# Patient Record
Sex: MALE | Race: WHITE | NOT HISPANIC OR LATINO | Employment: FULL TIME | URBAN - METROPOLITAN AREA
[De-identification: names, ages, dates, MRNs, and addresses within clinical notes are randomized per-mention and may not be internally consistent; named-entity substitution may affect disease eponyms.]

---

## 2017-02-02 ENCOUNTER — COMMUNICATION - HEALTHEAST (OUTPATIENT)
Dept: SCHEDULING | Facility: CLINIC | Age: 57
End: 2017-02-02

## 2017-02-05 ENCOUNTER — COMMUNICATION - HEALTHEAST (OUTPATIENT)
Dept: FAMILY MEDICINE | Facility: CLINIC | Age: 57
End: 2017-02-05

## 2017-02-05 DIAGNOSIS — I10 HTN (HYPERTENSION): ICD-10-CM

## 2017-02-05 DIAGNOSIS — E78.5 HYPERLIPIDEMIA: ICD-10-CM

## 2017-04-27 ENCOUNTER — COMMUNICATION - HEALTHEAST (OUTPATIENT)
Dept: FAMILY MEDICINE | Facility: CLINIC | Age: 57
End: 2017-04-27

## 2017-04-27 DIAGNOSIS — E78.5 HYPERLIPIDEMIA: ICD-10-CM

## 2017-04-27 DIAGNOSIS — I10 HTN (HYPERTENSION): ICD-10-CM

## 2017-06-01 ENCOUNTER — COMMUNICATION - HEALTHEAST (OUTPATIENT)
Dept: FAMILY MEDICINE | Facility: CLINIC | Age: 57
End: 2017-06-01

## 2017-07-18 ENCOUNTER — COMMUNICATION - HEALTHEAST (OUTPATIENT)
Dept: FAMILY MEDICINE | Facility: CLINIC | Age: 57
End: 2017-07-18

## 2017-07-18 DIAGNOSIS — I10 HTN (HYPERTENSION): ICD-10-CM

## 2017-08-02 ENCOUNTER — OFFICE VISIT - HEALTHEAST (OUTPATIENT)
Dept: FAMILY MEDICINE | Facility: CLINIC | Age: 57
End: 2017-08-02

## 2017-08-02 DIAGNOSIS — E78.5 HYPERLIPIDEMIA: ICD-10-CM

## 2017-08-02 DIAGNOSIS — I10 ESSENTIAL HYPERTENSION: ICD-10-CM

## 2017-08-02 DIAGNOSIS — Z12.11 SCREEN FOR COLON CANCER: ICD-10-CM

## 2017-08-02 LAB
CHOLEST SERPL-MCNC: 144 MG/DL
FASTING STATUS PATIENT QL REPORTED: YES
HDLC SERPL-MCNC: 35 MG/DL
LDLC SERPL CALC-MCNC: 91 MG/DL
TRIGL SERPL-MCNC: 88 MG/DL

## 2017-08-02 ASSESSMENT — MIFFLIN-ST. JEOR: SCORE: 1970.06

## 2017-08-07 ENCOUNTER — COMMUNICATION - HEALTHEAST (OUTPATIENT)
Dept: FAMILY MEDICINE | Facility: CLINIC | Age: 57
End: 2017-08-07

## 2017-08-28 ENCOUNTER — COMMUNICATION - HEALTHEAST (OUTPATIENT)
Dept: FAMILY MEDICINE | Facility: CLINIC | Age: 57
End: 2017-08-28

## 2017-08-28 DIAGNOSIS — E78.5 HYPERLIPIDEMIA: ICD-10-CM

## 2017-10-16 ENCOUNTER — COMMUNICATION - HEALTHEAST (OUTPATIENT)
Dept: FAMILY MEDICINE | Facility: CLINIC | Age: 57
End: 2017-10-16

## 2017-10-16 DIAGNOSIS — I10 HTN (HYPERTENSION): ICD-10-CM

## 2017-11-20 ENCOUNTER — COMMUNICATION - HEALTHEAST (OUTPATIENT)
Dept: FAMILY MEDICINE | Facility: CLINIC | Age: 57
End: 2017-11-20

## 2017-11-30 ENCOUNTER — RECORDS - HEALTHEAST (OUTPATIENT)
Dept: ADMINISTRATIVE | Facility: OTHER | Age: 57
End: 2017-11-30

## 2017-12-01 ENCOUNTER — RECORDS - HEALTHEAST (OUTPATIENT)
Dept: ADMINISTRATIVE | Facility: OTHER | Age: 57
End: 2017-12-01

## 2018-01-14 ENCOUNTER — COMMUNICATION - HEALTHEAST (OUTPATIENT)
Dept: FAMILY MEDICINE | Facility: CLINIC | Age: 58
End: 2018-01-14

## 2018-01-14 DIAGNOSIS — I10 HTN (HYPERTENSION): ICD-10-CM

## 2018-01-25 ENCOUNTER — COMMUNICATION - HEALTHEAST (OUTPATIENT)
Dept: FAMILY MEDICINE | Facility: CLINIC | Age: 58
End: 2018-01-25

## 2018-03-07 ENCOUNTER — COMMUNICATION - HEALTHEAST (OUTPATIENT)
Dept: FAMILY MEDICINE | Facility: CLINIC | Age: 58
End: 2018-03-07

## 2018-04-18 ENCOUNTER — COMMUNICATION - HEALTHEAST (OUTPATIENT)
Dept: FAMILY MEDICINE | Facility: CLINIC | Age: 58
End: 2018-04-18

## 2018-06-01 ENCOUNTER — COMMUNICATION - HEALTHEAST (OUTPATIENT)
Dept: FAMILY MEDICINE | Facility: CLINIC | Age: 58
End: 2018-06-01

## 2018-07-09 ENCOUNTER — COMMUNICATION - HEALTHEAST (OUTPATIENT)
Dept: FAMILY MEDICINE | Facility: CLINIC | Age: 58
End: 2018-07-09

## 2018-07-09 DIAGNOSIS — I10 HTN (HYPERTENSION): ICD-10-CM

## 2018-08-07 ENCOUNTER — COMMUNICATION - HEALTHEAST (OUTPATIENT)
Dept: FAMILY MEDICINE | Facility: CLINIC | Age: 58
End: 2018-08-07

## 2018-08-30 ENCOUNTER — COMMUNICATION - HEALTHEAST (OUTPATIENT)
Dept: FAMILY MEDICINE | Facility: CLINIC | Age: 58
End: 2018-08-30

## 2018-08-30 DIAGNOSIS — E78.5 HYPERLIPIDEMIA: ICD-10-CM

## 2018-09-24 ENCOUNTER — COMMUNICATION - HEALTHEAST (OUTPATIENT)
Dept: FAMILY MEDICINE | Facility: CLINIC | Age: 58
End: 2018-09-24

## 2018-10-10 ENCOUNTER — COMMUNICATION - HEALTHEAST (OUTPATIENT)
Dept: FAMILY MEDICINE | Facility: CLINIC | Age: 58
End: 2018-10-10

## 2018-10-10 DIAGNOSIS — I10 HTN (HYPERTENSION): ICD-10-CM

## 2018-11-09 ENCOUNTER — COMMUNICATION - HEALTHEAST (OUTPATIENT)
Dept: FAMILY MEDICINE | Facility: CLINIC | Age: 58
End: 2018-11-09

## 2018-11-28 ENCOUNTER — COMMUNICATION - HEALTHEAST (OUTPATIENT)
Dept: FAMILY MEDICINE | Facility: CLINIC | Age: 58
End: 2018-11-28

## 2018-11-28 DIAGNOSIS — E78.5 HYPERLIPIDEMIA: ICD-10-CM

## 2018-12-14 ENCOUNTER — COMMUNICATION - HEALTHEAST (OUTPATIENT)
Dept: FAMILY MEDICINE | Facility: CLINIC | Age: 58
End: 2018-12-14

## 2019-01-24 ENCOUNTER — COMMUNICATION - HEALTHEAST (OUTPATIENT)
Dept: FAMILY MEDICINE | Facility: CLINIC | Age: 59
End: 2019-01-24

## 2019-02-12 ENCOUNTER — RECORDS - HEALTHEAST (OUTPATIENT)
Dept: ADMINISTRATIVE | Facility: OTHER | Age: 59
End: 2019-02-12

## 2019-02-22 ENCOUNTER — COMMUNICATION - HEALTHEAST (OUTPATIENT)
Dept: FAMILY MEDICINE | Facility: CLINIC | Age: 59
End: 2019-02-22

## 2019-02-27 ENCOUNTER — OFFICE VISIT - HEALTHEAST (OUTPATIENT)
Dept: FAMILY MEDICINE | Facility: CLINIC | Age: 59
End: 2019-02-27

## 2019-02-27 DIAGNOSIS — Z00.00 WELLNESS EXAMINATION: ICD-10-CM

## 2019-02-27 DIAGNOSIS — M17.0 OSTEOARTHRITIS OF BOTH KNEES, UNSPECIFIED OSTEOARTHRITIS TYPE: ICD-10-CM

## 2019-02-27 DIAGNOSIS — I10 ESSENTIAL HYPERTENSION, BENIGN: ICD-10-CM

## 2019-02-27 LAB
ANION GAP SERPL CALCULATED.3IONS-SCNC: 12 MMOL/L (ref 5–18)
BUN SERPL-MCNC: 20 MG/DL (ref 8–22)
CALCIUM SERPL-MCNC: 9.2 MG/DL (ref 8.5–10.5)
CHLORIDE BLD-SCNC: 101 MMOL/L (ref 98–107)
CHOLEST SERPL-MCNC: 170 MG/DL
CO2 SERPL-SCNC: 27 MMOL/L (ref 22–31)
CREAT SERPL-MCNC: 0.88 MG/DL (ref 0.7–1.3)
ERYTHROCYTE [DISTWIDTH] IN BLOOD BY AUTOMATED COUNT: 12.3 % (ref 11–14.5)
FASTING STATUS PATIENT QL REPORTED: YES
GFR SERPL CREATININE-BSD FRML MDRD: >60 ML/MIN/1.73M2
GLUCOSE BLD-MCNC: 98 MG/DL (ref 70–125)
HBA1C MFR BLD: 5.9 % (ref 3.5–6)
HCT VFR BLD AUTO: 48.4 % (ref 40–54)
HDLC SERPL-MCNC: 38 MG/DL
HGB BLD-MCNC: 16.2 G/DL (ref 14–18)
LDLC SERPL CALC-MCNC: 100 MG/DL
MCH RBC QN AUTO: 29.6 PG (ref 27–34)
MCHC RBC AUTO-ENTMCNC: 33.5 G/DL (ref 32–36)
MCV RBC AUTO: 88 FL (ref 80–100)
PLATELET # BLD AUTO: 227 THOU/UL (ref 140–440)
PMV BLD AUTO: 7.4 FL (ref 7–10)
POTASSIUM BLD-SCNC: 4.3 MMOL/L (ref 3.5–5)
PSA SERPL-MCNC: 11 NG/ML (ref 0–3.5)
RBC # BLD AUTO: 5.47 MILL/UL (ref 4.4–6.2)
SODIUM SERPL-SCNC: 140 MMOL/L (ref 136–145)
TRIGL SERPL-MCNC: 159 MG/DL
WBC: 8.8 THOU/UL (ref 4–11)

## 2019-02-28 ENCOUNTER — COMMUNICATION - HEALTHEAST (OUTPATIENT)
Dept: FAMILY MEDICINE | Facility: CLINIC | Age: 59
End: 2019-02-28

## 2019-03-06 ENCOUNTER — RECORDS - HEALTHEAST (OUTPATIENT)
Dept: ADMINISTRATIVE | Facility: OTHER | Age: 59
End: 2019-03-06

## 2019-03-07 ENCOUNTER — COMMUNICATION - HEALTHEAST (OUTPATIENT)
Dept: FAMILY MEDICINE | Facility: CLINIC | Age: 59
End: 2019-03-07

## 2019-03-07 DIAGNOSIS — E78.5 HYPERLIPIDEMIA: ICD-10-CM

## 2019-03-15 ENCOUNTER — RECORDS - HEALTHEAST (OUTPATIENT)
Dept: ADMINISTRATIVE | Facility: OTHER | Age: 59
End: 2019-03-15

## 2019-03-18 ENCOUNTER — OFFICE VISIT - HEALTHEAST (OUTPATIENT)
Dept: FAMILY MEDICINE | Facility: CLINIC | Age: 59
End: 2019-03-18

## 2019-03-18 DIAGNOSIS — J10.1 INFLUENZA B: ICD-10-CM

## 2019-03-18 DIAGNOSIS — R05.9 COUGH: ICD-10-CM

## 2019-03-18 LAB
FLUAV AG SPEC QL IA: ABNORMAL
FLUBV AG SPEC QL IA: ABNORMAL

## 2019-03-27 ENCOUNTER — COMMUNICATION - HEALTHEAST (OUTPATIENT)
Dept: FAMILY MEDICINE | Facility: CLINIC | Age: 59
End: 2019-03-27

## 2019-03-27 DIAGNOSIS — M17.0 OSTEOARTHRITIS OF BOTH KNEES, UNSPECIFIED OSTEOARTHRITIS TYPE: ICD-10-CM

## 2019-04-04 ENCOUNTER — RECORDS - HEALTHEAST (OUTPATIENT)
Dept: ADMINISTRATIVE | Facility: OTHER | Age: 59
End: 2019-04-04

## 2019-05-13 ENCOUNTER — COMMUNICATION - HEALTHEAST (OUTPATIENT)
Dept: FAMILY MEDICINE | Facility: CLINIC | Age: 59
End: 2019-05-13

## 2019-05-13 ENCOUNTER — OFFICE VISIT - HEALTHEAST (OUTPATIENT)
Dept: FAMILY MEDICINE | Facility: CLINIC | Age: 59
End: 2019-05-13

## 2019-05-13 DIAGNOSIS — I10 ESSENTIAL HYPERTENSION, BENIGN: ICD-10-CM

## 2019-05-13 DIAGNOSIS — I10 HTN (HYPERTENSION): ICD-10-CM

## 2019-05-13 DIAGNOSIS — R97.20 ELEVATED PROSTATE SPECIFIC ANTIGEN (PSA): ICD-10-CM

## 2019-05-13 DIAGNOSIS — G47.33 OSA (OBSTRUCTIVE SLEEP APNEA): ICD-10-CM

## 2019-05-13 LAB
ANION GAP SERPL CALCULATED.3IONS-SCNC: 11 MMOL/L (ref 5–18)
BUN SERPL-MCNC: 18 MG/DL (ref 8–22)
CALCIUM SERPL-MCNC: 10 MG/DL (ref 8.5–10.5)
CHLORIDE BLD-SCNC: 104 MMOL/L (ref 98–107)
CO2 SERPL-SCNC: 25 MMOL/L (ref 22–31)
CREAT SERPL-MCNC: 0.96 MG/DL (ref 0.7–1.3)
ERYTHROCYTE [DISTWIDTH] IN BLOOD BY AUTOMATED COUNT: 12.6 % (ref 11–14.5)
GFR SERPL CREATININE-BSD FRML MDRD: >60 ML/MIN/1.73M2
GLUCOSE BLD-MCNC: 97 MG/DL (ref 70–125)
HCT VFR BLD AUTO: 48.6 % (ref 40–54)
HGB BLD-MCNC: 16.4 G/DL (ref 14–18)
MCH RBC QN AUTO: 30.1 PG (ref 27–34)
MCHC RBC AUTO-ENTMCNC: 33.8 G/DL (ref 32–36)
MCV RBC AUTO: 89 FL (ref 80–100)
PLATELET # BLD AUTO: 270 THOU/UL (ref 140–440)
PMV BLD AUTO: 7.6 FL (ref 7–10)
POTASSIUM BLD-SCNC: 4.7 MMOL/L (ref 3.5–5)
RBC # BLD AUTO: 5.44 MILL/UL (ref 4.4–6.2)
SODIUM SERPL-SCNC: 140 MMOL/L (ref 136–145)
WBC: 6.5 THOU/UL (ref 4–11)

## 2019-05-24 ASSESSMENT — MIFFLIN-ST. JEOR: SCORE: 2069.31

## 2019-05-28 ENCOUNTER — ANESTHESIA - HEALTHEAST (OUTPATIENT)
Dept: SURGERY | Facility: HOSPITAL | Age: 59
End: 2019-05-28

## 2019-05-28 ENCOUNTER — SURGERY - HEALTHEAST (OUTPATIENT)
Dept: SURGERY | Facility: HOSPITAL | Age: 59
End: 2019-05-28

## 2019-05-28 ASSESSMENT — MIFFLIN-ST. JEOR: SCORE: 2059.11

## 2019-05-29 ENCOUNTER — AMBULATORY - HEALTHEAST (OUTPATIENT)
Dept: OTHER | Facility: CLINIC | Age: 59
End: 2019-05-29

## 2019-06-06 ENCOUNTER — RECORDS - HEALTHEAST (OUTPATIENT)
Dept: ADMINISTRATIVE | Facility: OTHER | Age: 59
End: 2019-06-06

## 2019-06-17 ENCOUNTER — RECORDS - HEALTHEAST (OUTPATIENT)
Dept: ADMINISTRATIVE | Facility: OTHER | Age: 59
End: 2019-06-17

## 2019-07-16 ENCOUNTER — COMMUNICATION - HEALTHEAST (OUTPATIENT)
Dept: FAMILY MEDICINE | Facility: CLINIC | Age: 59
End: 2019-07-16

## 2019-07-16 DIAGNOSIS — I10 HTN (HYPERTENSION): ICD-10-CM

## 2019-09-05 ENCOUNTER — RECORDS - HEALTHEAST (OUTPATIENT)
Dept: ADMINISTRATIVE | Facility: OTHER | Age: 59
End: 2019-09-05

## 2019-09-19 ENCOUNTER — COMMUNICATION - HEALTHEAST (OUTPATIENT)
Dept: ONCOLOGY | Facility: CLINIC | Age: 59
End: 2019-09-19

## 2019-12-16 ENCOUNTER — RECORDS - HEALTHEAST (OUTPATIENT)
Dept: ADMINISTRATIVE | Facility: OTHER | Age: 59
End: 2019-12-16

## 2020-02-18 ENCOUNTER — COMMUNICATION - HEALTHEAST (OUTPATIENT)
Dept: SCHEDULING | Facility: CLINIC | Age: 60
End: 2020-02-18

## 2020-02-18 DIAGNOSIS — I10 HTN (HYPERTENSION): ICD-10-CM

## 2020-02-24 ENCOUNTER — COMMUNICATION - HEALTHEAST (OUTPATIENT)
Dept: FAMILY MEDICINE | Facility: CLINIC | Age: 60
End: 2020-02-24

## 2020-02-24 ENCOUNTER — OFFICE VISIT - HEALTHEAST (OUTPATIENT)
Dept: FAMILY MEDICINE | Facility: CLINIC | Age: 60
End: 2020-02-24

## 2020-02-24 DIAGNOSIS — H61.21 IMPACTED CERUMEN OF RIGHT EAR: ICD-10-CM

## 2020-02-24 DIAGNOSIS — E78.5 HYPERLIPIDEMIA: ICD-10-CM

## 2020-02-24 DIAGNOSIS — I10 HTN (HYPERTENSION): ICD-10-CM

## 2020-02-24 DIAGNOSIS — I10 ESSENTIAL HYPERTENSION, BENIGN: ICD-10-CM

## 2020-02-24 DIAGNOSIS — Z00.00 WELLNESS EXAMINATION: ICD-10-CM

## 2020-02-24 LAB
ANION GAP SERPL CALCULATED.3IONS-SCNC: 9 MMOL/L (ref 5–18)
BUN SERPL-MCNC: 18 MG/DL (ref 8–22)
CALCIUM SERPL-MCNC: 9.2 MG/DL (ref 8.5–10.5)
CHLORIDE BLD-SCNC: 104 MMOL/L (ref 98–107)
CHOLEST SERPL-MCNC: 158 MG/DL
CO2 SERPL-SCNC: 28 MMOL/L (ref 22–31)
CREAT SERPL-MCNC: 0.83 MG/DL (ref 0.7–1.3)
ERYTHROCYTE [DISTWIDTH] IN BLOOD BY AUTOMATED COUNT: 12.8 % (ref 11–14.5)
FASTING STATUS PATIENT QL REPORTED: YES
GFR SERPL CREATININE-BSD FRML MDRD: >60 ML/MIN/1.73M2
GLUCOSE BLD-MCNC: 101 MG/DL (ref 70–125)
HBA1C MFR BLD: 5.9 % (ref 3.5–6)
HCT VFR BLD AUTO: 47.4 % (ref 40–54)
HDLC SERPL-MCNC: 35 MG/DL
HGB BLD-MCNC: 16.1 G/DL (ref 14–18)
LDLC SERPL CALC-MCNC: 100 MG/DL
MCH RBC QN AUTO: 29.2 PG (ref 27–34)
MCHC RBC AUTO-ENTMCNC: 34 G/DL (ref 32–36)
MCV RBC AUTO: 86 FL (ref 80–100)
PLATELET # BLD AUTO: 252 THOU/UL (ref 140–440)
PMV BLD AUTO: 7.2 FL (ref 7–10)
POTASSIUM BLD-SCNC: 4.3 MMOL/L (ref 3.5–5)
RBC # BLD AUTO: 5.52 MILL/UL (ref 4.4–6.2)
SODIUM SERPL-SCNC: 141 MMOL/L (ref 136–145)
TRIGL SERPL-MCNC: 115 MG/DL
WBC: 7.9 THOU/UL (ref 4–11)

## 2020-02-24 ASSESSMENT — MIFFLIN-ST. JEOR: SCORE: 2093.13

## 2020-03-02 ENCOUNTER — RECORDS - HEALTHEAST (OUTPATIENT)
Dept: ADMINISTRATIVE | Facility: OTHER | Age: 60
End: 2020-03-02

## 2020-03-13 ENCOUNTER — RECORDS - HEALTHEAST (OUTPATIENT)
Dept: ADMINISTRATIVE | Facility: OTHER | Age: 60
End: 2020-03-13

## 2020-04-23 ENCOUNTER — COMMUNICATION - HEALTHEAST (OUTPATIENT)
Dept: FAMILY MEDICINE | Facility: CLINIC | Age: 60
End: 2020-04-23

## 2020-04-23 DIAGNOSIS — I10 ESSENTIAL HYPERTENSION, BENIGN: ICD-10-CM

## 2020-06-10 ENCOUNTER — RECORDS - HEALTHEAST (OUTPATIENT)
Dept: ADMINISTRATIVE | Facility: OTHER | Age: 60
End: 2020-06-10

## 2020-06-11 ENCOUNTER — RECORDS - HEALTHEAST (OUTPATIENT)
Dept: ADMINISTRATIVE | Facility: OTHER | Age: 60
End: 2020-06-11

## 2020-06-15 ENCOUNTER — VIRTUAL VISIT (OUTPATIENT)
Dept: FAMILY MEDICINE | Facility: OTHER | Age: 60
End: 2020-06-15

## 2020-06-15 ENCOUNTER — AMBULATORY - HEALTHEAST (OUTPATIENT)
Dept: FAMILY MEDICINE | Facility: CLINIC | Age: 60
End: 2020-06-15

## 2020-06-15 DIAGNOSIS — Z20.822 SUSPECTED COVID-19 VIRUS INFECTION: ICD-10-CM

## 2020-06-16 NOTE — PROGRESS NOTES
"Date: 06/15/2020 12:50:49  Clinician: Geoff Green  Clinician NPI: [id number]  Patient: Gonzalez Morton  Patient : 1960  Patient Address: [address] [place] [address]  Patient Phone: [phone]  Visit Protocol: URI  Patient Summary:  Gonzalez is a 59 year old ( : 1960 ) male who initiated a Visit for COVID-19 (Coronavirus) evaluation and screening. When asked the question \"Please sign me up to receive news, health information and promotions. \", Gonzalez responded \"No\".    Gonzalez states his symptoms started gradually 3-4 days ago. After his symptoms started, they improved and then got worse again.   His symptoms consist of malaise, myalgia, a headache, and chills. Gonzalez also feels feverish.   Symptom details     Temperature: His current temperature is 100.2 degrees Fahrenheit. Gonzalez has had a temperature over 100 degrees Fahrenheit for 1-2 days.     Headache: He states the headache is mild (1-3 on a 10 point pain scale).      Gonzalez denies having wheezing, nausea, teeth pain, ageusia, diarrhea, sore throat, enlarged lymph nodes, anosmia, facial pain or pressure, cough, nasal congestion, vomiting, rhinitis, and ear pain. He also denies having recent facial or sinus surgery in the past 60 days, taking antibiotic medication for the symptoms, and having a sinus infection within the past year. He is not experiencing dyspnea.   Precipitating events  He has not recently been exposed to someone with influenza. Gonzalez has not been in close contact with any high risk individuals.   Pertinent COVID-19 (Coronavirus) information  In the past 14 days, Gonzalez has not worked in a congregate living setting.   He does not work or volunteer as healthcare worker or a  and does not work or volunteer in a healthcare facility.   Gonzalez also has not lived in a congregate living setting in the past 14 days. He does not live with a healthcare " worker.   Gonzalez has not had a close contact with a laboratory-confirmed COVID-19 patient within 14 days of symptom onset.   Pertinent medical history  Gonzalez needs a return to work/school note.   Weight: 270 lbs   Gonzalez does not smoke or use smokeless tobacco.   Weight: 270 lbs    MEDICATIONS: lisinopril-hydrochlorothiazide oral, simvastatin oral, IBU oral, ALLERGIES: Penicillins  Clinician Response:  Dear Gonzalez,   Your symptoms show that you may have coronavirus (COVID-19). This illness can cause fever, cough and trouble breathing. Many people get a mild case and get better on their own. Some people can get very sick.  What should I do?  We would like to test you for this virus. This will be a curbside test done outside the clinic.   1. Please call [phone] to schedule your visit. Explain that you were referred by [facility] to have a COVID-19 test. Be [facility].  The following will serve as your written order for this COVID Test, ordered by me, for the indication of suspected COVID [Z20.828]: The test will be ordered in [employer], our electronic health record, after you are scheduled. It will show as ordered and authorized by Rich Puente MD.  Order: COVID-19 (Coronavirus) PCR for SYMPTOMATIC testing from [facility].      2. When it's time for your COVID test:  Stay at least 6 feet away from others. (If someone will drive you to your test, stay in the backseat, as far away from the  as you can.)   Cover your mouth and nose with a mask, tissue or washcloth.  Go straight to the testing site. Don't make any stops on the way there or back.      3.Starting now: Stay home and away from others (self-isolate) until:   You've had no fever---and no medicine that reduces fever---for 3 full days (72 hours). And...   Your other symptoms have gotten better. For example, your cough or breathing has improved. And...   At least 10 days have passed since your symptoms started.      "  During this time, don't leave the house except for testing or medical care.   Stay in your own room, even for meals. Use your own bathroom if you can.   Stay away from others in your home. No hugging, kissing or shaking hands. No visitors.  Don't go to work, school or anywhere else.    Clean \"high touch\" surfaces often (doorknobs, counters, handles, etc.). Use a household cleaning spray or wipes. You'll find a full list of  on the EPA website: www.epa.gov/pesticide-registration/list-n-disinfectants-use-against-sars-cov-2.   Cover your mouth and nose with a mask, tissue or washcloth to avoid spreading germs.  Wash your hands and face often. Use soap and water.  Caregivers in these groups are at risk for severe illness due to COVID-19:  o People 65 years and older  o People who live in a nursing home or long-term care facility  o People with chronic disease (lung, heart, cancer, diabetes, kidney, liver, immunologic)  o People who have a weakened immune system, including those who:   Are in cancer treatment  Take medicine that weakens the immune system, such as corticosteroids  Had a bone marrow or organ transplant  Have an immune deficiency  Have poorly controlled HIV or AIDS  Are obese (body mass index of 40 or higher)  Smoke regularly   o Caregivers should wear gloves while washing dishes, handling laundry and cleaning bedrooms and bathrooms.  o Use caution when washing and drying laundry: Don't shake dirty laundry, and use the warmest water setting that you can.  o For more tips, go to www.cdc.gov/coronavirus/2019-ncov/downloads/10Things.pdf.      How can I take care of myself?   Get lots of rest. Drink extra fluids (unless a doctor has told you not to).   Take Tylenol (acetaminophen) for fever or pain. If you have liver or kidney problems, ask your family doctor if it's okay to take Tylenol.   Adults can take either:    650 mg (two 325 mg pills) every 4 to 6 hours, or...   1,000 mg (two 500 mg pills) " every 8 hours as needed.    Note: Don't take more than 3,000 mg in one day. Acetaminophen is found in many medicines (both prescribed and over-the-counter medicines). Read all labels to be sure you don't take too much.   For children, check the Tylenol bottle for the right dose. The dose is based on the child's age or weight.    If you have other health problems (like cancer, heart failure, an organ transplant or severe kidney disease): Call your specialty clinic if you don't feel better in the next 2 days.       Know when to call 911. Emergency warning signs include:    Trouble breathing or shortness of breath Pain or pressure in the chest that doesn't go away Feeling confused like you haven't felt before, or not being able to wake up Bluish-colored lips or face.  Where can I get more information?    [email or web address] -- About COVID-19: [email or web address]   CDC -- What to Do If You're Sick: www.cdc.gov/coronavirus/2019-ncov/about/steps-when-sick.html   CDC -- Ending Home Isolation: www.cdc.gov/coronavirus/2019-ncov/hcp/disposition-in-home-patients.html   CDC -- Caring for Someone: www.cdc.gov/coronavirus/2019-ncov/if-you-are-sick/care-for-someone.html   [facility] -- Interim Guidance for Hospital Discharge to Home: www.health.[facility].[place]/diseases/coronavirus/hcp/hospdischarge.pdf   [facility] clinical trials (COVID-19 research studies): clinicalaffairs.[facility]-clinical-trials    Below are the COVID-19 hotlines at the [place] Department of Health ([facility]). Interpreters are available.    For health questions: Call 656-338-3177 or 1-165.614.8279 (7 a.m. to 7 p.m.) For questions about schools and childcare: Call 274-181-1885 or 1-802.645.4689 (7 a.m. to 7 p.m.)    Diagnosis: Other malaise  Diagnosis ICD: R53.81

## 2020-06-17 ENCOUNTER — COMMUNICATION - HEALTHEAST (OUTPATIENT)
Dept: FAMILY MEDICINE | Facility: CLINIC | Age: 60
End: 2020-06-17

## 2020-06-18 ENCOUNTER — HOSPITAL ENCOUNTER (OUTPATIENT)
Dept: CT IMAGING | Facility: CLINIC | Age: 60
Discharge: HOME | End: 2020-06-18
Attending: UROLOGY

## 2020-06-18 ENCOUNTER — RECORDS - HEALTHEAST (OUTPATIENT)
Dept: ADMINISTRATIVE | Facility: OTHER | Age: 60
End: 2020-06-18

## 2020-06-18 DIAGNOSIS — R10.9 ABDOMINAL PAIN, UNSPECIFIED ABDOMINAL LOCATION: ICD-10-CM

## 2020-06-18 DIAGNOSIS — R50.9 FEVER, UNSPECIFIED FEVER CAUSE: ICD-10-CM

## 2020-06-19 ENCOUNTER — RECORDS - HEALTHEAST (OUTPATIENT)
Dept: ADMINISTRATIVE | Facility: OTHER | Age: 60
End: 2020-06-19

## 2020-06-23 ENCOUNTER — HOME CARE/HOSPICE - HEALTHEAST (OUTPATIENT)
Dept: HOME HEALTH SERVICES | Facility: HOME HEALTH | Age: 60
End: 2020-06-23

## 2020-06-23 ENCOUNTER — COMMUNICATION - HEALTHEAST (OUTPATIENT)
Dept: FAMILY MEDICINE | Facility: CLINIC | Age: 60
End: 2020-06-23

## 2020-06-25 ENCOUNTER — AMBULATORY - HEALTHEAST (OUTPATIENT)
Dept: INTERVENTIONAL RADIOLOGY/VASCULAR | Facility: HOSPITAL | Age: 60
End: 2020-06-25

## 2020-06-25 ENCOUNTER — RECORDS - HEALTHEAST (OUTPATIENT)
Dept: ADMINISTRATIVE | Facility: OTHER | Age: 60
End: 2020-06-25

## 2020-06-25 ENCOUNTER — COMMUNICATION - HEALTHEAST (OUTPATIENT)
Dept: FAMILY MEDICINE | Facility: CLINIC | Age: 60
End: 2020-06-25

## 2020-06-25 DIAGNOSIS — Z11.59 ENCOUNTER FOR SCREENING FOR OTHER VIRAL DISEASES: ICD-10-CM

## 2020-06-26 ENCOUNTER — COMMUNICATION - HEALTHEAST (OUTPATIENT)
Dept: FAMILY MEDICINE | Facility: CLINIC | Age: 60
End: 2020-06-26

## 2020-06-28 ENCOUNTER — AMBULATORY - HEALTHEAST (OUTPATIENT)
Dept: FAMILY MEDICINE | Facility: CLINIC | Age: 60
End: 2020-06-28

## 2020-06-28 DIAGNOSIS — Z11.59 ENCOUNTER FOR SCREENING FOR OTHER VIRAL DISEASES: ICD-10-CM

## 2020-06-29 ENCOUNTER — COMMUNICATION - HEALTHEAST (OUTPATIENT)
Dept: FAMILY MEDICINE | Facility: CLINIC | Age: 60
End: 2020-06-29

## 2020-06-29 ENCOUNTER — OFFICE VISIT - HEALTHEAST (OUTPATIENT)
Dept: FAMILY MEDICINE | Facility: CLINIC | Age: 60
End: 2020-06-29

## 2020-06-29 DIAGNOSIS — K65.1 PELVIC ABSCESS IN MALE (H): ICD-10-CM

## 2020-07-01 ENCOUNTER — HOSPITAL ENCOUNTER (OUTPATIENT)
Dept: CT IMAGING | Facility: HOSPITAL | Age: 60
Discharge: HOME | End: 2020-07-01
Attending: PHYSICIAN ASSISTANT

## 2020-07-01 ENCOUNTER — HOSPITAL ENCOUNTER (OUTPATIENT)
Dept: INTERVENTIONAL RADIOLOGY/VASCULAR | Facility: HOSPITAL | Age: 60
Discharge: HOME | End: 2020-07-01
Attending: PHYSICIAN ASSISTANT

## 2020-07-01 DIAGNOSIS — L02.91 ABSCESS: ICD-10-CM

## 2020-07-01 ASSESSMENT — MIFFLIN-ST. JEOR: SCORE: 2040.96

## 2020-07-03 ENCOUNTER — COMMUNICATION - HEALTHEAST (OUTPATIENT)
Dept: FAMILY MEDICINE | Facility: CLINIC | Age: 60
End: 2020-07-03

## 2020-07-08 ENCOUNTER — RECORDS - HEALTHEAST (OUTPATIENT)
Dept: ADMINISTRATIVE | Facility: OTHER | Age: 60
End: 2020-07-08

## 2020-07-09 ENCOUNTER — COMMUNICATION - HEALTHEAST (OUTPATIENT)
Dept: FAMILY MEDICINE | Facility: CLINIC | Age: 60
End: 2020-07-09

## 2020-07-13 ENCOUNTER — COMMUNICATION - HEALTHEAST (OUTPATIENT)
Dept: FAMILY MEDICINE | Facility: CLINIC | Age: 60
End: 2020-07-13

## 2020-07-25 ENCOUNTER — RECORDS - HEALTHEAST (OUTPATIENT)
Dept: ADMINISTRATIVE | Facility: OTHER | Age: 60
End: 2020-07-25

## 2020-07-27 ENCOUNTER — COMMUNICATION - HEALTHEAST (OUTPATIENT)
Dept: FAMILY MEDICINE | Facility: CLINIC | Age: 60
End: 2020-07-27

## 2020-07-28 ENCOUNTER — RECORDS - HEALTHEAST (OUTPATIENT)
Dept: ADMINISTRATIVE | Facility: OTHER | Age: 60
End: 2020-07-28

## 2020-07-28 ENCOUNTER — COMMUNICATION - HEALTHEAST (OUTPATIENT)
Dept: FAMILY MEDICINE | Facility: CLINIC | Age: 60
End: 2020-07-28

## 2020-07-31 ENCOUNTER — OFFICE VISIT - HEALTHEAST (OUTPATIENT)
Dept: FAMILY MEDICINE | Facility: CLINIC | Age: 60
End: 2020-07-31

## 2020-07-31 DIAGNOSIS — M17.0 OSTEOARTHRITIS OF BOTH KNEES, UNSPECIFIED OSTEOARTHRITIS TYPE: ICD-10-CM

## 2020-07-31 DIAGNOSIS — C61 PROSTATE CANCER (H): ICD-10-CM

## 2020-07-31 DIAGNOSIS — K65.1 PELVIC ABSCESS IN MALE (H): ICD-10-CM

## 2020-07-31 DIAGNOSIS — Z01.818 PREOP EXAMINATION: ICD-10-CM

## 2020-07-31 DIAGNOSIS — I10 ESSENTIAL HYPERTENSION, BENIGN: ICD-10-CM

## 2020-07-31 LAB
ANION GAP SERPL CALCULATED.3IONS-SCNC: 10 MMOL/L (ref 5–18)
ATRIAL RATE - MUSE: 67 BPM
BASOPHILS # BLD AUTO: 0.1 THOU/UL (ref 0–0.2)
BASOPHILS NFR BLD AUTO: 2 % (ref 0–2)
BUN SERPL-MCNC: 15 MG/DL (ref 8–22)
CALCIUM SERPL-MCNC: 9.7 MG/DL (ref 8.5–10.5)
CHLORIDE BLD-SCNC: 100 MMOL/L (ref 98–107)
CO2 SERPL-SCNC: 28 MMOL/L (ref 22–31)
CREAT SERPL-MCNC: 0.87 MG/DL (ref 0.7–1.3)
DIASTOLIC BLOOD PRESSURE - MUSE: NORMAL
EOSINOPHIL # BLD AUTO: 0.3 THOU/UL (ref 0–0.4)
EOSINOPHIL NFR BLD AUTO: 5 % (ref 0–6)
ERYTHROCYTE [DISTWIDTH] IN BLOOD BY AUTOMATED COUNT: 12.6 % (ref 11–14.5)
GFR SERPL CREATININE-BSD FRML MDRD: >60 ML/MIN/1.73M2
GLUCOSE BLD-MCNC: 102 MG/DL (ref 70–125)
HCT VFR BLD AUTO: 45.8 % (ref 40–54)
HGB BLD-MCNC: 15.6 G/DL (ref 14–18)
INTERPRETATION ECG - MUSE: NORMAL
LYMPHOCYTES # BLD AUTO: 1.3 THOU/UL (ref 0.8–4.4)
LYMPHOCYTES NFR BLD AUTO: 18 % (ref 20–40)
MCH RBC QN AUTO: 29.3 PG (ref 27–34)
MCHC RBC AUTO-ENTMCNC: 34.1 G/DL (ref 32–36)
MCV RBC AUTO: 86 FL (ref 80–100)
MONOCYTES # BLD AUTO: 0.5 THOU/UL (ref 0–0.9)
MONOCYTES NFR BLD AUTO: 6 % (ref 2–10)
NEUTROPHILS # BLD AUTO: 5.1 THOU/UL (ref 2–7.7)
NEUTROPHILS NFR BLD AUTO: 70 % (ref 50–70)
P AXIS - MUSE: -19 DEGREES
PLATELET # BLD AUTO: 274 THOU/UL (ref 140–440)
PMV BLD AUTO: 9.9 FL (ref 8.5–12.5)
POTASSIUM BLD-SCNC: 4 MMOL/L (ref 3.5–5)
PR INTERVAL - MUSE: 148 MS
QRS DURATION - MUSE: 112 MS
QT - MUSE: 406 MS
QTC - MUSE: 429 MS
R AXIS - MUSE: -12 DEGREES
RBC # BLD AUTO: 5.32 MILL/UL (ref 4.4–6.2)
SODIUM SERPL-SCNC: 138 MMOL/L (ref 136–145)
SYSTOLIC BLOOD PRESSURE - MUSE: NORMAL
T AXIS - MUSE: 17 DEGREES
VENTRICULAR RATE- MUSE: 67 BPM
WBC: 7.4 THOU/UL (ref 4–11)

## 2020-07-31 ASSESSMENT — MIFFLIN-ST. JEOR: SCORE: 2082.92

## 2020-08-11 ENCOUNTER — COMMUNICATION - HEALTHEAST (OUTPATIENT)
Dept: FAMILY MEDICINE | Facility: CLINIC | Age: 60
End: 2020-08-11

## 2020-08-13 ENCOUNTER — AMBULATORY - HEALTHEAST (OUTPATIENT)
Dept: FAMILY MEDICINE | Facility: CLINIC | Age: 60
End: 2020-08-13

## 2020-08-13 ENCOUNTER — COMMUNICATION - HEALTHEAST (OUTPATIENT)
Dept: FAMILY MEDICINE | Facility: CLINIC | Age: 60
End: 2020-08-13

## 2020-08-13 DIAGNOSIS — R94.31 ABNORMAL ELECTROCARDIOGRAM: ICD-10-CM

## 2020-08-13 DIAGNOSIS — E78.2 MIXED HYPERLIPIDEMIA: ICD-10-CM

## 2020-08-13 DIAGNOSIS — I10 ESSENTIAL HYPERTENSION, BENIGN: ICD-10-CM

## 2020-08-13 DIAGNOSIS — Z01.818 PREOP EXAMINATION: ICD-10-CM

## 2020-08-18 ENCOUNTER — COMMUNICATION - HEALTHEAST (OUTPATIENT)
Dept: CARDIOLOGY | Facility: CLINIC | Age: 60
End: 2020-08-18

## 2020-08-19 ENCOUNTER — OFFICE VISIT - HEALTHEAST (OUTPATIENT)
Dept: CARDIOLOGY | Facility: CLINIC | Age: 60
End: 2020-08-19

## 2020-08-19 DIAGNOSIS — Z01.810 PRE-OPERATIVE CARDIOVASCULAR EXAMINATION: ICD-10-CM

## 2020-08-19 DIAGNOSIS — R94.31 ABNORMAL ELECTROCARDIOGRAM: ICD-10-CM

## 2020-08-19 DIAGNOSIS — I10 ESSENTIAL HYPERTENSION, BENIGN: ICD-10-CM

## 2020-08-19 DIAGNOSIS — E78.2 MIXED HYPERLIPIDEMIA: ICD-10-CM

## 2020-08-19 RX ORDER — CELECOXIB 200 MG/1
200 CAPSULE ORAL 2 TIMES DAILY
Status: SHIPPED | COMMUNITY
Start: 2020-08-19 | End: 2021-08-20

## 2020-08-19 ASSESSMENT — MIFFLIN-ST. JEOR: SCORE: 2090.86

## 2020-08-24 ENCOUNTER — COMMUNICATION - HEALTHEAST (OUTPATIENT)
Dept: FAMILY MEDICINE | Facility: CLINIC | Age: 60
End: 2020-08-24

## 2020-08-25 ENCOUNTER — RECORDS - HEALTHEAST (OUTPATIENT)
Dept: ADMINISTRATIVE | Facility: OTHER | Age: 60
End: 2020-08-25

## 2020-09-10 ENCOUNTER — RECORDS - HEALTHEAST (OUTPATIENT)
Dept: ADMINISTRATIVE | Facility: OTHER | Age: 60
End: 2020-09-10

## 2020-10-08 ENCOUNTER — RECORDS - HEALTHEAST (OUTPATIENT)
Dept: ADMINISTRATIVE | Facility: OTHER | Age: 60
End: 2020-10-08

## 2020-11-20 ENCOUNTER — COMMUNICATION - HEALTHEAST (OUTPATIENT)
Dept: FAMILY MEDICINE | Facility: CLINIC | Age: 60
End: 2020-11-20

## 2020-11-20 DIAGNOSIS — I10 HTN (HYPERTENSION): ICD-10-CM

## 2021-02-10 ENCOUNTER — COMMUNICATION - HEALTHEAST (OUTPATIENT)
Dept: FAMILY MEDICINE | Facility: CLINIC | Age: 61
End: 2021-02-10

## 2021-02-10 DIAGNOSIS — I10 HTN (HYPERTENSION): ICD-10-CM

## 2021-02-10 DIAGNOSIS — I10 ESSENTIAL HYPERTENSION, BENIGN: ICD-10-CM

## 2021-02-10 RX ORDER — LISINOPRIL 20 MG/1
TABLET ORAL
Qty: 90 TABLET | Refills: 1 | Status: SHIPPED | OUTPATIENT
Start: 2021-02-10 | End: 2021-08-20

## 2021-02-10 RX ORDER — HYDROCHLOROTHIAZIDE 25 MG/1
TABLET ORAL
Qty: 90 TABLET | Refills: 1 | Status: SHIPPED | OUTPATIENT
Start: 2021-02-10 | End: 2021-08-20

## 2021-02-13 ENCOUNTER — COMMUNICATION - HEALTHEAST (OUTPATIENT)
Dept: FAMILY MEDICINE | Facility: CLINIC | Age: 61
End: 2021-02-13

## 2021-02-13 DIAGNOSIS — E78.5 HYPERLIPIDEMIA: ICD-10-CM

## 2021-02-14 RX ORDER — SIMVASTATIN 40 MG
TABLET ORAL
Qty: 90 TABLET | Refills: 1 | Status: SHIPPED | OUTPATIENT
Start: 2021-02-14 | End: 2021-10-15

## 2021-03-03 ENCOUNTER — RECORDS - HEALTHEAST (OUTPATIENT)
Dept: ADMINISTRATIVE | Facility: OTHER | Age: 61
End: 2021-03-03

## 2021-04-23 ENCOUNTER — AMBULATORY - HEALTHEAST (OUTPATIENT)
Dept: NURSING | Facility: CLINIC | Age: 61
End: 2021-04-23

## 2021-05-14 ENCOUNTER — AMBULATORY - HEALTHEAST (OUTPATIENT)
Dept: NURSING | Facility: CLINIC | Age: 61
End: 2021-05-14

## 2021-05-27 NOTE — TELEPHONE ENCOUNTER
Pt notified and agreeable. He will contact his orthopedic. H.DeGree, CMA      Dr Lebron, please deny this so we can close the request.

## 2021-05-27 NOTE — TELEPHONE ENCOUNTER
Controlled Substance Refill Request  Medication:   Requested Prescriptions     Pending Prescriptions Disp Refills     traMADol (ULTRAM) 50 mg tablet [Pharmacy Med Name: TRAMADOL HCL 50 MG TABLET] 90 tablet 0     Sig: TAKE 1-2 TABLETS BY MOUTH EVERY 8 HOURS AS NEEDED FOR PAIN.     Date Last Fill: 2/27/19  [id number]   Submit electronically to pharmacy  Controlled Substance Agreement on File:   Encounter-Level CSA Scan Date:    There are no encounter-level csa scan date.       Last office visit: Last office visit pertaining to requested medication was 2/27/19.

## 2021-05-27 NOTE — TELEPHONE ENCOUNTER
Please notify Steven that I would prefer that he have this refilled by orthopedics until the surgery is completed.  Alternatively orthopedics may wish to try injections to get him by until surgery.

## 2021-05-28 NOTE — PROGRESS NOTES
Preoperative Exam    Scheduled Procedure: prostate removal  Surgery Date:  5/28  Surgery Location: [facility], fax [phone]    Surgeon:  Dr. Sanchez    Very pleasant 58-year-old male with a past history of hypertension and sleep apnea presents today for preoperative evaluation for prostatectomy.  The patient had elevated PSA and positive biopsy findings.    Patient has hypertension and currently on hydrochlorothiazide.  He also uses a CPAP although he has no formal diagnosis of ERIN.  He has had no chest pain or shortness of breath.  He is able to do vigorous physical activity without chest pain or difficulty.    Family history is negative from lung hyperthermia, he has had past surgeries with no complications, he does not take any blood thinners he is a Mallampati class II.    Assessment/Plan:     1. Elevated prostate specific antigen (PSA)  Patient is been optimized for surgery      2. Benign Essential Hypertension  Patient's blood pressure, when he is taking his medication, is typically well controlled.  - HM2(CBC w/o Differential)  - Basic Metabolic Panel    3. ERIN (obstructive sleep apnea)  I advised him to bring his CPAP to the hospital    Surgical Procedure Risk: Low (reported cardiac risk generally < 1%)  Have you had prior anesthesia?: Yes  Have you or any family members had a previous anesthesia reaction:  No  Do you or any family members have a history of a clotting or bleeding disorder?: No  Cardiac Risk Assessment: no increased risk for major cardiac complications    Pt has been optimized for surgery      Functional Status: Independent  Patient plans to recover at home with family.     Subjective:      Gonzalez Morton is a 58 y.o. male who presents for a preoperative consultation.      All other systems reviewed and are negative, other than those listed in the HPI.    Pertinent History  Do you have difficulty breathing or chest pain after walking up a flight of stairs: No  History of  obstructive sleep apnea: No  Steroid use in the last 6 months: No  Frequent Aspirin/NSAID use: Yes: meloxicam  Prior Blood Transfusion: No  Prior Blood Transfusion Reaction: No  If for some reason prior to, during or after the procedure, if it is medically indicated, would you be willing to have a blood transfusion?:  There is no transfusion refusal.    Current Outpatient Medications   Medication Sig Dispense Refill     doxycycline (VIBRA-TABS) 100 MG tablet TAKE ONE TABLET BY MOUTH EVERY TWELVE HOURS 180 tablet 2     hydroCHLOROthiazide (HYDRODIURIL) 25 MG tablet Take 1 tablet (25 mg total) by mouth daily. 90 tablet 2     ibuprofen (ADVIL,MOTRIN) 200 MG tablet Take 200 mg by mouth every 6 (six) hours as needed for pain.       lisinopril (PRINIVIL,ZESTRIL) 10 MG tablet TAKE ONE TABLET BY MOUTH ONE TIME DAILY 90 tablet 2     simvastatin (ZOCOR) 40 MG tablet Take 1 tablet (40 mg total) by mouth at bedtime. 90 tablet 3     traMADol (ULTRAM) 50 mg tablet TAKE 1-2 TABLETS BY MOUTH EVERY 8 HOURS AS NEEDED FOR PAIN. 90 tablet 0     No current facility-administered medications for this visit.         Allergies   Allergen Reactions     Penicillins        Patient Active Problem List   Diagnosis     Benign Essential Hypertension     Hyperlipidemia     Rosacea     Back pain       No past medical history on file.    No past surgical history on file.    Social History     Socioeconomic History     Marital status: Single     Spouse name: Not on file     Number of children: Not on file     Years of education: Not on file     Highest education level: Not on file   Occupational History     Not on file   Social Needs     Financial resource strain: Not on file     Food insecurity:     Worry: Not on file     Inability: Not on file     Transportation needs:     Medical: Not on file     Non-medical: Not on file   Tobacco Use     Smoking status: Never Smoker     Smokeless tobacco: Never Used   Substance and Sexual Activity     Alcohol use:  Not on file     Drug use: Not on file     Sexual activity: Not on file   Lifestyle     Physical activity:     Days per week: Not on file     Minutes per session: Not on file     Stress: Not on file   Relationships     Social connections:     Talks on phone: Not on file     Gets together: Not on file     Attends Buddhism service: Not on file     Active member of club or organization: Not on file     Attends meetings of clubs or organizations: Not on file     Relationship status: Not on file     Intimate partner violence:     Fear of current or ex partner: Not on file     Emotionally abused: Not on file     Physically abused: Not on file     Forced sexual activity: Not on file   Other Topics Concern     Not on file   Social History Narrative     Not on file       ROS:  10 pt system review complete.  Notable for hypertension ERIN    Patient Care Team:  Candie Lebron MD as PCP - General (Family Medicine)          Objective:     Vitals:    05/13/19 0901   BP: 140/90   Pulse: 78   Resp: 16   SpO2: 97%   Weight: (!) 278 lb (126.1 kg)         Physical Exam:  Constitutional:    --Vitals as above  --No acute distress  Eyes-  --Sclera noninjected  --Lids and conjunctiva normal  ENT-  --TMs clear  --Sclera noninjected  --Pharynx not erythematous  Neck-  --Neck supple with no cervical lymphadenopathy  Lungs-  --Clear to Auscultation  Heart-  --Regular rate and rhythm  Abdomen--  --Soft, non-tender, non-distended  Skin-  --Pink and dry  Psych-  --Alert and oriented  --Normal affect      There are no Patient Instructions on file for this visit.        Labs:  Labs pending at this time.  Results will be reviewed when available.    Immunization History   Administered Date(s) Administered     Influenza,seasonal quad, PF 09/17/2014     Influenza,seasonal quad, PF, 36+MOS 09/17/2014     Influenza,seasonal, Inj IIV3 09/24/2011     Tdap 09/17/2014       Thank you for the opportunity to participate in the care for this patient.  If  you have any concerns please do not hesitate to contact me at the [facility] at [phone].    Electronically signed by Candie Lebron MD 05/13/19 9:02 AM

## 2021-05-29 NOTE — ANESTHESIA PREPROCEDURE EVALUATION
Anesthesia Evaluation      Patient summary reviewed   No history of anesthetic complications     Airway   Mallampati: I  Neck ROM: full   Pulmonary - normal exam    breath sounds clear to auscultation  (+) sleep apnea on CPAP, ,   (-) shortness of breath, not a smoker                         Cardiovascular - normal exam  Exercise tolerance: > or = 4 METS  (+) hypertension, , hypercholesterolemia,     (-) angina  Rhythm: regular  Rate: normal,         Neuro/Psych - negative ROS     Comments: Back pain    Endo/Other    (+) obesity,      Comments: rosacea    GI/Hepatic/Renal    (+) GERD well controlled,             Dental - normal exam                        Anesthesia Plan  Planned anesthetic: general endotracheal    ASA 3   Induction: intravenous   Anesthetic plan and risks discussed with: patient  Anesthesia plan special considerations: antiemetics,   Post-op plan: other (ERIN orders)

## 2021-05-29 NOTE — ANESTHESIA POSTPROCEDURE EVALUATION
Patient: Gonzalez Morton  ROBOTIC ASSISTED RADICAL RETROPUBIC PROSTATECTOMY, BILATERAL PELVIC LYMPH NODE DISSECTION  Anesthesia type: general    Patient location: PACU  Last vitals:   Vitals Value Taken Time   /94 5/28/2019  2:16 PM   Temp 36.8  C (98.2  F) 5/28/2019  2:07 PM   Pulse 71 5/28/2019  2:26 PM   Resp 14 5/28/2019  2:26 PM   SpO2 95 % 5/28/2019  2:26 PM   Vitals shown include unvalidated device data.  Post vital signs: stable  Level of consciousness: awake and responds to simple questions  Post-anesthesia pain: pain controlled  Post-anesthesia nausea and vomiting: no  Pulmonary: unassisted, return to baseline  Cardiovascular: stable and blood pressure at baseline  Hydration: adequate  Anesthetic events: no    QCDR Measures:  ASA# 11 - Vaani-op Cardiac Arrest: ASA11B - Patient did NOT experience unanticipated cardiac arrest  ASA# 12 - Avani-op Mortality Rate: ASA12B - Patient did NOT die  ASA# 13 - PACU Re-Intubation Rate: ASA13B - Patient did NOT require a new airway mgmt  ASA# 10 - Composite Anes Safety: ASA10A - No serious adverse event    Additional Notes:

## 2021-05-29 NOTE — ANESTHESIA CARE TRANSFER NOTE
Last vitals:   Vitals:    05/28/19 1407   BP: (!) 150/97   Pulse: 83   Resp: 20   Temp: 36.8  C (98.2  F)   SpO2: 94%     Patient's level of consciousness is drowsy  Spontaneous respirations: yes  Maintains airway independently: yes  Dentition unchanged: yes  Oropharynx: oropharynx clear of all foreign objects    QCDR Measures:  ASA# 20 - Surgical Safety Checklist: WHO surgical safety checklist completed prior to induction    PQRS# 430 - Adult PONV Prevention: 4558F - Pt received => 2 anti-emetic agents (different classes) preop & intraop  ASA# 8 - Peds PONV Prevention: NA - Not pediatric patient, not GA or 2 or more risk factors NOT present  PQRS# 424 - Avani-op Temp Management: 4559F - At least one body temp DOCUMENTED => 35.5C or 95.9F within required timeframe  PQRS# 426 - PACU Transfer Protocol: - Transfer of care checklist used  ASA# 14 - Acute Post-op Pain: ASA14B - Patient did NOT experience pain >= 7 out of 10

## 2021-05-30 NOTE — TELEPHONE ENCOUNTER
Medication Question or Clarification  Who is calling: Pharmacy: fax  What medication are you calling about? (include dose and sig) lisinopril 10 mg tab Take one tablet by mouth every day #90.  Who prescribed the medication?: Dr Lebron  What is your question/concern?: The lisinopril has a hold on it from Dr Rosenbaum.  Please advise.   Pharmacy:[facility]  Okay to leave a detailed message?: Yes  Site CMT - Please call the pharmacy to obtain any additional needed information.

## 2021-05-31 VITALS — HEIGHT: 70 IN | BODY MASS INDEX: 36.67 KG/M2 | WEIGHT: 256.12 LBS

## 2021-06-01 NOTE — TELEPHONE ENCOUNTER
I spoke with Gonzalez regarding my role at HE and his SCP. I explained the contents as well as my process for getting it to him. He agreed to receive via mail and confirmed his address. I advised I would likely get it done today and mail tomorrow and once received, would call for review. He confirmed understanding. I thanked him for his time. Dez

## 2021-06-02 VITALS — BODY MASS INDEX: 39.74 KG/M2 | WEIGHT: 273 LBS

## 2021-06-02 VITALS — BODY MASS INDEX: 39.88 KG/M2 | WEIGHT: 274 LBS

## 2021-06-02 VITALS — WEIGHT: 278 LBS | BODY MASS INDEX: 40.46 KG/M2

## 2021-06-02 VITALS — BODY MASS INDEX: 41.1 KG/M2 | HEIGHT: 69 IN | WEIGHT: 277.5 LBS

## 2021-06-02 NOTE — TELEPHONE ENCOUNTER
I called Steven to review his SCP. We reviewed the contents of the plan as well as the Treatment Summary in its' entirety. I commended him for having an adv dir and we spoke at length about genetics as he reported that his father and uncle both had prostate cancer. I offered to send him more materials in terms of why to consider counseling or testing. He accepted the information. We discussed the [facility] program and agreed to an email when the invite is est in the spring. I asked him if he needed anything else in terms of survivorship and he declined. He felt the information that I sent him was good and he expressed appreciation for that. I congratulated him on his survivorship and thanked him for his time. Dez

## 2021-06-04 VITALS
WEIGHT: 279.25 LBS | BODY MASS INDEX: 39.98 KG/M2 | DIASTOLIC BLOOD PRESSURE: 78 MMHG | SYSTOLIC BLOOD PRESSURE: 122 MMHG | HEART RATE: 70 BPM | OXYGEN SATURATION: 96 % | HEIGHT: 70 IN

## 2021-06-04 VITALS
SYSTOLIC BLOOD PRESSURE: 142 MMHG | RESPIRATION RATE: 16 BRPM | HEIGHT: 70 IN | DIASTOLIC BLOOD PRESSURE: 80 MMHG | BODY MASS INDEX: 40.23 KG/M2 | WEIGHT: 281 LBS | HEART RATE: 68 BPM

## 2021-06-04 VITALS
DIASTOLIC BLOOD PRESSURE: 82 MMHG | HEART RATE: 62 BPM | WEIGHT: 285 LBS | RESPIRATION RATE: 16 BRPM | SYSTOLIC BLOOD PRESSURE: 138 MMHG | OXYGEN SATURATION: 95 % | BODY MASS INDEX: 42.21 KG/M2 | HEIGHT: 69 IN

## 2021-06-04 VITALS
SYSTOLIC BLOOD PRESSURE: 138 MMHG | BODY MASS INDEX: 40.2 KG/M2 | TEMPERATURE: 98.9 F | DIASTOLIC BLOOD PRESSURE: 84 MMHG | RESPIRATION RATE: 16 BRPM | HEART RATE: 76 BPM | WEIGHT: 272.19 LBS | OXYGEN SATURATION: 97 %

## 2021-06-04 VITALS — BODY MASS INDEX: 38.65 KG/M2 | WEIGHT: 270 LBS | HEIGHT: 70 IN

## 2021-06-06 NOTE — TELEPHONE ENCOUNTER
Refill Approved    Rx renewed per Medication Renewal Policy. Medication was last renewed on 5/13/19.    Katheryn Haddad, [facility] Triage/Med Refill 2/18/2020     Requested Prescriptions   Pending Prescriptions Disp Refills     hydroCHLOROthiazide (HYDRODIURIL) 25 MG tablet 90 tablet 2     Sig: Take 1 tablet (25 mg total) by mouth daily.       Diuretics/Combination Diuretics Refill Protocol  Passed - 2/18/2020 10:36 AM        Passed - Visit with PCP or prescribing provider visit in past 12 months     Last office visit with prescriber/PCP: 2/27/2019 Candie Lebron MD OR same dept: Visit date not found OR same specialty: Visit date not found  Last physical: 5/13/2019 Last MTM visit: Visit date not found   Next visit within 3 mo: Visit date not found  Next physical within 3 mo: Visit date not found  Prescriber OR PCP: Candie Lebron MD  Last diagnosis associated with med order: 1. HTN (hypertension)  - hydroCHLOROthiazide (HYDRODIURIL) 25 MG tablet; Take 1 tablet (25 mg total) by mouth daily.  Dispense: 90 tablet; Refill: 2    If protocol passes may refill for 12 months if within 3 months of last provider visit (or a total of 15 months).             Passed - Serum Potassium in past 12 months      Lab Results   Component Value Date    Potassium 4.7 05/13/2019             Passed - Serum Sodium in past 12 months      Lab Results   Component Value Date    Sodium 140 05/13/2019             Passed - Blood pressure on file in past 12 months     BP Readings from Last 1 Encounters:   05/29/19 107/67             Passed - Serum Creatinine in past 12 months      Creatinine   Date Value Ref Range Status   05/13/2019 0.96 0.70 - 1.30 mg/dL Final

## 2021-06-06 NOTE — TELEPHONE ENCOUNTER
Refill Request  Did you contact pharmacy: No  Medication name:   Requested Prescriptions     Pending Prescriptions Disp Refills     hydroCHLOROthiazide (HYDRODIURIL) 25 MG tablet 90 tablet 2     Sig: Take 1 tablet (25 mg total) by mouth daily.     Who prescribed the medication: Candie Lebron MD  [facility]  Is patient out of medication: Yes  Patient notified refills processed in 3 business days:  yes  Okay to leave a detailed message: yes

## 2021-06-06 NOTE — PROGRESS NOTES
Assessment:      Healthy male exam.      Plan:    1. Benign Essential Hypertension-worsening  Patient's blood pressure is not well controlled.  Will increase lisinopril to 20 mg daily and he will monitor.  - lisinopriL (PRINIVIL,ZESTRIL) 20 MG tablet; Take 1 tablet (20 mg total) by mouth daily.  Dispense: 30 tablet; Refill: 11    2. Hyperlipidemia  Stable  - simvastatin (ZOCOR) 40 MG tablet; Take 1 tablet (40 mg total) by mouth at bedtime.  Dispense: 90 tablet; Refill: 3    3. HTN (hypertension)  As above  - hydroCHLOROthiazide (HYDRODIURIL) 25 MG tablet; Take 1 tablet (25 mg total) by mouth daily.  Dispense: 90 tablet; Refill: 1    4. Wellness examination  Encourage weight loss and patient would like to exercise more after he gets his knees repaired  - HM2(CBC w/o Differential)  - Basic Metabolic Panel  - Lipid Cascade  - Glycosylated Hemoglobin A1c    5. Impacted cerumen of right ear  Patient has cerumen impaction which is removed with irrigation.     All questions answered.     Subjective:      Gonzalez Morton is a 59 y.o. male who presents for an annual exam.  He has status post prostatectomy and last 2 PSAs have been undetectable.  He is continent of urine, has no side effects afterwards, and is back to work.  He continues to have severe DJD in both knees and is scheduled to see [facility] for repair.    He has a new concern about difficulty hearing out of his right ear.    Healthy Habits:   Regular Exercise: Yes  Sunscreen Use: No  Healthy Diet: Yes  Dental Visits Regularly: No  Seat Belt: Yes  Sexually active: Yes  Monthly Self Testicular Exams:  No  Hemoccults: No  Flex Sig: No  Colonoscopy: Yes  Lipid Profile: Yes  Glucose Screen: Yes  Prevention of Osteoporosis: Yes  Last Dexa: No  Guns at Home:  No      Immunization History   Administered Date(s) Administered     Influenza,seasonal quad, PF 09/17/2014     Influenza,seasonal quad, PF, =/> 6months 09/17/2014     Influenza,seasonal, Inj IIV3  09/24/2011     Tdap 09/17/2014     Immunization status: up to date and documented.    No exam data present     Current Outpatient Medications   Medication Sig Dispense Refill     acetaminophen (TYLENOL) 650 MG CR tablet Take 1,300 mg by mouth every 8 (eight) hours as needed for pain.              hydroCHLOROthiazide (HYDRODIURIL) 25 MG tablet Take 1 tablet (25 mg total) by mouth daily. 90 tablet 1     lisinopril (PRINIVIL,ZESTRIL) 10 MG tablet Take 1 tablet (10 mg total) by mouth daily. 90 tablet 2     meloxicam (MOBIC) 15 MG tablet Take 15 mg by mouth daily.       neomycin-bacitracin-polymyxin (NEOSPORIN) ointment Apply to penis at catheter insertion site three times a day while catheter is in place. 15 g 0     simvastatin (ZOCOR) 40 MG tablet Take 1 tablet (40 mg total) by mouth at bedtime. 90 tablet 3     sildenafil (REVATIO) 20 mg tablet TAKE UP TO 5 TABLETS 3 TIMES A WEEK OR AS NEEDED.  11     No current facility-administered medications for this visit.      Past Medical History:   Diagnosis Date     Hypertension      Rosacea      Sleep apnea     CPAP     Past Surgical History:   Procedure Laterality Date     APPENDECTOMY       KNEE ARTHROSCOPY Bilateral      WRIST SURGERY       Penicillins  No family history on file.  Social History     Socioeconomic History     Marital status: Single     Spouse name: Not on file     Number of children: Not on file     Years of education: Not on file     Highest education level: Not on file   Occupational History     Not on file   Social Needs     Financial resource strain: Not on file     Food insecurity:     Worry: Not on file     Inability: Not on file     Transportation needs:     Medical: Not on file     Non-medical: Not on file   Tobacco Use     Smoking status: Never Smoker     Smokeless tobacco: Never Used   Substance and Sexual Activity     Alcohol use: Not Currently     Drug use: Never     Sexual activity: Not on file   Lifestyle     Physical activity:     Days per  "week: Not on file     Minutes per session: Not on file     Stress: Not on file   Relationships     Social connections:     Talks on phone: Not on file     Gets together: Not on file     Attends Mandaen service: Not on file     Active member of club or organization: Not on file     Attends meetings of clubs or organizations: Not on file     Relationship status: Not on file     Intimate partner violence:     Fear of current or ex partner: Not on file     Emotionally abused: Not on file     Physically abused: Not on file     Forced sexual activity: Not on file   Other Topics Concern     Not on file   Social History Narrative     Not on file       Review of Systems  Review of Systems          Objective:     Vitals:    02/24/20 0927   BP: 150/90   Pulse: 62   Resp: 16   SpO2: 95%   Weight: (!) 285 lb (129.3 kg)   Height: 5' 9\" (1.753 m)     Body mass index is 42.09 kg/m .    Physical  Physical Exam     Constitutional:    --Vitals as above  --No acute distress  Eyes-  --Sclera noninjected  --Lids and conjunctiva normal  ENT-  --TMs clear except for ceruminosis in the right ear  --Sclera noninjected  --Pharynx not erythematous  Neck-  --Neck supple with no cervical lymphadenopathy  Lungs-  --Clear to Auscultation  Heart-  --Regular rate and rhythm  Abdomen--  --Soft, non-tender, non-distended  Skin-  --Pink and dry  Psych-  --Alert and oriented  --Normal affect        "

## 2021-06-07 NOTE — TELEPHONE ENCOUNTER
Refill request on Lisinopril 10mg tablet.    Requesting 90 day supply.    2/24/20 approved for #[id number].

## 2021-06-09 NOTE — TELEPHONE ENCOUNTER
INF visit scheduled 6/29/20 with Dr Lebron.   Ok for orders prior to appt? Dx: pelvic abscess s/p drain placement.

## 2021-06-09 NOTE — PRE-PROCEDURE
IP PRE-PROCEDURE - ASSESSMENT    58 yo male with a right pelvic abscess s/p drain placement.  CT shows resolution of the abscess.  Patient reports feeling well with no drain output the past 5 days (excluding flushes).  Continues on amoxicillin.    Plan for no-prep tube check with probable removal.  If tube exchange is necessary will discuss with patient in the room.    Cuba Gonzales MD

## 2021-06-09 NOTE — PROGRESS NOTES
Chief Complaint   Patient presents with     Hospital Visit Follow Up       HPI: Hospital follow-up.  He was hospitalized from 6/19/2020 to 6/23/2020.  He had a pelvic abscess with percutaneous tube placed which he continues to have.  He was discharged on Augmentin and med reconciliation was completed.  Patient continues to have perk tube in place with JUDITH drain in place.  He has no fever, and is otherwise feeling well.    ROS: No fever.  No difficulty urinating.  No abdominal pain.    SH: Reviewed-see Snapshot for review.     FH: Reviewed-see Snapshot for review.    Meds:  Gonzalez has a current medication list which includes the following prescription(s): acetaminophen, amoxicillin-clavulanate, hydrochlorothiazide, lisinopril, omeprazole, oxycodone, simvastatin, and sodium chloride.    O:  /84   Pulse 76   Temp 98.9  F (37.2  C)   Resp 16   Wt (!) 272 lb 3 oz (123.5 kg)   SpO2 97%   BMI 40.20 kg/m    Constitutional:    --Vitals as above  --No acute distress  Eyes-  --Sclera noninjected  --Lids and conjunctiva normal  Pelvis-  -- Nontender with JUDITH drain having mild cloudy fluid  Skin-  --Pink and dry    A/P:   1. Pelvic abscess in male (H)  Follow-up with IR.  Continue Augmentin.  Follow-up with urology.

## 2021-06-09 NOTE — PRE-PROCEDURE
Pre-Procedure Negative COVID Test     Gonzalez Morton  COVID-19 PCR Results    COVID-19 PCR Results 6/15/20 6/19/20 6/19/20 6/28/20     2142 [id number]    2019-nCOV Not Detected Test received-See reflex to IDDL test SARS CoV2 (COVID-19) Virus RT-PCR  Not Detected   SARS-CoV-2 PCR Result   NEGATIVE       Comments are available for some flowsheets but are not being displayed.             Patient Notification  Patient notified of the negative COVID test result    Patient Information  Patient informed of the no visitor policy  Patient instructed to continue to self-quarantine prior to procedure  Patient informed to contact the ordering provider if the following symptoms develop prior to procedure:   Fever  Cough  Shortness of Breath  Sore throat   Runny or stuffy nose  Muscle or body aches  Headaches  Fatigue  Vomiting or diarrhea   Rash    Miguel Angel Figueroa

## 2021-06-09 NOTE — TELEPHONE ENCOUNTER
Who is calling:  Екатерина  Reason for Call:  Calling to verify of provider will follow for home care orders?  Date of last appointment with primary care: 02/24/20  Okay to leave a detailed message: Yes

## 2021-06-09 NOTE — TELEPHONE ENCOUNTER
Received fax from [facility] on patient      Placed in Dr. Lebron's inbox      07/09/20

## 2021-06-09 NOTE — TELEPHONE ENCOUNTER
Received fax from [facility] on patient      Placed in Dr. Lebron's inbox      07/09/20

## 2021-06-09 NOTE — TELEPHONE ENCOUNTER
Fax came in from [facility] fax in Dr Obrien inbox.

## 2021-06-10 NOTE — TELEPHONE ENCOUNTER
Wellness Screening Tool  Symptom Screening:  Do you have one of the following NEW symptoms:    Fever (subjective or >100.0)?  No    A new cough?  No    Shortness of breath?  No     Chills? No     New loss of taste or smell? No     Generalized body aches? No     New persistent headache? No     New sore throat? No     Nausea, vomiting, or diarrhea?  No    Within the past 3 weeks, have you been exposed to someone with a known positive illness below:    COVID-19 (known or suspected)?  No    Chicken pox?  No    Mealses?  No    Pertussis?  No    Patient notified of visitor policy- They may have one person accompany them to their appointment, but they will need to wear a mask and will be screened upon arrival for symptoms: Yes  Pt informed to wear a mask: Yes  Pt notified if they develop any symptoms listed above, prior to their appointment, they are to call the clinic directly at [phone] for further instructions.  Yes  Patient's appointment status: Patient will be seen in clinic as scheduled on 8/19/20. CMM,Rn

## 2021-06-10 NOTE — TELEPHONE ENCOUNTER
Please call and notify pt that the anesthesiologist is requesting that he get cardiology clearance prior to surgery. Someone will be calling to schedule that.     NP from [facility] calling. They would prefer that pt be evaluated and cleared by cardiology for his EKG findings of inferior infarct, age indeterminate. He is asymptomatic but does have risk factors. Rapid access order was placed.

## 2021-06-10 NOTE — PROGRESS NOTES
Preoperative Exam    Scheduled Procedure: Left knee replacment  Surgery Date:  8/6/2020  Surgery Location: [facility], fax [phone]    Surgeon:  Dr. Redd    Assessment/Plan:     1. Preop examination    2. Osteoarthritis of both knees, unspecified osteoarthritis type    3. Benign Essential Hypertension     EKG without acute findings. Patient is asymptomatic at this time, so ok to proceed with surgery, but I would like him to consider an echo down the road. Pt expressed understanding.  - Basic Metabolic Panel  - Electrocardiogram Perform and Read    4. History Pelvic abscess in male (H)  - HM1(CBC and Differential)  - HM1 (CBC with Diff)    5. Prostate cancer (H)        Surgical Procedure Risk: Low (reported cardiac risk generally < 1%)  Have you had prior anesthesia?: Yes  Have you or any family members had a previous anesthesia reaction:  No  Do you or any family members have a history of a clotting or bleeding disorder?: No  Cardiac Risk Assessment: no increased risk for major cardiac complications    APPROVAL GIVEN to proceed with proposed procedure, without further diagnostic evaluation        Functional Status: Independent  Patient plans to recover at home with family.     Subjective:      Gonzalez Morton is a 59 y.o. male who presents for a preoperative consultation. Bilateral knee pain, L>R, significantly worse over the last several mos. He has end stage osteoarthritis bilaterally, but is much more symptomatic on the left. He was initially scheduled for TKA this spring, but it had to be rescheduled due to COVID. History of previous injuries, and he has played baseball     He was hospitalized in June for an abscess in the pelvis, presumably an infected lymphocele from his prostate surgery last year. He presented with a fever and they were initially concerned about COVID. He was admitted for drainage and IV abx and he has been feeling well since with a normal  energy level and exercise tolerance.    He denies recent fever, chills, cough, URI symptoms, dyspnea, abdominal pain, nausea, vomiting or diarrhea.      All other systems reviewed and are negative, other than those listed in the HPI.    Pertinent History  Do you have difficulty breathing or chest pain after walking up a flight of stairs: No  History of obstructive sleep apnea: Yes: uses cpap  Steroid use in the last 6 months: No  Frequent Aspirin/NSAID use: No  Prior Blood Transfusion: No  Prior Blood Transfusion Reaction: No  If for some reason prior to, during or after the procedure, if it is medically indicated, would you be willing to have a blood transfusion?:  There is no transfusion refusal.    Current Outpatient Medications   Medication Sig Dispense Refill     acetaminophen (TYLENOL) 650 MG CR tablet Take 1,300 mg by mouth every 8 (eight) hours as needed for pain.              hydroCHLOROthiazide (HYDRODIURIL) 25 MG tablet Take 1 tablet (25 mg total) by mouth daily. 90 tablet 1     lisinopriL (PRINIVIL,ZESTRIL) 20 MG tablet Take 1 tablet (20 mg total) by mouth daily. 90 tablet 1     omeprazole (PRILOSEC) 20 MG capsule Take 20 mg by mouth daily before breakfast.       simvastatin (ZOCOR) 40 MG tablet Take 1 tablet (40 mg total) by mouth at bedtime. 90 tablet 3     No current facility-administered medications for this visit.         Allergies   Allergen Reactions     Penicillins Hives     Tolerated CTX 06/2020       Patient Active Problem List   Diagnosis     Benign Essential Hypertension     Hyperlipidemia     Rosacea     Back pain     Prostate cancer (H)     Pelvic abscess in male (H)     Hypertension, unspecified type       Past Medical History:   Diagnosis Date     Hypertension      Rosacea      Sleep apnea     CPAP       Past Surgical History:   Procedure Laterality Date     APPENDECTOMY       KNEE ARTHROSCOPY Bilateral      WRIST SURGERY         Social History     Socioeconomic History     Marital  "status: Single     Spouse name: Not on file     Number of children: Not on file     Years of education: Not on file     Highest education level: Not on file   Occupational History     Not on file   Social Needs     Financial resource strain: Not on file     Food insecurity     Worry: Not on file     Inability: Not on file     Transportation needs     Medical: Not on file     Non-medical: Not on file   Tobacco Use     Smoking status: Never Smoker     Smokeless tobacco: Never Used   Substance and Sexual Activity     Alcohol use: Not Currently     Drug use: Never     Sexual activity: Not on file   Lifestyle     Physical activity     Days per week: Not on file     Minutes per session: Not on file     Stress: Not on file   Relationships     Social connections     Talks on phone: Not on file     Gets together: Not on file     Attends Gnosticism service: Not on file     Active member of club or organization: Not on file     Attends meetings of clubs or organizations: Not on file     Relationship status: Not on file     Intimate partner violence     Fear of current or ex partner: Not on file     Emotionally abused: Not on file     Physically abused: Not on file     Forced sexual activity: Not on file   Other Topics Concern     Not on file   Social History Narrative     Not on file       Patient Care Team:  Candie Lebron MD as PCP - General (Family Medicine)  Candie Lebron MD as Assigned PCP          Objective:     Vitals:    07/31/20 1022   BP: 122/78   Pulse: 70   SpO2: 96%   Weight: (!) 279 lb 4 oz (126.7 kg)   Height: 5' 10\" (1.778 m)         Physical Exam:  General: Alert, cooperative, no distress  Head: Normocephalic, without obvious abnormality, atraumatic  Eyes: PERRL, conjunctiva/corneas clear, EOM's intact  Ears: Normal TM's and external ear canals, both ears  Nose: Nares normal, septum midline,mucosa normal, no drainage  Throat: Lips, mucosa, and tongue normal; teeth and gums normal  Neck: Supple, " symmetrical, trachea midline, no adenopathy;  thyroid normal  Back: Symmetric, no curvature, ROM normal, no CVA tenderness  Lungs: Clear to auscultation bilaterally, respirations unlabored  Heart: Regular rate and rhythm, no murmur, rub, or gallop,  Abdomen: Soft, non-tender, no masses, no organomegaly  Genitourinary: exam deferred   Musculoskeletal: Normal range of motion. No joint swelling or deformity.   Extremities: Extremities normal, atraumatic, no cyanosis or edema  Skin: Skin color, texture, turgor normal, no rashes or lesions  Lymph nodes: Cervical, supraclavicular, and axillary nodes normal  Neurologic: Grossly normal   Psychiatric: Normal mood and affect.        Patient Instructions   Follow instructions from surgery center staff regarding oral intake.     Hold am medications the day of surgery.           Labs:  Results for orders placed or performed in visit on 07/31/20   Basic Metabolic Panel   Result Value Ref Range    Sodium 138 136 - 145 mmol/L    Potassium 4.0 3.5 - 5.0 mmol/L    Chloride 100 98 - 107 mmol/L    CO2 28 22 - 31 mmol/L    Anion Gap, Calculation 10 5 - 18 mmol/L    Glucose 102 70 - 125 mg/dL    Calcium 9.7 8.5 - 10.5 mg/dL    BUN 15 8 - 22 mg/dL    Creatinine 0.87 0.70 - 1.30 mg/dL    GFR MDRD Af Amer >60 >60 mL/min/1.73m2    GFR MDRD Non Af Amer >60 >60 mL/min/1.73m2   HM1 (CBC with Diff)   Result Value Ref Range    WBC 7.4 4.0 - 11.0 thou/uL    RBC 5.32 4.40 - 6.20 mill/uL    Hemoglobin 15.6 14.0 - 18.0 g/dL    Hematocrit 45.8 40.0 - 54.0 %    MCV 86 80 - 100 fL    MCH 29.3 27.0 - 34.0 pg    MCHC 34.1 32.0 - 36.0 g/dL    RDW 12.6 11.0 - 14.5 %    Platelets 274 140 - 440 thou/uL    MPV 9.9 8.5 - 12.5 fL    Neutrophils % 70 50 - 70 %    Lymphocytes % 18 (L) 20 - 40 %    Monocytes % 6 2 - 10 %    Eosinophils % 5 0 - 6 %    Basophils % 2 0 - 2 %    Neutrophils Absolute 5.1 2.0 - 7.7 thou/uL    Lymphocytes Absolute 1.3 0.8 - 4.4 thou/uL    Monocytes Absolute 0.5 0.0 - 0.9 thou/uL     Eosinophils Absolute 0.3 0.0 - 0.4 thou/uL    Basophils Absolute 0.1 0.0 - 0.2 thou/uL   Electrocardiogram Perform and Read   Result Value Ref Range    SYSTOLIC BLOOD PRESSURE      DIASTOLIC BLOOD PRESSURE      VENTRICULAR RATE 67 BPM    ATRIAL RATE 67 BPM    P-R INTERVAL 148 ms    QRS DURATION 112 ms    Q-T INTERVAL 406 ms    QTC CALCULATION (BEZET) 429 ms    P Axis -19 degrees    R AXIS -12 degrees    T AXIS 17 degrees    MUSE DIAGNOSIS       Sinus rhythm  Inferior infarct , age undetermined  Abnormal ECG  No previous ECGs available  Confirmed by TORI PERALTA MD LOC:[facility] ([id number]) on 7/31/2020 4:07:30 PM          Immunization History   Administered Date(s) Administered     Influenza,seasonal quad, PF 09/17/2014     Influenza,seasonal quad, PF, =/> 6months 09/17/2014     Influenza,seasonal, Inj IIV3 09/24/2011     Tdap 09/17/2014           Electronically signed by Rebecca Mas MD 07/31/20 10:24 AM

## 2021-06-10 NOTE — PATIENT INSTRUCTIONS - HE
Follow instructions from surgery center staff regarding oral intake.     Hold am medications the day of surgery.

## 2021-06-10 NOTE — TELEPHONE ENCOUNTER
Received fax from [facility] on patient      Placed in Dr. Lebron's inbox        08/24/20

## 2021-06-12 NOTE — PROGRESS NOTES
Subjective:   Gonzalez comes in today for check of his hypertension as well as his hyperlipidemia.  He is having no side effects to medicines.  He denies any high blood pressure symptoms such as dizziness or chest pain or chest palpitations.  He is trying to follow a diet low in cholesterol.  He also would like to lose some weight.  He thinks he has lost 15 pounds or so over the last few months.  He works as a  and is on concrete floors all day so his knees will bother him at times.  He takes ibuprofen for that.  He also has tramadol when the pain gets severe.  The orthopedist told him that he needs a total knee performed but he does not want to do that until his finances are improved.      Objective:  HEENT: Pupils equally round and reactive to light.  Fundi are benign.  Pharynx is clear.  Neck: No increased JVD noted.  No bruits heard.  Cardiac: There is a regular rhythm present.  No murmur heard.  Lungs: Lungs are clear.  Patient's in no respiratory distress.  Extremities: No edema noted in the feet.  The knees have crepitus bilaterally.  No effusions present today.  There is tenderness noted in both medial and lateral joint lines.      Assessment:  1.  Hypertension in good control  2.  Hyperlipidemia      Plan:  Comprehensive metabolic panel and lipid cascade are done today.  The patient will be called with any abnormalities.  There are no changes in his medications today.  He will stay on a weight loss type diet.  He will stay as aerobically active as his knees will have him be.  He will follow-up here in 6 months for recheck.  Patient will be scheduled for colonoscopy as he has not had that as of yet.

## 2021-06-13 NOTE — TELEPHONE ENCOUNTER
Refill Approved    Rx renewed per Medication Renewal Policy. Medication was last renewed on 2/24/20.    Rosa Maria Forte, [facility] Triage/Med Refill 11/22/2020     Requested Prescriptions   Pending Prescriptions Disp Refills     hydroCHLOROthiazide (HYDRODIURIL) 25 MG tablet [Pharmacy Med Name: HYDROCHLOROTHIAZIDE 25 MG TAB] 90 tablet 1     Sig: TAKE 1 TABLET BY MOUTH EVERY DAY       Diuretics/Combination Diuretics Refill Protocol  Passed - 11/20/2020  3:13 PM        Passed - Visit with PCP or prescribing provider visit in past 12 months     Last office visit with prescriber/PCP: 6/29/2020 Candie Lebron MD OR same dept: 6/29/2020 Candie Lebron MD OR same specialty: 6/29/2020 Candie Lebron MD  Last physical: 2/24/2020 Last MTM visit: Visit date not found   Next visit within 3 mo: Visit date not found  Next physical within 3 mo: Visit date not found  Prescriber OR PCP: Candie Lebron MD  Last diagnosis associated with med order: 1. HTN (hypertension)  - hydroCHLOROthiazide (HYDRODIURIL) 25 MG tablet [Pharmacy Med Name: HYDROCHLOROTHIAZIDE 25 MG TAB]; TAKE 1 TABLET BY MOUTH EVERY DAY  Dispense: 90 tablet; Refill: 1    If protocol passes may refill for 12 months if within 3 months of last provider visit (or a total of 15 months).             Passed - Serum Potassium in past 12 months      Lab Results   Component Value Date    Potassium 4.0 07/31/2020             Passed - Serum Sodium in past 12 months      Lab Results   Component Value Date    Sodium 138 07/31/2020             Passed - Blood pressure on file in past 12 months     BP Readings from Last 1 Encounters:   08/19/20 142/80             Passed - Serum Creatinine in past 12 months      Creatinine   Date Value Ref Range Status   07/31/2020 0.87 0.70 - 1.30 mg/dL Final

## 2021-06-15 NOTE — TELEPHONE ENCOUNTER
Refill Approved    Rx renewed per Medication Renewal Policy. Medication was last renewed on 4/23/20, 11/22/20.    Emir Kline, [facility] Triage/Med Refill 2/10/2021     Requested Prescriptions   Pending Prescriptions Disp Refills     hydroCHLOROthiazide (HYDRODIURIL) 25 MG tablet [Pharmacy Med Name: HYDROCHLOROTHIAZIDE 25 MG TAB] 90 tablet 0     Sig: TAKE 1 TABLET BY MOUTH EVERY DAY       Diuretics/Combination Diuretics Refill Protocol  Passed - 2/10/2021 12:10 PM        Passed - Visit with PCP or prescribing provider visit in past 12 months     Last office visit with prescriber/PCP: 6/29/2020 Candie Lebron MD OR same dept: 6/29/2020 Candie Lebron MD OR same specialty: 6/29/2020 Candie Lebron MD  Last physical: 2/24/2020 Last MTM visit: Visit date not found   Next visit within 3 mo: Visit date not found  Next physical within 3 mo: Visit date not found  Prescriber OR PCP: Candie Lebron MD  Last diagnosis associated with med order: 1. HTN (hypertension)  - hydroCHLOROthiazide (HYDRODIURIL) 25 MG tablet [Pharmacy Med Name: HYDROCHLOROTHIAZIDE 25 MG TAB]; TAKE 1 TABLET BY MOUTH EVERY DAY  Dispense: 90 tablet; Refill: 0    2. Benign Essential Hypertension  - lisinopriL (PRINIVIL,ZESTRIL) 20 MG tablet [Pharmacy Med Name: LISINOPRIL 20 MG TABLET]; TAKE 1 TABLET BY MOUTH EVERY DAY  Dispense: 90 tablet; Refill: 1    If protocol passes may refill for 12 months if within 3 months of last provider visit (or a total of 15 months).             Passed - Serum Potassium in past 12 months      Lab Results   Component Value Date    Potassium 4.0 07/31/2020             Passed - Serum Sodium in past 12 months      Lab Results   Component Value Date    Sodium 138 07/31/2020             Passed - Blood pressure on file in past 12 months     BP Readings from Last 1 Encounters:   08/19/20 142/80             Passed - Serum Creatinine in past 12 months      Creatinine   Date Value Ref Range Status    07/31/2020 0.87 0.70 - 1.30 mg/dL Final                lisinopriL (PRINIVIL,ZESTRIL) 20 MG tablet [Pharmacy Med Name: LISINOPRIL 20 MG TABLET] 90 tablet 1     Sig: TAKE 1 TABLET BY MOUTH EVERY DAY       Ace Inhibitors Refill Protocol Passed - 2/10/2021 12:10 PM        Passed - PCP or prescribing provider visit in past 12 months       Last office visit with prescriber/PCP: 6/29/2020 Candie Lebron MD OR same dept: 6/29/2020 Candie Lebron MD OR same specialty: 6/29/2020 Candie Lebron MD  Last physical: 2/24/2020 Last MTM visit: Visit date not found   Next visit within 3 mo: Visit date not found  Next physical within 3 mo: Visit date not found  Prescriber OR PCP: Candie Lebron MD  Last diagnosis associated with med order: 1. HTN (hypertension)  - hydroCHLOROthiazide (HYDRODIURIL) 25 MG tablet [Pharmacy Med Name: HYDROCHLOROTHIAZIDE 25 MG TAB]; TAKE 1 TABLET BY MOUTH EVERY DAY  Dispense: 90 tablet; Refill: 0    2. Benign Essential Hypertension  - lisinopriL (PRINIVIL,ZESTRIL) 20 MG tablet [Pharmacy Med Name: LISINOPRIL 20 MG TABLET]; TAKE 1 TABLET BY MOUTH EVERY DAY  Dispense: 90 tablet; Refill: 1    If protocol passes may refill for 12 months if within 3 months of last provider visit (or a total of 15 months).             Passed - Serum Potassium in past 12 months     Lab Results   Component Value Date    Potassium 4.0 07/31/2020             Passed - Blood pressure filed in past 12 months     BP Readings from Last 1 Encounters:   08/19/20 142/80             Passed - Serum Creatinine in past 12 months     Creatinine   Date Value Ref Range Status   07/31/2020 0.87 0.70 - 1.30 mg/dL Final

## 2021-06-16 PROBLEM — R94.31 ABNORMAL ELECTROCARDIOGRAM: Status: ACTIVE | Noted: 2020-08-19

## 2021-06-16 PROBLEM — Z01.810 PRE-OPERATIVE CARDIOVASCULAR EXAMINATION: Status: ACTIVE | Noted: 2020-08-19

## 2021-06-16 PROBLEM — K65.1 PELVIC ABSCESS IN MALE (H): Status: ACTIVE | Noted: 2020-06-19

## 2021-06-16 PROBLEM — C61 PROSTATE CANCER (H): Status: ACTIVE | Noted: 2019-05-29

## 2021-06-18 NOTE — LETTER
Letter by Candie Lebron MD at      Author: Candie Lebron MD Service: -- Author Type: --    Filed:  Encounter Date: 2/28/2019 Status: (Other)       Gonzalez Morton  [address] [address]            February 28, 2019        Dear Mr. Morton,        Below are the results from your recent visit:    Resulted Orders   HM2(CBC w/o Differential)   Result Value Ref Range    WBC 8.8 4.0 - 11.0 thou/uL    RBC 5.47 4.40 - 6.20 mill/uL    Hemoglobin 16.2 14.0 - 18.0 g/dL    Hematocrit 48.4 40.0 - 54.0 %    MCV 88 80 - 100 fL    MCH 29.6 27.0 - 34.0 pg    MCHC 33.5 32.0 - 36.0 g/dL    RDW 12.3 11.0 - 14.5 %    Platelets 227 140 - 440 thou/uL    MPV 7.4 7.0 - 10.0 fL   Basic Metabolic Panel   Result Value Ref Range    Sodium 140 136 - 145 mmol/L    Potassium 4.3 3.5 - 5.0 mmol/L    Chloride 101 98 - 107 mmol/L    CO2 27 22 - 31 mmol/L    Anion Gap, Calculation 12 5 - 18 mmol/L    Glucose 98 70 - 125 mg/dL    Calcium 9.2 8.5 - 10.5 mg/dL    BUN 20 8 - 22 mg/dL    Creatinine 0.88 0.70 - 1.30 mg/dL    GFR MDRD Af Amer >60 >60 mL/min/1.73m2    GFR MDRD Non Af Amer >60 >60 mL/min/1.73m2    Narrative    Fasting Glucose reference range is 70-99 mg/dL per  American Diabetes Association (ADA) guidelines.   Glycosylated Hemoglobin A1c   Result Value Ref Range    Hemoglobin A1c 5.9 3.5 - 6.0 %   PSA (Prostatic-Specific Antigen), Annual Screen   Result Value Ref Range    PSA 11.0 (H) 0.0 - 3.5 ng/mL    Narrative    Method is Abbott Prostate-Specific Antigen (PSA)  Standard-WHO 1st International (90:10)   Lipid Cascade   Result Value Ref Range    Cholesterol 170 <=199 mg/dL    Triglycerides 159 (H) <=149 mg/dL    HDL Cholesterol 38 (L) >=40 mg/dL    LDL Calculated 100 <=129 mg/dL    Patient Fasting > 8hrs? Yes          Steven, as we discussed on the phone, your PSA is elevated and I would like you to see the urologist soon.  I have submitted a referral for this to occur.      Sincerely,        CHACE Lebron,  MD, WILLY  [facility]  [phone]

## 2021-06-19 NOTE — LETTER
Letter by Karissa Puente PA-C at      Author: Karissa Puente PA-C Service: -- Author Type: --    Filed:  Encounter Date: 3/18/2019 Status: (Other)         March 18, 2019     Patient: Gonzalez Morton   YOB: 1960   Date of Visit: 3/18/2019       To Whom It May Concern:    It is my medical opinion that Gonzalez Morton may return to work when fever and ache free for 24 hours.    If you have any questions or concerns, please don't hesitate to call.    Sincerely,        Electronically signed by Karissa Puente PA-C

## 2021-06-20 ENCOUNTER — HEALTH MAINTENANCE LETTER (OUTPATIENT)
Age: 61
End: 2021-06-20

## 2021-06-20 NOTE — LETTER
Letter by Jessica Joshi RN at      Author: Jessica Joshi RN Service: -- Author Type: --    Filed:  Encounter Date: 6/17/2020 Status: (Other)       6/17/2020        Gonzalez Morton  [address] [address]    This letter provides a written record that you were tested for COVID-19 on 6/15/2020.     Your result was negative. This means that we didnt find the virus that causes COVID-19 in your sample. A test may show negative when you do actually have the virus. This can happen when the virus is in the early stages of infection, before you feel illness symptoms.    If you have symptoms   Stay home and away from others (self-isolate) until you meet ALL of the guidelines below:    Youve had no fever--and no medicine that reduces fever--for 3 full days (72 hours). And ?    Your other symptoms have gotten better. For example, your cough or breathing has improved. And?    At least 10 days have passed since your symptoms started.    During this time:    Stay home. Dont go to work, school or anywhere else.     Stay in your own room, including for meals. Use your own bathroom if you can.    Stay away from others in your home. No hugging, kissing or shaking hands. No visitors.    Clean high touch surfaces often (doorknobs, counters, handles, etc.). Use a household cleaning spray or wipes. You can find a full list on the EPA website at www.epa.gov/pesticide-registration/list-n-disinfectants-use-against-sars-cov-2.    Cover your mouth and nose with a mask, tissue or washcloth to avoid spreading germs.    Wash your hands and face often with soap and water.    Going back to work  Check with your employer for any guidelines to follow for going back to work.    Employers: This document serves as formal notice that your employee tested negative for COVID-19, as of the testing date shown above.

## 2021-06-20 NOTE — LETTER
Letter by Candie Lebron MD at      Author: Candie Lebron MD Service: -- Author Type: --    Filed:  Encounter Date: 2/24/2020 Status: (Other)         Gonzalez Morton  [address] [facility] [facility] [address]            February 24, 2020        Dear Mr. Morton,        Below are the results from your recent visit:    Resulted Orders   HM2(CBC w/o Differential)   Result Value Ref Range    WBC 7.9 4.0 - 11.0 thou/uL    RBC 5.52 4.40 - 6.20 mill/uL    Hemoglobin 16.1 14.0 - 18.0 g/dL    Hematocrit 47.4 40.0 - 54.0 %    MCV 86 80 - 100 fL    MCH 29.2 27.0 - 34.0 pg    MCHC 34.0 32.0 - 36.0 g/dL    RDW 12.8 11.0 - 14.5 %    Platelets 252 140 - 440 thou/uL    MPV 7.2 7.0 - 10.0 fL   Basic Metabolic Panel   Result Value Ref Range    Sodium 141 136 - 145 mmol/L    Potassium 4.3 3.5 - 5.0 mmol/L    Chloride 104 98 - 107 mmol/L    CO2 28 22 - 31 mmol/L    Anion Gap, Calculation 9 5 - 18 mmol/L    Glucose 101 70 - 125 mg/dL    Calcium 9.2 8.5 - 10.5 mg/dL    BUN 18 8 - 22 mg/dL    Creatinine 0.83 0.70 - 1.30 mg/dL    GFR MDRD Af Amer >60 >60 mL/min/1.73m2    GFR MDRD Non Af Amer >60 >60 mL/min/1.73m2    Narrative    Fasting Glucose reference range is 70-99 mg/dL per  American Diabetes Association (ADA) guidelines.   Lipid Cascade   Result Value Ref Range    Cholesterol 158 <=199 mg/dL    Triglycerides 115 <=149 mg/dL    HDL Cholesterol 35 (L) >=40 mg/dL    LDL Calculated 100 <=129 mg/dL    Patient Fasting > 8hrs? Yes    Glycosylated Hemoglobin A1c   Result Value Ref Range    Hemoglobin A1c 5.9 3.5 - 6.0 %         Steven, your laboratory results are normal.  That is good news.  Thank you for coming in to visit.  We should visit again in 1 year.      Sincerely,        CHACE Lebron MD, WILLY  [facility]  [phone]

## 2021-06-23 NOTE — TELEPHONE ENCOUNTER
Controlled Substance Refill Request  Medication Name:   Requested Prescriptions     Pending Prescriptions Disp Refills     traMADol (ULTRAM) 50 mg tablet 90 tablet 0     Sig: TAKE 1-2 TABLETS BY MOUTH EVERY 8 HOURS AS NEEDED FOR PAIN.     Date Last Fill: [id number]  Pharmacy:  [facility]      Submit electronically to pharmacy  Controlled Substance Agreement Date Scanned:   Encounter-Level CSA Scan Date:    There are no encounter-level csa scan date.       Last office visit with prescriber/PCP: 8/2/2017 Jose Carlos Morrison MD OR same dept: Visit date not found OR same specialty: 8/2/2017 Jose Carlos Morrison MD  Last physical: Visit date not found Last MTM visit: Visit date not found

## 2021-06-23 NOTE — TELEPHONE ENCOUNTER
Refilled for 1 month. Please call patient to have him establish care with a new physician. Thank you.     Patricia Calero MD

## 2021-06-23 NOTE — TELEPHONE ENCOUNTER
Called pt to relay covering provider's message.  Pt will call back tomorrow to schedule apt with New PCP.  Thanks.

## 2021-06-24 NOTE — TELEPHONE ENCOUNTER
Controlled Substance Refill Request  Medication:   Requested Prescriptions     Pending Prescriptions Disp Refills     traMADol (ULTRAM) 50 mg tablet [Pharmacy Med Name: TRAMADOL HCL 50 MG TABLET] 90 tablet 0     Sig: TAKE 1-2 TABLETS BY MOUTH EVERY 8 HOURS AS NEEDED FOR PAIN.     Date Last Fill: 1/24/19  Pharmacy:[id number]    Submit electronically to pharmacy  Controlled Substance Agreement on File:   Encounter-Level CSA Scan Date:    There are no encounter-level csa scan date.       Last office visit: Last office visit pertaining to requested medication was 8/2/17.

## 2021-06-24 NOTE — PROGRESS NOTES
Chief Complaint   Patient presents with     Medication Management     Refills       HPI: 58-year-old male with past medical history of hyperlipidemia hypertension obesity, presents today for new patient evaluation and medication management.  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    His main concern is bilateral knee pain.  He has knee pain which causes substantial difficulty for him and he is scheduled to have surgery in the next few months on both knees sequentially.  He takes Ultram for the pain is aware that is a controlled substance.  He does not want anything stronger than Ultram but notes that the only thing that controls the pain at this time.    ROS: No fever.  No cough or congestion.  No chest pain or shortness of breath.    SH:    reports that  has never smoked. he has never used smokeless tobacco.      FH: The Patient's family history is not on file.     Meds:  Gonzalez has a current medication list which includes the following prescription(s): hydrochlorothiazide, ibuprofen, lisinopril, simvastatin, tramadol, and doxycycline.    O:  /90   Pulse 67   Resp 16   Wt (!) 273 lb (123.8 kg)   SpO2 98%   BMI 39.74 kg/m     Constitutional:    --Vitals as above  --No acute distress  Eyes-  --Sclera noninjected  --Lids and conjunctiva normal  ENT-  --TMs clear  --Sclera noninjected  --Pharynx not erythematous  Neck-  --Neck supple with no cervical lymphadenopathy  Lungs-  --Clear to Auscultation  Heart-  --Regular rate and rhythm  Abdomen--  --Soft, non-tender, non-distended  Skin-  --Pink and dry  Psych-  --Alert and oriented  --Normal affect      A/P:   1. Benign Essential Hypertension  Hydrochlorothiazide to continue.  Continue lisinopril as well.  Patient is approximately 25 pounds heavier than normal according to his admission and we discussed the importance of  losing weight.  - HM2(CBC w/o Differential)  - Basic Metabolic Panel    2. Wellness examination  Encouraged weight loss, exercise, and reviewed immunizations and colonoscopy which are up-to-date.  - Glycosylated Hemoglobin A1c  - PSA (Prostatic-Specific Antigen), Annual Screen  - Lipid Cascade    3. Osteoarthritis of both knees, unspecified osteoarthritis type  Orthopedic notes from 2/12/2019 were reviewed showing that he has severe osteoarthritis and is scheduled to have follow-up and most likely surgery.  Cautiously refilled Ultram until he can get his surgery done.  We had a conversation concerning addiction, not driving while taking his medications and he agreed to do so.  - traMADol (ULTRAM) 50 mg tablet; TAKE 1-2 TABLETS BY MOUTH EVERY 8 HOURS AS NEEDED FOR PAIN.  Dispense: 90 tablet; Refill: 0      Follow up in 6 months

## 2021-06-24 NOTE — TELEPHONE ENCOUNTER
Last refill on 1/24/19 advised needs to establish care with new primary to get more refills. Last clinic visit was 2017.

## 2021-06-24 NOTE — TELEPHONE ENCOUNTER
Refill Approved    Rx renewed per Medication Renewal Policy. Medication was last renewed on 12/3/18.    Ov: 2/27/19    Ann Marie Miles, [facility] Triage/Med Refill 3/9/2019     Requested Prescriptions   Pending Prescriptions Disp Refills     simvastatin (ZOCOR) 40 MG tablet 90 tablet 0    Statins Refill Protocol (Hmg CoA Reductase Inhibitors) Passed - 3/7/2019  8:40 AM       Passed - PCP or prescribing provider visit in past 12 months     Last office visit with prescriber/PCP: 2/27/2019 Candie Lebron MD OR same dept: 2/27/2019 Candie Lebron MD OR same specialty: 2/27/2019 Candie Lebron MD  Last physical: Visit date not found Last MTM visit: Visit date not found   Next visit within 3 mo: Visit date not found  Next physical within 3 mo: Visit date not found  Prescriber OR PCP: Candie Lebron MD  Last diagnosis associated with med order: 1. Hyperlipidemia  - simvastatin (ZOCOR) 40 MG tablet  Dispense: 90 tablet; Refill: 0    If protocol passes may refill for 12 months if within 3 months of last provider visit (or a total of 15 months).

## 2021-06-25 NOTE — PATIENT INSTRUCTIONS - HE
1.Take Tylenol or Ibuprofen as needed for fever or ache relief.   2. Increase fluids and rest.  3. Influenza is typically considered contagious one day prior and 5-7 days after your symptoms begin. I recommend returning to work/school after you are fever free for 24 hours. Continue to practice good hand hygiene and cough coverage well after you are fever free.    4. Follow up if you develop fever that can not be controlled, difficulty breathing, or severe dehydration.    Influenza  Influenza ( the flu ) is an infection that affects your respiratory tract (the mouth, nose, and lungs, and the passages between them). Unlike a cold, the flu can make you very ill. And it can lead to pneumonia, a serious lung infection. For some people, especially older adults, young children, and people with certain chronic conditions, the flu can have serious complications and even be fatal.  How Does the Flu Spread?  The flu is caused by viruses. The viruses spread through the air in droplets when someone who has the flu coughs, sneezes, laughs, or talks. You can become infected when you inhale these viruses directly. You can also become infected when you touch a surface on which the droplets have landed and then transfer the germs to your eyes, nose, or mouth. Touching used tissues, or sharing utensils, drinking glasses, or a toothbrush with an infected person can expose you to flu viruses, too.  What Are the Symptoms of the Flu?  Flu symptoms tend to come on quickly and may last a few days to a few weeks. They include:    Fever usually higher than 101 F  (38.3 C) and chills    Sore throat and headache    Dry cough    Runny nose    Tiredness and weakness    Muscle aches  Factors That Can Make Flu Worse  For some people, the flu can be very serious. The risk of complications is greater for:    Children under age 5.    Adults 65 years of age and older.    People with a chronic illness, such as diabetes or heart, kidney, or lung  disease.    People who live in a nursing home or long-term care facility.   How Is the Flu Treated?  Influenza usually improves after 7 days or so. In some cases, your health care provider may prescribe an antiviral medication. This may help you get well sooner. For the medication to help, you need to take it as soon as possible (ideally within 48 hours) after your symptoms start. If you develop pneumonia or other serious illness, hospital care may be needed.  Easing Flu Symptoms    Drink lots of fluids such as water, juice, and warm soup. A good rule is to drink enough so that you urinate your normal amount.    Get plenty of rest.    Ask your health care provider what to take for fever and pain.    Call your provider if your fever rises over 101 F (38.3 C) or you become dizzy, lightheaded, or short of breath.    Take Tylenol 650mg every 4 hours or ibuprofen 600mg every 6 hours by mouth for pain/fever.  Do not exceed 4000mg of acetaminophen or 2400mg of ibuprofen from any source in a 24 hour period.  Taking Tylenol and ibuprofen together may be helpful in reducing pain.

## 2021-06-25 NOTE — PROGRESS NOTES
Chief Complaint   Patient presents with     Cough     Cold/cough, fever        HPI:  Gonzalez Morton is a 58 y.o. male who presents today complaining of cough, sore throat, and sweats x 1 day.  Patient has sick contacts at work.  No known fevers.  No history of immune suppression or lung disease.  He did not have a flu vaccine this year.  He takes NSAIDs chronically for pain but denies any other new medications.  He denies any shortness of breath, wheezing, or chest pain.  He had some mild nasal congestion.    History obtained from the patient.    Problem List:  2015-06: Back pain  Benign Essential Hypertension  Hyperlipidemia  Rosacea      No past medical history on file.    Social History     Tobacco Use     Smoking status: Never Smoker     Smokeless tobacco: Never Used   Substance Use Topics     Alcohol use: Not on file       Review of Systems   Constitutional: Positive for diaphoresis. Negative for fever.   HENT: Positive for congestion, rhinorrhea and sore throat. Negative for ear pain.    Respiratory: Positive for cough.    Gastrointestinal: Negative.        Vitals:    03/18/19 1727   BP: 138/88   Patient Site: Right Arm   Patient Position: Sitting   Cuff Size: Adult Large   Pulse: 70   Resp: 18   Temp: 98.5  F (36.9  C)   TempSrc: Oral   SpO2: 96%   Weight: (!) 274 lb (124.3 kg)       Physical Exam   Constitutional: He appears well-developed and well-nourished. No distress.   HENT:   Head: Normocephalic and atraumatic.   Right Ear: Tympanic membrane, external ear and ear canal normal.   Left Ear: Tympanic membrane, external ear and ear canal normal.   Mouth/Throat: Uvula is midline and oropharynx is clear and moist. No oropharyngeal exudate, posterior oropharyngeal edema or posterior oropharyngeal erythema.   Eyes: Conjunctivae are normal. Right eye exhibits no discharge. Left eye exhibits no discharge.   Neck: Normal range of motion. Neck supple.   Cardiovascular: Normal rate, regular rhythm and normal  heart sounds.   Pulmonary/Chest: Effort normal and breath sounds normal. No respiratory distress. He has no wheezes.   Lymphadenopathy:     He has no cervical adenopathy.   Skin: He is not diaphoretic.   Psychiatric: He has a normal mood and affect. His behavior is normal. Judgment and thought content normal.       Labs:  Recent Results (from the past 72 hour(s))   Influenza A/B Rapid Test- Nasal Swab   Result Value Ref Range    Influenza  A, Rapid Antigen No Influenza A antigen detected No Influenza A antigen detected    Influenza B, Rapid Antigen Influenza B antigen detected (!) No Influenza B antigen detected       Clinical Decision Making:  Influenza B was positive on test today.  Patient is not considered high risk.  We discussed the pros and cons of treatment, and the patient decided to not take Tamiflu.  At the end of the encounter, I discussed results, diagnosis, medications. Discussed red flags for immediate return to clinic/ER, as well as indications for follow up if no improvement. Patient understood and agreed to plan. Patient was stable for discharge.    1. Influenza B     2. Cough  Influenza A/B Rapid Test- Nasal Swab         Patient Instructions     1.Take Tylenol or Ibuprofen as needed for fever or ache relief.   2. Increase fluids and rest.  3. Influenza is typically considered contagious one day prior and 5-7 days after your symptoms begin. I recommend returning to work/school after you are fever free for 24 hours. Continue to practice good hand hygiene and cough coverage well after you are fever free.    4. Follow up if you develop fever that can not be controlled, difficulty breathing, or severe dehydration.    Influenza  Influenza ( the flu ) is an infection that affects your respiratory tract (the mouth, nose, and lungs, and the passages between them). Unlike a cold, the flu can make you very ill. And it can lead to pneumonia, a serious lung infection. For some people, especially older adults,  young children, and people with certain chronic conditions, the flu can have serious complications and even be fatal.  How Does the Flu Spread?  The flu is caused by viruses. The viruses spread through the air in droplets when someone who has the flu coughs, sneezes, laughs, or talks. You can become infected when you inhale these viruses directly. You can also become infected when you touch a surface on which the droplets have landed and then transfer the germs to your eyes, nose, or mouth. Touching used tissues, or sharing utensils, drinking glasses, or a toothbrush with an infected person can expose you to flu viruses, too.  What Are the Symptoms of the Flu?  Flu symptoms tend to come on quickly and may last a few days to a few weeks. They include:    Fever usually higher than 101 F  (38.3 C) and chills    Sore throat and headache    Dry cough    Runny nose    Tiredness and weakness    Muscle aches  Factors That Can Make Flu Worse  For some people, the flu can be very serious. The risk of complications is greater for:    Children under age 5.    Adults 65 years of age and older.    People with a chronic illness, such as diabetes or heart, kidney, or lung disease.    People who live in a nursing home or long-term care facility.   How Is the Flu Treated?  Influenza usually improves after 7 days or so. In some cases, your health care provider may prescribe an antiviral medication. This may help you get well sooner. For the medication to help, you need to take it as soon as possible (ideally within 48 hours) after your symptoms start. If you develop pneumonia or other serious illness, hospital care may be needed.  Easing Flu Symptoms    Drink lots of fluids such as water, juice, and warm soup. A good rule is to drink enough so that you urinate your normal amount.    Get plenty of rest.    Ask your health care provider what to take for fever and pain.    Call your provider if your fever rises over 101 F (38.3 C) or you  become dizzy, lightheaded, or short of breath.    Take Tylenol 650mg every 4 hours or ibuprofen 600mg every 6 hours by mouth for pain/fever.  Do not exceed 4000mg of acetaminophen or 2400mg of ibuprofen from any source in a 24 hour period.  Taking Tylenol and ibuprofen together may be helpful in reducing pain.

## 2021-06-29 NOTE — PROGRESS NOTES
Progress Notes by Mery Lyons MD at 8/19/2020  9:20 AM     Author: Mery Lyons MD Service: -- Author Type: Physician    Filed: 8/19/2020 10:10 AM Encounter Date: 8/19/2020 Status: Signed    : Mery Lyons MD (Physician)         Thank you, Dr. Mas, for asking the [facility] team to see Mr. Gonzalez Morton to evaluate heart disease.      Assessment/Recommendations   Assessment/Plan:    Borderline EKG, not clearly inferior Q waves. Steven is achieving 4 mets at home easily without chest pain or dyspnea. His surgery is not a high risk one from a cardiovascular standpoint. I do not think that further cardiovascular testing or risk stratification is needed at this time.    Risk modification - continue blood pressure and lipid control. Mediterranean diet and daily exercise when able.    F/U with PCP       History of Present Illness/Subjective    Mr. Gonzalez oMrton is a 59 y.o. male with hypertension, hyperlipidemia and prostate cancer in 2019. Steven is scheduled to undergo a total left knee replacement next Tuesday. His pre-op EKG suggested inferior Q waves and he comes to cardiology today for further evaluation. Steven manages a diesel engine shop. He is active walking around there and goes up/down the stairs at home doing laundry without any chest pain, dyspnea, lightheadedness or fatigue. He wants to get back into exercising so he can lose weight but he cannot due to severe left knee pain.          Physical Examination Review of Systems   Vitals:    08/19/20 0914   BP: 142/80   Pulse: 68   Resp: 16     Body mass index is 40.32 kg/m .  Wt Readings from Last 3 Encounters:   08/19/20 (!) 281 lb (127.5 kg)   07/31/20 (!) 279 lb 4 oz (126.7 kg)   07/01/20 (!) 270 lb (122.5 kg)     [unfilled]  General Appearance:   no distress, normal body habitus   ENT/Mouth: membranes moist, no oral lesions or bleeding gums.      EYES:  no scleral icterus, normal conjunctivae   Neck: no  carotid bruits or thyromegaly   Chest/Lungs:   lungs are clear to auscultation, no rales or wheezing, no sternal scar, equal chest wall expansion    Cardiovascular:   Regular. Normal first and second heart sounds with no murmurs, rubs, or gallops. The right radial, dorsalis pedis and posterior tibial pulses are intact.  The left radial, dorsalis pedis and posterior tibial pulses are intact. Jugular venous pressure flat, no edema bilaterally    Abdomen:  no organomegaly, masses, bruits, or tenderness; bowel sounds are present   Extremities: no cyanosis or clubbing   Skin: no xanthelasma, warm.    Neurologic: normal  bilateral, no tremors     Psychiatric: alert and oriented x3, calm     General: WNL  Eyes: WNL  Ears/Nose/Throat: WNL  Lungs: WNL  Heart: WNL  Stomach: WNL  Bladder: WNL  Muscle/Joints: WNL  Skin: WNL  Nervous System: WNL  Mental Health: WNL     Blood: WNL     Medical History  Surgical History Family History Social History   Past Medical History:   Diagnosis Date   ? Hypertension    ? Rosacea    ? Sleep apnea     CPAP    Past Surgical History:   Procedure Laterality Date   ? APPENDECTOMY     ? KNEE ARTHROSCOPY Bilateral    ? WRIST SURGERY      Family History   Problem Relation Age of Onset   ? Diabetes Mother    ? Heart disease Father    ? Diabetes type I Brother     Social History     Socioeconomic History   ? Marital status: Single     Spouse name: Not on file   ? Number of children: Not on file   ? Years of education: Not on file   ? Highest education level: Not on file   Occupational History   ? Not on file   Social Needs   ? Financial resource strain: Not on file   ? Food insecurity     Worry: Not on file     Inability: Not on file   ? Transportation needs     Medical: Not on file     Non-medical: Not on file   Tobacco Use   ? Smoking status: Never Smoker   ? Smokeless tobacco: Never Used   Substance and Sexual Activity   ? Alcohol use: Not Currently   ? Drug use: Never   ? Sexual activity: Not  on file   Lifestyle   ? Physical activity     Days per week: Not on file     Minutes per session: Not on file   ? Stress: Not on file   Relationships   ? Social connections     Talks on phone: Not on file     Gets together: Not on file     Attends Nondenominational service: Not on file     Active member of club or organization: Not on file     Attends meetings of clubs or organizations: Not on file     Relationship status: Not on file   ? Intimate partner violence     Fear of current or ex partner: Not on file     Emotionally abused: Not on file     Physically abused: Not on file     Forced sexual activity: Not on file   Other Topics Concern   ? Not on file   Social History Narrative   ? Not on file          Medications  Allergies   Current Outpatient Medications   Medication Sig Dispense Refill   ? celecoxib (CELEBREX) 200 MG capsule Take 200 mg by mouth 2 (two) times a day.     ? hydroCHLOROthiazide (HYDRODIURIL) 25 MG tablet Take 1 tablet (25 mg total) by mouth daily. 90 tablet 1   ? lisinopriL (PRINIVIL,ZESTRIL) 20 MG tablet Take 1 tablet (20 mg total) by mouth daily. 90 tablet 1   ? omeprazole (PRILOSEC) 20 MG capsule Take 20 mg by mouth daily before breakfast.     ? simvastatin (ZOCOR) 40 MG tablet Take 1 tablet (40 mg total) by mouth at bedtime. 90 tablet 3   ? acetaminophen (TYLENOL) 650 MG CR tablet Take 1,300 mg by mouth every 8 (eight) hours as needed for pain.              No current facility-administered medications for this visit.     Allergies   Allergen Reactions   ? Penicillins Hives     Tolerated CTX 06/2020         Lab Results    Chemistry/lipid CBC Cardiac Enzymes/BNP/TSH/INR   Lab Results   Component Value Date    CHOL 158 02/24/2020    HDL 35 (L) 02/24/2020    LDLCALC 100 02/24/2020    TRIG 115 02/24/2020    CREATININE 0.87 07/31/2020    BUN 15 07/31/2020    K 4.0 07/31/2020     07/31/2020     07/31/2020    CO2 28 07/31/2020    Lab Results   Component Value Date    WBC 7.4 07/31/2020    HGB  15.6 07/31/2020    HCT 45.8 07/31/2020    MCV 86 07/31/2020     07/31/2020    Lab Results   Component Value Date    INR 1.23 (H) 06/19/2020

## 2021-07-03 NOTE — ADDENDUM NOTE
Addendum Note by Candie Lebron MD at 2/27/2019  9:30 AM     Author: Candie Lebron MD Service: -- Author Type: Physician    Filed: 2/28/2019  8:24 AM Encounter Date: 2/27/2019 Status: Signed    : Candie Lebron MD (Physician)    Addended by: CANDIE LEBRON on: 2/28/2019 08:24 AM        Modules accepted: Orders

## 2021-07-07 ENCOUNTER — RECORDS - HEALTHEAST (OUTPATIENT)
Dept: ADMINISTRATIVE | Facility: OTHER | Age: 61
End: 2021-07-07

## 2021-08-20 ENCOUNTER — OFFICE VISIT (OUTPATIENT)
Dept: FAMILY MEDICINE | Facility: CLINIC | Age: 61
End: 2021-08-20
Payer: COMMERCIAL

## 2021-08-20 VITALS
HEART RATE: 60 BPM | WEIGHT: 285 LBS | OXYGEN SATURATION: 96 % | BODY MASS INDEX: 45.8 KG/M2 | HEIGHT: 66 IN | DIASTOLIC BLOOD PRESSURE: 76 MMHG | SYSTOLIC BLOOD PRESSURE: 138 MMHG

## 2021-08-20 DIAGNOSIS — Z11.59 ENCOUNTER FOR HEPATITIS C SCREENING TEST FOR LOW RISK PATIENT: ICD-10-CM

## 2021-08-20 DIAGNOSIS — L71.9 ROSACEA: ICD-10-CM

## 2021-08-20 DIAGNOSIS — Z00.00 ROUTINE GENERAL MEDICAL EXAMINATION AT A HEALTH CARE FACILITY: Primary | ICD-10-CM

## 2021-08-20 DIAGNOSIS — Z11.4 SCREENING FOR HIV (HUMAN IMMUNODEFICIENCY VIRUS): ICD-10-CM

## 2021-08-20 DIAGNOSIS — I10 ESSENTIAL HYPERTENSION, BENIGN: ICD-10-CM

## 2021-08-20 DIAGNOSIS — E66.01 MORBID OBESITY (H): ICD-10-CM

## 2021-08-20 DIAGNOSIS — I10 BENIGN ESSENTIAL HYPERTENSION: ICD-10-CM

## 2021-08-20 DIAGNOSIS — E78.2 MIXED HYPERLIPIDEMIA: ICD-10-CM

## 2021-08-20 DIAGNOSIS — Z13.1 SCREENING FOR DIABETES MELLITUS: ICD-10-CM

## 2021-08-20 LAB
ANION GAP SERPL CALCULATED.3IONS-SCNC: 12 MMOL/L (ref 5–18)
BUN SERPL-MCNC: 17 MG/DL (ref 8–22)
CALCIUM SERPL-MCNC: 9.3 MG/DL (ref 8.5–10.5)
CHLORIDE BLD-SCNC: 103 MMOL/L (ref 98–107)
CHOLEST SERPL-MCNC: 133 MG/DL
CO2 SERPL-SCNC: 24 MMOL/L (ref 22–31)
CREAT SERPL-MCNC: 0.88 MG/DL (ref 0.7–1.3)
FASTING STATUS PATIENT QL REPORTED: YES
GFR SERPL CREATININE-BSD FRML MDRD: >90 ML/MIN/1.73M2
GLUCOSE BLD-MCNC: 100 MG/DL (ref 70–125)
HBA1C MFR BLD: 5.9 % (ref 0–5.6)
HDLC SERPL-MCNC: 34 MG/DL
HIV 1+2 AB+HIV1 P24 AG SERPL QL IA: NEGATIVE
LDLC SERPL CALC-MCNC: 69 MG/DL
POTASSIUM BLD-SCNC: 4.2 MMOL/L (ref 3.5–5)
SODIUM SERPL-SCNC: 139 MMOL/L (ref 136–145)
TRIGL SERPL-MCNC: 149 MG/DL

## 2021-08-20 PROCEDURE — 80048 BASIC METABOLIC PNL TOTAL CA: CPT | Performed by: NURSE PRACTITIONER

## 2021-08-20 PROCEDURE — 80061 LIPID PANEL: CPT | Performed by: NURSE PRACTITIONER

## 2021-08-20 PROCEDURE — 36415 COLL VENOUS BLD VENIPUNCTURE: CPT | Performed by: NURSE PRACTITIONER

## 2021-08-20 PROCEDURE — 87389 HIV-1 AG W/HIV-1&-2 AB AG IA: CPT | Performed by: NURSE PRACTITIONER

## 2021-08-20 PROCEDURE — 99396 PREV VISIT EST AGE 40-64: CPT | Performed by: NURSE PRACTITIONER

## 2021-08-20 PROCEDURE — 83036 HEMOGLOBIN GLYCOSYLATED A1C: CPT | Performed by: NURSE PRACTITIONER

## 2021-08-20 PROCEDURE — 86803 HEPATITIS C AB TEST: CPT | Performed by: NURSE PRACTITIONER

## 2021-08-20 RX ORDER — SILDENAFIL CITRATE 20 MG/1
20 TABLET ORAL 3 TIMES DAILY
COMMUNITY
End: 2021-08-20

## 2021-08-20 RX ORDER — LISINOPRIL 20 MG/1
TABLET ORAL
Qty: 90 TABLET | Refills: 3 | Status: SHIPPED | OUTPATIENT
Start: 2021-08-20 | End: 2022-06-10

## 2021-08-20 RX ORDER — DOXYCYCLINE 100 MG/1
100 CAPSULE ORAL 2 TIMES DAILY
Qty: 180 CAPSULE | Refills: 0 | Status: SHIPPED | OUTPATIENT
Start: 2021-08-20 | End: 2021-11-16

## 2021-08-20 RX ORDER — OMEGA-3 FATTY ACIDS/FISH OIL 300-1000MG
800 CAPSULE ORAL EVERY 8 HOURS PRN
COMMUNITY
End: 2023-05-17

## 2021-08-20 RX ORDER — ASPIRIN 81 MG/1
81 TABLET ORAL DAILY
Status: ON HOLD | COMMUNITY
End: 2022-06-10

## 2021-08-20 RX ORDER — HYDROCHLOROTHIAZIDE 25 MG/1
TABLET ORAL
Qty: 90 TABLET | Refills: 3 | Status: SHIPPED | OUTPATIENT
Start: 2021-08-20 | End: 2022-08-12

## 2021-08-20 ASSESSMENT — MIFFLIN-ST. JEOR: SCORE: 2042.33

## 2021-08-20 NOTE — LETTER
August 24, 2021      Gonzalez Morton  [address] [address]        Dear ,    We are writing to inform you of your test results.        Resulted Orders   Lipid panel   Result Value Ref Range    Cholesterol 133 <=199 mg/dL    Triglycerides 149 <=149 mg/dL    Direct Measure HDL 34 (L) >=40 mg/dL      Comment:      HDL Cholesterol Reference Range:     0-2 years:   No reference ranges established for patients under 2 years old  at [facility] for lipid analytes.    2-8 years:  Greater than 45 mg/dL     18 years and older:   Female: Greater than or equal to 50 mg/dL   Male:   Greater than or equal to 40 mg/dL    LDL Cholesterol Calculated 69 <=129 mg/dL    Patient Fasting > 8hrs? Yes    Hemoglobin A1c   Result Value Ref Range    Hemoglobin A1C 5.9 (H) 0.0 - 5.6 %      Comment:      Normal <5.7%   Prediabetes 5.7-6.4%    Diabetes 6.5% or higher     Note: Adopted from ADA consensus guidelines.   Basic metabolic panel   Result Value Ref Range    Sodium 139 136 - 145 mmol/L    Potassium 4.2 3.5 - 5.0 mmol/L    Chloride 103 98 - 107 mmol/L    Carbon Dioxide (CO2) 24 22 - 31 mmol/L    Anion Gap 12 5 - 18 mmol/L    Urea Nitrogen 17 8 - 22 mg/dL    Creatinine 0.88 0.70 - 1.30 mg/dL    Calcium 9.3 8.5 - 10.5 mg/dL    Glucose 100 70 - 125 mg/dL    GFR Estimate >90 >60 mL/min/1.73m2      Comment:      As of July 11, 2021, eGFR is calculated by the CKD-EPI creatinine equation, without race adjustment. eGFR can be influenced by muscle mass, exercise, and diet. The reported eGFR is an estimation only and is only applicable if the renal function is stable.   HIV Antigen Antibody Combo   Result Value Ref Range    HIV Antigen Antibody Combo Negative Negative   Hepatitis C Screen Reflex to HCV RNA Quant and Genotype   Result Value Ref Range    Hepatitis C Antibody Nonreactive Nonreactive    Narrative    Assay performance characteristics have not been established for newborns, infants, and  children.     Once in a lifetime screening for HIV and hepatitis C came back negative.  Kidney function looks good along with electrolytes.  Your A1c which is a measure of your blood sugar over the last 90 days is mildly elevated showing prediabetes.  You can prevent this from becoming full-blown diabetes by regular exercise, limiting carbohydrate/sugars in your diet, and weight loss.  Cholesterol levels look good!      If you have any questions or concerns, please call the clinic at the number listed above.       Sincerely,      Dylon Shafer NP

## 2021-08-20 NOTE — PROGRESS NOTES
October 9th.    SUBJECTIVE:   CC: Gonzalez Morton is an 60 year old male who presents for preventative health visit.     Patient has been advised of split billing requirements and indicates understanding: Yes  HPI    Today's PHQ-2 Score:   PHQ-2 ( 1999 [employer]) 8/20/2021   Q1: Little interest or pleasure in doing things 0   Q2: Feeling down, depressed or hopeless 0   PHQ-2 Score 0       Abuse: Current or Past(Physical, Sexual or Emotional)- No  Do you feel safe in your environment? Yes    Have you ever done Advance Care Planning? (For example, a Health Directive, POLST, or a discussion with a medical provider or your loved ones about your wishes): Yes, patient states has an Advance Care Planning document and will bring a copy to the clinic.    Social History     Tobacco Use     Smoking status: Never Smoker     Smokeless tobacco: Never Used   Substance Use Topics     Alcohol use: Not Currently     If you drink alcohol do you typically have >3 drinks per day or >7 drinks per week? No    Last PSA:   Prostate Specific Antigen Screen   Date Value Ref Range Status   02/27/2019 11.0 (H) 0.00 - 3.50 ng/mL Final       Reviewed orders with patient. Reviewed health maintenance and updated orders accordingly - Yes  Lab work is in process    Reviewed and updated as needed this visit by clinical staff  Tobacco  Allergies  Meds    Surg Hx           Reviewed and updated as needed this visit by Provider       Surg Hx           Review of Systems  CONSTITUTIONAL: NEGATIVE for fever, chills, change in weight  INTEGUMENTARY/SKIN: NEGATIVE for worrisome rashes, moles or lesions  EYES: NEGATIVE for vision changes or irritation  ENT: NEGATIVE for ear, mouth and throat problems  RESP: NEGATIVE for significant cough or SOB  CV: NEGATIVE for chest pain, palpitations or peripheral edema  GI: NEGATIVE for nausea, abdominal pain, heartburn, or change in bowel habits   male: negative for dysuria, hematuria, decreased urinary  "stream, erectile dysfunction, urethral discharge  MUSCULOSKELETAL: NEGATIVE for significant arthralgias or myalgia  NEURO: NEGATIVE for weakness, dizziness or paresthesias  PSYCHIATRIC: NEGATIVE for changes in mood or affect    OBJECTIVE:   /76   Pulse 60   Ht 1.671 m (5' 5.8\")   Wt 129.3 kg (285 lb)   SpO2 96%   BMI 46.28 kg/m      Physical Exam  GENERAL: healthy, alert and no distress  EYES: Eyes grossly normal to inspection, PERRL and conjunctivae and sclerae normal  HENT: ear canals and TM's normal, nose and mouth without ulcers or lesions  NECK: no adenopathy, no asymmetry, masses, or scars and thyroid normal to palpation  RESP: lungs clear to auscultation - no rales, rhonchi or wheezes  CV: regular rate and rhythm, normal S1 S2, no S3 or S4, no murmur, click or rub, no peripheral edema and peripheral pulses strong  ABDOMEN: soft, nontender, no hepatosplenomegaly, no masses and bowel sounds normal  MS: no gross musculoskeletal defects noted, no edema  SKIN: no suspicious lesions or rashes  NEURO: Normal strength and tone, mentation intact and speech normal  PSYCH: mentation appears normal, affect normal/bright    Diagnostic Test Results:  Labs reviewed in Epic    ASSESSMENT/PLAN:   1. Routine general medical examination at a health care facility    2. Essential hypertension, benign    3. Morbid obesity (H)    4. Mixed hyperlipidemia  - Lipid panel; Future  - Lipid panel    5. Screening for diabetes mellitus  - Hemoglobin A1c; Future  - Hemoglobin A1c    6. Benign essential hypertension  - lisinopril (ZESTRIL) 20 MG tablet; [LISINOPRIL (PRINIVIL,ZESTRIL) 20 MG TABLET] TAKE 1 TABLET BY MOUTH EVERY DAY  Dispense: 90 tablet; Refill: 3  - hydrochlorothiazide (HYDRODIURIL) 25 MG tablet; [HYDROCHLOROTHIAZIDE (HYDRODIURIL) 25 MG TABLET] TAKE 1 TABLET BY MOUTH EVERY DAY  Dispense: 90 tablet; Refill: 3  - Basic metabolic panel; Future  - Basic metabolic panel    7. Screening for HIV (human immunodeficiency " "virus)  - HIV Antigen Antibody Combo; Future  - HIV Antigen Antibody Combo    8. Encounter for hepatitis C screening test for low risk patient  - Hepatitis C Screen Reflex to HCV RNA Quant and Genotype; Future  - Hepatitis C Screen Reflex to HCV RNA Quant and Genotype    9. Rosacea  - doxycycline hyclate (VIBRAMYCIN) 100 MG capsule; Take 1 capsule (100 mg) by mouth 2 times daily  Dispense: 180 capsule; Refill: 0    Patient has been advised of split billing requirements and indicates understanding: Yes  COUNSELING:   Reviewed preventive health counseling, as reflected in patient instructions    Estimated body mass index is 46.28 kg/m  as calculated from the following:    Height as of this encounter: 1.671 m (5' 5.8\").    Weight as of this encounter: 129.3 kg (285 lb).     Weight management plan: Discussed healthy diet and exercise guidelines    He reports that he has never smoked. He has never used smokeless tobacco.      Counseling Resources:  ATP IV Guidelines  Pooled Cohorts Equation Calculator  FRAX Risk Assessment  ICSI Preventive Guidelines  Dietary Guidelines for Americans, 2010  USDA's MyPlate  ASA Prophylaxis  Lung CA Screening    Dylon Shafer NP  [facility]  "

## 2021-08-20 NOTE — PATIENT INSTRUCTIONS
"That shingles vaccine we talked about is called \"Shingrix\". Check with your insurance to see if they cover it. If they do and you're interested in getting it, let me know and we can have you come in for just the shot without having to see me.    Updating yearly labs today as well.    It was nice to meet you today!        Preventive Health Recommendations  Male Ages 50 - 64    Yearly exam:             See your health care provider every year in order to  o   Review health changes.   o   Discuss preventive care.    o   Review your medicines if your doctor has prescribed any.     Have a cholesterol test every 5 years, or more frequently if you are at risk for high cholesterol/heart disease.     Have a diabetes test (fasting glucose) every three years. If you are at risk for diabetes, you should have this test more often.     Have a colonoscopy at age 50, or have a yearly FIT test (stool test). These exams will check for colon cancer.      Talk with your health care provider about whether or not a prostate cancer screening test (PSA) is right for you.    You should be tested each year for STDs (sexually transmitted diseases), if you re at risk.     Shots: Get a flu shot each year. Get a tetanus shot every 10 years.     Nutrition:    Eat at least 5 servings of fruits and vegetables daily.     Eat whole-grain bread, whole-wheat pasta and brown rice instead of white grains and rice.     Get adequate Calcium and Vitamin D.     Lifestyle    Exercise for at least 150 minutes a week (30 minutes a day, 5 days a week). This will help you control your weight and prevent disease.     Limit alcohol to one drink per day.     No smoking.     Wear sunscreen to prevent skin cancer.     See your dentist every six months for an exam and cleaning.     See your eye doctor every 1 to 2 years.    "

## 2021-08-21 PROBLEM — N52.31 ERECTILE DYSFUNCTION FOLLOWING RADICAL PROSTATECTOMY: Status: ACTIVE | Noted: 2019-08-29

## 2021-08-21 PROBLEM — Z85.46 HISTORY OF MALIGNANT NEOPLASM OF PROSTATE: Status: ACTIVE | Noted: 2019-09-05

## 2021-08-23 LAB — HCV AB SERPL QL IA: NONREACTIVE

## 2021-08-24 PROBLEM — K65.1 PELVIC ABSCESS IN MALE (H): Status: RESOLVED | Noted: 2020-06-19 | Resolved: 2021-08-24

## 2021-08-24 PROBLEM — Z85.46 HISTORY OF PROSTATE CANCER: Status: ACTIVE | Noted: 2019-05-29

## 2021-08-24 PROBLEM — R94.31 ABNORMAL ELECTROCARDIOGRAM: Status: RESOLVED | Noted: 2020-08-19 | Resolved: 2021-08-24

## 2021-08-24 PROBLEM — Z01.810 PRE-OPERATIVE CARDIOVASCULAR EXAMINATION: Status: RESOLVED | Noted: 2020-08-19 | Resolved: 2021-08-24

## 2021-10-07 ENCOUNTER — TRANSFERRED RECORDS (OUTPATIENT)
Dept: HEALTH INFORMATION MANAGEMENT | Facility: CLINIC | Age: 61
End: 2021-10-07

## 2021-10-10 ENCOUNTER — HEALTH MAINTENANCE LETTER (OUTPATIENT)
Age: 61
End: 2021-10-10

## 2021-10-14 DIAGNOSIS — E78.5 HYPERLIPIDEMIA: ICD-10-CM

## 2021-10-15 RX ORDER — SIMVASTATIN 40 MG
TABLET ORAL
Qty: 90 TABLET | Refills: 3 | Status: SHIPPED | OUTPATIENT
Start: 2021-10-15 | End: 2022-02-01

## 2021-10-15 NOTE — TELEPHONE ENCOUNTER
"Last Written Prescription Date:  2/14/21  Last Fill Quantity: 90,  # refills: 1   Last office visit provider:  8/20/21     Requested Prescriptions   Pending Prescriptions Disp Refills     simvastatin (ZOCOR) 40 MG tablet [Pharmacy Med Name: SIMVASTATIN 40 MG TABLET] 90 tablet 1     Sig: TAKE 1 TABLET BY MOUTH EVERYDAY AT BEDTIME       Statins Protocol Passed - 10/14/2021  5:23 PM        Passed - LDL on file in past 12 months     Recent Labs   Lab Test 08/20/21  0932   LDL 69             Passed - No abnormal creatine kinase in past 12 months     No lab results found.             Passed - Recent (12 mo) or future (30 days) visit within the authorizing provider's specialty     Patient has had an office visit with the authorizing provider or a provider within the authorizing providers department within the previous 12 mos or has a future within next 30 days. See \"Patient Info\" tab in inbasket, or \"Choose Columns\" in Meds & Orders section of the refill encounter.              Passed - Medication is active on med list        Passed - Patient is age 18 or older             Emir Kline RN 10/15/21 3:18 PM  "

## 2021-11-14 DIAGNOSIS — L71.9 ROSACEA: ICD-10-CM

## 2021-11-16 RX ORDER — DOXYCYCLINE 100 MG/1
100 CAPSULE ORAL 2 TIMES DAILY
Qty: 180 CAPSULE | Refills: 0 | Status: SHIPPED | OUTPATIENT
Start: 2021-11-16 | End: 2022-02-10

## 2021-11-16 NOTE — TELEPHONE ENCOUNTER
Routing refill request to provider for review/approval because:  Drug not on the [facility] refill protocol     Last Written Prescription Date:  8/20/21  Last Fill Quantity: 180,  # refills: 0   Last office visit provider:  8/20/21     Requested Prescriptions   Pending Prescriptions Disp Refills     doxycycline hyclate (VIBRAMYCIN) 100 MG capsule [Pharmacy Med Name: DOXYCYCLINE HYCLATE 100 MG CAP] 180 capsule 0     Sig: TAKE 1 CAPSULE (100 MG) BY MOUTH 2 TIMES DAILY       There is no refill protocol information for this order          Emir Kline RN 11/16/21 9:01 AM

## 2022-02-01 ENCOUNTER — OFFICE VISIT (OUTPATIENT)
Dept: FAMILY MEDICINE | Facility: CLINIC | Age: 62
End: 2022-02-01
Payer: COMMERCIAL

## 2022-02-01 VITALS
BODY MASS INDEX: 45.96 KG/M2 | HEART RATE: 67 BPM | SYSTOLIC BLOOD PRESSURE: 128 MMHG | OXYGEN SATURATION: 96 % | WEIGHT: 283 LBS | DIASTOLIC BLOOD PRESSURE: 82 MMHG

## 2022-02-01 DIAGNOSIS — E78.5 HYPERLIPIDEMIA, UNSPECIFIED HYPERLIPIDEMIA TYPE: ICD-10-CM

## 2022-02-01 DIAGNOSIS — G25.81 RESTLESS LEGS SYNDROME: Primary | ICD-10-CM

## 2022-02-01 DIAGNOSIS — Z23 NEEDS FLU SHOT: ICD-10-CM

## 2022-02-01 LAB
ANION GAP SERPL CALCULATED.3IONS-SCNC: 13 MMOL/L (ref 5–18)
CHLORIDE BLD-SCNC: 99 MMOL/L (ref 98–107)
CO2 SERPL-SCNC: 26 MMOL/L (ref 22–31)
FERRITIN SERPL-MCNC: 238 NG/ML (ref 27–300)
MAGNESIUM SERPL-MCNC: 1.9 MG/DL (ref 1.8–2.6)
POTASSIUM BLD-SCNC: 4.3 MMOL/L (ref 3.5–5)
SODIUM SERPL-SCNC: 138 MMOL/L (ref 136–145)

## 2022-02-01 PROCEDURE — 99214 OFFICE O/P EST MOD 30 MIN: CPT | Mod: 25 | Performed by: NURSE PRACTITIONER

## 2022-02-01 PROCEDURE — 90471 IMMUNIZATION ADMIN: CPT | Performed by: NURSE PRACTITIONER

## 2022-02-01 PROCEDURE — 82728 ASSAY OF FERRITIN: CPT | Performed by: NURSE PRACTITIONER

## 2022-02-01 PROCEDURE — 80051 ELECTROLYTE PANEL: CPT | Performed by: NURSE PRACTITIONER

## 2022-02-01 PROCEDURE — 83735 ASSAY OF MAGNESIUM: CPT | Performed by: NURSE PRACTITIONER

## 2022-02-01 PROCEDURE — 36415 COLL VENOUS BLD VENIPUNCTURE: CPT | Performed by: NURSE PRACTITIONER

## 2022-02-01 PROCEDURE — 90682 RIV4 VACC RECOMBINANT DNA IM: CPT | Performed by: NURSE PRACTITIONER

## 2022-02-01 RX ORDER — CLINDAMYCIN HCL 300 MG
CAPSULE ORAL
COMMUNITY
Start: 2021-08-10 | End: 2022-05-10

## 2022-02-01 RX ORDER — SIMVASTATIN 40 MG
TABLET ORAL
Qty: 90 TABLET | Refills: 1 | Status: SHIPPED | OUTPATIENT
Start: 2022-02-01 | End: 2022-03-23 | Stop reason: ALTCHOICE

## 2022-02-01 NOTE — PROGRESS NOTES
Chief Complaint   Patient presents with     Leg Problem     States that he has been dealing with restless legs for around 5 years. Recently got  so now it is bugging spouse. Both arms twitch. Nothing during the day.       HPI: Gonzalez Morton is a 61 year old White male who presents today for an evaluation of restless legs and arms. This has been occurring for around five years, but he was recently  in October and says his wife is bothered by his movement at night. He oversees [employer] for work and works 1107-8500, often not going to sleep until 0300. He is unsure if it occurs every night, but the spasms wake him intermittently. He does have a physical job and feels when he is overly fatigued, it seems to exacerbate the problem. He feels he gets restful sleep and is able to stay awake during the day. He uses his CPAP every night, but has not had a sleep study completed, as he does not want to undergo a sleep study. He denies any sensitivities in his arms or legs and does have numbness in bilateral feet. He denies any alcohol use and does drink 2-3 cups of coffee in the morning.     ROS:Review of Systems - negative except for what's listed in the HPI    SH: The Patient's  reports that he has never smoked. He has never used smokeless tobacco. He reports previous alcohol use. He reports that he does not use drugs.      FH: The Patient's family history includes Atrophic kidney in his sister; Diabetes in his mother and sister; Diabetes Type 1 in his brother; Heart Disease in his father and mother.     Meds:    Current Outpatient Medications   Medication     aspirin 81 MG EC tablet     doxycycline hyclate (VIBRAMYCIN) 100 MG capsule     hydrochlorothiazide (HYDRODIURIL) 25 MG tablet     ibuprofen (ADVIL/MOTRIN) 200 MG capsule     lisinopril (ZESTRIL) 20 MG tablet     simvastatin (ZOCOR) 40 MG tablet     acetaminophen (TYLENOL) 650 MG CR tablet     clindamycin (CLEOCIN) 300 MG capsule     No  current facility-administered medications for this visit.       O:  /82   Pulse 67   Wt 128.4 kg (283 lb)   SpO2 96%   BMI 45.96 kg/m      Physical Examination:   General appearance - alert, well appearing, and in no distress  Mental status - alert, oriented to person, place, and time  Neck - no significant adenopathy  Lymphatics - no palpable lymphadenopathy  Chest - clear to auscultation, no wheezes, rales or rhonchi, symmetric air entry  Heart - normal rate and regular rhythm, S1 and S2 normal, no murmurs noted  Abdomen - soft, nontender, nondistended, no masses or organomegaly  Neurological - alert, oriented, normal speech, no focal findings or movement disorder noted, neck supple without rigidity, cranial nerves II through XII intact, motor and sensory grossly normal bilaterally, normal muscle tone, no tremors, strength 5/5  Musculoskeletal - no joint tenderness, deformity or swelling  Extremities - peripheral pulses normal, no peripheral edema  Skin - normal coloration and turgor.      A/P:     After discussing with Gonzalez, it would be appropriate to rule out any lab abnormalities. We will check his electrolytes and his iron level to see if this is a cause of the spasaming. If his levels result as normal, we discussed medication options such as gabapentin, requip, or mirapex. We also recommended that he complete a sleep study which he is open to completing if no benefit from medication.    Restless legs syndrome  - Ferritin  - Electrolyte panel (Na, K, Cl, CO2, Anion gap)  - Magnesium  - Ferritin  - Electrolyte panel (Na, K, Cl, CO2, Anion gap)  - Magnesium  -Ropinorole 0.25mg  Hyperlipidemia, unspecified hyperlipidemia type  - simvastatin (ZOCOR) 40 MG tablet  Dispense: 90 tablet; Refill: 1    Needs flu shot      Patient Active Problem List   Diagnosis     Benign Essential Hypertension     Mixed hyperlipidemia     Rosacea     Back pain     History of prostate cancer     Morbid obesity (H)      Erectile dysfunction following radical prostatectomy     History of malignant neoplasm of prostate       Current Outpatient Medications   Medication     aspirin 81 MG EC tablet     doxycycline hyclate (VIBRAMYCIN) 100 MG capsule     hydrochlorothiazide (HYDRODIURIL) 25 MG tablet     ibuprofen (ADVIL/MOTRIN) 200 MG capsule     lisinopril (ZESTRIL) 20 MG tablet     simvastatin (ZOCOR) 40 MG tablet     acetaminophen (TYLENOL) 650 MG CR tablet     clindamycin (CLEOCIN) 300 MG capsule     No current facility-administered medications for this visit.     Dayanna Ye, Nurse Practitioner Student    Agree with above documentation from nurse practitioner student.  Physical assessment also performed by me.  Addendum's made by me are in bold. Laboratory testing came back normal. Prescription for ropinirole sent to the pharmacy.      This note has been dictated using voice recognition software. Any grammatical or context distortions are unintentional and inherent to the software.

## 2022-02-01 NOTE — PATIENT INSTRUCTIONS
Starting with labs.    Once we discuss results with you we'll figure out medicine options.    If we cant get this under control we can have you meet with sleep medicine too.

## 2022-02-02 RX ORDER — ROPINIROLE 0.25 MG/1
0.25 TABLET, FILM COATED ORAL AT BEDTIME
Qty: 90 TABLET | Refills: 1 | Status: SHIPPED | OUTPATIENT
Start: 2022-02-02 | End: 2022-03-28

## 2022-02-08 DIAGNOSIS — L71.9 ROSACEA: ICD-10-CM

## 2022-02-09 NOTE — TELEPHONE ENCOUNTER
Routing refill request to provider for review/approval because:  Drug not [place] [facility] [place]     Last Written Prescription Date:  11/16/21  Last Fill Quantity: 180,  # refills: 0   Last office visit provider: 2/1/22     Requested Prescriptions   Pending Prescriptions Disp Refills     doxycycline hyclate (VIBRAMYCIN) 100 MG capsule [Pharmacy Med Name: DOXYCYCLINE HYCLATE 100 MG CAP] 180 capsule 0     Sig: TAKE 1 CAPSULE BY MOUTH TWICE A DAY       There is no refill protocol information for this order          Sarahi Colin RN 02/09/22 1:08 PM

## 2022-02-10 RX ORDER — DOXYCYCLINE 100 MG/1
CAPSULE ORAL
Qty: 180 CAPSULE | Refills: 1 | Status: SHIPPED | OUTPATIENT
Start: 2022-02-10 | End: 2022-05-10

## 2022-03-04 ENCOUNTER — OFFICE VISIT (OUTPATIENT)
Dept: CARDIOLOGY | Facility: CLINIC | Age: 62
End: 2022-03-04
Payer: COMMERCIAL

## 2022-03-04 VITALS
BODY MASS INDEX: 46.12 KG/M2 | HEART RATE: 68 BPM | SYSTOLIC BLOOD PRESSURE: 122 MMHG | OXYGEN SATURATION: 94 % | DIASTOLIC BLOOD PRESSURE: 72 MMHG | WEIGHT: 284 LBS | RESPIRATION RATE: 18 BRPM

## 2022-03-04 DIAGNOSIS — E78.2 MIXED HYPERLIPIDEMIA: Primary | ICD-10-CM

## 2022-03-04 PROCEDURE — 99215 OFFICE O/P EST HI 40 MIN: CPT | Performed by: INTERNAL MEDICINE

## 2022-03-04 NOTE — PROGRESS NOTES
HEART CARE ENCOUNTER CONSULTATON NOTE      [facility]  [phone]      Assessment/Recommendations   Assessment/Plan:  Mr. Morton has multiple cardiovascular risk factors including gender, hypertension, hyperlipidemia, ERIN, obesity and a family history of coronary disease in his dad. His blood pressure and cholesterol are at goal and he is compliant with his self-purchased CPAP without daytime somnolence or witnessed apnea. We discussed risk stratifying him with a coronary calcium score and he is interested in proceeding with that. We discussed the possibility of a stress test if his calcium score is high.     Obesity - mediterranean diet with sugar intake limited to < 30g per day and daily cardiovascular exercise recommended. I suggested that his goal weight be 200 lbs over the next few years.     F/U pending the calcium score results.       History of Present Illness/Subjective    HPI: Gonzalez Morton is a 61 year old male with hypertension, hyperlipidemia, self diagnosed ERIN using a self purchased CPAP, and prostate cancer in 2019. I met him for a pre-op evaluation after his EKG in the PCP office suggested inferior Q waves. No further testing was done and he had his left knee replaced without difficulty.    Mr. Morton comes back to cardiology today to make sure his heart is in good shape. He plans to get his other knee replaced this year. He is also a newlywed and wants to be around for a while with his new wife. 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 He thinks he will work until someone tells him he cant any further. He is busy at work but does not exercise due to right knee pain. Mr. Morton denies any chest pain, dyspnea, pnd/orthopnea, edema, dizziness or syncope. He notes that his dad had coronary disease, maybe in his 60s.          Physical Examination  Review of Systems   Vitals: /72 (BP Location: Left arm, Patient Position: Sitting, Cuff Size: Adult Large)    Pulse 68   Resp 18   Wt 128.8 kg (284 lb)   SpO2 94%   BMI 46.12 kg/m    BMI= Body mass index is 46.12 kg/m .  Wt Readings from Last 3 Encounters:   03/04/22 128.8 kg (284 lb)   02/01/22 128.4 kg (283 lb)   08/20/21 129.3 kg (285 lb)       General Appearance:   no distress, obese body habitus   ENT/Mouth: membranes moist, no oral lesions or bleeding gums.      EYES:  no scleral icterus, normal conjunctivae   Neck: no carotid bruits or thyromegaly   Chest/Lungs:   lungs are clear to auscultation, no rales or wheezing, no sternal scar, equal chest wall expansion    Cardiovascular:   Regular. Normal first and second heart sounds with no murmur. No rubs or gallops; the right carotid, radial and posterior tibial pulses are intact and the left carotid, radial and posterior tibial pulses are intact.  Jugular venous pressure is flat, no edema bilaterally    Abdomen:  no organomegaly, masses, bruits, or tenderness; bowel sounds are present   Extremities: no cyanosis or clubbing   Skin: no xanthelasma, warm.    Neurologic: normal  bilateral, no tremors     Psychiatric: alert and oriented x3, calm        Please refer above for cardiac ROS details.        Medical History  Surgical History Family History Social History   Past Medical History:   Diagnosis Date     Hypertension      Rosacea      Sleep apnea     CPAP     Past Surgical History:   Procedure Laterality Date     APPENDECTOMY       ARTHROSCOPY KNEE Bilateral      PROSTATECTOMY       TOTAL KNEE ARTHROPLASTY Left      WRIST SURGERY       Family History   Problem Relation Age of Onset     Diabetes Mother      Heart Disease Mother      Heart Disease Father      Atrophic kidney Sister      Diabetes Sister      Diabetes Type 1 Brother         Social History     Socioeconomic History     Marital status:      Spouse name: Not on file     Number of children: Not on file     Years of education: Not on file     Highest education level: Not on file   Occupational  History     Not on file   Tobacco Use     Smoking status: Never Smoker     Smokeless tobacco: Never Used   Substance and Sexual Activity     Alcohol use: Not Currently     Drug use: Never     Sexual activity: Not on file   Other Topics Concern     Not on file   Social History Narrative     Not on file     Social Determinants of Health     Financial Resource Strain: Not on file   Food Insecurity: Not on file   Transportation Needs: Not on file   Physical Activity: Not on file   Stress: Not on file   Social Connections: Not on file   Intimate Partner Violence: Not on file   Housing Stability: Not on file           Medications  Allergies   Current Outpatient Medications   Medication Sig Dispense Refill     acetaminophen (TYLENOL) 650 MG CR tablet Take 1,300 mg by mouth every 8 hours as needed        aspirin 81 MG EC tablet Take 81 mg by mouth daily       clindamycin (CLEOCIN) 300 MG capsule TAKE 2 CAPSULES BY MOUTH 1 HOUR PRIOR TO DENTAL APPOINTMENT.       doxycycline hyclate (VIBRAMYCIN) 100 MG capsule TAKE 1 CAPSULE BY MOUTH TWICE A  capsule 1     hydrochlorothiazide (HYDRODIURIL) 25 MG tablet [HYDROCHLOROTHIAZIDE (HYDRODIURIL) 25 MG TABLET] TAKE 1 TABLET BY MOUTH EVERY DAY 90 tablet 3     ibuprofen (ADVIL/MOTRIN) 200 MG capsule Take 800 mg by mouth continuous prn        lisinopril (ZESTRIL) 20 MG tablet [LISINOPRIL (PRINIVIL,ZESTRIL) 20 MG TABLET] TAKE 1 TABLET BY MOUTH EVERY DAY 90 tablet 3     rOPINIRole (REQUIP) 0.25 MG tablet Take 1 tablet (0.25 mg) by mouth At Bedtime (Patient taking differently: Take 0.5 mg by mouth At Bedtime ) 90 tablet 1     simvastatin (ZOCOR) 40 MG tablet TAKE 1 TABLET BY MOUTH EVERYDAY AT BEDTIME 90 tablet 1       Allergies   Allergen Reactions     Penicillins Hives     Tolerated CTX 06/2020          Lab Results    Chemistry/lipid CBC Cardiac Enzymes/BNP/TSH/INR   Recent Labs   Lab Test 08/20/21  0932   CHOL 133   HDL 34*   LDL 69   TRIG 149     Recent Labs   Lab Test  08/20/21  0932 02/24/20  1010 02/27/19  1019   LDL 69 100 100     Recent Labs   Lab Test 02/01/22  0857 08/20/21  0932    139   POTASSIUM 4.3 4.2   CHLORIDE 99 103   CO2 26 24   GLC  --  100   BUN  --  17   CR  --  0.88   GFRESTIMATED  --  >90   KEATON  --  9.3     Recent Labs   Lab Test 08/20/21  0932 07/31/20  1124 06/23/20  1052   CR 0.88 0.87 0.84     Recent Labs   Lab Test 08/20/21  0932 02/24/20  1010 02/27/19  1019   A1C 5.9* 5.9 5.9          Recent Labs   Lab Test 07/31/20  1124   WBC 7.4   HGB 15.6   HCT 45.8   MCV 86        Recent Labs   Lab Test 07/31/20  1124 06/23/20  1052 06/21/20  0624   HGB 15.6 12.9* 12.2*    No results for input(s): TROPONINI in the last 10982 hours.  No results for input(s): BNP, NTBNPI, NTBNP in the last 66347 hours.  No results for input(s): TSH in the last 99189 hours.  Recent Labs   Lab Test 06/19/20  2130   INR 1.23*        Today's clinic visit entailed:  [facility] information from epic reviewed  40 minutes spent on the date of the encounter doing chart review, review of outside records, review of test results, interpretation of tests, patient visit and documentation   Provider  Link to [facility] Help Grid     The level of medical decision making during this visit was of high complexity.        Mery Lyons MD

## 2022-03-04 NOTE — LETTER
3/4/2022    Dylon Shafer, NP  [address] [place] [address]    RE: Gonzalez Morton       Dear Colleague,     I had the [facility] of [facility] Gonzalez [facility] in the [facility].    HEART CARE ENCOUNTER CONSULTATON NOTE      [facility]  [phone]      Assessment/Recommendations   Assessment/Plan:  Mr. Morton has multiple cardiovascular risk factors including gender, hypertension, hyperlipidemia, ERIN, obesity and a family history of coronary disease in his dad. His blood pressure and cholesterol are at goal and he is compliant with his self-purchased CPAP without daytime somnolence or witnessed apnea. We discussed risk stratifying him with a coronary calcium score and he is interested in proceeding with that. We discussed the possibility of a stress test if his calcium score is high.     Obesity - mediterranean diet with sugar intake limited to < 30g per day and daily cardiovascular exercise recommended. I suggested that his goal weight be 200 lbs over the next few years.     F/U pending the calcium score results.       History of Present Illness/Subjective    HPI: Gonzalez Morton is a 61 year old male with hypertension, hyperlipidemia, self diagnosed ERIN using a self purchased CPAP, and prostate cancer in 2019. I met him for a pre-op evaluation after his EKG in the PCP office suggested inferior Q waves. No further testing was done and he had his left knee replaced without difficulty.    Mr. Morton comes back to cardiology today to make sure his heart is in good shape. He plans to get his other knee replaced this year. He is also a newlywed and wants to be around for a while with his new wife. 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 He thinks he will work until someone tells him he cant any further. He is busy at work but does not exercise due to right knee pain. Mr. Morton denies any chest pain, dyspnea, pnd/orthopnea, edema, dizziness  or syncope. He notes that his dad had coronary disease, maybe in his 60s.          Physical Examination  Review of Systems   Vitals: /72 (BP Location: Left arm, Patient Position: Sitting, Cuff Size: Adult Large)   Pulse 68   Resp 18   Wt 128.8 kg (284 lb)   SpO2 94%   BMI 46.12 kg/m    BMI= Body mass index is 46.12 kg/m .  Wt Readings from Last 3 Encounters:   03/04/22 128.8 kg (284 lb)   02/01/22 128.4 kg (283 lb)   08/20/21 129.3 kg (285 lb)       General Appearance:   no distress, obese body habitus   ENT/Mouth: membranes moist, no oral lesions or bleeding gums.      EYES:  no scleral icterus, normal conjunctivae   Neck: no carotid bruits or thyromegaly   Chest/Lungs:   lungs are clear to auscultation, no rales or wheezing, no sternal scar, equal chest wall expansion    Cardiovascular:   Regular. Normal first and second heart sounds with no murmur. No rubs or gallops; the right carotid, radial and posterior tibial pulses are intact and the left carotid, radial and posterior tibial pulses are intact.  Jugular venous pressure is flat, no edema bilaterally    Abdomen:  no organomegaly, masses, bruits, or tenderness; bowel sounds are present   Extremities: no cyanosis or clubbing   Skin: no xanthelasma, warm.    Neurologic: normal  bilateral, no tremors     Psychiatric: alert and oriented x3, calm        Please refer above for cardiac ROS details.        Medical History  Surgical History Family History Social History   Past Medical History:   Diagnosis Date     Hypertension      Rosacea      Sleep apnea     CPAP     Past Surgical History:   Procedure Laterality Date     APPENDECTOMY       ARTHROSCOPY KNEE Bilateral      PROSTATECTOMY       TOTAL KNEE ARTHROPLASTY Left      WRIST SURGERY       Family History   Problem Relation Age of Onset     Diabetes Mother      Heart Disease Mother      Heart Disease Father      Atrophic kidney Sister      Diabetes Sister      Diabetes Type 1 Brother         Social  History     Socioeconomic History     Marital status:      Spouse name: Not on file     Number of children: Not on file     Years of education: Not on file     Highest education level: Not on file   Occupational History     Not on file   Tobacco Use     Smoking status: Never Smoker     Smokeless tobacco: Never Used   Substance and Sexual Activity     Alcohol use: Not Currently     Drug use: Never     Sexual activity: Not on file   Other Topics Concern     Not on file   Social History Narrative     Not on file     Social Determinants of Health     Financial Resource Strain: Not on file   Food Insecurity: Not on file   Transportation Needs: Not on file   Physical Activity: Not on file   Stress: Not on file   Social Connections: Not on file   Intimate Partner Violence: Not on file   Housing Stability: Not on file           Medications  Allergies   Current Outpatient Medications   Medication Sig Dispense Refill     acetaminophen (TYLENOL) 650 MG CR tablet Take 1,300 mg by mouth every 8 hours as needed        aspirin 81 MG EC tablet Take 81 mg by mouth daily       clindamycin (CLEOCIN) 300 MG capsule TAKE 2 CAPSULES BY MOUTH 1 HOUR PRIOR TO DENTAL APPOINTMENT.       doxycycline hyclate (VIBRAMYCIN) 100 MG capsule TAKE 1 CAPSULE BY MOUTH TWICE A  capsule 1     hydrochlorothiazide (HYDRODIURIL) 25 MG tablet [HYDROCHLOROTHIAZIDE (HYDRODIURIL) 25 MG TABLET] TAKE 1 TABLET BY MOUTH EVERY DAY 90 tablet 3     ibuprofen (ADVIL/MOTRIN) 200 MG capsule Take 800 mg by mouth continuous prn        lisinopril (ZESTRIL) 20 MG tablet [LISINOPRIL (PRINIVIL,ZESTRIL) 20 MG TABLET] TAKE 1 TABLET BY MOUTH EVERY DAY 90 tablet 3     rOPINIRole (REQUIP) 0.25 MG tablet Take 1 tablet (0.25 mg) by mouth At Bedtime (Patient taking differently: Take 0.5 mg by mouth At Bedtime ) 90 tablet 1     simvastatin (ZOCOR) 40 MG tablet TAKE 1 TABLET BY MOUTH EVERYDAY AT BEDTIME 90 tablet 1       Allergies   Allergen Reactions     Penicillins Hives      Tolerated CTX 06/2020          Lab Results    Chemistry/lipid CBC Cardiac Enzymes/BNP/TSH/INR   Recent Labs   Lab Test 08/20/21  0932   CHOL 133   HDL 34*   LDL 69   TRIG 149     Recent Labs   Lab Test 08/20/21  0932 02/24/20  1010 02/27/19  1019   LDL 69 100 100     Recent Labs   Lab Test 02/01/22  0857 08/20/21  0932    139   POTASSIUM 4.3 4.2   CHLORIDE 99 103   CO2 26 24   GLC  --  100   BUN  --  17   CR  --  0.88   GFRESTIMATED  --  >90   KEATON  --  9.3     Recent Labs   Lab Test 08/20/21  0932 07/31/20  1124 06/23/20  1052   CR 0.88 0.87 0.84     Recent Labs   Lab Test 08/20/21  0932 02/24/20  1010 02/27/19  1019   A1C 5.9* 5.9 5.9          Recent Labs   Lab Test 07/31/20  1124   WBC 7.4   HGB 15.6   HCT 45.8   MCV 86        Recent Labs   Lab Test 07/31/20  1124 06/23/20  1052 06/21/20  0624   HGB 15.6 12.9* 12.2*    No results for input(s): TROPONINI in the last 64159 hours.  No results for input(s): BNP, NTBNPI, NTBNP in the last 55742 hours.  No results for input(s): TSH in the last 31582 hours.  Recent Labs   Lab Test 06/19/20  2130   INR 1.23*        Today's clinic visit entailed:  Review of [facility] information from epic reviewed  40 minutes spent on the date of the encounter doing chart review, review of outside records, review of test results, interpretation of tests, patient visit and documentation   Provider  Link to [facility] Help Grid     The level of medical decision making during this visit was of high complexity.        Mery Lyons MD            Thank you for allowing me to participate in the care of your patient.      Sincerely,     Mery Lyons MD     [facility]  cc:   Mery Lyons MD  [address] [place] [address]

## 2022-03-04 NOTE — PATIENT INSTRUCTIONS
It was a pleasure seeing you at [facility] today.        Here are my suggestions for your care:    1. Coronary calcium score CT scan.  The schedulers should reach out to you to get this scheduled. If they don't call you in the next week give us a call.     2. Work on a healthy mediterranean diet. Limit your sugar intake to no more than 30g/day (read labels) and cut out the artificial sweeteners. Limit your animal fats (fatty meats, full fat dairy/cheeses, egg yolks). Fill up your plate at least half full with vegetables each meal (limit the starchy veggies like corn and potatoes).     3. Cardiovascular exercise at least 30 days daily      We will call with results       You can always call my nurse Sherri Mora RN who is a nurse helping me in the care of my patients. She can be reached at [phone] if you have any questions.    Thank you again for trusting me with your care. Please feel free to call my office at any time if you have any question or if I can assist you in any way.    Mery Lyons MD  [facility]

## 2022-03-19 ENCOUNTER — HOSPITAL ENCOUNTER (OUTPATIENT)
Dept: CT IMAGING | Facility: CLINIC | Age: 62
Discharge: HOME | End: 2022-03-19
Attending: INTERNAL MEDICINE | Admitting: INTERNAL MEDICINE

## 2022-03-19 DIAGNOSIS — E78.2 MIXED HYPERLIPIDEMIA: ICD-10-CM

## 2022-03-19 PROCEDURE — 75571 CT HRT W/O DYE W/CA TEST: CPT | Mod: 26 | Performed by: INTERNAL MEDICINE

## 2022-03-19 PROCEDURE — 75571 CT HRT W/O DYE W/CA TEST: CPT

## 2022-03-21 LAB
CV CALCIUM SCORE AGATSTON LM: 205
CV CALCIUM SCORING AGATSON LAD: 2005
CV CALCIUM SCORING AGATSTON CX: 1499
CV CALCIUM SCORING AGATSTON RCA: 3731
CV CALCIUM SCORING AGATSTON TOTAL: 7440

## 2022-03-23 DIAGNOSIS — E78.2 MIXED HYPERLIPIDEMIA: Primary | ICD-10-CM

## 2022-03-23 DIAGNOSIS — R93.1 ELEVATED CORONARY ARTERY CALCIUM SCORE: ICD-10-CM

## 2022-03-23 RX ORDER — ATORVASTATIN CALCIUM 40 MG/1
40 TABLET, FILM COATED ORAL DAILY
Qty: 30 TABLET | Refills: 3 | Status: SHIPPED | OUTPATIENT
Start: 2022-03-23 | End: 2022-04-18

## 2022-03-28 ENCOUNTER — TELEPHONE (OUTPATIENT)
Dept: NURSING | Facility: CLINIC | Age: 62
End: 2022-03-28
Payer: COMMERCIAL

## 2022-03-28 DIAGNOSIS — G25.81 RESTLESS LEGS SYNDROME: ICD-10-CM

## 2022-03-28 RX ORDER — ROPINIROLE 0.25 MG/1
0.5 TABLET, FILM COATED ORAL AT BEDTIME
Qty: 180 TABLET | Refills: 2 | Status: SHIPPED | OUTPATIENT
Start: 2022-03-28 | End: 2022-12-15

## 2022-03-28 NOTE — TELEPHONE ENCOUNTER
2/1/22 lab result.   Please call patient. Labs are normal. I sent a prescription for ropinirole to the pharmacy for his sleep movement. Take about 2 hours before bed. I sent a low dose and I would recommend that he increase it by 1 tab every 4 days until he finds good relief of symptoms. Keep me updated and a couple weeks as to how things are going.

## 2022-03-28 NOTE — TELEPHONE ENCOUNTER
Please call patient to make sure that he knows that this refill was sent in.  Unclear what communication the pharmacy says that they have been trying to make with us as there is no documentation of this.    Dylon Shafer, CNP

## 2022-03-28 NOTE — TELEPHONE ENCOUNTER
Telephone call:    Pt states he is out of Ropinirole for his restless legs and pharmacy states it is too soon to fill.  Per pt he was told to start on one pill daily x1 week and if needed could increase to 2 tablets daily and has been taking 2 daily and now out of pills.  Per pt, pharmacy has reached out to clinic x2.  Message routed to PCP at clinic.  Pt's pharmacy is [facility] in [place] on [address].     Yolanda Elizabeth RN  03/28/22 9:44 AM  [facility] Nurse Advisor

## 2022-03-30 ENCOUNTER — TELEPHONE (OUTPATIENT)
Dept: CARDIOLOGY | Facility: CLINIC | Age: 62
End: 2022-03-30

## 2022-03-30 ENCOUNTER — HOSPITAL ENCOUNTER (OUTPATIENT)
Dept: NUCLEAR MEDICINE | Facility: CLINIC | Age: 62
Discharge: HOME | End: 2022-03-30
Attending: INTERNAL MEDICINE
Payer: COMMERCIAL

## 2022-03-30 ENCOUNTER — HOSPITAL ENCOUNTER (OUTPATIENT)
Dept: CARDIOLOGY | Facility: CLINIC | Age: 62
Discharge: HOME | End: 2022-03-30
Attending: INTERNAL MEDICINE
Payer: COMMERCIAL

## 2022-03-30 DIAGNOSIS — E78.2 MIXED HYPERLIPIDEMIA: ICD-10-CM

## 2022-03-30 DIAGNOSIS — R93.1 ELEVATED CORONARY ARTERY CALCIUM SCORE: ICD-10-CM

## 2022-03-30 DIAGNOSIS — R94.31 ABNORMAL ELECTROCARDIOGRAM: ICD-10-CM

## 2022-03-30 DIAGNOSIS — R93.1 ELEVATED CORONARY ARTERY CALCIUM SCORE: Primary | ICD-10-CM

## 2022-03-30 LAB
CV STRESS CURRENT BP HE: NORMAL
CV STRESS CURRENT HR HE: 66
CV STRESS CURRENT HR HE: 67
CV STRESS CURRENT HR HE: 69
CV STRESS CURRENT HR HE: 70
CV STRESS CURRENT HR HE: 71
CV STRESS CURRENT HR HE: 73
CV STRESS CURRENT HR HE: 75
CV STRESS CURRENT HR HE: 76
CV STRESS DEVIATION TIME HE: NORMAL
CV STRESS ECHO PERCENT HR HE: NORMAL
CV STRESS EXERCISE STAGE HE: NORMAL
CV STRESS FINAL RESTING BP HE: NORMAL
CV STRESS FINAL RESTING HR HE: 69
CV STRESS MAX HR HE: 77
CV STRESS MAX TREADMILL GRADE HE: 0
CV STRESS MAX TREADMILL SPEED HE: 0
CV STRESS PEAK DIA BP HE: NORMAL
CV STRESS PEAK SYS BP HE: NORMAL
CV STRESS PHASE HE: NORMAL
CV STRESS PROTOCOL HE: NORMAL
CV STRESS RESTING PT POSITION HE: NORMAL
CV STRESS ST DEVIATION AMOUNT HE: NORMAL
CV STRESS ST DEVIATION ELEVATION HE: NORMAL
CV STRESS ST EVELATION AMOUNT HE: NORMAL
CV STRESS TEST TYPE HE: NORMAL
CV STRESS TOTAL STAGE TIME MIN 1 HE: NORMAL
RATE PRESSURE PRODUCT: NORMAL
STRESS ECHO BASELINE DIASTOLIC HE: 92
STRESS ECHO BASELINE HR: 68
STRESS ECHO BASELINE SYSTOLIC BP: 145
STRESS ECHO CALCULATED PERCENT HR: 48 %
STRESS ECHO LAST STRESS DIASTOLIC BP: 81
STRESS ECHO LAST STRESS HR: 73
STRESS ECHO LAST STRESS SYSTOLIC BP: 141
STRESS ECHO TARGET HR: 159
STRESS/REST PERFUSION RATIO: 1.38

## 2022-03-30 PROCEDURE — 93017 CV STRESS TEST TRACING ONLY: CPT

## 2022-03-30 PROCEDURE — A9500 TC99M SESTAMIBI: HCPCS | Performed by: INTERNAL MEDICINE

## 2022-03-30 PROCEDURE — 93018 CV STRESS TEST I&R ONLY: CPT | Performed by: INTERNAL MEDICINE

## 2022-03-30 PROCEDURE — 343N000001 HC RX 343: Performed by: INTERNAL MEDICINE

## 2022-03-30 PROCEDURE — 78452 HT MUSCLE IMAGE SPECT MULT: CPT | Mod: 26 | Performed by: INTERNAL MEDICINE

## 2022-03-30 PROCEDURE — 78452 HT MUSCLE IMAGE SPECT MULT: CPT

## 2022-03-30 PROCEDURE — 93016 CV STRESS TEST SUPVJ ONLY: CPT

## 2022-03-30 RX ORDER — AMINOPHYLLINE 25 MG/ML
50 INJECTION, SOLUTION INTRAVENOUS
Status: DISCONTINUED | OUTPATIENT
Start: 2022-03-30 | End: 2022-03-30 | Stop reason: HOSPADM

## 2022-03-30 RX ORDER — REGADENOSON 0.08 MG/ML
0.4 INJECTION, SOLUTION INTRAVENOUS ONCE
Status: COMPLETED | OUTPATIENT
Start: 2022-03-30 | End: 2022-03-30

## 2022-03-30 RX ADMIN — Medication 8 MILLICURIE: at 07:59

## 2022-03-30 RX ADMIN — Medication 41.8 MILLICURIE: at 08:45

## 2022-03-30 RX ADMIN — REGADENOSON 0.4 MG: 0.08 INJECTION, SOLUTION INTRAVENOUS at 08:43

## 2022-03-30 NOTE — PROGRESS NOTES
Denies chest pain or SOARES.  Abnormal EKG at pre-op.  Calcium score greater than 7,000.  Mainly in the LAD and RCA.  Here for cardiac clearance.  [facility].  Willow Padilla RN

## 2022-03-30 NOTE — TELEPHONE ENCOUNTER
----- Message from Mery Lyons MD sent at 3/30/2022  1:44 PM CDT -----  Sherri - please get him set up for a coronary angio with possible PCI with me.   Thanks, EG      Hi,     Your stress test is unfortunately not normal. There is an area on the bottom of your heart that looks as though it may have damage from a previous heart attack. This could be a false positive, as the bottom of your heart sits on the breathing muscle which can sometimes generate a shadow in that area. If you remember, your EKG abnormality was also from this area as well.     Because your coronary calcium score was very high and this stress test is abnormal, I think that we need to do an invasive coronary angiogram to see if there is blockage there or not. I know that you are feeling well and are not having symptoms, but based on the calcium score and this stress test you are at very high risk for having a heart event in the next 10 years. I will have my nurse Sherri reach out to you about getting set up for an angiogram with possible stenting, depending on how your pictures look. Risk of any severe complications are < 1% for an angiogram and < 2% for stenting.     Dr. Lyons

## 2022-03-31 NOTE — TELEPHONE ENCOUNTER
PC with patient and discussion. Pt verbalized understanding of plan of care. Agreeable to proceed. Case request and covid order placed. Pt scheduled for H&P with CY on 4/4 am with Covid swab in clinic. Procedure on 4/7 0630 arrival with EMG. Education via phone on 4/4 at 1000. Will send prep and education via my chart. No further questions or concerns at this time. YAZAN,Rn

## 2022-03-31 NOTE — TELEPHONE ENCOUNTER
===View-only below this line===  ----- Message -----  From: Kamille Alvarenga  Sent: 3/31/2022   3:43 PM CDT  To: Flakito Mora RN  Subject: EMG ORDERING                                     CORS POSS PCI WITH EMG ONLY     630AM ADMIT ON 4/7    H&P ON 4/4 WITH CHIME     NO ALERTS     [facility]     THANKS!   -KAMILLE

## 2022-04-03 PROBLEM — Z82.49 FAMILY HISTORY OF ISCHEMIC HEART DISEASE: Status: ACTIVE | Noted: 2022-04-03

## 2022-04-03 PROBLEM — R94.39 ABNORMAL CARDIOVASCULAR STRESS TEST: Status: ACTIVE | Noted: 2022-04-03

## 2022-04-04 ENCOUNTER — OFFICE VISIT (OUTPATIENT)
Dept: CARDIOLOGY | Facility: CLINIC | Age: 62
End: 2022-04-04
Payer: COMMERCIAL

## 2022-04-04 ENCOUNTER — LAB (OUTPATIENT)
Dept: CARDIOLOGY | Facility: CLINIC | Age: 62
End: 2022-04-04
Payer: COMMERCIAL

## 2022-04-04 VITALS
HEART RATE: 67 BPM | BODY MASS INDEX: 45.31 KG/M2 | SYSTOLIC BLOOD PRESSURE: 122 MMHG | DIASTOLIC BLOOD PRESSURE: 78 MMHG | RESPIRATION RATE: 12 BRPM | WEIGHT: 279 LBS

## 2022-04-04 DIAGNOSIS — Z82.49 FAMILY HISTORY OF ISCHEMIC HEART DISEASE: ICD-10-CM

## 2022-04-04 DIAGNOSIS — R94.39 ABNORMAL CARDIOVASCULAR STRESS TEST: Primary | ICD-10-CM

## 2022-04-04 DIAGNOSIS — E78.2 MIXED HYPERLIPIDEMIA: ICD-10-CM

## 2022-04-04 DIAGNOSIS — I10 ESSENTIAL HYPERTENSION, BENIGN: ICD-10-CM

## 2022-04-04 DIAGNOSIS — R94.31 ABNORMAL ELECTROCARDIOGRAM: ICD-10-CM

## 2022-04-04 DIAGNOSIS — R06.09 DOE (DYSPNEA ON EXERTION): ICD-10-CM

## 2022-04-04 DIAGNOSIS — R93.1 ELEVATED CORONARY ARTERY CALCIUM SCORE: ICD-10-CM

## 2022-04-04 DIAGNOSIS — E66.01 MORBID OBESITY (H): ICD-10-CM

## 2022-04-04 LAB
ANION GAP SERPL CALCULATED.3IONS-SCNC: 11 MMOL/L (ref 5–18)
BUN SERPL-MCNC: 16 MG/DL (ref 8–22)
CALCIUM SERPL-MCNC: 9.4 MG/DL (ref 8.5–10.5)
CHLORIDE BLD-SCNC: 100 MMOL/L (ref 98–107)
CO2 SERPL-SCNC: 27 MMOL/L (ref 22–31)
CREAT SERPL-MCNC: 0.93 MG/DL (ref 0.7–1.3)
ERYTHROCYTE [DISTWIDTH] IN BLOOD BY AUTOMATED COUNT: 12.4 % (ref 10–15)
GFR SERPL CREATININE-BSD FRML MDRD: >90 ML/MIN/1.73M2
GLUCOSE BLD-MCNC: 108 MG/DL (ref 70–125)
HCT VFR BLD AUTO: 48.8 % (ref 40–53)
HGB BLD-MCNC: 16.9 G/DL (ref 13.3–17.7)
MCH RBC QN AUTO: 28.6 PG (ref 26.5–33)
MCHC RBC AUTO-ENTMCNC: 34.6 G/DL (ref 31.5–36.5)
MCV RBC AUTO: 83 FL (ref 78–100)
PLATELET # BLD AUTO: 258 10E3/UL (ref 150–450)
POTASSIUM BLD-SCNC: 4 MMOL/L (ref 3.5–5)
RBC # BLD AUTO: 5.91 10E6/UL (ref 4.4–5.9)
SODIUM SERPL-SCNC: 138 MMOL/L (ref 136–145)
WBC # BLD AUTO: 7.7 10E3/UL (ref 4–11)

## 2022-04-04 PROCEDURE — 99215 OFFICE O/P EST HI 40 MIN: CPT | Performed by: NURSE PRACTITIONER

## 2022-04-04 PROCEDURE — 85027 COMPLETE CBC AUTOMATED: CPT | Performed by: NURSE PRACTITIONER

## 2022-04-04 PROCEDURE — U0003 INFECTIOUS AGENT DETECTION BY NUCLEIC ACID (DNA OR RNA); SEVERE ACUTE RESPIRATORY SYNDROME CORONAVIRUS 2 (SARS-COV-2) (CORONAVIRUS DISEASE [COVID-19]), AMPLIFIED PROBE TECHNIQUE, MAKING USE OF HIGH THROUGHPUT TECHNOLOGIES AS DESCRIBED BY CMS-2020-01-R: HCPCS

## 2022-04-04 PROCEDURE — U0005 INFEC AGEN DETEC AMPLI PROBE: HCPCS

## 2022-04-04 PROCEDURE — 80048 BASIC METABOLIC PNL TOTAL CA: CPT | Performed by: NURSE PRACTITIONER

## 2022-04-04 PROCEDURE — 36415 COLL VENOUS BLD VENIPUNCTURE: CPT | Performed by: NURSE PRACTITIONER

## 2022-04-04 NOTE — PROGRESS NOTES
Assessment/Recommendations   History and physical for coronary angiogram with possible percutaneous coronary intervention on 4/7/2022    Assessment/Plan:  1.  Abnormal calcium score/abnormalities cardiovascular stress test: Recent coronary CT calcium score was found elevated in 7000s on 3/19/2022.  Nuclear stress test revealed medium sized area of nontransmural infarction involving mid to basal inferior wall although specificity reduced in the setting of diaphragmatic attenuation.  Patient was considered intermediate risk of future cardiac ischemic event.    Stress test was reviewed by Dr. Lyons and recommend coronary angiogram for further ischemic evaluation with possible coronary intervention.    Patient reports some shortness of breath on exertion but denies chest pain.    Currently on ASA 81 daily     Discussed about the indication for coronary angiogram/possible PCI and the risks associated with angiogram including <0.1% of MI and CVA or death or any of the following to the degree that it could threaten her life: allergic reaction, arrhythmia, renal failure, hemorrhage, thrombosis and infection.  Risk associated with therapeutic intervention includes 2% or less risk of MI, less than 1% risk of CVA, emergency her surgery, death, less than 4% risk of vascular injury requiring surgical repair or blood transfusion with 20-30% risk of restenosis with a bare-metal stent and less than 10% risk of stenosis with a drug-eluting stent.  Patient would like to proceed with coronary angiogram and possible PCI.    Recent lab work including BMP stable in February 22 and CBC in 2020.             2.  Dyslipidemia: Gonzalez Morton is on high intensity statin therapy with atorvastatin 40 mg daily.  He was switched from simvastatin to atorvastatin.  Tolerating with no adverse effect his most recent LDL is 69 in July 2021.    3.  Hypertension: His blood pressure is well controlled.  On lisinopril and  hydrochlorothiazide    4.  BMI of 45.31:    5.  History of snoring: Uses CPAP      Plan:  - Continue ASA and atorvastatin  - BMP/CBC pending  - Avoid strenuous exercise or lifting heavy objects until further direction  - Please seek medical attention if develop chest pain or, shortness of breath, lightheaded or dizziness  -Continue current hypertension regimen  -Covid test as scheduled  - Pre angio teach by AICHA Polanco -pending     History of Present Illness/Subjective    Gonzalez Morton is a 61 year old years old  with a significant PMH of hyperlipidemia, morbid obesity with a BMI of 45.31, history of malignant neoplasm of prostate with radical prostatectomy, family history of heart disease, left knee replacement in arthroplasty, hypertension, and shortness of breath on exertion who is seen at [facility] for pre procedure history and physical examination for coronary angiogram.    Patient saw Dr. Lyons early March for cardiac consultation as he plans to have right knee replacement this year.  He underwent calcium study which came back elevated in 7000.  He also had an abnormal stress test.  Given his multiple cardiovascular risk factors and abnormal stress test and elevated calcium score, patient was recommended coronary angiogram with possible coronary intervention.     Today, Steven denies fatigue, lightheadedness, shortness of breath, orthopnea, PND, palpitations, chest pain, abdominal fullness/bloating and lower extremity edema.  He further denies fever, chills, N/V, diarrhea, vomiting, constipation, hematochezia, hematemesis, hemoptysis. He also denies malignant hyperthermia, stroke/TIA, bleeding or clotting disorders, COPD/Asthma, anaphylaxis reaction to medications, IV contrast or sea food.  He reports history of snoring but no officially diagnosed with sleep apnea but he uses CPAP on his own.    Lexiscan stress test done on 3/30/2022-reviewed     The nuclear stress test is  abnormal.  Nuclear images demonstrate a medium size area of nontransmural infarction involving the mid to basal inferior wall, although specificity reduced in the setting of diaphragmatic attenuation.     Stress to rest cavity ratio is 1.38.  TID is present visually and quantitatively.  This is a nonspecific finding but may be a marker of diffuse subendocardial ischemia.     The left ventricular ejection fraction at stress is greater than 70%.     The patient is at an intermediate risk of future cardiac ischemic events.     There is no prior study for comparison.    CT coronary calcium score done on 3/19/2022-reviewed   Interpretation Summary    The total Agatston score is 7440. A calcium score in this range places the individual in the 99th percentile when compared to an age and gender matched control group and implies a very high risk of cardiac events in the next ten years.     Physical Examination Review of Systems   /78 (BP Location: Left arm, Patient Position: Sitting, Cuff Size: Adult Large)   Pulse 67   Resp 12   Wt 126.6 kg (279 lb)   BMI 45.31 kg/m    Body mass index is 45.31 kg/m .  Wt Readings from Last 3 Encounters:   04/04/22 126.6 kg (279 lb)   03/04/22 128.8 kg (284 lb)   02/01/22 128.4 kg (283 lb)     General Appearance:   no distress, normal body habitus   ENT/Mouth: membranes moist, no oral lesions or bleeding gums.      EYES:  no scleral icterus, normal conjunctivae   Neck: no carotid bruits or thyromegaly   Chest/Lungs:   lungs are clear to auscultation, no rales or wheezing, equal chest wall expansion    Cardiovascular:    Heart rate regular. Normal first and second heart sounds with no murmurs, rubs, or gallops; the carotid, radial and posterior tibial pulses are intact, JVP is difficult to assess due to the patient's obesity and body habitus   , no edema bilaterally    Abdomen:  Little but soft, no organomegaly, masses, bruits, or tenderness; bowel sounds are present   Extremities    no cyanosis or clubbing.  Radial pulses and Pedal pulses intact and symmetrical.  CMS intact.   Skin: no xanthelasma, warm.    Neurologic: normal  bilateral, no tremors     Psychiatric: alert and oriented x3, calm                                                    Negative unless noted in HPI     Medical History  Surgical History Family History Social History   Past Medical History:   Diagnosis Date     Hypertension      Rosacea      Sleep apnea     CPAP    Past Surgical History:   Procedure Laterality Date     APPENDECTOMY       ARTHROSCOPY KNEE Bilateral      PROSTATECTOMY       TOTAL KNEE ARTHROPLASTY Left      WRIST SURGERY      Family History   Problem Relation Age of Onset     Diabetes Mother      Heart Disease Mother      Heart Disease Father      Atrophic kidney Sister      Diabetes Sister      Diabetes Type 1 Brother     Social History     Socioeconomic History     Marital status:      Spouse name: Not on file     Number of children: Not on file     Years of education: Not on file     Highest education level: Not on file   Occupational History     Not on file   Tobacco Use     Smoking status: Never Smoker     Smokeless tobacco: Never Used   Substance and Sexual Activity     Alcohol use: Not Currently     Drug use: Never     Sexual activity: Not on file   Other Topics Concern     Not on file   Social History Narrative     Not on file     Social Determinants of Health     Financial Resource Strain: Not on file   Food Insecurity: Not on file   Transportation Needs: Not on file   Physical Activity: Not on file   Stress: Not on file   Social Connections: Not on file   Intimate Partner Violence: Not on file   Housing Stability: Not on file          Medications  Allergies   Current Outpatient Medications   Medication Sig Dispense Refill     acetaminophen (TYLENOL) 650 MG CR tablet Take 1,300 mg by mouth every 8 hours as needed        aspirin 81 MG EC tablet Take 81 mg by mouth daily       atorvastatin  (LIPITOR) 40 MG tablet Take 1 tablet (40 mg) by mouth daily 30 tablet 3     clindamycin (CLEOCIN) 300 MG capsule TAKE 2 CAPSULES BY MOUTH 1 HOUR PRIOR TO DENTAL APPOINTMENT.       doxycycline hyclate (VIBRAMYCIN) 100 MG capsule TAKE 1 CAPSULE BY MOUTH TWICE A  capsule 1     hydrochlorothiazide (HYDRODIURIL) 25 MG tablet [HYDROCHLOROTHIAZIDE (HYDRODIURIL) 25 MG TABLET] TAKE 1 TABLET BY MOUTH EVERY DAY 90 tablet 3     ibuprofen (ADVIL/MOTRIN) 200 MG capsule Take 800 mg by mouth continuous prn        lisinopril (ZESTRIL) 20 MG tablet [LISINOPRIL (PRINIVIL,ZESTRIL) 20 MG TABLET] TAKE 1 TABLET BY MOUTH EVERY DAY 90 tablet 3     rOPINIRole (REQUIP) 0.25 MG tablet Take 2 tablets (0.5 mg) by mouth At Bedtime 180 tablet 2    Allergies   Allergen Reactions     Penicillins Hives     Tolerated CTX 06/2020         Lab Results    Chemistry/lipid CBC Cardiac Enzymes/BNP/TSH/INR   Lab Results   Component Value Date    CHOL 133 08/20/2021    HDL 34 (L) 08/20/2021    TRIG 149 08/20/2021    BUN 17 08/20/2021     02/01/2022    CO2 26 02/01/2022    Lab Results   Component Value Date    WBC 7.4 07/31/2020    HGB 15.6 07/31/2020    HCT 45.8 07/31/2020    MCV 86 07/31/2020     07/31/2020    Lab Results   Component Value Date    INR 1.23 (H) 06/19/2020      40  minutes spent on the date of encounter doing chart review, review of test results, patient visit and documentation.        This note has been dictated using voice recognition software. Any grammatical, typographical, or context distortions are unintentional and inherent to the software

## 2022-04-04 NOTE — LETTER
4/4/2022    Dylon Shafer, NP  [address] [place] [address]    RE: Reneguille Morton       Dear Colleague,     I had the pleasure of seeing Gonzalez Morton in the [facility].      Assessment/Recommendations   History and physical for coronary angiogram with possible percutaneous coronary intervention on 4/7/2022    Assessment/Plan:  1.  Abnormal calcium score/abnormalities cardiovascular stress test: Recent coronary CT calcium score was found elevated in 7000s on 3/19/2022.  Nuclear stress test revealed medium sized area of nontransmural infarction involving mid to basal inferior wall although specificity reduced in the setting of diaphragmatic attenuation.  Patient was considered intermediate risk of future cardiac ischemic event.    Stress test was reviewed by Dr. Lyons and recommend coronary angiogram for further ischemic evaluation with possible coronary intervention.    Patient reports some shortness of breath on exertion but denies chest pain.    Currently on ASA 81 daily     Discussed about the indication for coronary angiogram/possible PCI and the risks associated with angiogram including <0.1% of MI and CVA or death or any of the following to the degree that it could threaten her life: allergic reaction, arrhythmia, renal failure, hemorrhage, thrombosis and infection.  Risk associated with therapeutic intervention includes 2% or less risk of MI, less than 1% risk of CVA, emergency her surgery, death, less than 4% risk of vascular injury requiring surgical repair or blood transfusion with 20-30% risk of restenosis with a bare-metal stent and less than 10% risk of stenosis with a drug-eluting stent.  Patient would like to proceed with coronary angiogram and possible PCI.    Recent lab work including BMP stable in February 22 and CBC in 2020.             2.  Dyslipidemia: Gonzalez Morton is on high intensity statin therapy with atorvastatin 40 mg daily.  He  was switched from simvastatin to atorvastatin.  Tolerating with no adverse effect his most recent LDL is 69 in July 2021.    3.  Hypertension: His blood pressure is well controlled.  On lisinopril and hydrochlorothiazide    4.  BMI of 45.31:    5.  History of snoring: Uses CPAP      Plan:  - Continue ASA and atorvastatin  - BMP/CBC pending  - Avoid strenuous exercise or lifting heavy objects until further direction  - Please seek medical attention if develop chest pain or, shortness of breath, lightheaded or dizziness  -Continue current hypertension regimen  -Covid test as scheduled  - Pre angio teach by AICHA Polanco -pending     History of Present Illness/Subjective    Gonzalez Morton is a 61 year old years old  with a significant PMH of hyperlipidemia, morbid obesity with a BMI of 45.31, history of malignant neoplasm of prostate with radical prostatectomy, family history of heart disease, left knee replacement in arthroplasty, hypertension, and shortness of breath on exertion who is seen at [facility] for pre procedure history and physical examination for coronary angiogram.    Patient saw Dr. Lyons early March for cardiac consultation as he plans to have right knee replacement this year.  He underwent calcium study which came back elevated in 7000.  He also had an abnormal stress test.  Given his multiple cardiovascular risk factors and abnormal stress test and elevated calcium score, patient was recommended coronary angiogram with possible coronary intervention.     Today, Steven denies fatigue, lightheadedness, shortness of breath, orthopnea, PND, palpitations, chest pain, abdominal fullness/bloating and lower extremity edema.  He further denies fever, chills, N/V, diarrhea, vomiting, constipation, hematochezia, hematemesis, hemoptysis. He also denies malignant hyperthermia, stroke/TIA, bleeding or clotting disorders, COPD/Asthma, anaphylaxis reaction to medications, IV contrast  or sea food.  He reports history of snoring but no officially diagnosed with sleep apnea but he uses CPAP on his own.    Lexiscan stress test done on 3/30/2022-reviewed     The nuclear stress test is abnormal.  Nuclear images demonstrate a medium size area of nontransmural infarction involving the mid to basal inferior wall, although specificity reduced in the setting of diaphragmatic attenuation.     Stress to rest cavity ratio is 1.38.  TID is present visually and quantitatively.  This is a nonspecific finding but may be a marker of diffuse subendocardial ischemia.     The left ventricular ejection fraction at stress is greater than 70%.     The patient is at an intermediate risk of future cardiac ischemic events.     There is no prior study for comparison.    CT coronary calcium score done on 3/19/2022-reviewed   Interpretation Summary    The total Agatston score is 7440. A calcium score in this range places the individual in the 99th percentile when compared to an age and gender matched control group and implies a very high risk of cardiac events in the next ten years.     Physical Examination Review of Systems   /78 (BP Location: Left arm, Patient Position: Sitting, Cuff Size: Adult Large)   Pulse 67   Resp 12   Wt 126.6 kg (279 lb)   BMI 45.31 kg/m    Body mass index is 45.31 kg/m .  Wt Readings from Last 3 Encounters:   04/04/22 126.6 kg (279 lb)   03/04/22 128.8 kg (284 lb)   02/01/22 128.4 kg (283 lb)     General Appearance:   no distress, normal body habitus   ENT/Mouth: membranes moist, no oral lesions or bleeding gums.      EYES:  no scleral icterus, normal conjunctivae   Neck: no carotid bruits or thyromegaly   Chest/Lungs:   lungs are clear to auscultation, no rales or wheezing, equal chest wall expansion    Cardiovascular:    Heart rate regular. Normal first and second heart sounds with no murmurs, rubs, or gallops; the carotid, radial and posterior tibial pulses are intact, JVP is difficult  to assess due to the patient's obesity and body habitus   , no edema bilaterally    Abdomen:  Little but soft, no organomegaly, masses, bruits, or tenderness; bowel sounds are present   Extremities   no cyanosis or clubbing.  Radial pulses and Pedal pulses intact and symmetrical.  CMS intact.   Skin: no xanthelasma, warm.    Neurologic: normal  bilateral, no tremors     Psychiatric: alert and oriented x3, calm                                                    Negative unless noted in HPI     Medical History  Surgical History Family History Social History   Past Medical History:   Diagnosis Date     Hypertension      Rosacea      Sleep apnea     CPAP    Past Surgical History:   Procedure Laterality Date     APPENDECTOMY       ARTHROSCOPY KNEE Bilateral      PROSTATECTOMY       TOTAL KNEE ARTHROPLASTY Left      WRIST SURGERY      Family History   Problem Relation Age of Onset     Diabetes Mother      Heart Disease Mother      Heart Disease Father      Atrophic kidney Sister      Diabetes Sister      Diabetes Type 1 Brother     Social History     Socioeconomic History     Marital status:      Spouse name: Not on file     Number of children: Not on file     Years of education: Not on file     Highest education level: Not on file   Occupational History     Not on file   Tobacco Use     Smoking status: Never Smoker     Smokeless tobacco: Never Used   Substance and Sexual Activity     Alcohol use: Not Currently     Drug use: Never     Sexual activity: Not on file   Other Topics Concern     Not on file   Social History Narrative     Not on file     Social Determinants of Health     Financial Resource Strain: Not on file   Food Insecurity: Not on file   Transportation Needs: Not on file   Physical Activity: Not on file   Stress: Not on file   Social Connections: Not on file   Intimate Partner Violence: Not on file   Housing Stability: Not on file          Medications  Allergies   Current Outpatient Medications    Medication Sig Dispense Refill     acetaminophen (TYLENOL) 650 MG CR tablet Take 1,300 mg by mouth every 8 hours as needed        aspirin 81 MG EC tablet Take 81 mg by mouth daily       atorvastatin (LIPITOR) 40 MG tablet Take 1 tablet (40 mg) by mouth daily 30 tablet 3     clindamycin (CLEOCIN) 300 MG capsule TAKE 2 CAPSULES BY MOUTH 1 HOUR PRIOR TO DENTAL APPOINTMENT.       doxycycline hyclate (VIBRAMYCIN) 100 MG capsule TAKE 1 CAPSULE BY MOUTH TWICE A  capsule 1     hydrochlorothiazide (HYDRODIURIL) 25 MG tablet [HYDROCHLOROTHIAZIDE (HYDRODIURIL) 25 MG TABLET] TAKE 1 TABLET BY MOUTH EVERY DAY 90 tablet 3     ibuprofen (ADVIL/MOTRIN) 200 MG capsule Take 800 mg by mouth continuous prn        lisinopril (ZESTRIL) 20 MG tablet [LISINOPRIL (PRINIVIL,ZESTRIL) 20 MG TABLET] TAKE 1 TABLET BY MOUTH EVERY DAY 90 tablet 3     rOPINIRole (REQUIP) 0.25 MG tablet Take 2 tablets (0.5 mg) by mouth At Bedtime 180 tablet 2    Allergies   Allergen Reactions     Penicillins Hives     Tolerated CTX 06/2020         Lab Results    Chemistry/lipid CBC Cardiac Enzymes/BNP/TSH/INR   Lab Results   Component Value Date    CHOL 133 08/20/2021    HDL 34 (L) 08/20/2021    TRIG 149 08/20/2021    BUN 17 08/20/2021     02/01/2022    CO2 26 02/01/2022    Lab Results   Component Value Date    WBC 7.4 07/31/2020    HGB 15.6 07/31/2020    HCT 45.8 07/31/2020    MCV 86 07/31/2020     07/31/2020    Lab Results   Component Value Date    INR 1.23 (H) 06/19/2020      40  minutes spent on the date of encounter doing chart review, review of test results, patient visit and documentation.        This note has been dictated using voice recognition software. Any grammatical, typographical, or context distortions are unintentional and inherent to the software

## 2022-04-04 NOTE — PATIENT INSTRUCTIONS
Gonzalez Morton,    It was a pleasure to see you today at the [facility].     My recommendations after this visit include:    - No medications changes made today    - Please seek medical attention if you develop  chest pain/tightness/pressure, worsening shortness of breath, lightheaded, dizziness, or passing out    - Avoid strenuous exercise or lifting heavy objects until further direction    - COVID test as scheduled    - Please call [phone], if you have any questions or concerns    Sukhi Singh, CNP

## 2022-04-04 NOTE — H&P (VIEW-ONLY)
Assessment/Recommendations   History and physical for coronary angiogram with possible percutaneous coronary intervention on 4/7/2022    Assessment/Plan:  1.  Abnormal calcium score/abnormalities cardiovascular stress test: Recent coronary CT calcium score was found elevated in 7000s on 3/19/2022.  Nuclear stress test revealed medium sized area of nontransmural infarction involving mid to basal inferior wall although specificity reduced in the setting of diaphragmatic attenuation.  Patient was considered intermediate risk of future cardiac ischemic event.    Stress test was reviewed by Dr. Lyons and recommend coronary angiogram for further ischemic evaluation with possible coronary intervention.    Patient reports some shortness of breath on exertion but denies chest pain.    Currently on ASA 81 daily     Discussed about the indication for coronary angiogram/possible PCI and the risks associated with angiogram including <0.1% of MI and CVA or death or any of the following to the degree that it could threaten her life: allergic reaction, arrhythmia, renal failure, hemorrhage, thrombosis and infection.  Risk associated with therapeutic intervention includes 2% or less risk of MI, less than 1% risk of CVA, emergency her surgery, death, less than 4% risk of vascular injury requiring surgical repair or blood transfusion with 20-30% risk of restenosis with a bare-metal stent and less than 10% risk of stenosis with a drug-eluting stent.  Patient would like to proceed with coronary angiogram and possible PCI.    Recent lab work including BMP stable in February 22 and CBC in 2020.             2.  Dyslipidemia: Gonzalez Morton is on high intensity statin therapy with atorvastatin 40 mg daily.  He was switched from simvastatin to atorvastatin.  Tolerating with no adverse effect his most recent LDL is 69 in July 2021.    3.  Hypertension: His blood pressure is well controlled.  On lisinopril and  hydrochlorothiazide    4.  BMI of 45.31:    5.  History of snoring: Uses CPAP      Plan:  - Continue ASA and atorvastatin  - BMP/CBC pending  - Avoid strenuous exercise or lifting heavy objects until further direction  - Please seek medical attention if develop chest pain or, shortness of breath, lightheaded or dizziness  -Continue current hypertension regimen  -Covid test as scheduled  - Pre angio teach by AICHA Polanco -pending     History of Present Illness/Subjective    Gonzalez Morton is a 61 year old years old  with a significant PMH of hyperlipidemia, morbid obesity with a BMI of 45.31, history of malignant neoplasm of prostate with radical prostatectomy, family history of heart disease, left knee replacement in arthroplasty, hypertension, and shortness of breath on exertion who is seen at [facility] for pre procedure history and physical examination for coronary angiogram.    Patient saw Dr. Lyons early March for cardiac consultation as he plans to have right knee replacement this year.  He underwent calcium study which came back elevated in 7000.  He also had an abnormal stress test.  Given his multiple cardiovascular risk factors and abnormal stress test and elevated calcium score, patient was recommended coronary angiogram with possible coronary intervention.     Today, Steven denies fatigue, lightheadedness, shortness of breath, orthopnea, PND, palpitations, chest pain, abdominal fullness/bloating and lower extremity edema.  He further denies fever, chills, N/V, diarrhea, vomiting, constipation, hematochezia, hematemesis, hemoptysis. He also denies malignant hyperthermia, stroke/TIA, bleeding or clotting disorders, COPD/Asthma, anaphylaxis reaction to medications, IV contrast or sea food.  He reports history of snoring but no officially diagnosed with sleep apnea but he uses CPAP on his own.    Lexiscan stress test done on 3/30/2022-reviewed     The nuclear stress test is  abnormal.  Nuclear images demonstrate a medium size area of nontransmural infarction involving the mid to basal inferior wall, although specificity reduced in the setting of diaphragmatic attenuation.     Stress to rest cavity ratio is 1.38.  TID is present visually and quantitatively.  This is a nonspecific finding but may be a marker of diffuse subendocardial ischemia.     The left ventricular ejection fraction at stress is greater than 70%.     The patient is at an intermediate risk of future cardiac ischemic events.     There is no prior study for comparison.    CT coronary calcium score done on 3/19/2022-reviewed   Interpretation Summary    The total Agatston score is 7440. A calcium score in this range places the individual in the 99th percentile when compared to an age and gender matched control group and implies a very high risk of cardiac events in the next ten years.     Physical Examination Review of Systems   /78 (BP Location: Left arm, Patient Position: Sitting, Cuff Size: Adult Large)   Pulse 67   Resp 12   Wt 126.6 kg (279 lb)   BMI 45.31 kg/m    Body mass index is 45.31 kg/m .  Wt Readings from Last 3 Encounters:   04/04/22 126.6 kg (279 lb)   03/04/22 128.8 kg (284 lb)   02/01/22 128.4 kg (283 lb)     General Appearance:   no distress, normal body habitus   ENT/Mouth: membranes moist, no oral lesions or bleeding gums.      EYES:  no scleral icterus, normal conjunctivae   Neck: no carotid bruits or thyromegaly   Chest/Lungs:   lungs are clear to auscultation, no rales or wheezing, equal chest wall expansion    Cardiovascular:    Heart rate regular. Normal first and second heart sounds with no murmurs, rubs, or gallops; the carotid, radial and posterior tibial pulses are intact, JVP is difficult to assess due to the patient's obesity and body habitus   , no edema bilaterally    Abdomen:  Little but soft, no organomegaly, masses, bruits, or tenderness; bowel sounds are present   Extremities    no cyanosis or clubbing.  Radial pulses and Pedal pulses intact and symmetrical.  CMS intact.   Skin: no xanthelasma, warm.    Neurologic: normal  bilateral, no tremors     Psychiatric: alert and oriented x3, calm                                                    Negative unless noted in HPI     Medical History  Surgical History Family History Social History   Past Medical History:   Diagnosis Date     Hypertension      Rosacea      Sleep apnea     CPAP    Past Surgical History:   Procedure Laterality Date     APPENDECTOMY       ARTHROSCOPY KNEE Bilateral      PROSTATECTOMY       TOTAL KNEE ARTHROPLASTY Left      WRIST SURGERY      Family History   Problem Relation Age of Onset     Diabetes Mother      Heart Disease Mother      Heart Disease Father      Atrophic kidney Sister      Diabetes Sister      Diabetes Type 1 Brother     Social History     Socioeconomic History     Marital status:      Spouse name: Not on file     Number of children: Not on file     Years of education: Not on file     Highest education level: Not on file   Occupational History     Not on file   Tobacco Use     Smoking status: Never Smoker     Smokeless tobacco: Never Used   Substance and Sexual Activity     Alcohol use: Not Currently     Drug use: Never     Sexual activity: Not on file   Other Topics Concern     Not on file   Social History Narrative     Not on file     Social Determinants of Health     Financial Resource Strain: Not on file   Food Insecurity: Not on file   Transportation Needs: Not on file   Physical Activity: Not on file   Stress: Not on file   Social Connections: Not on file   Intimate Partner Violence: Not on file   Housing Stability: Not on file          Medications  Allergies   Current Outpatient Medications   Medication Sig Dispense Refill     acetaminophen (TYLENOL) 650 MG CR tablet Take 1,300 mg by mouth every 8 hours as needed        aspirin 81 MG EC tablet Take 81 mg by mouth daily       atorvastatin  (LIPITOR) 40 MG tablet Take 1 tablet (40 mg) by mouth daily 30 tablet 3     clindamycin (CLEOCIN) 300 MG capsule TAKE 2 CAPSULES BY MOUTH 1 HOUR PRIOR TO DENTAL APPOINTMENT.       doxycycline hyclate (VIBRAMYCIN) 100 MG capsule TAKE 1 CAPSULE BY MOUTH TWICE A  capsule 1     hydrochlorothiazide (HYDRODIURIL) 25 MG tablet [HYDROCHLOROTHIAZIDE (HYDRODIURIL) 25 MG TABLET] TAKE 1 TABLET BY MOUTH EVERY DAY 90 tablet 3     ibuprofen (ADVIL/MOTRIN) 200 MG capsule Take 800 mg by mouth continuous prn        lisinopril (ZESTRIL) 20 MG tablet [LISINOPRIL (PRINIVIL,ZESTRIL) 20 MG TABLET] TAKE 1 TABLET BY MOUTH EVERY DAY 90 tablet 3     rOPINIRole (REQUIP) 0.25 MG tablet Take 2 tablets (0.5 mg) by mouth At Bedtime 180 tablet 2    Allergies   Allergen Reactions     Penicillins Hives     Tolerated CTX 06/2020         Lab Results    Chemistry/lipid CBC Cardiac Enzymes/BNP/TSH/INR   Lab Results   Component Value Date    CHOL 133 08/20/2021    HDL 34 (L) 08/20/2021    TRIG 149 08/20/2021    BUN 17 08/20/2021     02/01/2022    CO2 26 02/01/2022    Lab Results   Component Value Date    WBC 7.4 07/31/2020    HGB 15.6 07/31/2020    HCT 45.8 07/31/2020    MCV 86 07/31/2020     07/31/2020    Lab Results   Component Value Date    INR 1.23 (H) 06/19/2020      40  minutes spent on the date of encounter doing chart review, review of test results, patient visit and documentation.        This note has been dictated using voice recognition software. Any grammatical, typographical, or context distortions are unintentional and inherent to the software

## 2022-04-05 ENCOUNTER — PREP FOR PROCEDURE (OUTPATIENT)
Dept: CARDIOLOGY | Facility: CLINIC | Age: 62
End: 2022-04-05
Payer: COMMERCIAL

## 2022-04-05 ENCOUNTER — TELEPHONE (OUTPATIENT)
Dept: CARDIOLOGY | Facility: CLINIC | Age: 62
End: 2022-04-05
Payer: COMMERCIAL

## 2022-04-05 DIAGNOSIS — Z01.810 PRE-OPERATIVE CARDIOVASCULAR EXAMINATION: ICD-10-CM

## 2022-04-05 DIAGNOSIS — R94.31 ABNORMAL ELECTROCARDIOGRAM: ICD-10-CM

## 2022-04-05 DIAGNOSIS — R93.1 ELEVATED CORONARY ARTERY CALCIUM SCORE: Primary | ICD-10-CM

## 2022-04-05 DIAGNOSIS — Z82.49 FAMILY HISTORY OF ISCHEMIC HEART DISEASE: ICD-10-CM

## 2022-04-05 DIAGNOSIS — R94.39 ABNORMAL CARDIOVASCULAR STRESS TEST: ICD-10-CM

## 2022-04-05 DIAGNOSIS — E78.2 MIXED HYPERLIPIDEMIA: ICD-10-CM

## 2022-04-05 LAB — SARS-COV-2 RNA RESP QL NAA+PROBE: NEGATIVE

## 2022-04-05 RX ORDER — ASPIRIN 325 MG
325 TABLET ORAL ONCE
Status: CANCELLED | OUTPATIENT
Start: 2022-04-07 | End: 2022-04-05

## 2022-04-05 RX ORDER — SODIUM CHLORIDE 9 MG/ML
INJECTION, SOLUTION INTRAVENOUS CONTINUOUS
Status: CANCELLED | OUTPATIENT
Start: 2022-04-07

## 2022-04-05 RX ORDER — LIDOCAINE 40 MG/G
CREAM TOPICAL
Status: CANCELLED | OUTPATIENT
Start: 2022-04-05

## 2022-04-05 RX ORDER — DIAZEPAM 5 MG
10 TABLET ORAL
Status: CANCELLED | OUTPATIENT
Start: 2022-04-07

## 2022-04-05 RX ORDER — FENTANYL CITRATE 50 UG/ML
25 INJECTION, SOLUTION INTRAMUSCULAR; INTRAVENOUS
Status: CANCELLED | OUTPATIENT
Start: 2022-04-07

## 2022-04-05 RX ORDER — ASPIRIN 81 MG/1
243 TABLET, CHEWABLE ORAL ONCE
Status: CANCELLED | OUTPATIENT
Start: 2022-04-07

## 2022-04-05 NOTE — TELEPHONE ENCOUNTER
Gonzalez Morton  [address] [address]  [phone] (home)     Reason: Elevated Coronary Calcium Score, Abnormal Ecg  Procedure cardiologist:  Dr. Lyons  PCP:  Dylon Shafer  H&P completed by:  Sukhi Singh CNP 4/4/22  Admit date 4/7/22  Arrival time:  0630  Anticoagulation: None.  CPAP: Yes  Previous PCI: None.   Bypass Grafts: No  Renal Issues: No  Diabetic?: No  Device?: No    Covid-19 Test Date: 4/4/22  Location: [facility] Patient understands after they get their test, they are to self-isolate at home until their procedure. They will only be called back if there results are positive.  Ambulatory?: Independently.    Angiogram Teaching    Reason for Visit:    Telephone call to discuss pre-procedure education in preparation for: Coronary Angiogram with Possible Percutaneous Coronary Intervention     Procedure Prep:  Primary Cardiologist note dated: 3/4/22  EKG results obtained, dated: Morning of procedure  Hemogram results obtained: 4/4/22  Basic Metabolic Panel results obtained: 4/4/22  Lipid Profile results obtained: Morning of procedure     Pre-procedure instructions  Patient instructed to be NPO after midnight.  Patient instructed to shower the evening before or the morning of the procedure.  Leave all valuables at home (jewlery, rings, watches, large amounts of money).  Patient understands there is one visitor allowed during patients stay. Visitor will need to wear a mask their entire stay and remain in the restricted area per guidelines.   Patient instructed to arrange for transportation home following procedure from a responsible family member of friend. No driving for at least 24 hours post-procedure.  Patient instructed to have a responsible adult with them for 24 hours post-procedure.  Post-procedure follow up process.  Conscious sedation discussed.  Check your temperature morning of procedure along with assessment for any covid symptoms. If symptoms or temperature,  greater than or equal to 100.0, call [facility] at [phone] before leaving for the hospital.      Pre-procedure medication instructions  Patient instructed on antiplatelet medication.  Continue medications as scheduled, with a small amount of water on the day of the procedure unless indicated.  Patient instructed to take 325 mg of Aspirin am of procedure: Yes  Other medication: Morning of procedure please HOLD all vitamins, minerals, and supplements. Please TAKE a total of (4) baby aspirin morning of procedure. HOLD hydrochlorothiazide morning of procedure.       *PATIENTS RECORDS AVAILABLE IN [facility] UNLESS OTHERWISE INDICATED*      Patient Active Problem List   Diagnosis     Benign Essential Hypertension     Mixed hyperlipidemia     Rosacea     Back pain     History of prostate cancer     Morbid obesity (H)     Erectile dysfunction following radical prostatectomy     History of malignant neoplasm of prostate     Abnormal cardiovascular stress test     Family history of ischemic heart disease       Current Outpatient Medications   Medication Sig Dispense Refill     acetaminophen (TYLENOL) 650 MG CR tablet Take 1,300 mg by mouth every 8 hours as needed        aspirin 81 MG EC tablet Take 81 mg by mouth daily       atorvastatin (LIPITOR) 40 MG tablet Take 1 tablet (40 mg) by mouth daily 30 tablet 3     clindamycin (CLEOCIN) 300 MG capsule TAKE 2 CAPSULES BY MOUTH 1 HOUR PRIOR TO DENTAL APPOINTMENT.       doxycycline hyclate (VIBRAMYCIN) 100 MG capsule TAKE 1 CAPSULE BY MOUTH TWICE A  capsule 1     hydrochlorothiazide (HYDRODIURIL) 25 MG tablet [HYDROCHLOROTHIAZIDE (HYDRODIURIL) 25 MG TABLET] TAKE 1 TABLET BY MOUTH EVERY DAY 90 tablet 3     ibuprofen (ADVIL/MOTRIN) 200 MG capsule Take 800 mg by mouth continuous prn        lisinopril (ZESTRIL) 20 MG tablet [LISINOPRIL (PRINIVIL,ZESTRIL) 20 MG TABLET] TAKE 1 TABLET BY MOUTH EVERY DAY 90 tablet 3     rOPINIRole (REQUIP) 0.25 MG tablet Take 2 tablets (0.5 mg) by mouth At  Bedtime 180 tablet 2       Allergies   Allergen Reactions     Penicillins Hives     Tolerated CTX 06/2020       Plan: Patient education completed. Opportunity to ask questions and have them answered. None further at this time. Patient verbalized understanding of responsible adult for 24 hours and  post-procedure at time of discharge. Patient will be accompanied by his wife Willow. Pt ready for procedure.     Flakito Mora RN

## 2022-04-07 ENCOUNTER — APPOINTMENT (OUTPATIENT)
Dept: CARDIOLOGY | Facility: HOSPITAL | Age: 62
End: 2022-04-07
Attending: INTERNAL MEDICINE
Payer: COMMERCIAL

## 2022-04-07 ENCOUNTER — APPOINTMENT (OUTPATIENT)
Dept: RADIOLOGY | Facility: HOSPITAL | Age: 62
End: 2022-04-07
Attending: INTERNAL MEDICINE
Payer: COMMERCIAL

## 2022-04-07 ENCOUNTER — APPOINTMENT (OUTPATIENT)
Dept: ULTRASOUND IMAGING | Facility: HOSPITAL | Age: 62
End: 2022-04-07
Attending: INTERNAL MEDICINE
Payer: COMMERCIAL

## 2022-04-07 ENCOUNTER — HOSPITAL ENCOUNTER (OUTPATIENT)
Facility: HOSPITAL | Age: 62
Discharge: HOME | End: 2022-04-07
Attending: INTERNAL MEDICINE | Admitting: INTERNAL MEDICINE
Payer: COMMERCIAL

## 2022-04-07 VITALS
HEART RATE: 62 BPM | TEMPERATURE: 98 F | SYSTOLIC BLOOD PRESSURE: 104 MMHG | DIASTOLIC BLOOD PRESSURE: 57 MMHG | RESPIRATION RATE: 19 BRPM | BODY MASS INDEX: 41.32 KG/M2 | OXYGEN SATURATION: 96 % | HEIGHT: 69 IN | WEIGHT: 279 LBS

## 2022-04-07 DIAGNOSIS — E78.2 MIXED HYPERLIPIDEMIA: ICD-10-CM

## 2022-04-07 DIAGNOSIS — R94.39 ABNORMAL CARDIOVASCULAR STRESS TEST: ICD-10-CM

## 2022-04-07 DIAGNOSIS — R94.31 ABNORMAL ELECTROCARDIOGRAM: ICD-10-CM

## 2022-04-07 DIAGNOSIS — R93.1 ELEVATED CORONARY ARTERY CALCIUM SCORE: ICD-10-CM

## 2022-04-07 DIAGNOSIS — Z82.49 FAMILY HISTORY OF ISCHEMIC HEART DISEASE: ICD-10-CM

## 2022-04-07 LAB
ABO/RH(D): NORMAL
ANTIBODY SCREEN: NEGATIVE
ATRIAL RATE - MUSE: 60 BPM
CHOLEST SERPL-MCNC: 96 MG/DL
DIASTOLIC BLOOD PRESSURE - MUSE: NORMAL MMHG
FASTING STATUS PATIENT QL REPORTED: YES
HDLC SERPL-MCNC: 30 MG/DL
INTERPRETATION ECG - MUSE: NORMAL
LDLC SERPL CALC-MCNC: 48 MG/DL
LVEF ECHO: NORMAL
P AXIS - MUSE: -18 DEGREES
PR INTERVAL - MUSE: 164 MS
QRS DURATION - MUSE: 120 MS
QT - MUSE: 410 MS
QTC - MUSE: 410 MS
R AXIS - MUSE: -8 DEGREES
SPECIMEN EXPIRATION DATE: NORMAL
SYSTOLIC BLOOD PRESSURE - MUSE: NORMAL MMHG
T AXIS - MUSE: -10 DEGREES
TRIGL SERPL-MCNC: 89 MG/DL
VENTRICULAR RATE- MUSE: 60 BPM

## 2022-04-07 PROCEDURE — 93306 TTE W/DOPPLER COMPLETE: CPT | Mod: 26 | Performed by: INTERNAL MEDICINE

## 2022-04-07 PROCEDURE — 71046 X-RAY EXAM CHEST 2 VIEWS: CPT

## 2022-04-07 PROCEDURE — 99152 MOD SED SAME PHYS/QHP 5/>YRS: CPT | Performed by: INTERNAL MEDICINE

## 2022-04-07 PROCEDURE — 80061 LIPID PANEL: CPT | Performed by: INTERNAL MEDICINE

## 2022-04-07 PROCEDURE — 93010 ELECTROCARDIOGRAM REPORT: CPT | Performed by: INTERNAL MEDICINE

## 2022-04-07 PROCEDURE — 272N000001 HC OR GENERAL SUPPLY STERILE: Performed by: INTERNAL MEDICINE

## 2022-04-07 PROCEDURE — 255N000002 HC RX 255 OP 636: Performed by: INTERNAL MEDICINE

## 2022-04-07 PROCEDURE — 93454 CORONARY ARTERY ANGIO S&I: CPT | Performed by: INTERNAL MEDICINE

## 2022-04-07 PROCEDURE — 250N000009 HC RX 250: Performed by: INTERNAL MEDICINE

## 2022-04-07 PROCEDURE — 93454 CORONARY ARTERY ANGIO S&I: CPT | Mod: 26 | Performed by: INTERNAL MEDICINE

## 2022-04-07 PROCEDURE — C1769 GUIDE WIRE: HCPCS | Performed by: INTERNAL MEDICINE

## 2022-04-07 PROCEDURE — 999N000054 HC STATISTIC EKG NON-CHARGEABLE

## 2022-04-07 PROCEDURE — 93005 ELECTROCARDIOGRAM TRACING: CPT

## 2022-04-07 PROCEDURE — C1894 INTRO/SHEATH, NON-LASER: HCPCS | Performed by: INTERNAL MEDICINE

## 2022-04-07 PROCEDURE — 36415 COLL VENOUS BLD VENIPUNCTURE: CPT | Performed by: INTERNAL MEDICINE

## 2022-04-07 PROCEDURE — 258N000003 HC RX IP 258 OP 636: Performed by: INTERNAL MEDICINE

## 2022-04-07 PROCEDURE — 250N000013 HC RX MED GY IP 250 OP 250 PS 637: Performed by: INTERNAL MEDICINE

## 2022-04-07 PROCEDURE — 93880 EXTRACRANIAL BILAT STUDY: CPT

## 2022-04-07 PROCEDURE — 86901 BLOOD TYPING SEROLOGIC RH(D): CPT | Performed by: INTERNAL MEDICINE

## 2022-04-07 PROCEDURE — 250N000011 HC RX IP 250 OP 636: Performed by: INTERNAL MEDICINE

## 2022-04-07 RX ORDER — IODIXANOL 320 MG/ML
INJECTION, SOLUTION INTRAVASCULAR
Status: DISCONTINUED | OUTPATIENT
Start: 2022-04-07 | End: 2022-04-07 | Stop reason: HOSPADM

## 2022-04-07 RX ORDER — ASPIRIN 325 MG
325 TABLET ORAL ONCE
Status: DISCONTINUED | OUTPATIENT
Start: 2022-04-07 | End: 2022-04-07 | Stop reason: HOSPADM

## 2022-04-07 RX ORDER — FENTANYL CITRATE 50 UG/ML
25 INJECTION, SOLUTION INTRAMUSCULAR; INTRAVENOUS
Status: DISCONTINUED | OUTPATIENT
Start: 2022-04-07 | End: 2022-04-07 | Stop reason: HOSPADM

## 2022-04-07 RX ORDER — NITROGLYCERIN 0.4 MG/1
TABLET SUBLINGUAL
Qty: 25 TABLET | Refills: 3 | Status: SHIPPED | OUTPATIENT
Start: 2022-04-07 | End: 2023-05-17

## 2022-04-07 RX ORDER — LIDOCAINE 40 MG/G
CREAM TOPICAL
Status: DISCONTINUED | OUTPATIENT
Start: 2022-04-07 | End: 2022-04-07 | Stop reason: HOSPADM

## 2022-04-07 RX ORDER — NALOXONE HYDROCHLORIDE 0.4 MG/ML
0.4 INJECTION, SOLUTION INTRAMUSCULAR; INTRAVENOUS; SUBCUTANEOUS
Status: DISCONTINUED | OUTPATIENT
Start: 2022-04-07 | End: 2022-04-07 | Stop reason: HOSPADM

## 2022-04-07 RX ORDER — OXYCODONE HYDROCHLORIDE 5 MG/1
10 TABLET ORAL EVERY 4 HOURS PRN
Status: DISCONTINUED | OUTPATIENT
Start: 2022-04-07 | End: 2022-04-07 | Stop reason: HOSPADM

## 2022-04-07 RX ORDER — NALOXONE HYDROCHLORIDE 0.4 MG/ML
0.2 INJECTION, SOLUTION INTRAMUSCULAR; INTRAVENOUS; SUBCUTANEOUS
Status: DISCONTINUED | OUTPATIENT
Start: 2022-04-07 | End: 2022-04-07 | Stop reason: HOSPADM

## 2022-04-07 RX ORDER — ATROPINE SULFATE 0.1 MG/ML
0.5 INJECTION INTRAVENOUS
Status: DISCONTINUED | OUTPATIENT
Start: 2022-04-07 | End: 2022-04-07 | Stop reason: HOSPADM

## 2022-04-07 RX ORDER — FENTANYL CITRATE 50 UG/ML
INJECTION, SOLUTION INTRAMUSCULAR; INTRAVENOUS
Status: DISCONTINUED | OUTPATIENT
Start: 2022-04-07 | End: 2022-04-07 | Stop reason: HOSPADM

## 2022-04-07 RX ORDER — SODIUM CHLORIDE 9 MG/ML
INJECTION, SOLUTION INTRAVENOUS CONTINUOUS
Status: ACTIVE | OUTPATIENT
Start: 2022-04-07 | End: 2022-04-07

## 2022-04-07 RX ORDER — ACETAMINOPHEN 325 MG/1
650 TABLET ORAL EVERY 4 HOURS PRN
Status: DISCONTINUED | OUTPATIENT
Start: 2022-04-07 | End: 2022-04-07 | Stop reason: HOSPADM

## 2022-04-07 RX ORDER — FLUMAZENIL 0.1 MG/ML
0.2 INJECTION, SOLUTION INTRAVENOUS
Status: DISCONTINUED | OUTPATIENT
Start: 2022-04-07 | End: 2022-04-07 | Stop reason: HOSPADM

## 2022-04-07 RX ORDER — NITROGLYCERIN 0.4 MG/1
TABLET SUBLINGUAL
Status: DISCONTINUED | OUTPATIENT
Start: 2022-04-07 | End: 2022-04-07 | Stop reason: HOSPADM

## 2022-04-07 RX ORDER — OXYCODONE HYDROCHLORIDE 5 MG/1
5 TABLET ORAL EVERY 4 HOURS PRN
Status: DISCONTINUED | OUTPATIENT
Start: 2022-04-07 | End: 2022-04-07 | Stop reason: HOSPADM

## 2022-04-07 RX ORDER — SODIUM CHLORIDE 9 MG/ML
INJECTION, SOLUTION INTRAVENOUS CONTINUOUS
Status: DISCONTINUED | OUTPATIENT
Start: 2022-04-07 | End: 2022-04-07 | Stop reason: HOSPADM

## 2022-04-07 RX ORDER — ASPIRIN 81 MG/1
243 TABLET, CHEWABLE ORAL ONCE
Status: DISCONTINUED | OUTPATIENT
Start: 2022-04-07 | End: 2022-04-07 | Stop reason: HOSPADM

## 2022-04-07 RX ORDER — DIAZEPAM 5 MG
10 TABLET ORAL
Status: COMPLETED | OUTPATIENT
Start: 2022-04-07 | End: 2022-04-07

## 2022-04-07 RX ADMIN — DIAZEPAM 10 MG: 5 TABLET ORAL at 08:21

## 2022-04-07 RX ADMIN — SODIUM CHLORIDE: 9 INJECTION, SOLUTION INTRAVENOUS at 08:22

## 2022-04-07 RX ADMIN — PERFLUTREN 2 ML: 6.52 INJECTION, SUSPENSION INTRAVENOUS at 11:09

## 2022-04-07 NOTE — INTERVAL H&P NOTE
"I have reviewed the surgical (or preoperative) H&P that is linked to this encounter, and examined the patient. There are no significant changes    Clinical Conditions Present on Arrival:  Clinically Significant Risk Factors Present on Admission                  # Platelet Defect: home medication list includes an antiplatelet medication   # Severe Obesity: Estimated body mass index is 41.2 kg/m  as calculated from the following:    Height as of this encounter: 1.753 m (5' 9\").    Weight as of this encounter: 126.6 kg (279 lb).       "

## 2022-04-07 NOTE — LETTER
[facility]  [address] [place] [address]  Phone: [phone]  Fax: [phone]    April 7, 2022    Patient may return to work on 4/11/2022 with the following restrictions: NO strenuous activity until cleared by cardiothoracic surgery.         Please call if any questions or concerns.    Diana Rabago, CNP  [phone]

## 2022-04-07 NOTE — PRE-PROCEDURE
GENERAL PRE-PROCEDURE:   Procedure:  Coronary angiogram, possible percutaneous coronary intervention  Date/Time:  4/7/2022 7:06 AM    Written consent obtained?: Yes    Risks and benefits: Risks, benefits and alternatives were discussed    Consent given by:  Patient  Patient states understanding of procedure being performed: Yes    Patient's understanding of procedure matches consent: Yes    Procedure consent matches procedure scheduled: Yes    Expected level of sedation:  Moderate  Appropriately NPO:  Yes  ASA Class:  3 (Abnormal Lexiscan, elevated Ca2+ score, HTN, dyslipidemia, ERIN, class III obesity; BMI 41.20 kg/m )  Mallampati  :  Grade 3- soft palate visible, posterior pharyngeal wall not visible  Lungs:  Lungs clear with good breath sounds bilaterally  Heart:  Normal heart sounds and rate  History & Physical reviewed:  History and physical reviewed and no updates needed  Statement of review:  I have reviewed the lab findings, diagnostic data, medications, and the plan for sedation

## 2022-04-07 NOTE — PROGRESS NOTES
Discharged to home with radial instructions and a follow up Appt with CV surgery. Pt and his wife both state they have no concerns.

## 2022-04-07 NOTE — DISCHARGE INSTRUCTIONS

## 2022-04-12 ENCOUNTER — OFFICE VISIT (OUTPATIENT)
Dept: CARDIOLOGY | Facility: CLINIC | Age: 62
End: 2022-04-12
Payer: COMMERCIAL

## 2022-04-12 ENCOUNTER — PREP FOR PROCEDURE (OUTPATIENT)
Dept: CARDIOLOGY | Facility: CLINIC | Age: 62
End: 2022-04-12

## 2022-04-12 VITALS
RESPIRATION RATE: 12 BRPM | HEART RATE: 66 BPM | DIASTOLIC BLOOD PRESSURE: 80 MMHG | BODY MASS INDEX: 40.17 KG/M2 | SYSTOLIC BLOOD PRESSURE: 138 MMHG | WEIGHT: 272 LBS

## 2022-04-12 DIAGNOSIS — E78.2 MIXED HYPERLIPIDEMIA: ICD-10-CM

## 2022-04-12 DIAGNOSIS — I25.10 CAD (CORONARY ARTERY DISEASE): Primary | ICD-10-CM

## 2022-04-12 DIAGNOSIS — Z82.49 FAMILY HISTORY OF ISCHEMIC HEART DISEASE: ICD-10-CM

## 2022-04-12 DIAGNOSIS — R94.39 ABNORMAL CARDIOVASCULAR STRESS TEST: ICD-10-CM

## 2022-04-12 DIAGNOSIS — R93.1 ELEVATED CORONARY ARTERY CALCIUM SCORE: ICD-10-CM

## 2022-04-12 PROCEDURE — 99205 OFFICE O/P NEW HI 60 MIN: CPT | Performed by: THORACIC SURGERY (CARDIOTHORACIC VASCULAR SURGERY)

## 2022-04-12 NOTE — PATIENT INSTRUCTIONS
You were seen today in the [facility]    Surgery will be scheduled Monday, 6/6 and pre-admission testing Tuesday, 5/31 at 8:00 AM.    Please call AICHA Levine surgery coordinator with any questions.  Thank you.    Phone [phone]  Fax [phone]

## 2022-04-12 NOTE — PROGRESS NOTES
[facility] Cardiovascular Surgery Consult     Gonzalez Morton MRN# [id number]   YOB: 1960 Age: 61 year old      Primary care provider: Dylon Shafer    Referring Cardiologist(s): Mery Lyons MD    Date of Service: April 12, 2022    Reason for consult: Severe, multi-vessel coronary artery disease           Assessment and Plan:   Gonzalez Morton is a 61 year old male with stable ischemic heart disease and largely asymptomatic found during preoperative cardiac clearance for a planned right TKA for OA. Coronary angiogram reveals severe, multi-vessel disease. Echocardiogram reveals preserved function and no valvular disease. I recommend coronary artery bypass with LIMA, radial artery and saphenous vein as conduit. I described the technical details, as well as the expected postoperative course and recovery to the patient. I also discussed the risks, benefits, and alternatives of the procedure. The risks include but are not limited to bleeding, infection, stroke, heart attack, graft failure, dysrhythmia, respiratory failure, kidney or liver injury, nerve injury, bowel or limb ischemia, and death. The calculated STS risk of mortality is <1%, and the calculated STS risk of major morbidity or mortality is 5%. The patient understands these risks and wishes to undergo the operation.    STS coronary bypass risks:  Risk of Mortality:0.400%  Renal Failure:0.505%  Permanent Stroke:0.412%  Prolonged Ventilation:2.861%  DSW Infection:0.196%  Reoperation:1.332%  Morbidity or Mortality:4.795%  Short Length of Stay:68.364%  Long Length of Stay:1.433%    1) Schedule for coronary artery bypass, want to wait until lower level bathroom reconstruction complete to avoid stairs with right knee problem during recovery, likely in 1 month  2) Preop blood work and education to be scheduled.    Mayank Rocha MD  Cardiothoracic Surgery  [phone]             Chief Complaint:   Exertional shortness of breath          History of Present Illness:   Mr. Gonzalez Morton is a 61 year old male with osteoarthritis presented for cardiac clearance for a right TKA and was found to have EKG changes that prompted a CT coronary calcium and stress test. Both of these were positive and he then underwent coronary angiogram showing severe, multi-vessel disease. He admits to some shortness of breath with more vigorous activity but otherwise denies cardiac symptoms. He is frustrated this will delay his knee surgery as he has constant pain and decreased mobility of his right knee but is understanding of needing to fix his heart first. He is chest pain free and denies tightness, pressure, syncope, pre-syncope, palpitations, orthopnea or shoulder pain.               Past Medical History:     Past Medical History:   Diagnosis Date     Hypertension      Rosacea      Sleep apnea     CPAP             Past Surgical History:     Past Surgical History:   Procedure Laterality Date     APPENDECTOMY       ARTHROSCOPY KNEE Bilateral      CV CORONARY ANGIOGRAM N/A 4/7/2022    Procedure: Coronary Angiogram;  Surgeon: Mery Lyons MD;  Location: [facility] CV     PROSTATECTOMY       TOTAL KNEE ARTHROPLASTY Left      WRIST SURGERY                Social History:     Social History     Socioeconomic History     Marital status:      Spouse name: Not on file     Number of children: Not on file     Years of education: Not on file     Highest education level: Not on file   Occupational History     Not on file   Tobacco Use     Smoking status: Never Smoker     Smokeless tobacco: Never Used   Substance and Sexual Activity     Alcohol use: Not Currently     Drug use: Never     Sexual activity: Not on file   Other Topics Concern     Not on file   Social History Narrative     Not on file     Social Determinants of Health     Financial Resource Strain: Not on file   Food Insecurity: Not on file   Transportation Needs: Not on file   Physical Activity: Not  on file   Stress: Not on file   Social Connections: Not on file   Intimate Partner Violence: Not on file   Housing Stability: Not on file            Family History:     Family History   Problem Relation Age of Onset     Diabetes Mother      Heart Disease Mother      Heart Disease Father      Atrophic kidney Sister      Diabetes Sister      Diabetes Type 1 Brother              Immunizations:     Immunization History   Administered Date(s) Administered     COVID-19,PF,Pfizer (12+ Yrs) 04/23/2021, 05/14/2021     Influenza (IIV3) PF 09/24/2011     Influenza Quad, Recombinant, pf(RIV4) (Flublok) 02/01/2022     Influenza Vaccine IM > 6 months Valent IIV4 (Alfuria,Fluzone) 09/17/2014, 09/17/2014     Tdap (Adacel,Boostrix) 09/17/2014             Allergies:      Allergies   Allergen Reactions     Penicillins Hives     Tolerated CTX 06/2020             Medications:     Current Outpatient Medications   Medication     acetaminophen (TYLENOL) 650 MG CR tablet     aspirin 81 MG EC tablet     atorvastatin (LIPITOR) 40 MG tablet     clindamycin (CLEOCIN) 300 MG capsule     doxycycline hyclate (VIBRAMYCIN) 100 MG capsule     hydrochlorothiazide (HYDRODIURIL) 25 MG tablet     ibuprofen (ADVIL/MOTRIN) 200 MG capsule     lisinopril (ZESTRIL) 20 MG tablet     nitroGLYcerin (NITROSTAT) 0.4 MG sublingual tablet     rOPINIRole (REQUIP) 0.25 MG tablet     No current facility-administered medications for this visit.             Review of Systems:     A 10 point ROS was performed and is negative other than HPI.             Physical Exam:   /80 (BP Location: Left arm, Patient Position: Sitting, Cuff Size: Adult Large)   Pulse 66   Resp 12   Wt 123.4 kg (272 lb)   BMI 40.17 kg/m       Gen: NAD, resting comfortably in bed  Neck: No JVD, trachea midline  ENT: EOMI, anicteric  Lungs: CTAB, non-labored breathing  CV: regular rhythm, normal rate  Abd: obese, soft, nt, nd  Ext: LUE Zhang's test negative with palmar capillary refill within 2  seconds of releasing the ulnar artery, no varicosities  Neuro: AOx3    Labs:  Lab Results   Component Value Date    WBC 7.7 04/04/2022    HGB 16.9 04/04/2022    HCT 48.8 04/04/2022     04/04/2022     04/04/2022    POTASSIUM 4.0 04/04/2022    CHLORIDE 100 04/04/2022    CO2 27 04/04/2022    BUN 16 04/04/2022    CR 0.93 04/04/2022     04/04/2022    AST 8 06/20/2020    ALT 22 06/20/2020    ALKPHOS 128 (H) 06/20/2020    BILITOTAL 0.7 06/20/2020    INR 1.23 (H) 06/19/2020       Imaging:  Transthoracic echocardiogram (4/7/2022): I have personally reviewed these images  LVEF 60-65%, no significant valvular disease.     Coronary angiogram (4/7/2022):  I have personally reviewed these images  dLM into ostial LAD 40%  mLAD 50%, dLAD 70%, dLAD2 100%  oLCx 90%  mRCA 100%    Carotid Ultrasound:  < 50% b/l

## 2022-04-12 NOTE — LETTER
4/12/2022    Dylon Shafer, NP  [address] [place] [facility] [address]    RE: Gonzalez Morton       Dear Colleague,     I had the pleasure of seeing Gonzalez Morton in the [facility].  [facility] Cardiovascular Surgery Consult     Gonzalez Morton MRN# [id number]   YOB: 1960 Age: 61 year old      Primary care provider: Dylon Shafer    Referring Cardiologist(s): Mery Lyons MD    Date of Service: April 12, 2022    Reason for consult: Severe, multi-vessel coronary artery disease           Assessment and Plan:   Gonzalez Morton is a 61 year old male with stable ischemic heart disease and largely asymptomatic found during preoperative cardiac clearance for a planned right TKA for OA. Coronary angiogram reveals severe, multi-vessel disease. Echocardiogram reveals preserved function and no valvular disease. I recommend coronary artery bypass with LIMA, radial artery and saphenous vein as conduit. I described the technical details, as well as the expected postoperative course and recovery to the patient. I also discussed the risks, benefits, and alternatives of the procedure. The risks include but are not limited to bleeding, infection, stroke, heart attack, graft failure, dysrhythmia, respiratory failure, kidney or liver injury, nerve injury, bowel or limb ischemia, and death. The calculated STS risk of mortality is <1%, and the calculated STS risk of major morbidity or mortality is 5%. The patient understands these risks and wishes to undergo the operation.    STS coronary bypass risks:  Risk of Mortality:0.400%  Renal Failure:0.505%  Permanent Stroke:0.412%  Prolonged Ventilation:2.861%  DSW Infection:0.196%  Reoperation:1.332%  Morbidity or Mortality:4.795%  Short Length of Stay:68.364%  Long Length of Stay:1.433%    1) Schedule for coronary artery bypass, want to wait until lower level bathroom reconstruction complete to avoid stairs with right knee  problem during recovery, likely in 1 month  2) Preop blood work and education to be scheduled.    Mayank Rocha MD  Cardiothoracic Surgery  [phone]             Chief Complaint:   Exertional shortness of breath         History of Present Illness:   Mr. Gonzalez Morton is a 61 year old male with osteoarthritis presented for cardiac clearance for a right TKA and was found to have EKG changes that prompted a CT coronary calcium and stress test. Both of these were positive and he then underwent coronary angiogram showing severe, multi-vessel disease. He admits to some shortness of breath with more vigorous activity but otherwise denies cardiac symptoms. He is frustrated this will delay his knee surgery as he has constant pain and decreased mobility of his right knee but is understanding of needing to fix his heart first. He is chest pain free and denies tightness, pressure, syncope, pre-syncope, palpitations, orthopnea or shoulder pain.               Past Medical History:     Past Medical History:   Diagnosis Date     Hypertension      Rosacea      Sleep apnea     CPAP             Past Surgical History:     Past Surgical History:   Procedure Laterality Date     APPENDECTOMY       ARTHROSCOPY KNEE Bilateral      CV CORONARY ANGIOGRAM N/A 4/7/2022    Procedure: Coronary Angiogram;  Surgeon: Mery Lyons MD;  Location: [facility] LAB CV     PROSTATECTOMY       TOTAL KNEE ARTHROPLASTY Left      WRIST SURGERY                Social History:     Social History     Socioeconomic History     Marital status:      Spouse name: Not on file     Number of children: Not on file     Years of education: Not on file     Highest education level: Not on file   Occupational History     Not on file   Tobacco Use     Smoking status: Never Smoker     Smokeless tobacco: Never Used   Substance and Sexual Activity     Alcohol use: Not Currently     Drug use: Never     Sexual activity: Not on file   Other Topics Concern      Not on file   Social History Narrative     Not on file     Social Determinants of Health     Financial Resource Strain: Not on file   Food Insecurity: Not on file   Transportation Needs: Not on file   Physical Activity: Not on file   Stress: Not on file   Social Connections: Not on file   Intimate Partner Violence: Not on file   Housing Stability: Not on file            Family History:     Family History   Problem Relation Age of Onset     Diabetes Mother      Heart Disease Mother      Heart Disease Father      Atrophic kidney Sister      Diabetes Sister      Diabetes Type 1 Brother              Immunizations:     Immunization History   Administered Date(s) Administered     COVID-19,PF,Pfizer (12+ Yrs) 04/23/2021, 05/14/2021     Influenza (IIV3) PF 09/24/2011     Influenza Quad, Recombinant, pf(RIV4) (Flublok) 02/01/2022     Influenza Vaccine IM > 6 months Valent IIV4 (Alfuria,Fluzone) 09/17/2014, 09/17/2014     Tdap (Adacel,Boostrix) 09/17/2014             Allergies:      Allergies   Allergen Reactions     Penicillins Hives     Tolerated CTX 06/2020             Medications:     Current Outpatient Medications   Medication     acetaminophen (TYLENOL) 650 MG CR tablet     aspirin 81 MG EC tablet     atorvastatin (LIPITOR) 40 MG tablet     clindamycin (CLEOCIN) 300 MG capsule     doxycycline hyclate (VIBRAMYCIN) 100 MG capsule     hydrochlorothiazide (HYDRODIURIL) 25 MG tablet     ibuprofen (ADVIL/MOTRIN) 200 MG capsule     lisinopril (ZESTRIL) 20 MG tablet     nitroGLYcerin (NITROSTAT) 0.4 MG sublingual tablet     rOPINIRole (REQUIP) 0.25 MG tablet     No current facility-administered medications for this visit.             Review of Systems:     A 10 point ROS was performed and is negative other than HPI.             Physical Exam:   /80 (BP Location: Left arm, Patient Position: Sitting, Cuff Size: Adult Large)   Pulse 66   Resp 12   Wt 123.4 kg (272 lb)   BMI 40.17 kg/m       Gen: NAD, resting comfortably  in bed  Neck: No JVD, trachea midline  ENT: EOMI, anicteric  Lungs: CTAB, non-labored breathing  CV: regular rhythm, normal rate  Abd: obese, soft, nt, nd  Ext: LUE Zhang's test negative with palmar capillary refill within 2 seconds of releasing the ulnar artery, no varicosities  Neuro: AOx3    Labs:  Lab Results   Component Value Date    WBC 7.7 04/04/2022    HGB 16.9 04/04/2022    HCT 48.8 04/04/2022     04/04/2022     04/04/2022    POTASSIUM 4.0 04/04/2022    CHLORIDE 100 04/04/2022    CO2 27 04/04/2022    BUN 16 04/04/2022    CR 0.93 04/04/2022     04/04/2022    AST 8 06/20/2020    ALT 22 06/20/2020    ALKPHOS 128 (H) 06/20/2020    BILITOTAL 0.7 06/20/2020    INR 1.23 (H) 06/19/2020       Imaging:  Transthoracic echocardiogram (4/7/2022): I have personally reviewed these images  LVEF 60-65%, no significant valvular disease.     Coronary angiogram (4/7/2022):  I have personally reviewed these images  dLM into ostial LAD 40%  mLAD 50%, dLAD 70%, dLAD2 100%  oLCx 90%  mRCA 100%    Carotid Ultrasound:  < 50% b/l        Thank you for allowing me to participate in the care of your patient.    Sincerely,     Mayank Rocha MD     [facility]

## 2022-04-13 ENCOUNTER — TELEPHONE (OUTPATIENT)
Dept: ADMINISTRATIVE | Facility: CLINIC | Age: 62
End: 2022-04-13
Payer: COMMERCIAL

## 2022-04-13 ENCOUNTER — TELEPHONE (OUTPATIENT)
Dept: CARDIOLOGY | Facility: CLINIC | Age: 62
End: 2022-04-13
Payer: COMMERCIAL

## 2022-04-13 DIAGNOSIS — I25.10 CORONARY ARTERY DISEASE INVOLVING NATIVE CORONARY ARTERY OF NATIVE HEART, UNSPECIFIED WHETHER ANGINA PRESENT: Primary | ICD-10-CM

## 2022-04-13 RX ORDER — DEXMEDETOMIDINE HYDROCHLORIDE 4 UG/ML
0.2-1.2 INJECTION, SOLUTION INTRAVENOUS CONTINUOUS
Status: CANCELLED | OUTPATIENT
Start: 2022-06-06

## 2022-04-13 RX ORDER — ASPIRIN 81 MG/1
81 TABLET, CHEWABLE ORAL
Status: CANCELLED | OUTPATIENT
Start: 2022-06-06

## 2022-04-13 RX ORDER — CHLORHEXIDINE GLUCONATE ORAL RINSE 1.2 MG/ML
10 SOLUTION DENTAL ONCE
Status: CANCELLED | OUTPATIENT
Start: 2022-06-06 | End: 2022-04-13

## 2022-04-13 RX ORDER — LIDOCAINE 40 MG/G
CREAM TOPICAL
Status: CANCELLED | OUTPATIENT
Start: 2022-04-13

## 2022-04-13 RX ORDER — SODIUM CHLORIDE, SODIUM GLUCONATE, SODIUM ACETATE, POTASSIUM CHLORIDE AND MAGNESIUM CHLORIDE 526; 502; 368; 37; 30 MG/100ML; MG/100ML; MG/100ML; MG/100ML; MG/100ML
1000 INJECTION, SOLUTION INTRAVENOUS
Status: CANCELLED | OUTPATIENT
Start: 2022-06-06 | End: 2022-06-06

## 2022-04-13 RX ORDER — CLINDAMYCIN PHOSPHATE 900 MG/50ML
900 INJECTION, SOLUTION INTRAVENOUS
Status: CANCELLED | OUTPATIENT
Start: 2022-04-13

## 2022-04-13 RX ORDER — ASPIRIN 81 MG/1
162 TABLET, CHEWABLE ORAL
Status: CANCELLED | OUTPATIENT
Start: 2022-06-06

## 2022-04-13 RX ORDER — CLINDAMYCIN PHOSPHATE 900 MG/50ML
900 INJECTION, SOLUTION INTRAVENOUS SEE ADMIN INSTRUCTIONS
Status: CANCELLED | OUTPATIENT
Start: 2022-04-13

## 2022-04-13 RX ORDER — PHENYLEPHRINE HCL IN 0.9% NACL 50MG/250ML
.1-6 PLASTIC BAG, INJECTION (ML) INTRAVENOUS CONTINUOUS
Status: CANCELLED | OUTPATIENT
Start: 2022-06-06

## 2022-04-13 NOTE — TELEPHONE ENCOUNTER
I spoke with PETER Montiel RN to arrange preadmission appt to coordinate with appointments for chest xray and EKG afterwards.

## 2022-04-13 NOTE — TELEPHONE ENCOUNTER
Letter by Julianna Ley RN on 4/13/2022       Cardiovascular Surgery     [facility]       CARDIOTHORACIC SURGERY PRE-OP INSTRUCTIONS     Your Heart Surgery is scheduled with Dr. Rocha on Monday, 6/6.  Please arrive at 5:00 AM for your surgery scheduled at 7:10 AM.     Here are instructions for your upcoming surgery and clinic visit if scheduled.     Report to the information desk at the main entrance of [facility] at [address] [place] [address]. When you walk in the main entrance of the hospital, it is directly in front of you. Ask for an escort or the  can help direct you. You will then be escorted or directed to the Surgical Admission Unit (PETER) on the second floor for your surgery preparation. You have been pre-registered.      Family/friends will be called from the OR with updates 1-2 times during the surgery.  After the surgery is completed, the surgeon will speak to your family members or friends face to face or by calling them.       At this time due to COVID-19, only two visitors a day will be allowed per adult patient for the patient's hospital admission.  Surgical patients can have one visitor only during the preoperative phase, and one person may escort an adult patient to their outpatient clinic appointments.  All visitors will be screened, each time they visit, and must pass screening before entry.  Individuals who are sick and not seeking care or have known exposure to COVID-19 are not allowed to visit.  Visitors under the age of 18 are not allowed to enter. Hospital visiting hours are 8:00 am to 8:30 pm. Visiting policies will be strictly enforced.  This policy is subject to change as the COVID virus continues to evolve.      You will spend approximately 5 - 7 days in the hospital, depending on how quickly you recover.       COVID-19 TESTING  You will be required to undergo COVID-19 testing prior to your surgery.  Please call [phone] to schedule an  appointment. You must schedule it WITHIN 4 DAYS of your surgery. A COVID-19 test performed more than 4 days prior will not be counted as your pre-op COVID-19 test.       After the COVID test, please protect yourself by following the CDC recommendations for disease prevention.  Wash your hands regularly with soap and water and use hand  if soap and water is not available, wear a face mask. Separate yourself from others by 6 feet and keep your hands away from your face.      INSTRUCTIONS PRIOR TO SURGERY      MEDICATIONS:     ASPIRIN is okay to take on the day of your surgery if you are having bypass surgery. Do not take your aspirin on your day of surgery if you are scheduled for valve or aortic surgery. If you do not take aspirin, do not start aspirin unless instructed otherwise. Take your other medications as you normally would until the day of surgery unless instructed otherwise.       IF you are taking a blood thinner, (Coumadin, Warfarin, Plavix, Pradaxa, Effient, Brilinta, Pletal, Eliquis, Xarelto or other antiplatelet), please inform your surgery team as soon as possible as  these medications may need to be stopped for several days (3-7) prior to your surgery.     STOP TAKING these medications. STOP ALL ANTIINFLAMMATORY MEDICATIONS: (Ibuprofen, Aleve, Advil, Celebrex, Votaren, Ketoprofen, and Naproxen).  Stop ALL SUPPLEMENTS 10 days prior to your surgery. This includes stopping Co-Q 10, vitamin E and all fish oil supplements.   Please check the labels on any OTC eye vitamins you may be taking.  They often contain vitamin E and should be stopped 10 days prior to surgery.      MEDICATIONS THE MORNING OF SURGERY:  Take your ASPIRIN the morning of your surgery with a drink of clear liquid.  Please tell your anesthesiologist what medication you took and at what time.     DO NOT EAT ANY SOLID FOODS AFTER MIDNIGHT or the morning of your surgery. NO MILK, MILK PRODUCTS, SMOOTHIES 8 HOURS PRIOR TO  SURGERY.      DO NOT EAT ANYTHING, however you may have any clear liquids; black coffee (sugar okay), clear tea, water, sprite, ginger ale, apple juice, Gatorade or any clear liquid up to two hours before your surgery time. (No fiona tea) No milk, or milk products, no smoothies or juice with pulp.        HISTORY AND PHYSICAL:  LABS and IMAGING  All blood work, tests, and procedures must be within 30 days of your surgery date.  You do NOT need to fast for the blood work.   Please schedule a history and physical with your primary physician within 30 days of your surgery.     You have a Pre-Admission Testing (PAT) appointment beginning at 8:00 AM to 10:00 AM on Tuesday, 5/31 in the Surgical Admission Unit (PETER) at [facility], [facility] [place] [address]. Please check in at the information desk in main entrance lobby.  You will be directed to the second floor.  Radiology is located downstairs in the lower level of the hospital.      Your schedule for 5/31 is as follows:     PAT appointment at 8:00 AM  Chest xray to follow  EKG to follow     BELONGINGS  Do not bring personal belongings, jewelry, money, valuables, toiletries or medications to the hospital the morning of your surgery. You may pack a bag and give it to a family member or friend to bring the following day or when needed.  Things you may want to pack or have brought to you later may include slippers/shoes with a sole that are easy to take on/off, and comfy pajama bottoms to wear while walking the halls of the hospital.  Please remove all jewelry, including body piercings. Cautery instruments are used during surgery that may pose a risk of burns if a body piercing is left in during surgery.      Do bring a photo ID and insurance card.  A copy of your health care directives, if you have one.  Glasses and hearing aids (bring cases).  You cannot wear contact lenses during the surgery.  Inhaler(s) and eye drops if you use them, tell the staff about  them when you arrive. Bring your CPAP machine or breathing device, if you use them.  If you have a pacemaker or ICD (cardiac defibrillator) bring the ID card.  If you have an implanted stimulator, bring the remote control.  If you are a legal guardian bring a copy of the certified (court-stamped) guardianship papers.      Call Maria Ines our surgical scheduler, with any questions or concerns about scheduling. She can be reached by phone at [phone] between 8:00 AM and 4:00 PM Monday through Friday.      If you have questions about your medications, test results or have a change in your health status, call your surgery coordinator AICHA Levine during regular business hours.  Call Julianna or the afterhours number ([phone]) if you develop any of the following symptoms; fever, cough, shortness of breath, sore throat, runny or stuffy nose, muscle or body aches, headaches, fatigue, vomiting or diarrhea.       On weekends or after 4:00 PM, please call [phone] and ask the  to page the Cardiovascular Surgery staff on call.      PLEASE NOTE, there are times elective surgery (like yours) needs to be rescheduled due to unplanned emergency, transplant, or urgent surgeries. Your surgery may also be postponed or cancelled if there are no ICU beds available in the hospital.  If that should happen, your surgery will be rescheduled as quickly as possible. Our surgery team appreciates your understanding.      Please call me with any questions.  Thank you,  Julianna Ley RN  Cardiovascular Surgery Coordinator  [phone]  Direct Phone  [phone]  Fax

## 2022-05-09 ENCOUNTER — TELEPHONE (OUTPATIENT)
Dept: CARDIOLOGY | Facility: CLINIC | Age: 62
End: 2022-05-09
Payer: COMMERCIAL

## 2022-05-09 NOTE — TELEPHONE ENCOUNTER
Patient called to confirm getting a cortisone shot in his knee today would be ok prior to 6/6 CAB. Advised him it was ok to do so. FMLA forms faxed to [employer], copy sent to scanning and emailed to patient per request.

## 2022-05-10 ENCOUNTER — OFFICE VISIT (OUTPATIENT)
Dept: FAMILY MEDICINE | Facility: CLINIC | Age: 62
End: 2022-05-10
Payer: COMMERCIAL

## 2022-05-10 VITALS
HEIGHT: 69 IN | WEIGHT: 267 LBS | SYSTOLIC BLOOD PRESSURE: 110 MMHG | OXYGEN SATURATION: 95 % | DIASTOLIC BLOOD PRESSURE: 60 MMHG | BODY MASS INDEX: 39.55 KG/M2 | RESPIRATION RATE: 14 BRPM | HEART RATE: 69 BPM | TEMPERATURE: 98.1 F

## 2022-05-10 DIAGNOSIS — I10 ESSENTIAL HYPERTENSION, BENIGN: ICD-10-CM

## 2022-05-10 DIAGNOSIS — Z01.818 PREOP GENERAL PHYSICAL EXAM: Primary | ICD-10-CM

## 2022-05-10 DIAGNOSIS — G47.33 OBSTRUCTIVE SLEEP APNEA SYNDROME: ICD-10-CM

## 2022-05-10 DIAGNOSIS — E78.2 MIXED HYPERLIPIDEMIA: ICD-10-CM

## 2022-05-10 DIAGNOSIS — I25.10 CORONARY ARTERY DISEASE INVOLVING NATIVE CORONARY ARTERY OF NATIVE HEART WITHOUT ANGINA PECTORIS: ICD-10-CM

## 2022-05-10 DIAGNOSIS — E66.01 MORBID OBESITY (H): ICD-10-CM

## 2022-05-10 DIAGNOSIS — R93.1 ELEVATED CORONARY ARTERY CALCIUM SCORE: ICD-10-CM

## 2022-05-10 DIAGNOSIS — R94.39 ABNORMAL CARDIOVASCULAR STRESS TEST: ICD-10-CM

## 2022-05-10 PROCEDURE — 99215 OFFICE O/P EST HI 40 MIN: CPT | Performed by: NURSE PRACTITIONER

## 2022-05-10 ASSESSMENT — PAIN SCALES - GENERAL: PAINLEVEL: MODERATE PAIN (4)

## 2022-05-10 NOTE — PATIENT INSTRUCTIONS
I will get your paperwork faxed to your surgeon.    Follow your surgeon's directions regarding eating and drinking prior to surgery.     No advil, ibuprofen, aleve, naproxen, or aspirin a week before surgery. Tylenol is ok.    Stop all supplements a week before surgery.    Your surgeon will manage any complications after your procedure.

## 2022-05-10 NOTE — H&P (VIEW-ONLY)
[facility]  [address]  [place] [facility] [address]  Phone: [phone]  Fax: [phone]  Primary Provider: Jose Shafer  Pre-op Performing Provider: JOSE SHAFER    PREOPERATIVE EVALUATION:  Today's date: 5/10/2022    Gonzalez Morton is a 61 year old male who presents for a preoperative evaluation.    Surgical Information:  Surgery/Procedure: Coronary artery bypass with LIMA, radial artery and saphenous vein as conduit  Surgery Location: [facility]  Surgeon: Dr. Mayank Rocha  Surgery Date: 6/6/2022  Time of Surgery: AM  Where patient plans to recover: At home with family  Fax number for surgical facility: Note does not need to be faxed, will be available electronically in Epic.    Type of Anesthesia Anticipated: General    Assessment & Plan     The proposed surgical procedure is considered HIGH risk.    Preop general physical exam  Medically optimized for surgery.  Patient does have planned lab work and EKG already ordered and scheduled to be done at the end of this month.  COVID testing scheduled.    CAD  Planned CABG    Elevated coronary artery calcium score  Planned CABG    Abnormal cardiovascular stress test    Obstructive sleep apnea syndrome  Uses CPAP religiously    Morbid obesity (H)  Increased risk of obesity related hypoventilation    Mixed hyperlipidemia  On atorvastatin 40 mg    Benign Essential Hypertension  Well-controlled           Risks and Recommendations:  The patient has the following additional risks and recommendations for perioperative complications:   - No identified additional risk factors other than previously addressed    Medication Instructions:  Hold NSAIDs a week prior.  Hold ACE inhibitor and hydrochlorothiazide the morning of procedure    RECOMMENDATION:  APPROVAL GIVEN to proceed with proposed procedure, without further diagnostic evaluation.    Reviewed cardiology note x1, cardiothoracic surgery note  x1, echocardiogram x1, cardiac catheterization procedure x1, stress test x1, CT coronary calcium scan x1, basic metabolic panel x1, CBC x1.    42 minutes spent on the date of the encounter doing chart review, history and exam, documentation and further activities per the note    Subjective     HPI related to upcoming procedure: Patient has been working with cardiology.  Elevated CT coronary calcium scan.  He did also have an abnormal stress test.  The hope was to be able to do an angiogram which showed severe diffuse coronary calcification along the mid and distal LAD with an occluded apical LAD along with critical ostial circumflex disease and chronic total occlusion of the mid RCA with collateral filling of the distal RCA branches    Preop Questions 5/6/2022   1. Have you ever had a heart attack or stroke? No   2. Have you ever had surgery on your heart or blood vessels, such as a stent placement, a coronary artery bypass, or surgery on an artery in your head, neck, heart, or legs? No   3. Do you have chest pain with activity? No   4. Do you have a history of  heart failure? No   5. Do you currently have a cold, bronchitis or symptoms of other infection? No   6. Do you have a cough, shortness of breath, or wheezing? No   7. Do you or anyone in your family have previous history of blood clots? No   8. Do you or does anyone in your family have a serious bleeding problem such as prolonged bleeding following surgeries or cuts? No   9. Have you ever had problems with anemia or been told to take iron pills? No   10. Have you had any abnormal blood loss such as black, tarry or bloody stools? No   11. Have you ever had a blood transfusion? No   12. Are you willing to have a blood transfusion if it is medically needed before, during, or after your surgery? Yes   13. Have you or any of your relatives ever had problems with anesthesia? No   14. Do you have sleep apnea, excessive snoring or daytime drowsiness? YES - nighttime  snoring. Uses cpap   14a. Do you have a CPAP machine? Yes   15. Do you have any artifical heart valves or other implanted medical devices like a pacemaker, defibrillator, or continuous glucose monitor? No   16. Do you have artificial joints? YES - left knee   17. Are you allergic to latex? No       Health Care Directive:  Patient does not have a Health Care Directive or Living Will: previously discussed    Preoperative Review of :   reviewed - no record of controlled substances prescribed.    Status of Chronic Conditions:  See problem list for active medical problems.  Problems all longstanding and stable, except as noted/documented.  See ROS for pertinent symptoms related to these conditions.      Review of Systems  CONSTITUTIONAL: NEGATIVE for fever, chills, change in weight  INTEGUMENTARY/SKIN: NEGATIVE for worrisome rashes, moles or lesions  EYES: NEGATIVE for vision changes or irritation  ENT/MOUTH: NEGATIVE for ear, mouth and throat problems  RESP: NEGATIVE for significant cough or SOB  CV: NEGATIVE for chest pain, palpitations or peripheral edema  GI: NEGATIVE for nausea, abdominal pain, heartburn, or change in bowel habits  : NEGATIVE for frequency, dysuria, or hematuria  MUSCULOSKELETAL: NEGATIVE for significant arthralgias or myalgia  NEURO: NEGATIVE for weakness, dizziness or paresthesias  ENDOCRINE: NEGATIVE for temperature intolerance, skin/hair changes  HEME: NEGATIVE for bleeding problems  PSYCHIATRIC: NEGATIVE for changes in mood or affect    Patient Active Problem List    Diagnosis Date Noted     Elevated coronary artery calcium score      Priority: Medium     Abnormal cardiovascular stress test 04/03/2022     Priority: Medium     Family history of ischemic heart disease 04/03/2022     Priority: Medium     Morbid obesity (H) 08/20/2021     Priority: Medium     Mixed hyperlipidemia      Priority: Medium     Created by Conversion         History of malignant neoplasm of prostate 09/05/2019      "Priority: Medium     Erectile dysfunction following radical prostatectomy 08/29/2019     Priority: Medium     History of prostate cancer 05/29/2019     Priority: Medium     Back pain 06/08/2015     Priority: Medium     Benign Essential Hypertension      Priority: Medium     Created by Conversion         Rosacea      Priority: Medium     Created by Conversion          Past Medical History:   Diagnosis Date     Hypertension      Rosacea      Sleep apnea     CPAP     Past Surgical History:   Procedure Laterality Date     APPENDECTOMY       ARTHROSCOPY KNEE Bilateral      CV CORONARY ANGIOGRAM N/A 4/7/2022    Procedure: Coronary Angiogram;  Surgeon: Mery Lyons MD;  [facility]: [facility] CV     PROSTATECTOMY       TOTAL KNEE ARTHROPLASTY Left      WRIST SURGERY       Current Outpatient Medications   Medication Sig Dispense Refill     acetaminophen (TYLENOL) 650 MG CR tablet Take 1,300 mg by mouth every 8 hours as needed        aspirin 81 MG EC tablet Take 81 mg by mouth daily       atorvastatin (LIPITOR) 40 MG tablet TAKE 1 TABLET BY MOUTH EVERY DAY 90 tablet 1     hydrochlorothiazide (HYDRODIURIL) 25 MG tablet [HYDROCHLOROTHIAZIDE (HYDRODIURIL) 25 MG TABLET] TAKE 1 TABLET BY MOUTH EVERY DAY 90 tablet 3     ibuprofen (ADVIL/MOTRIN) 200 MG capsule Take 800 mg by mouth continuous prn        lisinopril (ZESTRIL) 20 MG tablet [LISINOPRIL (PRINIVIL,ZESTRIL) 20 MG TABLET] TAKE 1 TABLET BY MOUTH EVERY DAY 90 tablet 3     nitroGLYcerin (NITROSTAT) 0.4 MG sublingual tablet One tablet under the tongue every 5 minutes if needed for chest pain. May repeat every 5 minutes for a maximum of 3 doses in 15 minutes\" 25 tablet 3     rOPINIRole (REQUIP) 0.25 MG tablet Take 2 tablets (0.5 mg) by mouth At Bedtime 180 tablet 2     clindamycin (CLEOCIN) 300 MG capsule TAKE 2 CAPSULES BY MOUTH 1 HOUR PRIOR TO DENTAL APPOINTMENT. (Patient not taking: Reported on 5/10/2022)       doxycycline hyclate (VIBRAMYCIN) 100 MG capsule TAKE 1 " "CAPSULE BY MOUTH TWICE A DAY (Patient not taking: Reported on 5/10/2022) 180 capsule 1       Allergies   Allergen Reactions     Penicillins Hives     Tolerated CTX 06/2020        Social History     Tobacco Use     Smoking status: Never Smoker     Smokeless tobacco: Never Used   Substance Use Topics     Alcohol use: Yes     Alcohol/week: 1.0 standard drink     Types: 1 Standard drinks or equivalent per week     Family History   Problem Relation Age of Onset     Diabetes Mother      Heart Disease Mother      Heart Disease Father      Atrophic kidney Sister      Diabetes Sister      Diabetes Type 1 Brother      History   Drug Use Unknown         Objective     /60 (BP Location: Right arm, Patient Position: Sitting, Cuff Size: Adult Regular)   Pulse 69   Temp 98.1  F (36.7  C)   Resp 14   Ht 1.759 m (5' 9.25\")   Wt 121.1 kg (267 lb)   SpO2 95%   BMI 39.14 kg/m      Physical Exam    GENERAL APPEARANCE: healthy, alert and no distress     EYES: EOMI,  PERRL     HENT: ear canals and TM's normal and nose and mouth without ulcers or lesions     NECK: no adenopathy, no asymmetry, masses, or scars and thyroid normal to palpation     RESP: lungs clear to auscultation - no rales, rhonchi or wheezes     CV: regular rates and rhythm, normal S1 S2, no S3 or S4 and no murmur, click or rub     ABDOMEN:  soft, nontender, no HSM or masses and bowel sounds normal     MS: extremities normal- no gross deformities noted, no evidence of inflammation in joints, FROM in all extremities.     SKIN: no suspicious lesions or rashes     NEURO: Normal strength and tone, sensory exam grossly normal, mentation intact and speech normal     PSYCH: mentation appears normal. and affect normal/bright     LYMPHATICS: No cervical adenopathy    Recent Labs   Lab Test 04/04/22  0840 02/01/22  0857 08/20/21  0932 07/31/20  1124 06/20/20  0615 06/19/20  2130   HGB 16.9  --   --  15.6   < > 13.4*     --   --  274   < > 387   INR  --   --   --   " --   --  1.23*    138 139 138   < > 136   POTASSIUM 4.0 4.3 4.2 4.0   < > 3.8   CR 0.93  --  0.88 0.87   < > 1.14   A1C  --   --  5.9*  --   --   --     < > = values in this interval not displayed.        Diagnostics:  Last Comprehensive Metabolic Panel:  Sodium   Date Value Ref Range Status   04/04/2022 138 136 - 145 mmol/L Final     Potassium   Date Value Ref Range Status   04/04/2022 4.0 3.5 - 5.0 mmol/L Final     Chloride   Date Value Ref Range Status   04/04/2022 100 98 - 107 mmol/L Final     Carbon Dioxide (CO2)   Date Value Ref Range Status   04/04/2022 27 22 - 31 mmol/L Final     Anion Gap   Date Value Ref Range Status   04/04/2022 11 5 - 18 mmol/L Final     Glucose   Date Value Ref Range Status   04/04/2022 108 70 - 125 mg/dL Final     Urea Nitrogen   Date Value Ref Range Status   04/04/2022 16 8 - 22 mg/dL Final     Creatinine   Date Value Ref Range Status   04/04/2022 0.93 0.70 - 1.30 mg/dL Final     GFR Estimate   Date Value Ref Range Status   04/04/2022 >90 >60 mL/min/1.73m2 Final     Comment:     Effective December 21, 2021 eGFRcr in adults is calculated using the 2021 CKD-EPI creatinine equation which includes age and gender (Gracia et al., NEJ, DOI: 10.1056/VKMLuu2941866)   07/31/2020 >60 >60 mL/min/1.73m2 Final     Calcium   Date Value Ref Range Status   04/04/2022 9.4 8.5 - 10.5 mg/dL Final       EKG planned prior to surgery.    Revised Cardiac Risk Index (RCRI):  The patient has the following serious cardiovascular risks for perioperative complications:   - Coronary Artery Disease (MI, positive stress test, angina, Qs on EKG) = 1 point     RCRI Interpretation: 1 point: Class II (low risk - 0.9% complication rate)     Signed Electronically by: Dylon Shafer NP  Copy of this evaluation report is provided to requesting physician.

## 2022-05-12 ENCOUNTER — TRANSFERRED RECORDS (OUTPATIENT)
Dept: HEALTH INFORMATION MANAGEMENT | Facility: CLINIC | Age: 62
End: 2022-05-12
Payer: COMMERCIAL

## 2022-05-25 DIAGNOSIS — Z11.59 ENCOUNTER FOR SCREENING FOR OTHER VIRAL DISEASES: Primary | ICD-10-CM

## 2022-05-31 ENCOUNTER — HOSPITAL ENCOUNTER (OUTPATIENT)
Dept: RADIOLOGY | Facility: HOSPITAL | Age: 62
Discharge: HOME | End: 2022-05-31
Attending: THORACIC SURGERY (CARDIOTHORACIC VASCULAR SURGERY)
Payer: COMMERCIAL

## 2022-05-31 ENCOUNTER — ANESTHESIA EVENT (OUTPATIENT)
Dept: SURGERY | Facility: HOSPITAL | Age: 62
DRG: 236 | End: 2022-05-31
Payer: COMMERCIAL

## 2022-05-31 ENCOUNTER — HOSPITAL ENCOUNTER (OUTPATIENT)
Dept: SURGERY | Facility: HOSPITAL | Age: 62
Discharge: HOME | End: 2022-05-31
Payer: COMMERCIAL

## 2022-05-31 VITALS
HEIGHT: 69 IN | SYSTOLIC BLOOD PRESSURE: 132 MMHG | TEMPERATURE: 97.9 F | DIASTOLIC BLOOD PRESSURE: 85 MMHG | OXYGEN SATURATION: 95 % | WEIGHT: 267.5 LBS | HEART RATE: 61 BPM | RESPIRATION RATE: 16 BRPM | BODY MASS INDEX: 39.62 KG/M2

## 2022-05-31 DIAGNOSIS — I25.10 CORONARY ARTERY DISEASE INVOLVING NATIVE CORONARY ARTERY OF NATIVE HEART, UNSPECIFIED WHETHER ANGINA PRESENT: ICD-10-CM

## 2022-05-31 LAB
ABO/RH(D): NORMAL
ALBUMIN SERPL-MCNC: 3.9 G/DL (ref 3.5–5)
ALBUMIN UR-MCNC: NEGATIVE MG/DL
ALP SERPL-CCNC: 95 U/L (ref 45–120)
ALT SERPL W P-5'-P-CCNC: 31 U/L (ref 0–45)
ANION GAP SERPL CALCULATED.3IONS-SCNC: 7 MMOL/L (ref 5–18)
ANTIBODY SCREEN: NEGATIVE
APPEARANCE UR: CLEAR
APTT PPP: 27 SECONDS (ref 22–38)
AST SERPL W P-5'-P-CCNC: 13 U/L (ref 0–40)
ATRIAL RATE - MUSE: 61 BPM
BILIRUB SERPL-MCNC: 0.7 MG/DL (ref 0–1)
BILIRUB UR QL STRIP: NEGATIVE
BUN SERPL-MCNC: 17 MG/DL (ref 8–22)
CALCIUM SERPL-MCNC: 8.7 MG/DL (ref 8.5–10.5)
CHLORIDE BLD-SCNC: 102 MMOL/L (ref 98–107)
CO2 SERPL-SCNC: 27 MMOL/L (ref 22–31)
COLOR UR AUTO: COLORLESS
CREAT SERPL-MCNC: 0.82 MG/DL (ref 0.7–1.3)
DIASTOLIC BLOOD PRESSURE - MUSE: NORMAL MMHG
ERYTHROCYTE [DISTWIDTH] IN BLOOD BY AUTOMATED COUNT: 12.8 % (ref 10–15)
GFR SERPL CREATININE-BSD FRML MDRD: >90 ML/MIN/1.73M2
GLUCOSE BLD-MCNC: 102 MG/DL (ref 70–125)
GLUCOSE UR STRIP-MCNC: NEGATIVE MG/DL
HBA1C MFR BLD: 5.8 %
HCT VFR BLD AUTO: 43.9 % (ref 40–53)
HGB BLD-MCNC: 15.4 G/DL (ref 13.3–17.7)
HGB UR QL STRIP: NEGATIVE
INR PPP: 1.05 (ref 0.85–1.15)
INTERPRETATION ECG - MUSE: NORMAL
KETONES UR STRIP-MCNC: NEGATIVE MG/DL
LEUKOCYTE ESTERASE UR QL STRIP: NEGATIVE
MAGNESIUM SERPL-MCNC: 1.8 MG/DL (ref 1.8–2.6)
MCH RBC QN AUTO: 29 PG (ref 26.5–33)
MCHC RBC AUTO-ENTMCNC: 35.1 G/DL (ref 31.5–36.5)
MCV RBC AUTO: 83 FL (ref 78–100)
MRSA DNA SPEC QL NAA+PROBE: NEGATIVE
NITRATE UR QL: NEGATIVE
P AXIS - MUSE: NORMAL DEGREES
PH UR STRIP: 6.5 [PH] (ref 5–7)
PLATELET # BLD AUTO: 219 10E3/UL (ref 150–450)
POTASSIUM BLD-SCNC: 3.7 MMOL/L (ref 3.5–5)
PR INTERVAL - MUSE: 152 MS
PREALB SERPL IA-MCNC: 34 MG/DL (ref 19–38)
PROT SERPL-MCNC: 7 G/DL (ref 6–8)
QRS DURATION - MUSE: 94 MS
QT - MUSE: 384 MS
QTC - MUSE: 386 MS
R AXIS - MUSE: 44 DEGREES
RBC # BLD AUTO: 5.31 10E6/UL (ref 4.4–5.9)
RBC URINE: 0 /HPF
SA TARGET DNA: POSITIVE
SODIUM SERPL-SCNC: 136 MMOL/L (ref 136–145)
SP GR UR STRIP: 1 (ref 1–1.03)
SPECIMEN EXPIRATION DATE: NORMAL
SYSTOLIC BLOOD PRESSURE - MUSE: NORMAL MMHG
T AXIS - MUSE: 15 DEGREES
UROBILINOGEN UR STRIP-MCNC: <2 MG/DL
VENTRICULAR RATE- MUSE: 61 BPM
WBC # BLD AUTO: 7.2 10E3/UL (ref 4–11)
WBC URINE: 0 /HPF

## 2022-05-31 PROCEDURE — 86923 COMPATIBILITY TEST ELECTRIC: CPT | Performed by: THORACIC SURGERY (CARDIOTHORACIC VASCULAR SURGERY)

## 2022-05-31 PROCEDURE — 71046 X-RAY EXAM CHEST 2 VIEWS: CPT

## 2022-05-31 PROCEDURE — 86850 RBC ANTIBODY SCREEN: CPT | Performed by: THORACIC SURGERY (CARDIOTHORACIC VASCULAR SURGERY)

## 2022-05-31 PROCEDURE — 84134 ASSAY OF PREALBUMIN: CPT | Performed by: THORACIC SURGERY (CARDIOTHORACIC VASCULAR SURGERY)

## 2022-05-31 PROCEDURE — 87641 MR-STAPH DNA AMP PROBE: CPT | Performed by: THORACIC SURGERY (CARDIOTHORACIC VASCULAR SURGERY)

## 2022-05-31 PROCEDURE — 85014 HEMATOCRIT: CPT | Performed by: THORACIC SURGERY (CARDIOTHORACIC VASCULAR SURGERY)

## 2022-05-31 PROCEDURE — 85610 PROTHROMBIN TIME: CPT | Performed by: THORACIC SURGERY (CARDIOTHORACIC VASCULAR SURGERY)

## 2022-05-31 PROCEDURE — 80053 COMPREHEN METABOLIC PANEL: CPT | Performed by: THORACIC SURGERY (CARDIOTHORACIC VASCULAR SURGERY)

## 2022-05-31 PROCEDURE — 93005 ELECTROCARDIOGRAM TRACING: CPT

## 2022-05-31 PROCEDURE — 83735 ASSAY OF MAGNESIUM: CPT | Performed by: THORACIC SURGERY (CARDIOTHORACIC VASCULAR SURGERY)

## 2022-05-31 PROCEDURE — 82040 ASSAY OF SERUM ALBUMIN: CPT | Performed by: THORACIC SURGERY (CARDIOTHORACIC VASCULAR SURGERY)

## 2022-05-31 PROCEDURE — 81001 URINALYSIS AUTO W/SCOPE: CPT | Performed by: THORACIC SURGERY (CARDIOTHORACIC VASCULAR SURGERY)

## 2022-05-31 PROCEDURE — 93010 ELECTROCARDIOGRAM REPORT: CPT | Performed by: INTERNAL MEDICINE

## 2022-05-31 PROCEDURE — 36415 COLL VENOUS BLD VENIPUNCTURE: CPT | Performed by: THORACIC SURGERY (CARDIOTHORACIC VASCULAR SURGERY)

## 2022-05-31 PROCEDURE — 85730 THROMBOPLASTIN TIME PARTIAL: CPT | Performed by: THORACIC SURGERY (CARDIOTHORACIC VASCULAR SURGERY)

## 2022-05-31 PROCEDURE — 86901 BLOOD TYPING SEROLOGIC RH(D): CPT | Performed by: THORACIC SURGERY (CARDIOTHORACIC VASCULAR SURGERY)

## 2022-05-31 PROCEDURE — 83036 HEMOGLOBIN GLYCOSYLATED A1C: CPT | Performed by: THORACIC SURGERY (CARDIOTHORACIC VASCULAR SURGERY)

## 2022-05-31 NOTE — PHARMACY-ADMISSION MEDICATION HISTORY
Pharmacy Note - Admission Medication History   ______________________________________________________________________    Prior To Admission (PTA) med list completed and updated in EMR.       No outpatient medications have been marked as taking for the 5/31/22 encounter (Hospital Encounter) with ANT GREEN PRE-ADMISSION TESTING.       Information source(s): Patient and CareEverywhere/SureScripts    Method of interview communication: in-person    Patient was asked about OTC/herbal products specifically.  PTA med list reflects this.    Based on the pharmacist's assessment, the PTA med list information appears reliable    Allergies were reviewed, assessed, and updated with the patient.      Patient does not use any multi-dose medications prior to admission.     Thank you for the opportunity to participate in the care of this patient.      Ml Dunaway RPH     5/31/2022     9:21 AM

## 2022-05-31 NOTE — CONSULTS
"SPIRITUAL HEALTH SERVICES NOTE  [facility] - [facility]/Pre-Op    SPIRITUAL ASSESSMENT  Hopeful/optimistic about surgery  Multiple surgeries in the past  Willing to follow instructions as he recovers  Looking forward to regaining some of his energy  Identifies family as support    CARE PROVIDED  Empathic listening and presence  Normalized feelings of anxiety and concern  Explored/identified sources of strength and support  Encouraged asking others for help  Prayer shared      SPIRITUAL CARE NOTE  Met with Steven due to pre-surgical Spiritual Care Consult. He was accompanied by his sister. Steven says that he has know he needs surgery for about a month now and that he is anxious to have it over. He says that he hasn't experienced much pain, but has significant fatigue. Steven shares that he has had multiple surgeries in the past and is optimistic about his recovery. He says that he is typically a \"perfect patient\" in that he does everything he is told and that he is good about following instructions. He notes that having the right mindset has been especially helpful for him in the past when recovering from surgery. He denies any concerns about work and is most excited to engage in his ADL without being exhausted. Steven identifies his family members as sources of support and says that he has no problem asking for help. He also notes that his wife will be taking a week off of work to help him recover. He has curtis that God will take care of him during this experience. Prayer shared.     Visit Length: 10 minutes    Plan of Care: Will remain available for further support as patient/family needs/desires.    Julianna Booker M.Div.      Office: [phone] (for non-urgent requests)  Please Vocera or page through [facility] for time-sensitive requests    "

## 2022-05-31 NOTE — ANESTHESIA PREPROCEDURE EVALUATION
Anesthesia Pre-Procedure Evaluation    Patient: Gonzalez Morton   MRN: [id number] : 1960        Procedure : Procedure(s):  CORONARY ARTERY BYPASS GRAFT (CABG), ENDOSCOPIC VESSEL PROCUREMENT, INTERNAL MAMMARY ARTERY HARVEST, LEFT RADIAL ARTERY HARVEST, ANESTHESIA TRANSESOPHAGEAL ECHOCARDIOGRAM          Past Medical History:   Diagnosis Date     CAD (coronary artery disease)      Hypertension      Rosacea      Sleep apnea     CPAP      Past Surgical History:   Procedure Laterality Date     APPENDECTOMY       ARTHROSCOPY KNEE Bilateral      CV CORONARY ANGIOGRAM N/A 2022    Procedure: Coronary Angiogram;  Surgeon: Mery Lyons MD;  Location: [facility] CATH LAB CV     PROSTATECTOMY       TOTAL KNEE ARTHROPLASTY Left      WRIST SURGERY        Allergies   Allergen Reactions     Penicillins Hives     Tolerated CTX 2020      Social History     Tobacco Use     Smoking status: Never Smoker     Smokeless tobacco: Never Used   Substance Use Topics     Alcohol use: Yes     Alcohol/week: 1.0 standard drink     Types: 1 Standard drinks or equivalent per week      Wt Readings from Last 1 Encounters:   05/10/22 121.1 kg (267 lb)        Anesthesia Evaluation   Pt has had prior anesthetic. Type: General.    History of anesthetic complications       ROS/MED HX  ENT/Pulmonary:     (+) sleep apnea, moderate, uses CPAP,     Neurologic:  - neg neurologic ROS     Cardiovascular: Comment: 22: Echo  Left ventricular size, wall motion and function are normal. The ejection  fraction is 60-65%.  Normal right ventricle size and systolic function.  No hemodynamically significant valvular abnormalities on 2D or color flow  imaging. The study was technically difficult.    22: Cath    Very high calcium score and abnormal stress test in an obese man with a family history of early coronary disease.    Severe, diffuse coronary calcification.    Moderate distal left main disease.    Severe mid and distal LAD disease with an  occluded apical LAD.    Critical ostial circumflex disease limiting flow into both the large branching OM-1 and OM-3.     of the mid RCA with collateral filling piecmeal of the distal RCA branches.        (+) Dyslipidemia hypertension--CAD --- (-) pacemaker, arrhythmias, irregular heartbeat/palpitations, valvular problems/murmurs, congenital heart disease, pulmonary hypertension, pacemaker and ICD   METS/Exercise Tolerance: >4 METS    Hematologic:  - neg hematologic  ROS     Musculoskeletal:  - neg musculoskeletal ROS     GI/Hepatic:  - neg GI/hepatic ROS     Renal/Genitourinary:  - neg Renal ROS     Endo:     (+) Obesity,  (-) Type II DM   Psychiatric/Substance Use:  - neg psychiatric ROS     Infectious Disease:       Malignancy:   (+) Malignancy, History of Prostate.Prostate CA Remission status post.        Other:  - neg other ROS          Physical Exam    Airway        Mallampati: II   TM distance: > 3 FB   Neck ROM: full     Respiratory Devices and Support         Dental       (+) caps and partials      Cardiovascular          Rhythm and rate: regular and normal     Pulmonary           breath sounds clear to auscultation       Other findings: Short neck    OUTSIDE LABS:  CBC:   Lab Results   Component Value Date    WBC 7.2 05/31/2022    WBC 7.7 04/04/2022    HGB 15.4 05/31/2022    HGB 16.9 04/04/2022    HCT 43.9 05/31/2022    HCT 48.8 04/04/2022     05/31/2022     04/04/2022     BMP:   Lab Results   Component Value Date     05/31/2022     04/04/2022    POTASSIUM 3.7 05/31/2022    POTASSIUM 4.0 04/04/2022    CHLORIDE 102 05/31/2022    CHLORIDE 100 04/04/2022    CO2 27 05/31/2022    CO2 27 04/04/2022    BUN 17 05/31/2022    BUN 16 04/04/2022    CR 0.82 05/31/2022    CR 0.93 04/04/2022     05/31/2022     04/04/2022     COAGS:   Lab Results   Component Value Date    PTT 27 05/31/2022    INR 1.05 05/31/2022     POC: No results found for: BGM, HCG, HCGS  HEPATIC:   Lab  Results   Component Value Date    ALBUMIN 3.9 05/31/2022    PROTTOTAL 7.0 05/31/2022    ALT 31 05/31/2022    AST 13 05/31/2022    ALKPHOS 95 05/31/2022    BILITOTAL 0.7 05/31/2022     OTHER:   Lab Results   Component Value Date    LACT 1.0 06/20/2020    A1C 5.8 (H) 05/31/2022    KEATON 8.7 05/31/2022    MAG 1.8 05/31/2022    CRP 25.6 (H) 06/19/2020       Anesthesia Plan    ASA Status:  3   NPO Status:  NPO Appropriate    Anesthesia Type: General.     - Airway: ETT   Induction: Intravenous, Propofol.   Maintenance: Balanced.   Techniques and Equipment:     - Airway: Video-Laryngoscope     - Lines/Monitors: 2nd IV, Arterial Line, Central Line, PAC, CVP, BIS, NIRS, SHAWN            SHAWN Absolute Contra-indication: NONE            SHAWN Relative Contra-indication: NONE     - Blood: Blood in Room, PRBC     - Drips/Meds: Dexmed. infusion, Ketamine, Phenylephrine, Norepi, Vasopressin, Epinephrine, Nicardipine     Consents    Anesthesia Plan(s) and associated risks, benefits, and realistic alternatives discussed. Questions answered and patient/representative(s) expressed understanding.     - Discussed: Risks, Benefits and Alternatives for BOTH SEDATION and the PROCEDURE were discussed     - Discussed with:  Patient, Spouse      - Extended Intubation/Ventilatory Support Discussed: Yes.      - Patient is DNR/DNI Status: No    Use of blood products discussed: Yes.     - Discussed with: Patient.     - Consented: consented to blood products            Reason for refusal: other.     Postoperative Care    Pain management: IV analgesics, Oral pain medications, Multi-modal analgesia.   PONV prophylaxis: Ondansetron (or other 5HT-3), Dexamethasone or Solumedrol     Comments:    Other Comments: 2 PIV, [place], CVC, SGC.  Dexmedetomidine, Aprotinin; Epi, Norepi, Nicardipine, PE infusions.  Ephedrine, Calcium, Magnesium PRN.  Fast track anesthesia.  UF on CPB.  SHAWN.            Patrick Noguera MD

## 2022-06-03 ENCOUNTER — LAB (OUTPATIENT)
Dept: LAB | Facility: CLINIC | Age: 62
End: 2022-06-03
Payer: COMMERCIAL

## 2022-06-03 DIAGNOSIS — Z11.59 ENCOUNTER FOR SCREENING FOR OTHER VIRAL DISEASES: ICD-10-CM

## 2022-06-03 PROCEDURE — U0003 INFECTIOUS AGENT DETECTION BY NUCLEIC ACID (DNA OR RNA); SEVERE ACUTE RESPIRATORY SYNDROME CORONAVIRUS 2 (SARS-COV-2) (CORONAVIRUS DISEASE [COVID-19]), AMPLIFIED PROBE TECHNIQUE, MAKING USE OF HIGH THROUGHPUT TECHNOLOGIES AS DESCRIBED BY CMS-2020-01-R: HCPCS

## 2022-06-03 PROCEDURE — U0005 INFEC AGEN DETEC AMPLI PROBE: HCPCS

## 2022-06-04 LAB — SARS-COV-2 RNA RESP QL NAA+PROBE: NEGATIVE

## 2022-06-05 ASSESSMENT — ENCOUNTER SYMPTOMS: DYSRHYTHMIAS: 0

## 2022-06-06 ENCOUNTER — APPOINTMENT (OUTPATIENT)
Dept: RADIOLOGY | Facility: HOSPITAL | Age: 62
DRG: 236 | End: 2022-06-06
Attending: PHYSICIAN ASSISTANT
Payer: COMMERCIAL

## 2022-06-06 ENCOUNTER — DOCUMENTATION ONLY (OUTPATIENT)
Dept: OTHER | Facility: CLINIC | Age: 62
End: 2022-06-06
Payer: COMMERCIAL

## 2022-06-06 ENCOUNTER — HOSPITAL ENCOUNTER (INPATIENT)
Facility: HOSPITAL | Age: 62
LOS: 4 days | Discharge: HOME | DRG: 236 | End: 2022-06-10
Attending: THORACIC SURGERY (CARDIOTHORACIC VASCULAR SURGERY) | Admitting: THORACIC SURGERY (CARDIOTHORACIC VASCULAR SURGERY)
Payer: COMMERCIAL

## 2022-06-06 ENCOUNTER — ANESTHESIA (OUTPATIENT)
Dept: SURGERY | Facility: HOSPITAL | Age: 62
DRG: 236 | End: 2022-06-06
Payer: COMMERCIAL

## 2022-06-06 DIAGNOSIS — Z95.1 S/P CABG (CORONARY ARTERY BYPASS GRAFT): Primary | ICD-10-CM

## 2022-06-06 DIAGNOSIS — I25.10 CORONARY ARTERY DISEASE INVOLVING NATIVE CORONARY ARTERY OF NATIVE HEART, UNSPECIFIED WHETHER ANGINA PRESENT: ICD-10-CM

## 2022-06-06 LAB
ALBUMIN SERPL-MCNC: 3.5 G/DL (ref 3.5–5)
ALP SERPL-CCNC: 75 U/L (ref 45–120)
ALT SERPL W P-5'-P-CCNC: 19 U/L (ref 0–45)
ANION GAP SERPL CALCULATED.3IONS-SCNC: 10 MMOL/L (ref 5–18)
ANION GAP SERPL CALCULATED.3IONS-SCNC: 9 MMOL/L (ref 5–18)
APTT PPP: 30 SECONDS (ref 22–38)
APTT PPP: 31 SECONDS (ref 22–38)
AST SERPL W P-5'-P-CCNC: 26 U/L (ref 0–40)
ATRIAL RATE - MUSE: 110 BPM
BASE EXCESS BLDA CALC-SCNC: -0.7 MMOL/L
BASE EXCESS BLDA CALC-SCNC: -1.7 MMOL/L
BASE EXCESS BLDA CALC-SCNC: -2.4 MMOL/L
BASE EXCESS BLDA CALC-SCNC: -2.8 MMOL/L
BASE EXCESS BLDA CALC-SCNC: -3.9 MMOL/L
BASE EXCESS BLDA CALC-SCNC: -4 MMOL/L
BASE EXCESS BLDV CALC-SCNC: 0.3 MMOL/L
BILIRUB SERPL-MCNC: 0.5 MG/DL (ref 0–1)
BLD PROD TYP BPU: NORMAL
BLD PROD TYP BPU: NORMAL
BLOOD COMPONENT TYPE: NORMAL
BLOOD COMPONENT TYPE: NORMAL
BUN SERPL-MCNC: 14 MG/DL (ref 8–22)
BUN SERPL-MCNC: 17 MG/DL (ref 8–22)
CALCIUM SERPL-MCNC: 7.8 MG/DL (ref 8.5–10.5)
CALCIUM SERPL-MCNC: 8.4 MG/DL (ref 8.5–10.5)
CALCIUM, IONIZED MEASURED: 1.09 MMOL/L (ref 1.11–1.3)
CALCIUM, IONIZED MEASURED: 1.1 MMOL/L (ref 1.11–1.3)
CHLORIDE BLD-SCNC: 109 MMOL/L (ref 98–107)
CHLORIDE BLD-SCNC: 110 MMOL/L (ref 98–107)
CO2 SERPL-SCNC: 21 MMOL/L (ref 22–31)
CO2 SERPL-SCNC: 22 MMOL/L (ref 22–31)
CODING SYSTEM: NORMAL
CODING SYSTEM: NORMAL
COHGB MFR BLD: 91.7 % (ref 96–97)
COHGB MFR BLD: 92.9 % (ref 96–97)
COHGB MFR BLD: 93.8 % (ref 96–97)
COHGB MFR BLD: 94.6 % (ref 96–97)
COHGB MFR BLD: 95.9 % (ref 96–97)
COHGB MFR BLD: 96.3 % (ref 96–97)
CREAT SERPL-MCNC: 0.75 MG/DL (ref 0.7–1.3)
CREAT SERPL-MCNC: 0.8 MG/DL (ref 0.7–1.3)
CROSSMATCH: NORMAL
CROSSMATCH: NORMAL
DIASTOLIC BLOOD PRESSURE - MUSE: NORMAL MMHG
ERYTHROCYTE [DISTWIDTH] IN BLOOD BY AUTOMATED COUNT: 13 % (ref 10–15)
ERYTHROCYTE [DISTWIDTH] IN BLOOD BY AUTOMATED COUNT: 13 % (ref 10–15)
FIBRINOGEN PPP-MCNC: 354 MG/DL (ref 170–490)
FLOW: 7 LPM
FLOW: 7 LPM
GFR SERPL CREATININE-BSD FRML MDRD: >90 ML/MIN/1.73M2
GFR SERPL CREATININE-BSD FRML MDRD: >90 ML/MIN/1.73M2
GLUCOSE BLD-MCNC: 167 MG/DL (ref 70–125)
GLUCOSE BLD-MCNC: 168 MG/DL (ref 70–125)
GLUCOSE BLDC GLUCOMTR-MCNC: 147 MG/DL (ref 70–99)
GLUCOSE BLDC GLUCOMTR-MCNC: 148 MG/DL (ref 70–99)
GLUCOSE BLDC GLUCOMTR-MCNC: 150 MG/DL (ref 70–99)
GLUCOSE BLDC GLUCOMTR-MCNC: 155 MG/DL (ref 70–99)
GLUCOSE BLDC GLUCOMTR-MCNC: 158 MG/DL (ref 70–99)
GLUCOSE BLDC GLUCOMTR-MCNC: 160 MG/DL (ref 70–99)
GLUCOSE BLDC GLUCOMTR-MCNC: 164 MG/DL (ref 70–99)
GLUCOSE BLDC GLUCOMTR-MCNC: 165 MG/DL (ref 70–99)
GLUCOSE BLDC GLUCOMTR-MCNC: 166 MG/DL (ref 70–99)
GLUCOSE BLDC GLUCOMTR-MCNC: 179 MG/DL (ref 70–99)
HCO3 BLD-SCNC: 21 MMOL/L (ref 23–29)
HCO3 BLD-SCNC: 21 MMOL/L (ref 23–29)
HCO3 BLD-SCNC: 22 MMOL/L (ref 23–29)
HCO3 BLD-SCNC: 22 MMOL/L (ref 23–29)
HCO3 BLD-SCNC: 23 MMOL/L (ref 23–29)
HCO3 BLD-SCNC: 24 MMOL/L (ref 23–29)
HCO3 BLDV-SCNC: 24 MMOL/L (ref 24–30)
HCT VFR BLD AUTO: 34 % (ref 40–53)
HCT VFR BLD AUTO: 34.5 % (ref 40–53)
HGB BLD-MCNC: 10.3 G/DL (ref 13.3–17.7)
HGB BLD-MCNC: 11.6 G/DL (ref 13.3–17.7)
HGB BLD-MCNC: 11.8 G/DL (ref 13.3–17.7)
INR PPP: 1.28 (ref 0.85–1.15)
INR PPP: 1.39 (ref 0.85–1.15)
INTERPRETATION ECG - MUSE: NORMAL
ION CA PH 7.4: 1.04 MMOL/L (ref 1.11–1.3)
ION CA PH 7.4: 1.05 MMOL/L (ref 1.11–1.3)
ISSUE DATE AND TIME: NORMAL
ISSUE DATE AND TIME: NORMAL
LACTATE SERPL-SCNC: 2.4 MMOL/L (ref 0.7–2)
MAGNESIUM SERPL-MCNC: 2.6 MG/DL (ref 1.8–2.6)
MCH RBC QN AUTO: 28.9 PG (ref 26.5–33)
MCH RBC QN AUTO: 29.1 PG (ref 26.5–33)
MCHC RBC AUTO-ENTMCNC: 34.1 G/DL (ref 31.5–36.5)
MCHC RBC AUTO-ENTMCNC: 34.2 G/DL (ref 31.5–36.5)
MCV RBC AUTO: 85 FL (ref 78–100)
MCV RBC AUTO: 85 FL (ref 78–100)
O2/TOTAL GAS SETTING VFR VENT: 100 %
O2/TOTAL GAS SETTING VFR VENT: 40 %
OXYHGB MFR BLD: 90.3 % (ref 96–97)
OXYHGB MFR BLD: 91.5 % (ref 96–97)
OXYHGB MFR BLD: 92.5 % (ref 96–97)
OXYHGB MFR BLD: 93.8 % (ref 96–97)
OXYHGB MFR BLD: 95.1 % (ref 96–97)
OXYHGB MFR BLD: 95.7 % (ref 96–97)
OXYHGB MFR BLDV: 59.2 % (ref 70–75)
P AXIS - MUSE: -4 DEGREES
PCO2 BLD: 42 MM HG (ref 35–45)
PCO2 BLD: 45 MM HG (ref 35–45)
PCO2 BLD: 45 MM HG (ref 35–45)
PCO2 BLD: 47 MM HG (ref 35–45)
PCO2 BLD: 51 MM HG (ref 35–45)
PCO2 BLD: 51 MM HG (ref 35–45)
PCO2 BLDV: 51 MM HG (ref 35–50)
PEEP: 10 CM H2O
PEEP: 8 CM H2O
PH BLD: 7.27 [PH] (ref 7.37–7.44)
PH BLD: 7.3 [PH] (ref 7.37–7.44)
PH BLD: 7.32 [PH] (ref 7.37–7.44)
PH BLD: 7.33 [PH] (ref 7.37–7.44)
PH BLD: 7.33 [PH] (ref 7.37–7.44)
PH BLD: 7.35 [PH] (ref 7.37–7.44)
PH BLDV: 7.32 [PH] (ref 7.35–7.45)
PH: 7.31 (ref 7.35–7.45)
PH: 7.33 (ref 7.35–7.45)
PHOSPHATE SERPL-MCNC: 3.3 MG/DL (ref 2.5–4.5)
PLATELET # BLD AUTO: 188 10E3/UL (ref 150–450)
PLATELET # BLD AUTO: 247 10E3/UL (ref 150–450)
PO2 BLD: 105 MM HG (ref 80–90)
PO2 BLD: 63 MM HG (ref 80–90)
PO2 BLD: 68 MM HG (ref 80–90)
PO2 BLD: 68 MM HG (ref 80–90)
PO2 BLD: 88 MM HG (ref 80–90)
PO2 BLD: 96 MM HG (ref 80–90)
PO2 BLDV: 32 MM HG (ref 25–47)
POTASSIUM BLD-SCNC: 3.7 MMOL/L (ref 3.5–5)
POTASSIUM BLD-SCNC: 3.7 MMOL/L (ref 3.5–5)
POTASSIUM BLD-SCNC: 3.9 MMOL/L (ref 3.5–5)
PR INTERVAL - MUSE: 160 MS
PROT SERPL-MCNC: 5.5 G/DL (ref 6–8)
QRS DURATION - MUSE: 108 MS
QT - MUSE: 350 MS
QTC - MUSE: 473 MS
R AXIS - MUSE: -16 DEGREES
RATE: 18 RR/MIN
RATE: 20 RR/MIN
RATE: 22 RR/MIN
RATE: 22 RR/MIN
RBC # BLD AUTO: 4.02 10E6/UL (ref 4.4–5.9)
RBC # BLD AUTO: 4.06 10E6/UL (ref 4.4–5.9)
SAO2 % BLDV: 60 % (ref 70–75)
SODIUM SERPL-SCNC: 140 MMOL/L (ref 136–145)
SODIUM SERPL-SCNC: 141 MMOL/L (ref 136–145)
SYSTOLIC BLOOD PRESSURE - MUSE: NORMAL MMHG
T AXIS - MUSE: 30 DEGREES
TEMPERATURE: 37 DEGREES C
UNIT ABO/RH: NORMAL
UNIT ABO/RH: NORMAL
UNIT NUMBER: NORMAL
UNIT NUMBER: NORMAL
UNIT STATUS: NORMAL
UNIT STATUS: NORMAL
UNIT TYPE ISBT: 5100
UNIT TYPE ISBT: 5100
VENTILATION MODE: ABNORMAL
VENTILATOR TIDAL VOLUME: 510 ML
VENTILATOR TIDAL VOLUME: 510 ML
VENTILATOR TIDAL VOLUME: 550 ML
VENTILATOR TIDAL VOLUME: 600 ML
VENTRICULAR RATE- MUSE: 110 BPM
WBC # BLD AUTO: 14.1 10E3/UL (ref 4–11)
WBC # BLD AUTO: 20.3 10E3/UL (ref 4–11)

## 2022-06-06 PROCEDURE — 02HP32Z INSERTION OF MONITORING DEVICE INTO PULMONARY TRUNK, PERCUTANEOUS APPROACH: ICD-10-PCS | Performed by: THORACIC SURGERY (CARDIOTHORACIC VASCULAR SURGERY)

## 2022-06-06 PROCEDURE — 82330 ASSAY OF CALCIUM: CPT | Performed by: PHYSICIAN ASSISTANT

## 2022-06-06 PROCEDURE — 250N000011 HC RX IP 250 OP 636: Performed by: NURSE ANESTHETIST, CERTIFIED REGISTERED

## 2022-06-06 PROCEDURE — 258N000003 HC RX IP 258 OP 636: Performed by: THORACIC SURGERY (CARDIOTHORACIC VASCULAR SURGERY)

## 2022-06-06 PROCEDURE — 250N000013 HC RX MED GY IP 250 OP 250 PS 637: Performed by: PHYSICIAN ASSISTANT

## 2022-06-06 PROCEDURE — 99291 CRITICAL CARE FIRST HOUR: CPT | Mod: FT | Performed by: NURSE PRACTITIONER

## 2022-06-06 PROCEDURE — 5A1221Z PERFORMANCE OF CARDIAC OUTPUT, CONTINUOUS: ICD-10-PCS | Performed by: THORACIC SURGERY (CARDIOTHORACIC VASCULAR SURGERY)

## 2022-06-06 PROCEDURE — 250N000009 HC RX 250: Performed by: THORACIC SURGERY (CARDIOTHORACIC VASCULAR SURGERY)

## 2022-06-06 PROCEDURE — 250N000009 HC RX 250: Performed by: NURSE PRACTITIONER

## 2022-06-06 PROCEDURE — 258N000003 HC RX IP 258 OP 636: Performed by: ANESTHESIOLOGY

## 2022-06-06 PROCEDURE — 272N000004 HC RX 272: Performed by: THORACIC SURGERY (CARDIOTHORACIC VASCULAR SURGERY)

## 2022-06-06 PROCEDURE — 258N000003 HC RX IP 258 OP 636: Performed by: NURSE ANESTHETIST, CERTIFIED REGISTERED

## 2022-06-06 PROCEDURE — 85610 PROTHROMBIN TIME: CPT | Performed by: PHYSICIAN ASSISTANT

## 2022-06-06 PROCEDURE — 250N000011 HC RX IP 250 OP 636: Performed by: ANESTHESIOLOGY

## 2022-06-06 PROCEDURE — 84132 ASSAY OF SERUM POTASSIUM: CPT | Performed by: PHYSICIAN ASSISTANT

## 2022-06-06 PROCEDURE — 250N000011 HC RX IP 250 OP 636: Performed by: PHYSICIAN ASSISTANT

## 2022-06-06 PROCEDURE — 999N000253 HC STATISTIC WEANING TRIALS

## 2022-06-06 PROCEDURE — 35600 OPEN HRV UXTR ART 1 SGM CAB: CPT | Performed by: THORACIC SURGERY (CARDIOTHORACIC VASCULAR SURGERY)

## 2022-06-06 PROCEDURE — 84100 ASSAY OF PHOSPHORUS: CPT | Performed by: PHYSICIAN ASSISTANT

## 2022-06-06 PROCEDURE — 999N000157 HC STATISTIC RCP TIME EA 10 MIN

## 2022-06-06 PROCEDURE — 02100AW BYPASS CORONARY ARTERY, ONE ARTERY FROM AORTA WITH AUTOLOGOUS ARTERIAL TISSUE, OPEN APPROACH: ICD-10-PCS | Performed by: THORACIC SURGERY (CARDIOTHORACIC VASCULAR SURGERY)

## 2022-06-06 PROCEDURE — 85014 HEMATOCRIT: CPT | Performed by: PHYSICIAN ASSISTANT

## 2022-06-06 PROCEDURE — 85018 HEMOGLOBIN: CPT | Performed by: THORACIC SURGERY (CARDIOTHORACIC VASCULAR SURGERY)

## 2022-06-06 PROCEDURE — 410N000003 HC PER-PERFUSION 1ST 30 MIN: Performed by: THORACIC SURGERY (CARDIOTHORACIC VASCULAR SURGERY)

## 2022-06-06 PROCEDURE — 80069 RENAL FUNCTION PANEL: CPT | Performed by: NURSE ANESTHETIST, CERTIFIED REGISTERED

## 2022-06-06 PROCEDURE — 999N000141 HC STATISTIC PRE-PROCEDURE NURSING ASSESSMENT: Performed by: THORACIC SURGERY (CARDIOTHORACIC VASCULAR SURGERY)

## 2022-06-06 PROCEDURE — 250N000011 HC RX IP 250 OP 636: Performed by: THORACIC SURGERY (CARDIOTHORACIC VASCULAR SURGERY)

## 2022-06-06 PROCEDURE — 93010 ELECTROCARDIOGRAM REPORT: CPT | Performed by: INTERNAL MEDICINE

## 2022-06-06 PROCEDURE — 250N000009 HC RX 250: Performed by: SURGERY

## 2022-06-06 PROCEDURE — 33534 CABG ARTERIAL TWO: CPT | Performed by: THORACIC SURGERY (CARDIOTHORACIC VASCULAR SURGERY)

## 2022-06-06 PROCEDURE — P9041 ALBUMIN (HUMAN),5%, 50ML: HCPCS | Performed by: NURSE ANESTHETIST, CERTIFIED REGISTERED

## 2022-06-06 PROCEDURE — 85384 FIBRINOGEN ACTIVITY: CPT | Performed by: NURSE ANESTHETIST, CERTIFIED REGISTERED

## 2022-06-06 PROCEDURE — 4A1239Z MONITORING OF CARDIAC OUTPUT, PERCUTANEOUS APPROACH: ICD-10-PCS | Performed by: THORACIC SURGERY (CARDIOTHORACIC VASCULAR SURGERY)

## 2022-06-06 PROCEDURE — 360N000079 HC SURGERY LEVEL 6, PER MIN: Performed by: THORACIC SURGERY (CARDIOTHORACIC VASCULAR SURGERY)

## 2022-06-06 PROCEDURE — 272N000001 HC OR GENERAL SUPPLY STERILE: Performed by: THORACIC SURGERY (CARDIOTHORACIC VASCULAR SURGERY)

## 2022-06-06 PROCEDURE — 250N000025 HC SEVOFLURANE, PER MIN: Performed by: THORACIC SURGERY (CARDIOTHORACIC VASCULAR SURGERY)

## 2022-06-06 PROCEDURE — 250N000009 HC RX 250: Performed by: PHYSICIAN ASSISTANT

## 2022-06-06 PROCEDURE — P9041 ALBUMIN (HUMAN),5%, 50ML: HCPCS | Performed by: STUDENT IN AN ORGANIZED HEALTH CARE EDUCATION/TRAINING PROGRAM

## 2022-06-06 PROCEDURE — 85730 THROMBOPLASTIN TIME PARTIAL: CPT | Performed by: PHYSICIAN ASSISTANT

## 2022-06-06 PROCEDURE — 83735 ASSAY OF MAGNESIUM: CPT | Performed by: PHYSICIAN ASSISTANT

## 2022-06-06 PROCEDURE — 82805 BLOOD GASES W/O2 SATURATION: CPT | Performed by: NURSE PRACTITIONER

## 2022-06-06 PROCEDURE — 33519 CABG ARTERY-VEIN THREE: CPT | Performed by: THORACIC SURGERY (CARDIOTHORACIC VASCULAR SURGERY)

## 2022-06-06 PROCEDURE — 82805 BLOOD GASES W/O2 SATURATION: CPT | Performed by: THORACIC SURGERY (CARDIOTHORACIC VASCULAR SURGERY)

## 2022-06-06 PROCEDURE — 250N000013 HC RX MED GY IP 250 OP 250 PS 637: Performed by: NURSE ANESTHETIST, CERTIFIED REGISTERED

## 2022-06-06 PROCEDURE — 82805 BLOOD GASES W/O2 SATURATION: CPT | Performed by: SURGERY

## 2022-06-06 PROCEDURE — 94002 VENT MGMT INPAT INIT DAY: CPT

## 2022-06-06 PROCEDURE — C1713 ANCHOR/SCREW BN/BN,TIS/BN: HCPCS | Performed by: THORACIC SURGERY (CARDIOTHORACIC VASCULAR SURGERY)

## 2022-06-06 PROCEDURE — 250N000009 HC RX 250: Performed by: NURSE ANESTHETIST, CERTIFIED REGISTERED

## 2022-06-06 PROCEDURE — 370N000017 HC ANESTHESIA TECHNICAL FEE, PER MIN: Performed by: THORACIC SURGERY (CARDIOTHORACIC VASCULAR SURGERY)

## 2022-06-06 PROCEDURE — 85610 PROTHROMBIN TIME: CPT | Performed by: NURSE ANESTHETIST, CERTIFIED REGISTERED

## 2022-06-06 PROCEDURE — 410N000004: Performed by: THORACIC SURGERY (CARDIOTHORACIC VASCULAR SURGERY)

## 2022-06-06 PROCEDURE — 85730 THROMBOPLASTIN TIME PARTIAL: CPT | Performed by: NURSE ANESTHETIST, CERTIFIED REGISTERED

## 2022-06-06 PROCEDURE — P9041 ALBUMIN (HUMAN),5%, 50ML: HCPCS | Performed by: THORACIC SURGERY (CARDIOTHORACIC VASCULAR SURGERY)

## 2022-06-06 PROCEDURE — 3E043XZ INTRODUCTION OF VASOPRESSOR INTO CENTRAL VEIN, PERCUTANEOUS APPROACH: ICD-10-PCS | Performed by: THORACIC SURGERY (CARDIOTHORACIC VASCULAR SURGERY)

## 2022-06-06 PROCEDURE — P9016 RBC LEUKOCYTES REDUCED: HCPCS | Performed by: THORACIC SURGERY (CARDIOTHORACIC VASCULAR SURGERY)

## 2022-06-06 PROCEDURE — 021209W BYPASS CORONARY ARTERY, THREE ARTERIES FROM AORTA WITH AUTOLOGOUS VENOUS TISSUE, OPEN APPROACH: ICD-10-PCS | Performed by: THORACIC SURGERY (CARDIOTHORACIC VASCULAR SURGERY)

## 2022-06-06 PROCEDURE — 93005 ELECTROCARDIOGRAM TRACING: CPT | Performed by: PHYSICIAN ASSISTANT

## 2022-06-06 PROCEDURE — 4A133B3 MONITORING OF ARTERIAL PRESSURE, PULMONARY, PERCUTANEOUS APPROACH: ICD-10-PCS | Performed by: THORACIC SURGERY (CARDIOTHORACIC VASCULAR SURGERY)

## 2022-06-06 PROCEDURE — C1760 CLOSURE DEV, VASC: HCPCS | Performed by: THORACIC SURGERY (CARDIOTHORACIC VASCULAR SURGERY)

## 2022-06-06 PROCEDURE — 85027 COMPLETE CBC AUTOMATED: CPT | Performed by: NURSE ANESTHETIST, CERTIFIED REGISTERED

## 2022-06-06 PROCEDURE — 83605 ASSAY OF LACTIC ACID: CPT | Performed by: PHYSICIAN ASSISTANT

## 2022-06-06 PROCEDURE — 82330 ASSAY OF CALCIUM: CPT | Performed by: THORACIC SURGERY (CARDIOTHORACIC VASCULAR SURGERY)

## 2022-06-06 PROCEDURE — 250N000011 HC RX IP 250 OP 636: Performed by: STUDENT IN AN ORGANIZED HEALTH CARE EDUCATION/TRAINING PROGRAM

## 2022-06-06 PROCEDURE — 02100Z9 BYPASS CORONARY ARTERY, ONE ARTERY FROM LEFT INTERNAL MAMMARY, OPEN APPROACH: ICD-10-PCS | Performed by: THORACIC SURGERY (CARDIOTHORACIC VASCULAR SURGERY)

## 2022-06-06 PROCEDURE — 71045 X-RAY EXAM CHEST 1 VIEW: CPT

## 2022-06-06 PROCEDURE — 93005 ELECTROCARDIOGRAM TRACING: CPT

## 2022-06-06 PROCEDURE — 06BQ4ZZ EXCISION OF LEFT SAPHENOUS VEIN, PERCUTANEOUS ENDOSCOPIC APPROACH: ICD-10-PCS | Performed by: THORACIC SURGERY (CARDIOTHORACIC VASCULAR SURGERY)

## 2022-06-06 PROCEDURE — 250N000013 HC RX MED GY IP 250 OP 250 PS 637: Performed by: THORACIC SURGERY (CARDIOTHORACIC VASCULAR SURGERY)

## 2022-06-06 PROCEDURE — 80053 COMPREHEN METABOLIC PANEL: CPT | Performed by: NURSE ANESTHETIST, CERTIFIED REGISTERED

## 2022-06-06 PROCEDURE — 200N000001 HC R&B ICU

## 2022-06-06 PROCEDURE — 03BC4ZZ EXCISION OF LEFT RADIAL ARTERY, PERCUTANEOUS ENDOSCOPIC APPROACH: ICD-10-PCS | Performed by: THORACIC SURGERY (CARDIOTHORACIC VASCULAR SURGERY)

## 2022-06-06 PROCEDURE — 250N000012 HC RX MED GY IP 250 OP 636 PS 637: Performed by: THORACIC SURGERY (CARDIOTHORACIC VASCULAR SURGERY)

## 2022-06-06 PROCEDURE — 258N000003 HC RX IP 258 OP 636: Performed by: PHYSICIAN ASSISTANT

## 2022-06-06 PROCEDURE — C1898 LEAD, PMKR, OTHER THAN TRANS: HCPCS | Performed by: THORACIC SURGERY (CARDIOTHORACIC VASCULAR SURGERY)

## 2022-06-06 DEVICE — SS SUTURE, 3 PER SLEEVE
Type: IMPLANTABLE DEVICE | Site: CHEST | Status: FUNCTIONAL
Brand: MYO/WIRE II

## 2022-06-06 DEVICE — SU STERNAL WIRE SINGLE #6 046-032: Type: IMPLANTABLE DEVICE | Site: CHEST | Status: FUNCTIONAL

## 2022-06-06 RX ORDER — HYDROMORPHONE HCL IN WATER/PF 6 MG/30 ML
0.4 PATIENT CONTROLLED ANALGESIA SYRINGE INTRAVENOUS
Status: DISCONTINUED | OUTPATIENT
Start: 2022-06-06 | End: 2022-06-10 | Stop reason: HOSPADM

## 2022-06-06 RX ORDER — NEOSTIGMINE METHYLSULFATE 1 MG/ML
VIAL (ML) INJECTION PRN
Status: DISCONTINUED | OUTPATIENT
Start: 2022-06-06 | End: 2022-06-06

## 2022-06-06 RX ORDER — LIDOCAINE 40 MG/G
CREAM TOPICAL
Status: DISCONTINUED | OUTPATIENT
Start: 2022-06-06 | End: 2022-06-06 | Stop reason: HOSPADM

## 2022-06-06 RX ORDER — HYDROMORPHONE HCL IN WATER/PF 6 MG/30 ML
0.2 PATIENT CONTROLLED ANALGESIA SYRINGE INTRAVENOUS
Status: DISCONTINUED | OUTPATIENT
Start: 2022-06-06 | End: 2022-06-10 | Stop reason: HOSPADM

## 2022-06-06 RX ORDER — EPHEDRINE SULFATE 50 MG/ML
INJECTION, SOLUTION INTRAMUSCULAR; INTRAVENOUS; SUBCUTANEOUS PRN
Status: DISCONTINUED | OUTPATIENT
Start: 2022-06-06 | End: 2022-06-06

## 2022-06-06 RX ORDER — ACETAMINOPHEN 325 MG/1
975 TABLET ORAL EVERY 8 HOURS
Status: COMPLETED | OUTPATIENT
Start: 2022-06-06 | End: 2022-06-09

## 2022-06-06 RX ORDER — HYDRALAZINE HYDROCHLORIDE 20 MG/ML
10 INJECTION INTRAMUSCULAR; INTRAVENOUS EVERY 30 MIN PRN
Status: DISCONTINUED | OUTPATIENT
Start: 2022-06-06 | End: 2022-06-10 | Stop reason: HOSPADM

## 2022-06-06 RX ORDER — ASPIRIN 81 MG/1
162 TABLET, CHEWABLE ORAL
Status: DISCONTINUED | OUTPATIENT
Start: 2022-06-06 | End: 2022-06-06 | Stop reason: HOSPADM

## 2022-06-06 RX ORDER — ASPIRIN 81 MG/1
162 TABLET, CHEWABLE ORAL DAILY
Status: DISCONTINUED | OUTPATIENT
Start: 2022-06-07 | End: 2022-06-10 | Stop reason: HOSPADM

## 2022-06-06 RX ORDER — PAPAVERINE HYDROCHLORIDE 30 MG/ML
INJECTION INTRAMUSCULAR; INTRAVENOUS
Status: DISCONTINUED
Start: 2022-06-06 | End: 2022-06-06 | Stop reason: HOSPADM

## 2022-06-06 RX ORDER — SODIUM CHLORIDE 9 MG/ML
25 INJECTION, SOLUTION INTRAVENOUS CONTINUOUS
Status: DISCONTINUED | OUTPATIENT
Start: 2022-06-06 | End: 2022-06-08

## 2022-06-06 RX ORDER — BISACODYL 10 MG
10 SUPPOSITORY, RECTAL RECTAL DAILY PRN
Status: DISCONTINUED | OUTPATIENT
Start: 2022-06-06 | End: 2022-06-10 | Stop reason: HOSPADM

## 2022-06-06 RX ORDER — CEFAZOLIN SODIUM/WATER 3 G/30 ML
3 SYRINGE (ML) INTRAVENOUS
Status: COMPLETED | OUTPATIENT
Start: 2022-06-06 | End: 2022-06-06

## 2022-06-06 RX ORDER — ALBUMIN, HUMAN INJ 5% 5 %
25 SOLUTION INTRAVENOUS ONCE
Status: COMPLETED | OUTPATIENT
Start: 2022-06-06 | End: 2022-06-06

## 2022-06-06 RX ORDER — KETOROLAC TROMETHAMINE 15 MG/ML
15 INJECTION, SOLUTION INTRAMUSCULAR; INTRAVENOUS EVERY 6 HOURS PRN
Status: DISCONTINUED | OUTPATIENT
Start: 2022-06-06 | End: 2022-06-10 | Stop reason: HOSPADM

## 2022-06-06 RX ORDER — PANTOPRAZOLE SODIUM 40 MG/1
40 TABLET, DELAYED RELEASE ORAL DAILY
Status: DISCONTINUED | OUTPATIENT
Start: 2022-06-06 | End: 2022-06-10 | Stop reason: HOSPADM

## 2022-06-06 RX ORDER — DEXMEDETOMIDINE HYDROCHLORIDE 4 UG/ML
.2-.7 INJECTION, SOLUTION INTRAVENOUS CONTINUOUS
Status: DISCONTINUED | OUTPATIENT
Start: 2022-06-06 | End: 2022-06-07

## 2022-06-06 RX ORDER — AMOXICILLIN 250 MG
1 CAPSULE ORAL 2 TIMES DAILY
Status: DISCONTINUED | OUTPATIENT
Start: 2022-06-06 | End: 2022-06-10 | Stop reason: HOSPADM

## 2022-06-06 RX ORDER — NITROGLYCERIN 10 MG/100ML
INJECTION INTRAVENOUS PRN
Status: DISCONTINUED | OUTPATIENT
Start: 2022-06-06 | End: 2022-06-06

## 2022-06-06 RX ORDER — CEFAZOLIN SODIUM 2 G/100ML
2 INJECTION, SOLUTION INTRAVENOUS EVERY 8 HOURS
Status: COMPLETED | OUTPATIENT
Start: 2022-06-06 | End: 2022-06-07

## 2022-06-06 RX ORDER — CALCIUM GLUCONATE 20 MG/ML
2 INJECTION, SOLUTION INTRAVENOUS
Status: ACTIVE | OUTPATIENT
Start: 2022-06-06 | End: 2022-06-09

## 2022-06-06 RX ORDER — ALBUTEROL SULFATE 90 UG/1
AEROSOL, METERED RESPIRATORY (INHALATION) PRN
Status: DISCONTINUED | OUTPATIENT
Start: 2022-06-06 | End: 2022-06-06

## 2022-06-06 RX ORDER — SODIUM CHLORIDE 9 MG/ML
INJECTION, SOLUTION INTRAVENOUS CONTINUOUS PRN
Status: DISCONTINUED | OUTPATIENT
Start: 2022-06-06 | End: 2022-06-06

## 2022-06-06 RX ORDER — OXYCODONE HYDROCHLORIDE 5 MG/1
5 TABLET ORAL EVERY 4 HOURS PRN
Status: DISCONTINUED | OUTPATIENT
Start: 2022-06-06 | End: 2022-06-10 | Stop reason: HOSPADM

## 2022-06-06 RX ORDER — PHENYLEPHRINE HCL IN 0.9% NACL 50MG/250ML
.1-4 PLASTIC BAG, INJECTION (ML) INTRAVENOUS CONTINUOUS PRN
Status: DISCONTINUED | OUTPATIENT
Start: 2022-06-06 | End: 2022-06-08

## 2022-06-06 RX ORDER — DEXTROSE MONOHYDRATE 25 G/50ML
25-50 INJECTION, SOLUTION INTRAVENOUS
Status: DISCONTINUED | OUTPATIENT
Start: 2022-06-06 | End: 2022-06-10 | Stop reason: HOSPADM

## 2022-06-06 RX ORDER — MAGNESIUM SULFATE 4 G/50ML
4 INJECTION INTRAVENOUS ONCE
Status: COMPLETED | OUTPATIENT
Start: 2022-06-06 | End: 2022-06-06

## 2022-06-06 RX ORDER — POTASSIUM CHLORIDE 29.8 MG/ML
20 INJECTION INTRAVENOUS ONCE
Status: COMPLETED | OUTPATIENT
Start: 2022-06-06 | End: 2022-06-06

## 2022-06-06 RX ORDER — ALBUMIN, HUMAN INJ 5% 5 %
SOLUTION INTRAVENOUS CONTINUOUS PRN
Status: DISCONTINUED | OUTPATIENT
Start: 2022-06-06 | End: 2022-06-06

## 2022-06-06 RX ORDER — SODIUM CHLORIDE, SODIUM LACTATE, POTASSIUM CHLORIDE, CALCIUM CHLORIDE 600; 310; 30; 20 MG/100ML; MG/100ML; MG/100ML; MG/100ML
INJECTION, SOLUTION INTRAVENOUS CONTINUOUS
Status: DISCONTINUED | OUTPATIENT
Start: 2022-06-06 | End: 2022-06-06 | Stop reason: HOSPADM

## 2022-06-06 RX ORDER — DEXMEDETOMIDINE HYDROCHLORIDE 4 UG/ML
0.2-1.2 INJECTION, SOLUTION INTRAVENOUS CONTINUOUS
Status: DISCONTINUED | OUTPATIENT
Start: 2022-06-06 | End: 2022-06-06 | Stop reason: HOSPADM

## 2022-06-06 RX ORDER — NALOXONE HYDROCHLORIDE 0.4 MG/ML
0.4 INJECTION, SOLUTION INTRAMUSCULAR; INTRAVENOUS; SUBCUTANEOUS
Status: DISCONTINUED | OUTPATIENT
Start: 2022-06-06 | End: 2022-06-10 | Stop reason: HOSPADM

## 2022-06-06 RX ORDER — CALCIUM GLUCONATE 20 MG/ML
1 INJECTION, SOLUTION INTRAVENOUS
Status: DISPENSED | OUTPATIENT
Start: 2022-06-06 | End: 2022-06-09

## 2022-06-06 RX ORDER — OXYCODONE HYDROCHLORIDE 5 MG/1
10 TABLET ORAL EVERY 4 HOURS PRN
Status: DISCONTINUED | OUTPATIENT
Start: 2022-06-06 | End: 2022-06-10 | Stop reason: HOSPADM

## 2022-06-06 RX ORDER — CHLORHEXIDINE GLUCONATE ORAL RINSE 1.2 MG/ML
15 SOLUTION DENTAL EVERY 12 HOURS
Status: DISCONTINUED | OUTPATIENT
Start: 2022-06-06 | End: 2022-06-07

## 2022-06-06 RX ORDER — PROPOFOL 10 MG/ML
INJECTION, EMULSION INTRAVENOUS PRN
Status: DISCONTINUED | OUTPATIENT
Start: 2022-06-06 | End: 2022-06-06

## 2022-06-06 RX ORDER — SODIUM CHLORIDE, SODIUM GLUCONATE, SODIUM ACETATE, POTASSIUM CHLORIDE AND MAGNESIUM CHLORIDE 526; 502; 368; 37; 30 MG/100ML; MG/100ML; MG/100ML; MG/100ML; MG/100ML
1000 INJECTION, SOLUTION INTRAVENOUS
Status: DISCONTINUED | OUTPATIENT
Start: 2022-06-06 | End: 2022-06-06

## 2022-06-06 RX ORDER — LIDOCAINE 40 MG/G
CREAM TOPICAL
Status: DISCONTINUED | OUTPATIENT
Start: 2022-06-06 | End: 2022-06-10 | Stop reason: HOSPADM

## 2022-06-06 RX ORDER — CLINDAMYCIN PHOSPHATE 900 MG/50ML
900 INJECTION, SOLUTION INTRAVENOUS EVERY 8 HOURS
Status: DISCONTINUED | OUTPATIENT
Start: 2022-06-06 | End: 2022-06-06

## 2022-06-06 RX ORDER — PHENYLEPHRINE HCL IN 0.9% NACL 50MG/250ML
.1-6 PLASTIC BAG, INJECTION (ML) INTRAVENOUS CONTINUOUS
Status: DISCONTINUED | OUTPATIENT
Start: 2022-06-06 | End: 2022-06-06 | Stop reason: HOSPADM

## 2022-06-06 RX ORDER — ONDANSETRON 4 MG/1
4 TABLET, ORALLY DISINTEGRATING ORAL EVERY 6 HOURS PRN
Status: DISCONTINUED | OUTPATIENT
Start: 2022-06-06 | End: 2022-06-10 | Stop reason: HOSPADM

## 2022-06-06 RX ORDER — HEPARIN SODIUM 5000 [USP'U]/.5ML
5000 INJECTION, SOLUTION INTRAVENOUS; SUBCUTANEOUS EVERY 8 HOURS
Status: DISCONTINUED | OUTPATIENT
Start: 2022-06-07 | End: 2022-06-10 | Stop reason: HOSPADM

## 2022-06-06 RX ORDER — HEPARIN SODIUM 1000 [USP'U]/ML
INJECTION, SOLUTION INTRAVENOUS; SUBCUTANEOUS PRN
Status: DISCONTINUED | OUTPATIENT
Start: 2022-06-06 | End: 2022-06-06

## 2022-06-06 RX ORDER — FENTANYL CITRATE 50 UG/ML
INJECTION, SOLUTION INTRAMUSCULAR; INTRAVENOUS PRN
Status: DISCONTINUED | OUTPATIENT
Start: 2022-06-06 | End: 2022-06-06

## 2022-06-06 RX ORDER — CEFAZOLIN SODIUM/WATER 3 G/30 ML
3 SYRINGE (ML) INTRAVENOUS SEE ADMIN INSTRUCTIONS
Status: DISCONTINUED | OUTPATIENT
Start: 2022-06-06 | End: 2022-06-06 | Stop reason: HOSPADM

## 2022-06-06 RX ORDER — GLYCOPYRROLATE 0.2 MG/ML
INJECTION, SOLUTION INTRAMUSCULAR; INTRAVENOUS PRN
Status: DISCONTINUED | OUTPATIENT
Start: 2022-06-06 | End: 2022-06-06

## 2022-06-06 RX ORDER — CHLORHEXIDINE GLUCONATE ORAL RINSE 1.2 MG/ML
10 SOLUTION DENTAL ONCE
Status: COMPLETED | OUTPATIENT
Start: 2022-06-06 | End: 2022-06-06

## 2022-06-06 RX ORDER — LIDOCAINE HYDROCHLORIDE 20 MG/ML
INJECTION, SOLUTION INFILTRATION; PERINEURAL PRN
Status: DISCONTINUED | OUTPATIENT
Start: 2022-06-06 | End: 2022-06-06

## 2022-06-06 RX ORDER — PROCHLORPERAZINE MALEATE 10 MG
10 TABLET ORAL EVERY 6 HOURS PRN
Status: DISCONTINUED | OUTPATIENT
Start: 2022-06-06 | End: 2022-06-10 | Stop reason: HOSPADM

## 2022-06-06 RX ORDER — NALOXONE HYDROCHLORIDE 0.4 MG/ML
0.2 INJECTION, SOLUTION INTRAMUSCULAR; INTRAVENOUS; SUBCUTANEOUS
Status: DISCONTINUED | OUTPATIENT
Start: 2022-06-06 | End: 2022-06-10 | Stop reason: HOSPADM

## 2022-06-06 RX ORDER — NICOTINE POLACRILEX 4 MG
15-30 LOZENGE BUCCAL
Status: DISCONTINUED | OUTPATIENT
Start: 2022-06-06 | End: 2022-06-10 | Stop reason: HOSPADM

## 2022-06-06 RX ORDER — POLYETHYLENE GLYCOL 3350 17 G/17G
17 POWDER, FOR SOLUTION ORAL DAILY
Status: DISCONTINUED | OUTPATIENT
Start: 2022-06-07 | End: 2022-06-10 | Stop reason: HOSPADM

## 2022-06-06 RX ORDER — ASPIRIN 81 MG/1
81 TABLET, CHEWABLE ORAL
Status: DISCONTINUED | OUTPATIENT
Start: 2022-06-06 | End: 2022-06-06 | Stop reason: HOSPADM

## 2022-06-06 RX ORDER — ACETAMINOPHEN 325 MG/1
650 TABLET ORAL EVERY 4 HOURS PRN
Status: DISCONTINUED | OUTPATIENT
Start: 2022-06-09 | End: 2022-06-10 | Stop reason: HOSPADM

## 2022-06-06 RX ORDER — ATORVASTATIN CALCIUM 40 MG/1
40 TABLET, FILM COATED ORAL DAILY
Status: DISCONTINUED | OUTPATIENT
Start: 2022-06-06 | End: 2022-06-10 | Stop reason: HOSPADM

## 2022-06-06 RX ORDER — FENTANYL CITRATE 50 UG/ML
50-100 INJECTION, SOLUTION INTRAMUSCULAR; INTRAVENOUS
Status: DISCONTINUED | OUTPATIENT
Start: 2022-06-06 | End: 2022-06-07

## 2022-06-06 RX ORDER — METHADONE HYDROCHLORIDE 10 MG/ML
INJECTION, SOLUTION INTRAMUSCULAR; INTRAVENOUS; SUBCUTANEOUS
Status: DISCONTINUED
Start: 2022-06-06 | End: 2022-06-06 | Stop reason: HOSPADM

## 2022-06-06 RX ORDER — ONDANSETRON 2 MG/ML
4 INJECTION INTRAMUSCULAR; INTRAVENOUS EVERY 6 HOURS PRN
Status: DISCONTINUED | OUTPATIENT
Start: 2022-06-06 | End: 2022-06-10 | Stop reason: HOSPADM

## 2022-06-06 RX ORDER — ALBUMIN, HUMAN INJ 5% 5 %
12.5 SOLUTION INTRAVENOUS ONCE
Status: COMPLETED | OUTPATIENT
Start: 2022-06-06 | End: 2022-06-06

## 2022-06-06 RX ORDER — ROPINIROLE 0.25 MG/1
0.5 TABLET, FILM COATED ORAL AT BEDTIME
Status: DISCONTINUED | OUTPATIENT
Start: 2022-06-06 | End: 2022-06-10 | Stop reason: HOSPADM

## 2022-06-06 RX ORDER — METHADONE HYDROCHLORIDE 10 MG/ML
0.3 INJECTION, SOLUTION INTRAMUSCULAR; INTRAVENOUS; SUBCUTANEOUS ONCE
Status: COMPLETED | OUTPATIENT
Start: 2022-06-06 | End: 2022-06-06

## 2022-06-06 RX ORDER — PROTAMINE SULFATE 10 MG/ML
INJECTION, SOLUTION INTRAVENOUS PRN
Status: DISCONTINUED | OUTPATIENT
Start: 2022-06-06 | End: 2022-06-06

## 2022-06-06 RX ORDER — EPINEPHRINE HCL IN 0.9 % NACL 100 MCG/10
SYRINGE (ML) INTRAVENOUS PRN
Status: DISCONTINUED | OUTPATIENT
Start: 2022-06-06 | End: 2022-06-06

## 2022-06-06 RX ORDER — MUPIROCIN 20 MG/G
0.5 OINTMENT TOPICAL 2 TIMES DAILY
Status: DISCONTINUED | OUTPATIENT
Start: 2022-06-06 | End: 2022-06-10 | Stop reason: HOSPADM

## 2022-06-06 RX ADMIN — INSULIN HUMAN 2 UNITS/HR: 1 INJECTION, SOLUTION INTRAVENOUS at 11:12

## 2022-06-06 RX ADMIN — ROCURONIUM BROMIDE 20 MG: 50 INJECTION, SOLUTION INTRAVENOUS at 11:14

## 2022-06-06 RX ADMIN — Medication 5 MG: at 08:16

## 2022-06-06 RX ADMIN — SODIUM CHLORIDE 25 ML/HR: 9 INJECTION, SOLUTION INTRAVENOUS at 14:01

## 2022-06-06 RX ADMIN — NITROGLYCERIN 20 MCG: 10 INJECTION INTRAVENOUS at 12:05

## 2022-06-06 RX ADMIN — HYDROMORPHONE HYDROCHLORIDE 0.2 MG: 0.2 INJECTION, SOLUTION INTRAMUSCULAR; INTRAVENOUS; SUBCUTANEOUS at 19:04

## 2022-06-06 RX ADMIN — PROPOFOL 200 MG: 10 INJECTION, EMULSION INTRAVENOUS at 07:20

## 2022-06-06 RX ADMIN — ALBUMIN HUMAN: 0.05 INJECTION, SOLUTION INTRAVENOUS at 12:23

## 2022-06-06 RX ADMIN — POTASSIUM CHLORIDE 20 MEQ: 400 INJECTION, SOLUTION INTRAVENOUS at 14:52

## 2022-06-06 RX ADMIN — Medication 3 G: at 11:33

## 2022-06-06 RX ADMIN — NITROGLYCERIN 20 MCG: 10 INJECTION INTRAVENOUS at 12:04

## 2022-06-06 RX ADMIN — DEXMEDETOMIDINE HYDROCHLORIDE 0.5 MCG/KG/HR: 400 INJECTION INTRAVENOUS at 07:56

## 2022-06-06 RX ADMIN — HEPARIN SODIUM 40000 UNITS: 1000 INJECTION INTRAVENOUS; SUBCUTANEOUS at 09:11

## 2022-06-06 RX ADMIN — Medication 5 MG: at 08:09

## 2022-06-06 RX ADMIN — MAGNESIUM SULFATE HEPTAHYDRATE 4 G: 4 INJECTION, SOLUTION INTRAVENOUS at 06:50

## 2022-06-06 RX ADMIN — ALBUMIN HUMAN: 0.05 INJECTION, SOLUTION INTRAVENOUS at 12:31

## 2022-06-06 RX ADMIN — SODIUM CHLORIDE, POTASSIUM CHLORIDE, SODIUM LACTATE AND CALCIUM CHLORIDE: 600; 310; 30; 20 INJECTION, SOLUTION INTRAVENOUS at 06:15

## 2022-06-06 RX ADMIN — GLYCOPYRROLATE 1 MG: 0.2 INJECTION, SOLUTION INTRAMUSCULAR; INTRAVENOUS at 13:30

## 2022-06-06 RX ADMIN — SODIUM CHLORIDE, POTASSIUM CHLORIDE, SODIUM LACTATE AND CALCIUM CHLORIDE 250 ML: 600; 310; 30; 20 INJECTION, SOLUTION INTRAVENOUS at 18:49

## 2022-06-06 RX ADMIN — ROCURONIUM BROMIDE 100 MG: 50 INJECTION, SOLUTION INTRAVENOUS at 07:20

## 2022-06-06 RX ADMIN — METOPROLOL TARTRATE 12.5 MG: 25 TABLET, FILM COATED ORAL at 06:00

## 2022-06-06 RX ADMIN — HYDROMORPHONE HYDROCHLORIDE 0.4 MG: 0.2 INJECTION, SOLUTION INTRAMUSCULAR; INTRAVENOUS; SUBCUTANEOUS at 13:46

## 2022-06-06 RX ADMIN — DESMOPRESSIN ACETATE 37.04 MCG: 4 SOLUTION INTRAVENOUS at 12:19

## 2022-06-06 RX ADMIN — NICARDIPINE HYDROCHLORIDE 0.5 MG/HR: 0.2 INJECTION, SOLUTION INTRAVENOUS at 11:30

## 2022-06-06 RX ADMIN — ROPINIROLE HYDROCHLORIDE 0.5 MG: 0.25 TABLET, FILM COATED ORAL at 20:00

## 2022-06-06 RX ADMIN — HYDROMORPHONE HYDROCHLORIDE 0.4 MG: 0.2 INJECTION, SOLUTION INTRAMUSCULAR; INTRAVENOUS; SUBCUTANEOUS at 15:54

## 2022-06-06 RX ADMIN — EPINEPHRINE 10 MCG: 1 INJECTION INTRAMUSCULAR; INTRAVENOUS; SUBCUTANEOUS at 12:09

## 2022-06-06 RX ADMIN — HYDROMORPHONE HYDROCHLORIDE 0.4 MG: 0.2 INJECTION, SOLUTION INTRAMUSCULAR; INTRAVENOUS; SUBCUTANEOUS at 23:13

## 2022-06-06 RX ADMIN — AMINOCAPROIC ACID 1.25 G/HR: 250 INJECTION, SOLUTION INTRAVENOUS at 08:51

## 2022-06-06 RX ADMIN — Medication 5 MG: at 09:13

## 2022-06-06 RX ADMIN — Medication 5 MG: at 08:20

## 2022-06-06 RX ADMIN — NITROGLYCERIN 20 MCG: 10 INJECTION INTRAVENOUS at 12:19

## 2022-06-06 RX ADMIN — SODIUM CHLORIDE: 9 INJECTION, SOLUTION INTRAVENOUS at 09:55

## 2022-06-06 RX ADMIN — HYDROMORPHONE HYDROCHLORIDE 1 MG: 1 INJECTION, SOLUTION INTRAMUSCULAR; INTRAVENOUS; SUBCUTANEOUS at 12:08

## 2022-06-06 RX ADMIN — EPINEPHRINE 10 MCG: 1 INJECTION INTRAMUSCULAR; INTRAVENOUS; SUBCUTANEOUS at 12:02

## 2022-06-06 RX ADMIN — ALBUMIN HUMAN 25 G: 0.05 INJECTION, SOLUTION INTRAVENOUS at 20:17

## 2022-06-06 RX ADMIN — NITROGLYCERIN 20 MCG: 10 INJECTION INTRAVENOUS at 12:15

## 2022-06-06 RX ADMIN — MUPIROCIN 0.5 G: 20 OINTMENT TOPICAL at 19:59

## 2022-06-06 RX ADMIN — NITROGLYCERIN 20 MCG: 10 INJECTION INTRAVENOUS at 12:12

## 2022-06-06 RX ADMIN — EPINEPHRINE 10 MCG: 1 INJECTION INTRAMUSCULAR; INTRAVENOUS; SUBCUTANEOUS at 12:01

## 2022-06-06 RX ADMIN — ROCURONIUM BROMIDE 30 MG: 50 INJECTION, SOLUTION INTRAVENOUS at 10:45

## 2022-06-06 RX ADMIN — LIDOCAINE HYDROCHLORIDE 30 MG: 20 INJECTION, SOLUTION INFILTRATION; PERINEURAL at 07:19

## 2022-06-06 RX ADMIN — FENTANYL CITRATE 100 MCG: 50 INJECTION, SOLUTION INTRAMUSCULAR; INTRAVENOUS at 08:27

## 2022-06-06 RX ADMIN — NEOSTIGMINE METHYLSULFATE 5 MG: 1 INJECTION INTRAVENOUS at 13:30

## 2022-06-06 RX ADMIN — CEFAZOLIN SODIUM 2 G: 2 INJECTION, SOLUTION INTRAVENOUS at 19:47

## 2022-06-06 RX ADMIN — SODIUM CHLORIDE: 9 INJECTION, SOLUTION INTRAVENOUS at 08:00

## 2022-06-06 RX ADMIN — KETOROLAC TROMETHAMINE 15 MG: 15 INJECTION, SOLUTION INTRAMUSCULAR; INTRAVENOUS at 22:21

## 2022-06-06 RX ADMIN — CHLORHEXIDINE GLUCONATE 10 ML: 1.2 SOLUTION ORAL at 06:00

## 2022-06-06 RX ADMIN — ACETAMINOPHEN 975 MG: 325 TABLET ORAL at 19:55

## 2022-06-06 RX ADMIN — Medication 10 MCG: at 12:00

## 2022-06-06 RX ADMIN — ALBUTEROL SULFATE 6 PUFF: 90 AEROSOL, METERED RESPIRATORY (INHALATION) at 12:06

## 2022-06-06 RX ADMIN — FENTANYL CITRATE 100 MCG: 50 INJECTION, SOLUTION INTRAMUSCULAR; INTRAVENOUS at 07:19

## 2022-06-06 RX ADMIN — NITROGLYCERIN 30 MCG: 10 INJECTION INTRAVENOUS at 12:16

## 2022-06-06 RX ADMIN — SODIUM BICARBONATE 50 MEQ: 84 INJECTION, SOLUTION INTRAVENOUS at 14:30

## 2022-06-06 RX ADMIN — CALCIUM GLUCONATE 1 G: 20 INJECTION, SOLUTION INTRAVENOUS at 18:49

## 2022-06-06 RX ADMIN — HYDROMORPHONE HYDROCHLORIDE 0.2 MG: 0.2 INJECTION, SOLUTION INTRAMUSCULAR; INTRAVENOUS; SUBCUTANEOUS at 21:13

## 2022-06-06 RX ADMIN — ATORVASTATIN CALCIUM 40 MG: 40 TABLET, FILM COATED ORAL at 19:57

## 2022-06-06 RX ADMIN — Medication 20 MG: at 07:19

## 2022-06-06 RX ADMIN — Medication 1 UNITS: at 12:13

## 2022-06-06 RX ADMIN — SODIUM BICARBONATE 50 MEQ: 84 INJECTION, SOLUTION INTRAVENOUS at 20:30

## 2022-06-06 RX ADMIN — SENNOSIDES AND DOCUSATE SODIUM 1 TABLET: 8.6; 5 TABLET ORAL at 20:00

## 2022-06-06 RX ADMIN — OXYCODONE HYDROCHLORIDE 5 MG: 5 TABLET ORAL at 20:35

## 2022-06-06 RX ADMIN — SODIUM CHLORIDE, POTASSIUM CHLORIDE, SODIUM LACTATE AND CALCIUM CHLORIDE 250 ML: 600; 310; 30; 20 INJECTION, SOLUTION INTRAVENOUS at 17:18

## 2022-06-06 RX ADMIN — NITROGLYCERIN 30 MCG: 10 INJECTION INTRAVENOUS at 12:06

## 2022-06-06 RX ADMIN — AMINOCAPROIC ACID 7.5 G: 250 INJECTION, SOLUTION INTRAVENOUS at 07:58

## 2022-06-06 RX ADMIN — SODIUM CHLORIDE: 9 INJECTION, SOLUTION INTRAVENOUS at 12:55

## 2022-06-06 RX ADMIN — PROTAMINE SULFATE 300 MG: 10 INJECTION, SOLUTION INTRAVENOUS at 12:17

## 2022-06-06 RX ADMIN — SODIUM CHLORIDE: 9 INJECTION, SOLUTION INTRAVENOUS at 07:45

## 2022-06-06 RX ADMIN — Medication 3 G: at 07:33

## 2022-06-06 RX ADMIN — ROCURONIUM BROMIDE 50 MG: 50 INJECTION, SOLUTION INTRAVENOUS at 08:49

## 2022-06-06 RX ADMIN — EPINEPHRINE 0.04 MCG/KG/MIN: 1 INJECTION INTRAMUSCULAR; INTRAVENOUS; SUBCUTANEOUS at 12:00

## 2022-06-06 RX ADMIN — ALBUMIN HUMAN 12.5 G: 0.05 INJECTION, SOLUTION INTRAVENOUS at 18:14

## 2022-06-06 RX ADMIN — CALCIUM GLUCONATE 1 G: 20 INJECTION, SOLUTION INTRAVENOUS at 14:10

## 2022-06-06 RX ADMIN — DEXMEDETOMIDINE HYDROCHLORIDE 0.5 MCG/KG/HR: 4 INJECTION, SOLUTION INTRAVENOUS at 13:49

## 2022-06-06 RX ADMIN — Medication 10 MCG: at 12:01

## 2022-06-06 ASSESSMENT — ACTIVITIES OF DAILY LIVING (ADL)
ADLS_ACUITY_SCORE: 22
ADLS_ACUITY_SCORE: 20

## 2022-06-06 NOTE — INTERVAL H&P NOTE
"I have reviewed the surgical (or preoperative) H&P that is linked to this encounter, and examined the patient. There are no significant changes    Severe, multi-vessel disease with stable angina. Plan for coronary artery bypass today. Risks, benefits, and alternatives previously discussed.    Mayank Rocha MD  Cardiothoracic Surgery  [phone]      Clinical Conditions Present on Arrival:  Clinically Significant Risk Factors Present on Admission                  # Platelet Defect: home medication list includes an antiplatelet medication  # Obesity: Estimated body mass index is 38.93 kg/m  as calculated from the following:    Height as of this encounter: 1.753 m (5' 9\").    Weight as of this encounter: 119.6 kg (263 lb 9.6 oz).       "

## 2022-06-06 NOTE — PROGRESS NOTES
RT PROGRESS NOTE    VENT DAY# 1    CURRENT SETTINGS:  Mode: CMV/AC  RR: 22  VT: 550  PEEP: 8  FiO2: 100%    PATIENT PARAMETERS:  PIP 27  Pplat:  25  Pmean:  14  Compliance: 26.9    Secretions: NA (not needed)  02 Sats:  97%  BS: diminished, clear    ETT SIZE 8.0 Secured at 21 cm at teeth/gums    Respiratory Medications: none     Last Arterial Blood Gas:  pH Arterial   Date Value Ref Range Status   06/06/2022 7.30 (L) 7.37 - 7.44 Final     pH   Date Value Ref Range Status   06/06/2022 7.31 (L) 7.35 - 7.45 Final     pCO2 Arterial   Date Value Ref Range Status   06/06/2022 51 (H) 35 - 45 mm Hg Final     pO2 Arterial   Date Value Ref Range Status   06/06/2022 96 (H) 80 - 90 mm Hg Final     Bicarbonate Arterial   Date Value Ref Range Status   06/06/2022 23 23 - 29 mmol/L Final     O2 Sat, Arterial   Date Value Ref Range Status   06/06/2022 95.9 (L) 96.0 - 97.0 % Final     Base Excess/Deficit (+/-)   Date Value Ref Range Status   06/06/2022 -1.7   mmol/L Final         NOTE / SHIFT SUMMARY:   Patient remains on ventilator. PEEP dropped from 10 to 8, VT increased from 510 to 550, Rate increased from 18 to 22. No SBTs completed at this time. Will continue to assess and monitor. Replaced silk tape with commercial tube roque w/bite block.  @1750 - Patient FiO2 slowly weaned from 100% to 40% throughout day. SBT started on CPAP/PS 5/5 40%.     Cristiana Langford, RT

## 2022-06-06 NOTE — CONSULTS
PULMONARY / CRITICAL CARE CONSULTATION NOTE    Physician Attestation   I, ANGELA Walls CNP, was present with the  SHIRLENE student who participated in the service and in the documentation of the note.  I have verified the history and personally performed the physical exam and medical decision making.  I agree with the assessment and plan of care as documented in the note.      I personally reviewed vital signs, medications, labs and imaging.    See my separate note.     ANGELA Walls CNP  Date of Service (when I saw the patient): 22      Consultation - Pulmonary/Critical Care Medicine  Gonzalez Morton,  1960, MRN [id number]  Date / Time of Admission:  2022  5:09 AM    Admitting Dx: CAD (coronary artery disease) [I25.10]  Coronary artery disease involving native coronary artery of native heart, unspecified whether angina present [I25.10]    PCP: Dylon Shafer, [phone]  Consulting physician:  Mayank Rocha MD   Code status:  Full Code       Extended Emergency Contact Information  Primary Emergency Contact: GeraldoDeidra Holman  Address: [address] [id number] [facility]  Home Phone: [phone]  Mobile Phone: [phone]  Relation: Spouse  Secondary Emergency Contact: KhloebarbarademetriusNasreen  Address: [address] [place] [address] [facility]  Home Phone: [phone]  Relation: Other       ID: Gonzalez Morton is a 61 year old male with a past medical history of obstructive sleep apnea on CPAP at home, prostate cancer with a radical prostatectomy in 2019, coronary artery disease and hypertension. He arrive to the unit today following a CABG x 5 for his coronary artery disease. He arrives intubated and sedated. Procedure reported as straight forward, excluding while coming off pump, he did have some desaturations requiring higher PEEP and FiO2. Also had labile blood pressures. No blood products were given excluding cell  saver.. EBL reported at 950mL; transfused with 180mL Cell Saver.He did require a small amount of epinephrine post pump run and arrived to ICU on the epinephrine with Cardene also running for left radial graft site. Direct sign out received at the bedside from Dr. LUIS Jay.           ASSESSMENT & PLAN BY SYSTEM   Systems to Assess:     Pulmonary: Post op vent management; obstructive sleep apnea (on CPAP)    Wean supplemental O2 as tolerated; goal O2 sat > 92%.  HOB > 30 degrees to limit aspiration risk.      Check ABG and adjust support as indicated; avoid acidotic state.     Check CXR    Once hemodynamically stable, plan to proceed to wake, wean, and extubate with goal < 6-hours.    Current ventilator setting: A/C  R 18 PEEP 10 FiO2 100%    Per anesthesia patient had an episode of desaturation in the OR requiring 100% and a PEEP of 8, presumably due to atelectasis. Will anticipate a slower wean off the ventilator and PEEP has been increased to recruit alveoli.     Cardiovascular: coronary artery disease, s/p CABG x 5     Cardiac monitoring.     SBP goal < 130 mmHg, MAP goal > 65 mmHg.      Goal CI 2-3     Goal PAD 18    Vasoactive gtts per CV-surgery.Currently on nicardipine at 0.5 (for radial artery graft), epinephrine at 0.04    Temporary pacing wires present; will use in setting of symptomatic arrhythmia. AV wires paced at 80 but patient has had a heart rate above this since admission to the ICU     Neurological: sedation for vent synchrony and comfort.     Neuro checks per ICU protocol.     Sedate with Precedex until ready to wean and extubate.     Pain control: PRN, methadone 20 mg given in OR    Goal RASS 0 to -1     GI/: at risk for postoperative constipation     NPO until extubated and fully awake.     Strong catheter in place for accurate measurement of I/O.     GI prophylaxis:  Omeprazole    Renal: no acute issues     Monitor I/O's.  Electrolyte repletion PRN.  Avoid/limit nephrotoxic agents.      IVFs: per CV-surgery    Heme/Coag/Onc: Acute blood loss anemia; coagulopathy secondary to pump run. Patient has a history of prostate cancer and radical prostatectomy (noted). Upon arrival to the ICU mediastinal chest tubes present x 3 (28F)    Monitor H/H.     Transfuse per CV-surgery.     Monitor chest tube (3 mediastinal) output hourly; notify CV-surgery for excessive output or abrupt stop in output.     DVT prophylaxis:  SubQ heparin    Infectious disease: risk for infection d/t surgical incision     General precautions.     Remove lines and drains once no longer required.     Endocrine: hyperglycemia requiring glucose control in OR     RHI gtt per ICU protocol.     Musculoskeletal: no acute issues       Bedrest; initiate mobility protocol.     Lines: A-line, Day# 1, Central line, Day# 1 [place] Niko, Day# 1      Code Status:  Full    Thank you for this interesting consultation.  Please call if any questions or concerns.    The patient and/or the family were educated about the above plan of care and indicated understanding.      This patient is considered critically ill and requires ICU level of care due to immediate post CV-surgical procedure. High risk for acute hemodynamic collapse and death.     Total Critical Care time, not including separate billable procedure time:  30 minutes.    Soo Paez, RN, Acute Care Nurse Practitioner Student   [facility] Pulmonary/Critical Care      Chief Complaint chief complaint       HPI    Gonzalez RONALD Praveen is a 61 year old male with a past medical history of obstructive sleep apnea on CPAP at home, prostate cancer with a radical prostatectomy in 2019, coronary artery disease and hypertension. He arrive to the unit today following a CABG x 5 for his coronary artery disease. He arrives intubated and sedated. Procedure reported as straight forward, excluding while coming off pump, he did have some desaturations requiring higher PEEP and FiO2. Also had labile blood  pressures. No blood products were given excluding cell saver.. EBL reported at 950mL; transfused with 180mL Cell Saver.He did require a small amount of epinephrine post pump run and arrived to ICU on the epinephrine with Cardene also running for left radial graft site. Direct sign out received at the bedside from Dr. LUIS Jay.        Review of Systems   TAVO: pt intubated and sedated      Active Problem List   Patient Active Problem List    Diagnosis Date Noted     Coronary artery disease involving native coronary artery of native heart, unspecified whether angina present 06/06/2022     Priority: Medium     Obstructive sleep apnea syndrome 05/10/2022     Priority: Medium     Elevated coronary artery calcium score      Priority: Medium     Abnormal cardiovascular stress test 04/03/2022     Priority: Medium     Family history of ischemic heart disease 04/03/2022     Priority: Medium     Morbid obesity (H) 08/20/2021     Priority: Medium     Mixed hyperlipidemia      Priority: Medium     Created by Conversion         History of malignant neoplasm of prostate 09/05/2019     Priority: Medium     Erectile dysfunction following radical prostatectomy 08/29/2019     Priority: Medium     History of prostate cancer 05/29/2019     Priority: Medium     Benign Essential Hypertension      Priority: Medium     Created by Conversion         Rosacea      Priority: Medium     Created by Conversion             Medical/Surgical History   Past Medical History:   Diagnosis Date     CAD (coronary artery disease)      Hypertension      Knee injury     right     Rosacea      Sleep apnea     CPAP     Past Surgical History:   Procedure Laterality Date     APPENDECTOMY       ARTHROSCOPY KNEE Bilateral      CV CORONARY ANGIOGRAM N/A 4/7/2022    Procedure: Coronary Angiogram;  Surgeon: Mery Lyons MD;  Location: [facility] CATH LAB CV     PROSTATECTOMY       TOTAL KNEE ARTHROPLASTY Left      WRIST SURGERY           Allergies   Allergies  "  Allergen Reactions     Penicillins Hives     Tolerated CTX 06/2020, Ancef 6/2022         Medications:  OUTpatient medications   Prior to Admission medications    Medication Sig Start Date End Date Taking? Authorizing Provider   acetaminophen (TYLENOL) 650 MG CR tablet Take 1,300 mg by mouth every 8 hours as needed for pain 5/24/19  Yes Provider, Historical   aspirin 81 MG EC tablet Take 81 mg by mouth daily   Yes Reported, Patient   atorvastatin (LIPITOR) 40 MG tablet TAKE 1 TABLET BY MOUTH EVERY DAY  Patient taking differently: Take 40 mg by mouth daily 4/18/22  Yes Mery Lyons MD   hydrochlorothiazide (HYDRODIURIL) 25 MG tablet [HYDROCHLOROTHIAZIDE (HYDRODIURIL) 25 MG TABLET] TAKE 1 TABLET BY MOUTH EVERY DAY  Patient taking differently: Take 25 mg by mouth daily [HYDROCHLOROTHIAZIDE (HYDRODIURIL) 25 MG TABLET] TAKE 1 TABLET BY MOUTH EVERY DAY 8/20/21  Yes Dylon Shafer NP   ibuprofen (ADVIL/MOTRIN) 200 MG capsule Take 800 mg by mouth every 8 hours as needed for inflammatory pain   Yes Reported, Patient   lisinopril (ZESTRIL) 20 MG tablet [LISINOPRIL (PRINIVIL,ZESTRIL) 20 MG TABLET] TAKE 1 TABLET BY MOUTH EVERY DAY  Patient taking differently: Take 20 mg by mouth daily [LISINOPRIL (PRINIVIL,ZESTRIL) 20 MG TABLET] TAKE 1 TABLET BY MOUTH EVERY DAY 8/20/21  Yes Dylon Shafer NP   rOPINIRole (REQUIP) 0.25 MG tablet Take 2 tablets (0.5 mg) by mouth At Bedtime 3/28/22  Yes Dylon Shafer NP   nitroGLYcerin (NITROSTAT) 0.4 MG sublingual tablet One tablet under the tongue every 5 minutes if needed for chest pain. May repeat every 5 minutes for a maximum of 3 doses in 15 minutes\"  Patient taking differently: Place 0.4 mg under the tongue every 5 minutes as needed for chest pain One tablet under the tongue every 5 minutes if needed for chest pain. May repeat every 5 minutes for a maximum of 3 doses in 15 minutes\" 4/7/22   Mery Lyons MD           Medications:  INpatient medications     acetaminophen  975 " mg Oral Q8H     [START ON 6/7/2022] aspirin  162 mg Oral or NG Tube Daily     atorvastatin  40 mg Oral Daily     ceFAZolin  2 g Intravenous Q8H     [START ON 6/7/2022] heparin ANTICOAGULANT  5,000 Units Subcutaneous Q8H     mupirocin  0.5 g Both Nostrils BID     pantoprazole  40 mg Oral or NG Tube Daily    Or     pantoprazole  40 mg Oral Daily     [START ON 6/7/2022] polyethylene glycol  17 g Oral Daily     rOPINIRole  0.5 mg Oral At Bedtime     senna-docusate  1 tablet Oral BID     sodium chloride (PF)  3 mL Intracatheter Q8H           Family History  Social History     Reviewed  Family History   Problem Relation Age of Onset     Diabetes Mother      Heart Disease Mother      Heart Disease Father      Atrophic kidney Sister      Diabetes Sister      Diabetes Type 1 Brother        Reviewed  Social History     Socioeconomic History     Marital status:      Spouse name: Not on file     Number of children: Not on file     Years of education: Not on file     Highest education level: Not on file   Occupational History     Not on file   Tobacco Use     Smoking status: Never Smoker     Smokeless tobacco: Never Used   Substance and Sexual Activity     Alcohol use: Yes     Alcohol/week: 1.0 standard drink     Types: 1 Standard drinks or equivalent per week     Drug use: Never     Sexual activity: Not on file   Other Topics Concern     Not on file   Social History Narrative     Not on file     Social Determinants of Health     Financial Resource Strain: Not on file   Food Insecurity: Not on file   Transportation Needs: Not on file   Physical Activity: Not on file   Stress: Not on file   Social Connections: Not on file   Intimate Partner Violence: Not on file   Housing Stability: Not on file      Psychosocial Needs  Social History     Social History Narrative     Not on file     Additional psychosocial needs reviewed per nursing assessment.      Physical Exam   VITALS:  /75 (BP Location: Left arm)   Pulse 68    "Temp 98.8  F (37.1  C) (Oral)   Resp 16   Ht 1.753 m (5' 9\")   Wt 119.6 kg (263 lb 9.6 oz)   SpO2 97%   BMI 38.93 kg/m        PHYSICAL EXAM:   GEN: intubated and sedated  HEENT:  OETT in place; PERRL, Normocephalic. Atraumatic.   NECK: Supple.  Right [facility] Niko in place  PULM:  Orally intubated. Lungs diminished bilaterally to auscultation.   CVS:   Tachycardic. No murmurs, rubs, or gallops. No edema noted. Pedal and ulnar pulses 2+.   ABDOMEN:   Soft, non tender. Non distended. Hypoactive bowel sounds.   EXTREMITIES/SKIN:    Left hand dusky in appearance. Good ulnar pulse. Will continue to monitor.   NEURO:  .  Sedated     I&O:      Intake/Output Summary (Last 24 hours) at 6/6/2022 1350  Last data filed at 6/6/2022 1302  Gross per 24 hour   Intake 2679.97 ml   Output 2550 ml   Net 129.97 ml        Pertinent Labs   Lab Results: personally reviewed.     Serum Glucose range:Invalid input(s): POCGLU    No results for input(s): POCPEEP, TEMP, POCRATE, POCFLOW, PSV in the last 04627 hours.    Invalid input(s): PHART, CSA0HZW, PO2ART, OXYHB, [id number], BEARTCALC, 02SAT, VENTTIVOL, CARBOXYHGB, METHGB, 94591  CBC:  Recent Labs   Lab 06/06/22  1333 06/06/22  1025 05/31/22  0823   WBC 14.1* 20.3* 7.2   HGB 11.6* 11.8* 15.4   HCT 34.0* 34.5* 43.9    247 219     Chemistry:  Recent Labs   Lab 06/06/22  1025 05/31/22  0823    136   CO2 22 27   BUN 14 17   ALBUMIN  --  3.9   ALT  --  31   AST  --  13   ALKPHOS  --  95     Coags:  Lab Results   Component Value Date    INR 1.39 (H) 06/06/2022    INR 1.05 05/31/2022    INR 1.23 (H) 06/19/2020     Cardiac Markers:  No results for input(s): CKTOTAL, TROPONINI in the last 168 hours.    Invalid input(s): TROPONINT, CKMBINDEX    Microbiology:  Collected Updated Procedure Result Status    06/03/2022 1008 06/04/2022 1308 Asymptomatic COVID-19 Virus (Coronavirus) by PCR Nose [[id number]]    Swab from Nose    Final result Component Value   SARS CoV2 PCR Negative "   NEGATIVE: SARS-CoV-2 (COVID-19) RNA not detected, presumed negative.          05/31/2022 0816 05/31/2022 1407 Methicillin Resistant Staph Aureus PCR [[id number]]    Swab from Nares, Bilateral    Final result Component Value   MRSA Target DNA Negative   SA Target DNA Positive                 Radiology:  Chest X-Ray:   EXAM: XR CHEST PORT 1 VIEW  LOCATION: [facility]  DATE/TIME: 6/6/2022 1:37 PM     INDICATION: Post Op CVTS Surgery  COMPARISON: 05/31/2022 and older studies.                                                                      IMPRESSION: Poststernotomy changes with mediastinal and left pleural chest tubes.      Endotracheal tube is  2 cm from the [place]anti. Right IJ [place] catheter tip is in the main pulmonary outflow tract.     Superior mediastinum is widened and likely exaggerated due to positioning and body habitus.     Low lung volumes. No pneumothorax. Heart and pulmonary vascularity are normal.

## 2022-06-06 NOTE — CONSULTS
PULMONARY / CRITICAL CARE CONSULTATION NOTE      Consultation - Pulmonary/Critical Care Medicine  Gonzalez Morton,  1960, MRN [id number]  Date / Time of Admission:  2022  5:09 AM    Admitting Dx: CAD (coronary artery disease) [I25.10]  Coronary artery disease involving native coronary artery of native heart, unspecified whether angina present [I25.10]    PCP: Dylon Shafer, [phone]  Consulting physician:  Mayank Rocha MD   Code status:  Full Code       Extended Emergency Contact Information  Primary Emergency Contact: Deidra Orellana  Address: [address] [place] [address] [place]  Home Phone: [phone]  Mobile Phone: [phone]  Relation: Spouse  Secondary Emergency Contact: HalieNasreen  Address: [address] [place] [address] [facility]  Home Phone: [phone]  Relation: Other       ID:  Gonzalez Morton is a 61 year old male with multivessel CAD, htn, ERIN on CPAP. Admitted today for CABG x 5 with Dr. Rocha.         ASSESSMENT & PLAN BY SYSTEM   Systems to Assess:     Pulmonary: Post op vent management; ERIN on CPAP.     Wean supplemental O2 as tolerated; goal O2 sat > 92%.  HOB > 30 degrees to limit aspiration risk.      Check ABG and adjust support as indicated; avoid acidotic state.     Check CXR    Once hemodynamically stable, plan to proceed to wake, wean, and extubate with goal < 6-hours.    Goal extubation by 1900 but may take longer given his higher vent needs post op.     Cardiovascular: Multivessel CAD s/p CABG x 5; hx hld and htn.     Cardiac monitoring.     SBP goal > 90 mmHg, MAP goal > 65 mmHg.      Goal CI 2-3     Goal PAD 18-20    Vasoactive gtts per CV-surgery.     Temporary pacing wires present; will use in setting of symptomatic arrhythmia.     Neurological: sedation for vent synchrony and comfort.      Neuro checks per ICU protocol.     Sedate with precedex until ready to wean and extubate.     Pain  control: PRN    Goal RASS 0 to -1     GI/: no acute issues    NPO until extubated and fully awake.     Strong catheter in place for accurate measurement of I/O.     GI prophylaxis:  Omeprazole    Renal: no acute issues     Monitor I/O's.  Electrolyte repletion PRN.  Avoid/limit nephrotoxic agents.     IVFs: per CV-surgery    Heme/Coag: Acute blood loss anemia; coagulopathy secondary to pump run.     Monitor H/H.     Transfuse per CV-surgery.     Monitor chest tube output hourly; notify CV-surgery for excessive output or abrupt stop in output.     DVT prophylaxis:  SubQ heparin    Infectious disease:     General precautions.     Remove lines and drains once no longer required.     Endocrine: hyperglycemia - stress induced.      RHI gtt per ICU protocol.     Musculoskeletal:     Bedrest; initiate mobility protocol.     Lines: A-line, Day# 1, Central line, Day# 1 or [place], Day# 1      Code Status:  FULL CODE    Thank you for this interesting consultation.  Please call if any questions or concerns.    The patient and/or the family were educated about the above plan of care and indicated understanding.      This patient is considered critically ill and requires ICU level of care due to immediate post CV-surgical procedure. High risk for acute hemodynamic collapse and death.     Total Critical Care time, not including separate billable procedure time:  30 minutes.    Karissa Barger, CNP  [facility] Pulmonary/Critical Care      Chief Complaint chief complaint       HPI    Gonzalez Morton is a 61 year old male with hx htn, hld, ERIN on CPAP. Was being worked up for knee surgery and found to have severe multivessel CAD. Admitted today for CABG x 5 with Dr. Rocha.   Procedure reported as straight forward. 20-mg IV Methadone given; intubation and lines placed without difficulty. No difficulty going on pump. While coming off pump, he did have some desaturations requiring higher PEEP and FiO2. Also had labile  pressures; no bleeding concerns. EBL reported at 950mL; transfused with 180mL Cell Saver. Required a bit of Epi post pump run and arrived to ICU on the same with Cardene also running for left radial graft site.   Direct sign out received at the bedside from Dr. LUIS Jay.          Review of Systems   Review of systems not obtained due to patient factors.     Active Problem List   Patient Active Problem List    Diagnosis Date Noted     Coronary artery disease involving native coronary artery of native heart, unspecified whether angina present 06/06/2022     Priority: Medium     Obstructive sleep apnea syndrome 05/10/2022     Priority: Medium     Elevated coronary artery calcium score      Priority: Medium     Abnormal cardiovascular stress test 04/03/2022     Priority: Medium     Family history of ischemic heart disease 04/03/2022     Priority: Medium     Morbid obesity (H) 08/20/2021     Priority: Medium     Mixed hyperlipidemia      Priority: Medium     Created by Conversion         History of malignant neoplasm of prostate 09/05/2019     Priority: Medium     Erectile dysfunction following radical prostatectomy 08/29/2019     Priority: Medium     History of prostate cancer 05/29/2019     Priority: Medium     Benign Essential Hypertension      Priority: Medium     Created by Conversion         Rosacea      Priority: Medium     Created by Conversion             Medical/Surgical History   Past Medical History:   Diagnosis Date     CAD (coronary artery disease)      Hypertension      Knee injury     right     Rosacea      Sleep apnea     CPAP     Past Surgical History:   Procedure Laterality Date     APPENDECTOMY       ARTHROSCOPY KNEE Bilateral      CV CORONARY ANGIOGRAM N/A 4/7/2022    Procedure: Coronary Angiogram;  Surgeon: Mery Lyons MD;  Location: [facility] CATH LAB CV     PROSTATECTOMY       TOTAL KNEE ARTHROPLASTY Left      WRIST SURGERY           Allergies   Allergies   Allergen Reactions      "Penicillins Hives     Tolerated CTX 06/2020, Ancef 6/2022         Medications:  OUTpatient medications   Prior to Admission medications    Medication Sig Start Date End Date Taking? Authorizing Provider   acetaminophen (TYLENOL) 650 MG CR tablet Take 1,300 mg by mouth every 8 hours as needed for pain 5/24/19  Yes Provider, Historical   aspirin 81 MG EC tablet Take 81 mg by mouth daily   Yes Reported, Patient   atorvastatin (LIPITOR) 40 MG tablet TAKE 1 TABLET BY MOUTH EVERY DAY  Patient taking differently: Take 40 mg by mouth daily 4/18/22  Yes Mery Lyons MD   hydrochlorothiazide (HYDRODIURIL) 25 MG tablet [HYDROCHLOROTHIAZIDE (HYDRODIURIL) 25 MG TABLET] TAKE 1 TABLET BY MOUTH EVERY DAY  Patient taking differently: Take 25 mg by mouth daily [HYDROCHLOROTHIAZIDE (HYDRODIURIL) 25 MG TABLET] TAKE 1 TABLET BY MOUTH EVERY DAY 8/20/21  Yes Dylon Shafer NP   ibuprofen (ADVIL/MOTRIN) 200 MG capsule Take 800 mg by mouth every 8 hours as needed for inflammatory pain   Yes Reported, Patient   lisinopril (ZESTRIL) 20 MG tablet [LISINOPRIL (PRINIVIL,ZESTRIL) 20 MG TABLET] TAKE 1 TABLET BY MOUTH EVERY DAY  Patient taking differently: Take 20 mg by mouth daily [LISINOPRIL (PRINIVIL,ZESTRIL) 20 MG TABLET] TAKE 1 TABLET BY MOUTH EVERY DAY 8/20/21  Yes Dylon Shafer NP   rOPINIRole (REQUIP) 0.25 MG tablet Take 2 tablets (0.5 mg) by mouth At Bedtime 3/28/22  Yes Dylon Shafer NP   nitroGLYcerin (NITROSTAT) 0.4 MG sublingual tablet One tablet under the tongue every 5 minutes if needed for chest pain. May repeat every 5 minutes for a maximum of 3 doses in 15 minutes\"  Patient taking differently: Place 0.4 mg under the tongue every 5 minutes as needed for chest pain One tablet under the tongue every 5 minutes if needed for chest pain. May repeat every 5 minutes for a maximum of 3 doses in 15 minutes\" 4/7/22   Mery Lyons MD           Medications:  INpatient medications     acetaminophen  975 mg Oral Q8H     [START ON " 6/7/2022] aspirin  162 mg Oral or NG Tube Daily     atorvastatin  40 mg Oral Daily     ceFAZolin  2 g Intravenous Q8H     chlorhexidine  15 mL Mouth/Throat Q12H     [START ON 6/7/2022] heparin ANTICOAGULANT  5,000 Units Subcutaneous Q8H     mupirocin  0.5 g Both Nostrils BID     pantoprazole  40 mg Oral or NG Tube Daily    Or     pantoprazole  40 mg Oral Daily     [START ON 6/7/2022] polyethylene glycol  17 g Oral Daily     rOPINIRole  0.5 mg Oral At Bedtime     senna-docusate  1 tablet Oral BID     sodium chloride (PF)  3 mL Intracatheter Q8H           Family History  Social History     Reviewed  Family History   Problem Relation Age of Onset     Diabetes Mother      Heart Disease Mother      Heart Disease Father      Atrophic kidney Sister      Diabetes Sister      Diabetes Type 1 Brother        Reviewed  Social History     Socioeconomic History     Marital status:      Spouse name: Not on file     Number of children: Not on file     Years of education: Not on file     Highest education level: Not on file   Occupational History     Not on file   Tobacco Use     Smoking status: Never Smoker     Smokeless tobacco: Never Used   Substance and Sexual Activity     Alcohol use: Yes     Alcohol/week: 1.0 standard drink     Types: 1 Standard drinks or equivalent per week     Drug use: Never     Sexual activity: Not on file   Other Topics Concern     Not on file   Social History Narrative     Not on file     Social Determinants of Health     Financial Resource Strain: Not on file   Food Insecurity: Not on file   Transportation Needs: Not on file   Physical Activity: Not on file   Stress: Not on file   Social Connections: Not on file   Intimate Partner Violence: Not on file   Housing Stability: Not on file      Psychosocial Needs  Social History     Social History Narrative     Not on file     Additional psychosocial needs reviewed per nursing assessment.      Physical Exam   VITALS:  /75 (BP Location: Left arm)  "  Pulse 68   Temp 98.8  F (37.1  C) (Oral)   Resp 16   Ht 1.753 m (5' 9\")   Wt 119.6 kg (263 lb 9.6 oz)   SpO2 97%   BMI 38.93 kg/m    [unfilled]    PHYSICAL EXAM:   GEN: intubated and sedated  HEENT:  OETT in place  NECK: Supple.  RIJ introducer with PA-C in place.   PULM:  Unlabored on full vent; lungs are diminished, more so on the left. No wheeze.   CVS:   Tachy and regular. S1S2, minimal rub. Chest tubes x 2 in place.   ABDOMEN:   Soft ntnd  EXTREMITIES/SKIN:    Visible skin intact.   NEURO:  .  sedated    I&O:      Intake/Output Summary (Last 24 hours) at 6/6/2022 1405  Last data filed at 6/6/2022 1302  Gross per 24 hour   Intake 2679.97 ml   Output 2550 ml   Net 129.97 ml        Pertinent Labs   Lab Results: personally reviewed.     Serum Glucose range:Invalid input(s): POCGLU    No results for input(s): POCPEEP, TEMP, POCRATE, POCFLOW, PSV in the last 83021 hours.    Invalid input(s): PHART, UZE8DAC, PO2ART, OXYHB, [id number], BEARTCALC, 02SAT, VENTTIVOL, CARBOXYHGB, METHGB, 29511  CBC:  Recent Labs   Lab 06/06/22  1333 06/06/22  1025 05/31/22  0823   WBC 14.1* 20.3* 7.2   HGB 11.6* 11.8* 15.4   HCT 34.0* 34.5* 43.9    247 219     Chemistry:  Recent Labs   Lab 06/06/22  1025 05/31/22  0823    136   CO2 22 27   BUN 14 17   ALBUMIN  --  3.9   ALT  --  31   AST  --  13   ALKPHOS  --  95     Coags:  Lab Results   Component Value Date    INR 1.28 (H) 06/06/2022    INR 1.39 (H) 06/06/2022    INR 1.05 05/31/2022     Cardiac Markers:  No results for input(s): CKTOTAL, TROPONINI in the last 168 hours.    Invalid input(s): TROPONINT, CKMBINDEX    Microbiology:    none       Radiology:    Chest X-Ray: post op chest film read at the bedside.   Rad read as follows:  Poststernotomy changes with mediastinal and left pleural chest tubes.      Endotracheal tube is  2 cm from the ashanti. Right IJ Charenton-Niko catheter tip is in the main pulmonary outflow tract.     Superior mediastinum is widened and " likely exaggerated due to positioning and body habitus.     Low lung volumes. No pneumothorax. Heart and pulmonary vascularity are normal.       Findings discussed by the undersigned with his nurse Michael at 1358. No significant output from his mediastinal chest tube.

## 2022-06-06 NOTE — ANESTHESIA PROCEDURE NOTES
Central Line/PA Catheter Placement    Pre-Procedure   Staff -        Anesthesiologist:  Karissa Jay MD       Performed By: anesthesiologist       Location: OR       Pre-Anesthestic Checklist: patient identified, IV checked, site marked, risks and benefits discussed, informed consent, monitors and equipment checked, pre-op evaluation and at physician/surgeon's request  Timeout:       Correct Patient: Yes        Correct Procedure: Yes        Correct Site: Yes        Correct Position: Yes        Correct Laterality: Yes   Line Placement:   This line was placed Post Induction    Procedure   Procedure: central line and elective       Laterality: right       Insertion Site: internal jugular.       Patient Position: Trendelenburg  Sterile Prep        All elements of maximal sterile barrier technique followed       Patient Prep/Sterile Barriers: draped, hand hygiene, gloves , hat , mask , draped, gown, sterile gel and probe cover       Skin prep: Chloraprep  Insertion/Injection        Technique: ultrasound guided        1. Ultrasound was used to evaluate the access site.       2. Vein evaluated via ultrasound for patency/adequacy.       3. Using real-time ultrasound the needle/catheter was observed entering the artery/vein.       4. Permanent image was captured and entered into the patient's record.       5. The visualized structures were anatomically normal.       6. There were no apparent abnormal pathologic findings.       Introducer Type: 9 Fr, 2-lumen MAC        Type: PA/CVC with Introducer       Catheter Size: 9 Fr       Catheter Length: 15       Number of Lumens: double lumen       PA Catheter Type: CCO         Appropriate RV, RA and PA waveforms noted:  Yes            Withdrawn and Locked at cm: 48  Narrative         Secured by: suture       Tegaderm and Biopatch dressing used.       Complications: None apparent,        blood aspirated from all lumens,        All lumens flushed: Yes       Verification method:  Placement to be verified post-op

## 2022-06-06 NOTE — PLAN OF CARE
Problem: Activity Intolerance (Cardiovascular Surgery)  Goal: Improved Activity Tolerance  Outcome: Ongoing, Progressing     Problem: Bleeding (Cardiovascular Surgery)  Goal: Bleeding (Cardiovascular Surgery)  Outcome: Ongoing, Progressing     Problem: Cardiac Function Impaired (Cardiovascular Surgery)  Goal: Effective Cardiac Function  Outcome: Ongoing, Progressing   Goal Outcome Evaluation:    Pt tolerating repositioning in bed, strong all extremities.  Weaned successfully, gas drawn on wean.  CT output sanguinous and 190 total since OR.  Urine output  per hour.  PRN dilaudid given x2 for ETT discomfort.  Epi and nicardipine continued.  PRN LR given and albumin x2 for soft BP and SVR in 500's.  Sinus tach, pacer not utilized.

## 2022-06-06 NOTE — ANESTHESIA PROCEDURE NOTES
Airway       Patient location during procedure: OR       Procedure Start/Stop Times: 6/6/2022 7:21 AM  Staff -        Anesthesiologist:  Karissa Jay MD       CRNA: Leatha Sam APRN CRNA       Performed By: CRNA and anesthesiologist  Consent for Airway        Urgency: elective  Indications and Patient Condition       Indications for airway management: dakotah-procedural       Induction type:intravenous       Mask difficulty assessment: 2 - vent by mask + OA or adjuvant +/- NMBA (one person mask with OA or two person mask)    Final Airway Details       Final airway type: endotracheal airway       Successful airway: ETT - single  Endotracheal Airway Details        ETT size (mm): 8.0       Cuffed: yes       Successful intubation technique: video laryngoscopy       VL Blade Size: Glidescope 4 (grade 1 view with glidescope)       Grade View of Cords: 1       Adjucts: stylet       Position: Left       Measured from: lips       Secured at (cm): 24       Bite block used: None    Post intubation assessment        Placement verified by: capnometry, equal breath sounds and chest rise        Number of attempts at approach: 1       Number of other approaches attempted: 0       Secured with: silk tape       Ease of procedure: easy       Dentition: Intact    Medication(s) Administered   Medication Administration Time: 6/6/2022 7:21 AM

## 2022-06-06 NOTE — ANESTHESIA POSTPROCEDURE EVALUATION
Patient: Gonzalez Morton    Procedure: Procedure(s):  CORONARY ARTERY BYPASS GRAFT X 5, ENDOSCOPIC VESSEL PROCUREMENT LEFT LEG, INTERNAL MAMMARY ARTERY HARVEST, LEFT RADIAL ARTERY HARVEST, ANESTHESIA TRANSESOPHAGEAL ECHOCARDIOGRAM, EPIAORTIC ULTRASOUND       Anesthesia Type:  General    Note:  Disposition: ICU            ICU Sign Out: Anesthesiologist/ICU physician sign out WAS performed   Postop Pain Control: Uneventful            Sign Out: Well controlled pain   PONV: No   Neuro/Psych: Uneventful            Sign Out: Acceptable/Baseline neuro status   Airway/Respiratory: Uneventful            Sign Out: AIRWAY IN SITU/Resp. Support   CV/Hemodynamics: Uneventful            Sign Out: Acceptable CV status; No obvious hypovolemia; No obvious fluid overload   Other NRE:    DID A NON-ROUTINE EVENT OCCUR?            Last vitals:  Vitals:    06/06/22 0530 06/06/22 0531   BP: 118/77 122/75   Pulse: 68    Resp: 16    Temp: 37.1  C (98.8  F)    SpO2: 94%        Electronically Signed By: Karissa Jay MD  June 6, 2022  1:43 PM

## 2022-06-06 NOTE — ANESTHESIA CARE TRANSFER NOTE
Patient: Gonzalez Morton    Procedure: Procedure(s):  CORONARY ARTERY BYPASS GRAFT X 5, ENDOSCOPIC VESSEL PROCUREMENT LEFT LEG, INTERNAL MAMMARY ARTERY HARVEST, LEFT RADIAL ARTERY HARVEST, ANESTHESIA TRANSESOPHAGEAL ECHOCARDIOGRAM, EPIAORTIC ULTRASOUND       Diagnosis: CAD (coronary artery disease) [I25.10]  Diagnosis Additional Information: No value filed.    Anesthesia Type:   General     Note.vitals        Oropharynx: endotracheal tube in place  Level of Consciousness: unresponsive      Independent Airway: airway patency satisfactory and stable  Dentition: dentition unchanged  Vital Signs Stable: post-procedure vital signs reviewed and stable  Report to RN Given: handoff report given  Patient transferred to: ICU  Comments: Report to RN upon arrival to ICU, patient stable en rout from OR to ICU  ICU Handoff: Call for PAUSE to initiate/utilize ICU HANDOFF, Identified Patient, Identified Responsible Provider, Reviewed the Pertinent Medical History, Discussed Surgical Course, Reviewed Intra-OP Anesthesia Management and Issues during Anesthesia, Set Expectations for Post Procedure Period and Allowed Opportunity for Questions and Acknowledgement of Understanding      Vitals:  Vitals Value Taken Time   BP     Temp     Pulse 114 06/06/22 1333   Resp     SpO2 94 % 06/06/22 1333   Vitals shown include unvalidated device data.    Electronically Signed By: Leatha Sam CRNA, APRN CRNA  June 6, 2022  1:35 PM

## 2022-06-06 NOTE — OP NOTE
"DATE OF SERVICE: 06/06/22      PREOPERATIVE DIAGNOSES:  1. Severe, multivessel coronary artery disease.  2. Stable ischemic heart disease  3. Hypertension  4. Obstructive sleep apnea     POSTOPERATIVE DIAGNOSES:  1. Severe, multivessel coronary artery disease.  2. Stable ischemic heart disease  3. Hypertension  4. Obstructive sleep apnea     PROCEDURE PERFORMED:  1. Coronary artery bypass grafting x 5   A. Radial artery graft to the right posterior descending coronary artery  B. Reversed saphenous vein graft to the first obtuse marginal branch of the left circumflex coronary artery   B. Reversed saphenous vein graft to the second obtuse marginal branch of the left circumflex coronary artery  C. Reversed saphenous vein graft to the first diagonal branch of the left anterior descending coronary artery  D. Pedicled left internal mammary artery to second diagonal branch of the left anterior descending coronary artery  2. Endoscopic vein harvest of the greater saphenous vein from the left lower extremity.  3. Endoscopic radial artery harvest from the left upper extremity  4. Epiaortic ultrasound  5. Transesophageal echocardiogram by anesthesia     SURGEON: Mayank Rocha MD     RESIDENT SURGEON: Gareth Christianson MD.     FIRST ASSISTANT: Tesha Traylor CST/ADRIÁN and Catherine Champion PA-C     ANESTHESIA: General endotracheal anesthesia.     ANESTHESIOLOGIST: Karissa Jay MD     ESTIMATED BLOOD LOSS: 500 mL     SPECIMEN: None.    CARDIOPULMONARY BYPASS TIME: 140\"    AORTIC CROSS CLAMP TIME: 120\"     INDICATIONS FOR PROCEDURE: Mr. Gonzalez Morton is a 61 year old year-old male who presents with stable ischemic heart disease found on preoperative cardiac risk stratification for planned total knee arthroplasty. Coronary angiography demonstrates severe multivessel coronary artery disease. Echocardiography demonstrates preserved left ventricular function and no significant valvular abnormality. The patient understands the risks " and benefits of the procedure and wishes to undergo the operation.     OPERATIVE FINDINGS: Epiaortic ultrasound showed the ascending aorta was normal. His rhythm changed to atrial flutter during cannulation and he was converted successfully with a synchronized cardioversion at 30 Joules. The left internal mammary artery was 2.2 mm in diameter and had excellent, pulsatile flow. The radial artery from the left upper extremity was 4 mm in diameter and suitable for conduit. It was not long enough to reach the distal circumflex/second obtuse marginal coronary artery. The greater saphenous vein from the left lower extremity was 4-5 mm in diameter and suitable for conduit. The right posterior descending coronary artery was 2 mm in diameter and free of disease at the site of anastomosis. The first obtuse marginal branch of the left circumflex coronary artery was 2.2 mm in diameter and free of disease at the site of anastomosis. The second obtuse marginal branch of the left circumflex coronary artery was 1.5 mm in diameter and free of disease at the site of anastomosis. The first diagonal branch of the left anterior descending coronary artery was 2 mm in diameter and free of disease at the site anastomosis. The second diagonal branch of the left anterior descending coronary artery was 2 mm in diameter and free of disease at the site anastomosis. The left anterior descending coronary was severely calcified distal to the second diagonal where it's lesions were located and it was not suitable for grafting because of this and poor outflow to a chronically occluded distal/apical LAD. After reperfusion sinus rhythm resumed. Left ventricular function was preserved after bypass on low-dose inotropic support.     OPERATIVE DESCRIPTION IN DETAIL:    After obtaining informed consent, the patient was brought to the operating room and placed in the supine position on the operating room table. Appropriate lines and devices for monitoring  were placed by the anesthesia team. The patient underwent smooth induction of general anesthesia, and the trachea was intubated orally. Invasive monitoring lines were placed by anesthesia. The patient was prepped and draped, and a timeout was performed to confirm the correct patient identity,as well as the procedure to be performed. A median sternotomy was performed and the left internal mammary artery was harvested in a pedicled fashion. The greater saphenous vein was harvested from the left lower extremity using endoscopic vein harvesting by the physician assistant, Catherine Champion PA-C. The radial artery was harvested in an endoscopic fashion from the left upper extremity with a tourniquet time of 10 minutes.     The patient was given 400 units/Kg of heparin and a final ACT was greater than 480 seconds. Epiaortic ultrasound was performed and the findings were as above. Cannulation of the distal ascending aorta and the right atrium was performed through pursestrings and cardiopulmonary bypass was commenced. Antegrade and retrograde cardioplegia cannulae were placed, and the heart was arrested with 1 liter of cold antegrade blood cardioplegia. Subsequent maintenance doses of 300 mL of cold retrograde blood cardioplegia were given approximately every 20 minutes or after each distal anastomosis. Topical cold saline slush was applied for additional protection.     The following grafts were constructed in end-to-side fashion using running 7-0 Prolene: A radial artery graft was sewn to the proximal right posterior descending coronary artery, a reversed saphenous vein graft was sewn to the mid, second obtuse marginal branch of the left circumflex coronary artery, a reversed saphenous vein graft was sewn to the mid, first obtuse marginal branch of the left circumflex coronary artery, and a reversed saphenous vein graft was sewn to the mid, first diagonal branch of the left anterior descending coronary artery. The pedicled  left internal mammary artery was sewn to the mid second diagonal branch of the left anterior descending coronary artery in end-to-side fashion using running 7-0 Prolene. The proximal anastomoses of the 4 grafts were constructed on the ascending aorta in end-to-side fashion using running 6-0 Prolene under a single crossclamp. The crossclamp was released, and the heart was resuscitated.       All bleeding points were controlled. A bipolar ventricular pacing lead was placed and brought out through a separate stab incision. A bipolar right atrial pacing lead was placed and brought out through a separate stab incision. The patient weaned from cardiopulmonary bypass successfully, was given protamine and decannulated. A 28-Canadian angled chest tube was placed in the left pleural space and brought out through a separate stab incision. Two 28-Canadian chest tubes were placed in the anterior mediastinum and brought out through a separate stab incision. The sternal edges were coated with vancomycin paste and then reapposed with 3 interrupted #6 stainless steel sternal wires in the manubrium, 3 double wires in the body of the sternum and 1 additional #6 stainless steel sternal wire at the lower aspect of the sternum. The muscle, fascia, and skin were closed in layers with absorbable Stratafix suture. Surgical skin glue was applied. All sponge, needle and instrument counts were correct at the end of the case.      Mayank Rocha MD  Cardiothoracic Surgery  Pager [phone]

## 2022-06-06 NOTE — PROGRESS NOTES
Patient was brought ICU and placed on ventilator with settings:     Mode: CMV/AC  RR: 18  FiO2: 100%  VT: 510  PEEP: 10    ETT is 8.0, 21 @ teeth/gums    Will continue to monitor and wean as able.     Cristiana Langford, RT

## 2022-06-06 NOTE — PHARMACY-ADMISSION MEDICATION HISTORY
Pharmacy Note - Admission Medication History    Pertinent Provider Information: None   ______________________________________________________________________    Prior To Admission (PTA) med list completed and updated in EMR.       PTA Med List   Medication Sig Note Last Dose     acetaminophen (TYLENOL) 650 MG CR tablet Take 1,300 mg by mouth every 8 hours as needed for pain 5/31/2022: Normally takes in the evening Past Week     aspirin 81 MG EC tablet Take 81 mg by mouth daily  6/6/2022 at am     atorvastatin (LIPITOR) 40 MG tablet TAKE 1 TABLET BY MOUTH EVERY DAY (Patient taking differently: Take 40 mg by mouth daily)  6/5/2022 at pm     hydrochlorothiazide (HYDRODIURIL) 25 MG tablet [HYDROCHLOROTHIAZIDE (HYDRODIURIL) 25 MG TABLET] TAKE 1 TABLET BY MOUTH EVERY DAY (Patient taking differently: Take 25 mg by mouth daily [HYDROCHLOROTHIAZIDE (HYDRODIURIL) 25 MG TABLET] TAKE 1 TABLET BY MOUTH EVERY DAY)  6/5/2022 at am     ibuprofen (ADVIL/MOTRIN) 200 MG capsule Take 800 mg by mouth every 8 hours as needed for inflammatory pain 5/31/2022: Normally takes in the evening Past Week     lisinopril (ZESTRIL) 20 MG tablet [LISINOPRIL (PRINIVIL,ZESTRIL) 20 MG TABLET] TAKE 1 TABLET BY MOUTH EVERY DAY (Patient taking differently: Take 20 mg by mouth daily [LISINOPRIL (PRINIVIL,ZESTRIL) 20 MG TABLET] TAKE 1 TABLET BY MOUTH EVERY DAY)  6/5/2022 at am     rOPINIRole (REQUIP) 0.25 MG tablet Take 2 tablets (0.5 mg) by mouth At Bedtime  6/5/2022 at pm       Information source(s): Patient and Hospital records    Method of interview communication: in-person    Patient was asked about OTC/herbal products specifically.  PTA med list reflects this.    Based on the pharmacist's assessment, the PTA med list information appears reliable    Allergies were reviewed, assessed, and updated with the patient.      Patient does not use any multi-dose medications prior to admission.     Thank you for the opportunity to participate in the care of this  patient.      Joanne Yoder, [facility]     6/6/2022     6:21 AM

## 2022-06-06 NOTE — PROGRESS NOTES
PROVIDER RESTRAINT FOR NON-VIOLENT BEHAVIOR FACE TO FACE EVALUATION    Patient's Immediate Situation:  Patient demonstrated the following behaviors: recovering from anesthesia; does not respond to redirection.     Patient's Reaction to the intervention:  Does patient understand the reason for restraint/seclusion? Unable to express     Medical Condition:  Is there any evidence of compromise of Skin integrity, Respiratory, Cardiovascular, Musculoskeletal, Hydration?   No    Behavioral Condition:  In consultation with the RN, is there a need to continue this restraint or seclusion? Yes    See Restraint Flowsheet for complete restraint documentation and assessment.    Karissa Barger, CNP  [facility] Pulmonary/Critical Care

## 2022-06-06 NOTE — ANESTHESIA PROCEDURE NOTES
Arterial Line Procedure Note    Pre-Procedure   Staff -        Anesthesiologist:  Karissa Jay MD       Performed By: anesthesiologist       Location: OR       Pre-Anesthestic Checklist: patient identified, IV checked, risks and benefits discussed, informed consent, monitors and equipment checked, pre-op evaluation and at physician/surgeon's request  Timeout:       Correct Patient: Yes        Correct Procedure: Yes        Correct Site: Yes        Correct Position: Yes   Line Placement:   This line was placed Pre Induction starting at 6/6/2022 7:10 AM and ending at 6/6/2022 7:12 AM  Procedure   Procedure: arterial line, new line and elective       Laterality: right       Insertion Site: brachial.  Sterile Prep        Standard elements of sterile barrier followed       Skin prep: Chloraprep  Insertion/Injection        Technique: ultrasound guided        1. Ultrasound was used to evaluate the access site.       2. Artery evaluated via ultrasound for patency/adequacy.       3. Using real-time ultrasound the needle/catheter was observed entering the artery/vein.       4. Permanent image was captured and entered into the patient's record.       5. The visualized structures were anatomically normal.       Catheter Type/Size: 20 G, 12 cm  Narrative         Secured by: suture       Tegaderm and Biopatch dressing used.       Complications: None apparent,        Arterial waveform: Yes        IBP within 10% of NIBP: Yes

## 2022-06-06 NOTE — ANESTHESIA PROCEDURE NOTES
Perioperative SHWAN Procedure Note    Staff -        Anesthesiologist:  Karissa Jay MD       Performed By: anesthesiologist  Preanesthesia Checklist:  Patient identified, IV assessed, risks and benefits discussed, monitors and equipment assessed, procedure being performed at surgeon's request and anesthesia consent obtained.    SHAWN Probe Insertion    Probe Status PRE Insertion: NO obvious damage  Probe type:  Adult 3D  Bite block used:   Soft  Insertion Technique: Easy, no oropharyngeal manipulation  Insertion complications: None obvious  Billing Report:SHAWN report by Anesthesiologist (See Separate Report note)  Probe Status POST Removal: NO obvious damage    SHAWN Report  General Procedure Information  Images for this study have been archived.  Echocardiographic and Doppler Measurements    Valves  Aortic Valve: Annulus normal.  Regurgitation absent.    Mitral Valve: Annulus normal.  Regurgitation absent.    Tricuspid Valve: Annulus normal.  Regurgitation +1.        Aorta: Ascending Aorta: Size normal.    Aortic Arch: Size normal.        Descending Aorta: Size normal.           Left Atrium: Left atrial appendage normal.     Atrial Septum: Intra-atrial septal morphology normal.             Post Intervention Findings  Procedure(s) performed:  CABG.  Regional wall motion:. Surgeon(s) notified of all postintervention findings: Yes.                 Echocardiogram Comments

## 2022-06-07 ENCOUNTER — APPOINTMENT (OUTPATIENT)
Dept: OCCUPATIONAL THERAPY | Facility: HOSPITAL | Age: 62
DRG: 236 | End: 2022-06-07
Attending: THORACIC SURGERY (CARDIOTHORACIC VASCULAR SURGERY)
Payer: COMMERCIAL

## 2022-06-07 ENCOUNTER — TELEPHONE (OUTPATIENT)
Dept: CARDIOLOGY | Facility: CLINIC | Age: 62
End: 2022-06-07
Payer: COMMERCIAL

## 2022-06-07 ENCOUNTER — APPOINTMENT (OUTPATIENT)
Dept: RADIOLOGY | Facility: HOSPITAL | Age: 62
DRG: 236 | End: 2022-06-07
Attending: PHYSICIAN ASSISTANT
Payer: COMMERCIAL

## 2022-06-07 DIAGNOSIS — Z95.1 S/P CABG (CORONARY ARTERY BYPASS GRAFT): Primary | ICD-10-CM

## 2022-06-07 LAB
ALBUMIN SERPL-MCNC: 3.5 G/DL (ref 3.5–5)
ALP SERPL-CCNC: 58 U/L (ref 45–120)
ALT SERPL W P-5'-P-CCNC: 15 U/L (ref 0–45)
ANION GAP SERPL CALCULATED.3IONS-SCNC: 10 MMOL/L (ref 5–18)
AST SERPL W P-5'-P-CCNC: 33 U/L (ref 0–40)
BASE EXCESS BLDV CALC-SCNC: 2.9 MMOL/L
BILIRUB SERPL-MCNC: 0.4 MG/DL (ref 0–1)
BUN SERPL-MCNC: 19 MG/DL (ref 8–22)
CALCIUM SERPL-MCNC: 8 MG/DL (ref 8.5–10.5)
CALCIUM, IONIZED MEASURED: 1.08 MMOL/L (ref 1.11–1.3)
CALCIUM, IONIZED MEASURED: 1.11 MMOL/L (ref 1.11–1.3)
CALCIUM, IONIZED MEASURED: 1.11 MMOL/L (ref 1.11–1.3)
CHLORIDE BLD-SCNC: 107 MMOL/L (ref 98–107)
CO2 SERPL-SCNC: 24 MMOL/L (ref 22–31)
CREAT SERPL-MCNC: 0.96 MG/DL (ref 0.7–1.3)
ERYTHROCYTE [DISTWIDTH] IN BLOOD BY AUTOMATED COUNT: 13.4 % (ref 10–15)
GFR SERPL CREATININE-BSD FRML MDRD: 90 ML/MIN/1.73M2
GLUCOSE BLD-MCNC: 142 MG/DL (ref 70–125)
GLUCOSE BLDC GLUCOMTR-MCNC: 100 MG/DL (ref 70–99)
GLUCOSE BLDC GLUCOMTR-MCNC: 114 MG/DL (ref 70–99)
GLUCOSE BLDC GLUCOMTR-MCNC: 117 MG/DL (ref 70–99)
GLUCOSE BLDC GLUCOMTR-MCNC: 118 MG/DL (ref 70–99)
GLUCOSE BLDC GLUCOMTR-MCNC: 118 MG/DL (ref 70–99)
GLUCOSE BLDC GLUCOMTR-MCNC: 123 MG/DL (ref 70–99)
GLUCOSE BLDC GLUCOMTR-MCNC: 127 MG/DL (ref 70–99)
GLUCOSE BLDC GLUCOMTR-MCNC: 131 MG/DL (ref 70–99)
GLUCOSE BLDC GLUCOMTR-MCNC: 139 MG/DL (ref 70–99)
GLUCOSE BLDC GLUCOMTR-MCNC: 140 MG/DL (ref 70–99)
GLUCOSE BLDC GLUCOMTR-MCNC: 147 MG/DL (ref 70–99)
GLUCOSE BLDC GLUCOMTR-MCNC: 162 MG/DL (ref 70–99)
HCO3 BLDV-SCNC: 26 MMOL/L (ref 24–30)
HCT VFR BLD AUTO: 29 % (ref 40–53)
HGB BLD-MCNC: 10.1 G/DL (ref 13.3–17.7)
ION CA PH 7.4: 1.06 MMOL/L (ref 1.11–1.3)
ION CA PH 7.4: 1.09 MMOL/L (ref 1.11–1.3)
ION CA PH 7.4: 1.09 MMOL/L (ref 1.11–1.3)
MAGNESIUM SERPL-MCNC: 2 MG/DL (ref 1.8–2.6)
MCH RBC QN AUTO: 29.4 PG (ref 26.5–33)
MCHC RBC AUTO-ENTMCNC: 34.8 G/DL (ref 31.5–36.5)
MCV RBC AUTO: 85 FL (ref 78–100)
OXYHGB MFR BLDV: 57.8 % (ref 70–75)
PCO2 BLDV: 52 MM HG (ref 35–50)
PH BLDV: 7.35 [PH] (ref 7.35–7.45)
PH: 7.37 (ref 7.35–7.45)
PH: 7.37 (ref 7.35–7.45)
PH: 7.38 (ref 7.35–7.45)
PHOSPHATE SERPL-MCNC: 3.5 MG/DL (ref 2.5–4.5)
PLATELET # BLD AUTO: 213 10E3/UL (ref 150–450)
PO2 BLDV: 32 MM HG (ref 25–47)
POTASSIUM BLD-SCNC: 3.7 MMOL/L (ref 3.5–5)
POTASSIUM BLD-SCNC: 4 MMOL/L (ref 3.5–5)
PROT SERPL-MCNC: 5.5 G/DL (ref 6–8)
RBC # BLD AUTO: 3.43 10E6/UL (ref 4.4–5.9)
SAO2 % BLDV: 58.9 % (ref 70–75)
SODIUM SERPL-SCNC: 141 MMOL/L (ref 136–145)
WBC # BLD AUTO: 16.1 10E3/UL (ref 4–11)

## 2022-06-07 PROCEDURE — 82330 ASSAY OF CALCIUM: CPT | Performed by: PHYSICIAN ASSISTANT

## 2022-06-07 PROCEDURE — 97535 SELF CARE MNGMENT TRAINING: CPT | Mod: GO

## 2022-06-07 PROCEDURE — 82330 ASSAY OF CALCIUM: CPT | Performed by: THORACIC SURGERY (CARDIOTHORACIC VASCULAR SURGERY)

## 2022-06-07 PROCEDURE — 250N000009 HC RX 250: Performed by: PHYSICIAN ASSISTANT

## 2022-06-07 PROCEDURE — 94799 UNLISTED PULMONARY SVC/PX: CPT

## 2022-06-07 PROCEDURE — 93005 ELECTROCARDIOGRAM TRACING: CPT | Performed by: PHYSICIAN ASSISTANT

## 2022-06-07 PROCEDURE — 84100 ASSAY OF PHOSPHORUS: CPT | Performed by: PHYSICIAN ASSISTANT

## 2022-06-07 PROCEDURE — 200N000001 HC R&B ICU

## 2022-06-07 PROCEDURE — 71045 X-RAY EXAM CHEST 1 VIEW: CPT

## 2022-06-07 PROCEDURE — 83735 ASSAY OF MAGNESIUM: CPT | Performed by: PHYSICIAN ASSISTANT

## 2022-06-07 PROCEDURE — 999N000157 HC STATISTIC RCP TIME EA 10 MIN

## 2022-06-07 PROCEDURE — 97166 OT EVAL MOD COMPLEX 45 MIN: CPT | Mod: GO

## 2022-06-07 PROCEDURE — 250N000013 HC RX MED GY IP 250 OP 250 PS 637: Performed by: THORACIC SURGERY (CARDIOTHORACIC VASCULAR SURGERY)

## 2022-06-07 PROCEDURE — 82805 BLOOD GASES W/O2 SATURATION: CPT | Performed by: THORACIC SURGERY (CARDIOTHORACIC VASCULAR SURGERY)

## 2022-06-07 PROCEDURE — 250N000013 HC RX MED GY IP 250 OP 250 PS 637: Performed by: PHYSICIAN ASSISTANT

## 2022-06-07 PROCEDURE — 82040 ASSAY OF SERUM ALBUMIN: CPT | Performed by: PHYSICIAN ASSISTANT

## 2022-06-07 PROCEDURE — 84132 ASSAY OF SERUM POTASSIUM: CPT | Performed by: THORACIC SURGERY (CARDIOTHORACIC VASCULAR SURGERY)

## 2022-06-07 PROCEDURE — 250N000011 HC RX IP 250 OP 636: Performed by: THORACIC SURGERY (CARDIOTHORACIC VASCULAR SURGERY)

## 2022-06-07 PROCEDURE — 250N000011 HC RX IP 250 OP 636: Performed by: PHYSICIAN ASSISTANT

## 2022-06-07 PROCEDURE — 85027 COMPLETE CBC AUTOMATED: CPT | Performed by: PHYSICIAN ASSISTANT

## 2022-06-07 PROCEDURE — G0463 HOSPITAL OUTPT CLINIC VISIT: HCPCS

## 2022-06-07 PROCEDURE — 97110 THERAPEUTIC EXERCISES: CPT | Mod: GO

## 2022-06-07 PROCEDURE — 250N000012 HC RX MED GY IP 250 OP 636 PS 637: Performed by: PHYSICIAN ASSISTANT

## 2022-06-07 PROCEDURE — 80053 COMPREHEN METABOLIC PANEL: CPT | Performed by: PHYSICIAN ASSISTANT

## 2022-06-07 RX ORDER — MAGNESIUM SULFATE HEPTAHYDRATE 40 MG/ML
2 INJECTION, SOLUTION INTRAVENOUS ONCE
Status: COMPLETED | OUTPATIENT
Start: 2022-06-07 | End: 2022-06-07

## 2022-06-07 RX ORDER — FUROSEMIDE 10 MG/ML
40 INJECTION INTRAMUSCULAR; INTRAVENOUS ONCE
Status: COMPLETED | OUTPATIENT
Start: 2022-06-07 | End: 2022-06-07

## 2022-06-07 RX ORDER — POTASSIUM CHLORIDE 1500 MG/1
20 TABLET, EXTENDED RELEASE ORAL ONCE
Status: COMPLETED | OUTPATIENT
Start: 2022-06-07 | End: 2022-06-07

## 2022-06-07 RX ORDER — AMLODIPINE BESYLATE 2.5 MG/1
2.5 TABLET ORAL DAILY
Status: DISCONTINUED | OUTPATIENT
Start: 2022-06-07 | End: 2022-06-10 | Stop reason: HOSPADM

## 2022-06-07 RX ADMIN — OXYCODONE HYDROCHLORIDE 5 MG: 5 TABLET ORAL at 01:07

## 2022-06-07 RX ADMIN — Medication 1.2 MCG/KG/MIN: at 04:06

## 2022-06-07 RX ADMIN — CEFAZOLIN SODIUM 2 G: 2 INJECTION, SOLUTION INTRAVENOUS at 11:27

## 2022-06-07 RX ADMIN — CEFAZOLIN SODIUM 2 G: 2 INJECTION, SOLUTION INTRAVENOUS at 03:18

## 2022-06-07 RX ADMIN — ATORVASTATIN CALCIUM 40 MG: 40 TABLET, FILM COATED ORAL at 08:07

## 2022-06-07 RX ADMIN — Medication 0.4 MCG/KG/MIN: at 15:40

## 2022-06-07 RX ADMIN — ACETAMINOPHEN 975 MG: 325 TABLET ORAL at 11:27

## 2022-06-07 RX ADMIN — OXYCODONE HYDROCHLORIDE 10 MG: 5 TABLET ORAL at 16:43

## 2022-06-07 RX ADMIN — ASPIRIN 81 MG CHEWABLE TABLET 162 MG: 81 TABLET CHEWABLE at 08:06

## 2022-06-07 RX ADMIN — MAGNESIUM SULFATE IN WATER 2 G: 40 INJECTION, SOLUTION INTRAVENOUS at 03:18

## 2022-06-07 RX ADMIN — HYDROMORPHONE HYDROCHLORIDE 0.4 MG: 0.2 INJECTION, SOLUTION INTRAMUSCULAR; INTRAVENOUS; SUBCUTANEOUS at 03:06

## 2022-06-07 RX ADMIN — SENNOSIDES AND DOCUSATE SODIUM 1 TABLET: 8.6; 5 TABLET ORAL at 20:41

## 2022-06-07 RX ADMIN — HYDROMORPHONE HYDROCHLORIDE 0.4 MG: 0.2 INJECTION, SOLUTION INTRAMUSCULAR; INTRAVENOUS; SUBCUTANEOUS at 06:11

## 2022-06-07 RX ADMIN — POTASSIUM CHLORIDE 20 MEQ: 1500 TABLET, EXTENDED RELEASE ORAL at 03:15

## 2022-06-07 RX ADMIN — PANTOPRAZOLE SODIUM 40 MG: 40 TABLET, DELAYED RELEASE ORAL at 08:07

## 2022-06-07 RX ADMIN — AMLODIPINE BESYLATE 2.5 MG: 2.5 TABLET ORAL at 08:07

## 2022-06-07 RX ADMIN — POLYETHYLENE GLYCOL 3350 17 GRAM ORAL POWDER PACKET 17 G: at 08:07

## 2022-06-07 RX ADMIN — OXYCODONE HYDROCHLORIDE 10 MG: 5 TABLET ORAL at 12:22

## 2022-06-07 RX ADMIN — ACETAMINOPHEN 975 MG: 325 TABLET ORAL at 20:40

## 2022-06-07 RX ADMIN — MUPIROCIN 0.5 G: 20 OINTMENT TOPICAL at 20:41

## 2022-06-07 RX ADMIN — CALCIUM GLUCONATE 1 G: 20 INJECTION, SOLUTION INTRAVENOUS at 00:55

## 2022-06-07 RX ADMIN — OXYCODONE HYDROCHLORIDE 10 MG: 5 TABLET ORAL at 20:40

## 2022-06-07 RX ADMIN — OXYCODONE HYDROCHLORIDE 10 MG: 5 TABLET ORAL at 08:18

## 2022-06-07 RX ADMIN — HEPARIN SODIUM 5000 UNITS: 10000 INJECTION, SOLUTION INTRAVENOUS; SUBCUTANEOUS at 11:27

## 2022-06-07 RX ADMIN — SENNOSIDES AND DOCUSATE SODIUM 1 TABLET: 8.6; 5 TABLET ORAL at 08:06

## 2022-06-07 RX ADMIN — HEPARIN SODIUM 5000 UNITS: 10000 INJECTION, SOLUTION INTRAVENOUS; SUBCUTANEOUS at 20:40

## 2022-06-07 RX ADMIN — FUROSEMIDE 40 MG: 10 INJECTION, SOLUTION INTRAMUSCULAR; INTRAVENOUS at 12:21

## 2022-06-07 RX ADMIN — INSULIN GLARGINE 20 UNITS: 100 INJECTION, SOLUTION SUBCUTANEOUS at 08:07

## 2022-06-07 RX ADMIN — CALCIUM GLUCONATE 1 G: 20 INJECTION, SOLUTION INTRAVENOUS at 05:37

## 2022-06-07 RX ADMIN — ACETAMINOPHEN 975 MG: 325 TABLET ORAL at 03:14

## 2022-06-07 RX ADMIN — KETOROLAC TROMETHAMINE 15 MG: 15 INJECTION, SOLUTION INTRAMUSCULAR; INTRAVENOUS at 06:25

## 2022-06-07 RX ADMIN — INSULIN ASPART 1 UNITS: 100 INJECTION, SOLUTION INTRAVENOUS; SUBCUTANEOUS at 11:42

## 2022-06-07 RX ADMIN — ROPINIROLE HYDROCHLORIDE 0.5 MG: 0.25 TABLET, FILM COATED ORAL at 20:42

## 2022-06-07 RX ADMIN — MUPIROCIN 0.5 G: 20 OINTMENT TOPICAL at 08:07

## 2022-06-07 ASSESSMENT — ACTIVITIES OF DAILY LIVING (ADL)
ADLS_ACUITY_SCORE: 30
ADLS_ACUITY_SCORE: 31
ADLS_ACUITY_SCORE: 30
ADLS_ACUITY_SCORE: 31
ADLS_ACUITY_SCORE: 30
ADLS_ACUITY_SCORE: 30
DEPENDENT_IADLS:: INDEPENDENT
ADLS_ACUITY_SCORE: 31
ADLS_ACUITY_SCORE: 30

## 2022-06-07 NOTE — PROGRESS NOTES
RCAT Treatment Plan    Patient Score: 9  Patient Acuity: 4    Clinical Indication for Therapy: prevent atelectasis    Therapy Ordered: Flutter valve QID, IS Q1 W/A    Assessment Summary: RCAT done per protocol. BS diminished, RR 20, 3L NC sats 94%, IS =1000, flutter valve done as scheduled. Encourage pt. to take deep breaths and cough, titrate oxygen, RCAT re-eval 6/8     Marcelle Okeefe, RT  6/7/2022

## 2022-06-07 NOTE — PLAN OF CARE
Problem: Respiratory Compromise (Cardiovascular Surgery)  Goal: Effective Oxygenation and Ventilation (Cardiovascular Surgery)  Outcome: Ongoing, Progressing  Intervention: Promote Airway Secretion Clearance  Intervention: Optimize Oxygenation and Ventilation   Pt on 3L and on home CPAP overnight, 3L via NC during the day. Pt reports difficulty with taking deep breaths. Clear diminished lung sounds. Using IS and acapella, IS up to 1250.    Problem: Postoperative Urinary Retention (Cardiovascular Surgery)  Goal: Effective Urinary Elimination (Cardiovascular Surgery)  Outcome: Ongoing, Progressing   Strong in place  out q2h.    Problem: Pain (Cardiovascular Surgery)  Goal: Acceptable Pain Control  Outcome: Ongoing, Progressing  Intervention: Prevent or Manage Pain   Pain somewhat controlled with oxy, dilaudid, tylenol, and toradol. Pain in chest described as soreness and pressure. Pain much is worse with movement.    Problem: Infection (Cardiovascular Surgery)  Goal: Absence of Infection Signs and Symptoms  Outcome: Ongoing, Progressing  Intervention: Prevent or Manage Infection   Pt receiving cefazolin.    Problem: Glycemic Control Impaired (Cardiovascular Surgery)  Goal: Blood Glucose Level Within Targeted Range (Cardiovascular Surgery)  Outcome: Ongoing, Progressing   Pt on insulin gtt, currently infusing at 1.5, algorithm 3.    Problem: Fluid and Electrolyte Imbalance (Cardiovascular Surgery)  Goal: Fluid and Electrolyte Balance (Cardiovascular Surgery)  Outcome: Ongoing, Progressing  Intervention: Monitor and Manage Fluid and Electrolyte Balance   Pt received 750 of albumin and 750 LR total. K 3.7, gave 20 mEq oral and recheck tomorrow AM. Mg 2.0, 2g IV given, recheck tomorrow AM. Phos 3.5, recheck tomorrow AM. Ionized calcium given x2 last night, recheck at 1030.    Problem: Cardiac Function Impaired (Cardiovascular Surgery)  Goal: Effective Cardiac Function  Outcome: Ongoing, Progressing   Pt originally  on epi, transitioned off epi and onto ar due to low SVR and high CI/CO. Ar was up to 1.3, now infusing at 0.7. Nicardipine at low constant rate of 0.25 for radial graft. Noticed that PM was causing ectopy so pt has been unplugged from PM and is currently in NSR with HR in 60-70s. CVP and PAP WNL. SVR has been low all night.    Problem: Bleeding (Cardiovascular Surgery)  Goal: Bleeding (Cardiovascular Surgery)  Outcome: Ongoing, Progressing  Intervention: Monitor and Manage Bleeding   2 mediastinal and 1 left pleural chest tubes in 1 atrium. Output 30-80 q2h, sanguinous output.    Problem: Activity Intolerance (Cardiovascular Surgery)  Goal: Improved Activity Tolerance  Outcome: Ongoing, Progressing  Intervention: Optimize Tolerance for Activity   Pt dangled and stood around 2230 and tolerated well besides severe pain; no SOB, dizziness, lightheadedness, or nausea. Pt strength is great.    Pt stood and got up to the chair this AM, complained of significant pain, some SOB, and mild lightheadedness. BP noted to have dropped. Pt was still able to make it onto the scale and into the chair. Pain still significant, however, no longer SOB or lightheaded.

## 2022-06-07 NOTE — PROGRESS NOTES
06/07/22 0900   Quick Adds   Type of Visit Initial Occupational Therapy Evaluation   Living Environment   People in Home spouse   Current Living Arrangements house   Home Accessibility stairs to enter home;other (see comments)  (able to stay on main level, only goes downstairs to get his haircut)   Number of Stairs, Main Entrance 3   Self-Care   Usual Activity Tolerance good   Current Activity Tolerance poor   Regular Exercise No   Fall history within last six months no   Activity/Exercise/Self-Care Comment independent self cares   Instrumental Activities of Daily Living (IADL)   Previous Responsibilities work;yardwork;driving  (active job)   General Information   Onset of Illness/Injury or Date of Surgery 06/06/22   Existing Precautions/Restrictions sternal;cardiac   Left Upper Extremity (Weight-bearing Status) non weight-bearing (NWB)   Right Upper Extremity (Weight-bearing Status) non weight-bearing (NWB)   Heart Disease Risk Factors Family history;Overweight   General Observations and Info Needs R knee surgery, already had L done, SOB with yardwork at home   Cognitive Status Examination   Orientation Status orientation to person, place and time   Transfers   Transfers sit-stand transfer   Sit-Stand Transfer   Sit-Stand [place] (Transfers) minimum assist (75% patient effort);2 person assist   Balance   Balance Assessment no deficits were identified   Clinical Impression   Criteria for Skilled Therapeutic Interventions Met (OT) Yes, treatment indicated   OT Diagnosis decreased functional endurance due to blockage with CABG   OT Problem List-Impairments impacting ADL activity tolerance impaired;post-surgical precautions;pain;strength;mobility   Assessment of Occupational Performance 3-5 Performance Deficits   Planned Therapy Interventions (OT) ADL retraining;bed mobility training;progressive activity/exercise;risk factor education;home program guidelines;transfer training;strengthening   Clinical Decision  Making Complexity (OT) moderate complexity   Risk & Benefits of therapy have been explained evaluation/treatment results reviewed;patient   Clinical Impression Comments Pt. demnstrates capacity to perform transfers and standing as allowed. Recommend home with outpt. cardiac rehab   OT Discharge Planning   OT Discharge Recommendation (DC Rec) (S)  home with outpatient cardiac rehab   Total Evaluation Time (Minutes)   Total Evaluation Time (Minutes) 20   OT Goals   Therapy Frequency (OT) 2 times/day   OT Predicted Duration/Target Date for Goal Attainment 06/14/22   OT Goals Cardiac Phase 1   OT: Understanding of cardiac education to maximize quality of life, condition management, and health outcomes Patient;Caregiver   OT: Perform aerobic activity with stable cardiovascular response 10 minutes;intermittent   OT: Functional/aerobic ambulation tolerance with stable cardiovascular response in order to return to home and community environment Modified independent;Greater than 300 feet   OT: Navigation of stairs simulating home set up with stable cardiovascular response in order to return to home and community environment Modified independent;4 stairs

## 2022-06-07 NOTE — CONSULTS
CLINICAL NUTRITION SERVICES - EDUCATION NOTE    Received diet education consult for Heart Healthy diet after CV surgery      NUTRITION HISTORY:  Pt s/p CABG 6/6/22  Pt states that he has changed his diet to be more heart healthy in recent months and he has lost weight.  Pt has given up soda, he rarely eats red meat, eats a variety of vegetables and fruit at meals.  He occasionally eats bread.  ?  NUTRITION DIAGNOSIS:  Food- and nutrition-related knowledge deficit related to heart disease as evidenced by need for therapeutic diet      INTERVENTIONS:  Provided brief instruction on Heart Healthy Diet.      Provided  the following handouts: Heart Healthy Label Reading Tips, Heart Healthy Nutrition Therapy, 20 ways to increase vegetable and fruit intake.    Goals:  Patient demonstrates knowledge of the diet.    Follow Up:  Patient to ask any further nutrition-related questions before discharge. Further diet ed available in the outpatient cardiac rehab program.

## 2022-06-07 NOTE — PLAN OF CARE
Problem: Respiratory Compromise (Cardiovascular Surgery)  Goal: Effective Oxygenation and Ventilation (Cardiovascular Surgery)  Outcome: Ongoing, Progressing  Intervention: Promote Airway Secretion Clearance  Recent Flowsheet Documentation  Taken 6/7/2022 0832 by Marcelle Okeefe, RT  Administration (IS): instruction provided, initial  Level Incentive Spirometer (mL): 1000  Cough And Deep Breathing: done independently per patient  Number of Repetitions (IS): 8  Patient Tolerance (IS): good    Marcelle Okeefe, RT

## 2022-06-07 NOTE — PROGRESS NOTES
SPIRITUAL HEALTH SERVICES NOTE  [facility]/ICU    SPIRITUAL CARE NOTE  Follow-up visit with Steven. He was accompanied by his wife Deidra. Steven says that he did not sleep well last night and that moving from his chair to the bed today wore him out. He re-iterates that he will do what staff ask of him and also that he takes cues from his body. Deidra shares that Steven has encouraged her to take care of herself while he is in the hospital as well. She says that they were  one year ago on May 2nd. Deidra's father underwent bypass surgery 10 years ago. She says she learned two things - that her dad was stronger than they imagined, and that God was protecting him the whole time. Their Yazidi curtis is a significant source of strength and comfort. Prayer shared. Brought Steven a popsicle.     Visit Length: 15 minutes    Plan of Care: Will remain available for further support as patient/family needs/desires.    Julianna Booker M.Div.      Office: [phone] (for non-urgent requests)  Please Vocera or page through [facility] for time-sensitive requests

## 2022-06-07 NOTE — CONSULTS
Care Management Initial Consult    General Information  Assessment completed with: Patient, patient  Type of CM/SW Visit: Initial Assessment    Primary Care Provider verified and updated as needed: Yes   Readmission within the last 30 days: no previous admission in last 30 days      Reason for Consult: discharge planning  Advance Care Planning: Advance Care Planning Reviewed: no concerns identified          Communication Assessment  Patient's communication style: spoken language (English or Bilingual)    Hearing Difficulty or Deaf: no   Wear Glasses or Blind: yes    Cognitive  Cognitive/Neuro/Behavioral: WDL  Level of Consciousness: alert  Arousal Level: opens eyes spontaneously  Orientation: oriented x 4  Mood/Behavior: calm, cooperative  Best Language: 0 - No aphasia  Speech: clear, spontaneous, logical    Living Environment:   People in home: spouse     Current living Arrangements: house      Able to return to prior arrangements: yes       Family/Social Support:  Care provided by: self  Provides care for: no one  Marital Status:   Wife  Deidra       Description of Support System: Supportive, Involved    Support Assessment: Adequate family and caregiver support, Adequate social supports    Current Resources:   Patient receiving home care services: No     Community Resources: None  Equipment currently used at home: none  Supplies currently used at home: Oxygen Tubing/Supplies (Uses CPAP)    Employment/Financial:  Employment Status:          Financial Concerns: No concerns identified           Lifestyle & Psychosocial Needs:  Social Determinants of Health     Tobacco Use: Low Risk      Smoking Tobacco Use: Never Smoker     Smokeless Tobacco Use: Never Used   Alcohol Use: Not on file   Financial Resource Strain: Not on file   Food Insecurity: Not on file   Transportation Needs: Not on file   Physical Activity: Not on file   Stress: Not on file   Social Connections: Not on file   Intimate Partner Violence: Not on  file   Depression: Not at risk     PHQ-2 Score: 0   Housing Stability: Not on file       Functional Status:  Prior to admission patient needed assistance:   Dependent ADLs:: Independent  Dependent IADLs:: Independent  Assesssment of Functional Status: Not at  functional baseline    Mental Health Status:          Chemical Dependency Status:                Values/Beliefs:  Spiritual, Cultural Beliefs, Scientology Practices, Values that affect care:                 Additional Information:  Chart reviewed.  RNCM met with patient to introduce self and review role of care management (CM), progression of care, and possible need for services at discharge.    Patient lives in his own home with his spouse, Deidra.  He is independent with all ADL's/IADL's at baseline.  He uses CPAP when sleeping.  Patient has healthcare directive but he states it is not validated yet.  Patient states that he plans to return home when discharged and go to Outpatient Cardiac Rehab when appropriate.    Informed that CM will continue to follow care progression, review recommendations from care team, and assist with discharge planning if needed.    Carolyn Langford RN

## 2022-06-07 NOTE — PROGRESS NOTES
"Pulmonary  6/7/2022  11:02 AM       Chart reviewed.   Stable night in ICU. Up in chair this morning, working with Cardiac Rehab.   Feels \"groggy\" but overall ok.   Plan of care per CV-surgery.   Our service will sign off.     Karissa Barger, [facility] Pulmonary/Critical Care   "

## 2022-06-07 NOTE — PROGRESS NOTES
CVTS Daily Progress Note   POD#1 s/p CABGx5  Attending: Aj  LOS: 1    SUBJECTIVE/INTERVAL EVENTS:    Patient arrived to ICU from OR yesterday afternoon. He was subsequently extubated. On nicardipine (radial artery) and roxie this AM; normotensive. CI 3.1. No acute events overnight. Patient progressing well. Maintaining oxygen saturations on nasal cannula. Up to chair this AM and strong. Pain well controlled. - BM / flatus. Tolerating diet without nausea. UOP adequate. Chest tube output appropriate. Hgb 10.1. Patient denies new chest pain, shortness of breath, abdominal pain, calf pain, nausea. Patient has no questions today.    OBJECTIVE:  Temp:  [99.3  F (37.4  C)-101.3  F (38.5  C)] (P) 99.9  F (37.7  C)  Pulse:  [] 61  Resp:  [18-20] 18  BP: (108)/(62) 108/62  MAP:  [59 mmHg-106 mmHg] 75 mmHg  Arterial Line BP: ()/(47-90) 104/58  FiO2 (%):  [40 %-100 %] 40 %  SpO2:  [79 %-98 %] 93 %  Vent Mode: CPAP/PS  (Continuous positive airway pressure with Pressure Support)  FiO2 (%): (S) 40 %  Resp Rate (Set): (S) 22 breaths/min  Tidal Volume (Set, mL): (S) 550 mL  PEEP (cm H2O): 8 cmH2O  Pressure Support (cm H2O): 5 cmH2O  Resp: 18    Vitals:    06/06/22 0530 06/07/22 0609   Weight: 119.6 kg (263 lb 9.6 oz) (P) 126.1 kg (278 lb 1.6 oz)       Current Medications:    Scheduled Meds:    acetaminophen  975 mg Oral Q8H     amLODIPine  2.5 mg Oral Daily     aspirin  162 mg Oral or NG Tube Daily     atorvastatin  40 mg Oral Daily     ceFAZolin  2 g Intravenous Q8H     chlorhexidine  15 mL Mouth/Throat Q12H     heparin ANTICOAGULANT  5,000 Units Subcutaneous Q8H     insulin aspart  1-7 Units Subcutaneous TID AC     insulin aspart  1-5 Units Subcutaneous At Bedtime     insulin glargine  20 Units Subcutaneous Once     mupirocin  0.5 g Both Nostrils BID     pantoprazole  40 mg Oral or NG Tube Daily    Or     pantoprazole  40 mg Oral Daily     polyethylene glycol  17 g Oral Daily     rOPINIRole  0.5 mg Oral At Bedtime      senna-docusate  1 tablet Oral BID     sodium chloride (PF)  3 mL Intracatheter Q8H     Continuous Infusions:    EPINEPHrine Stopped (06/07/22 0128)     niCARdipine 0.25 mg/hr (06/06/22 2026)     phenylephrine 0.7 mcg/kg/min (06/07/22 0628)     sodium chloride 25 mL/hr (06/06/22 1600)     vasopressin       PRN Meds:.[START ON 6/9/2022] acetaminophen, bisacodyl, calcium gluconate, calcium gluconate, calcium gluconate, glucose **OR** dextrose **OR** glucagon, EPINEPHrine, hydrALAZINE, HYDROmorphone **OR** HYDROmorphone, ketorolac, lactated ringers, lidocaine 4%, lidocaine (buffered or not buffered), magnesium hydroxide, naloxone **OR** naloxone **OR** naloxone **OR** naloxone, niCARdipine, ondansetron **OR** ondansetron, oxyCODONE **OR** oxyCODONE, phenylephrine, prochlorperazine **OR** prochlorperazine, sodium chloride (PF), vasopressin    Cardiographics:    Telemetry monitoring demonstrates NSR with rates in the 60s per my personal review.    Imaging:  Results for orders placed or performed during the hospital encounter of 06/06/22   XR Chest Port 1 View    Impression    IMPRESSION: Poststernotomy changes with mediastinal and left pleural chest tubes.     Endotracheal tube is  2 cm from the ashanti. Right IJ [place]-Niko catheter tip is in the main pulmonary outflow tract.    Superior mediastinum is widened and likely exaggerated due to positioning and body habitus.    Low lung volumes. No pneumothorax. Heart and pulmonary vascularity are normal.    Findings discussed by the undersigned with his nurse Michael at 1358. No significant output from his mediastinal chest tube.   XR Chest Port 1 View    Impression    IMPRESSION: Interval extubation. Right heart catheter remains in place terminating over the main pulmonary artery. Heart size is normal status post median sternotomy. Lung volumes are low. Improving left basilar atelectasis. No pneumothorax.       Labs, personally reviewed.  Hemoglobin   Date Value Ref Range  Status   06/07/2022 10.1 (L) 13.3 - 17.7 g/dL Final   06/06/2022 10.3 (L) 13.3 - 17.7 g/dL Final   06/06/2022 11.6 (L) 13.3 - 17.7 g/dL Final     WBC Count   Date Value Ref Range Status   06/07/2022 16.1 (H) 4.0 - 11.0 10e3/uL Final   06/06/2022 14.1 (H) 4.0 - 11.0 10e3/uL Final   06/06/2022 20.3 (H) 4.0 - 11.0 10e3/uL Final     Platelet Count   Date Value Ref Range Status   06/07/2022 213 150 - 450 10e3/uL Final   06/06/2022 188 150 - 450 10e3/uL Final   06/06/2022 247 150 - 450 10e3/uL Final     Creatinine   Date Value Ref Range Status   06/07/2022 0.96 0.70 - 1.30 mg/dL Final   06/06/2022 0.80 0.70 - 1.30 mg/dL Final   06/06/2022 0.75 0.70 - 1.30 mg/dL Final     Potassium   Date Value Ref Range Status   06/07/2022 3.7 3.5 - 5.0 mmol/L Final   06/07/2022 4.0 3.5 - 5.0 mmol/L Final   06/06/2022 3.7 3.5 - 5.0 mmol/L Final   06/06/2022 3.7 3.5 - 5.0 mmol/L Final     Magnesium   Date Value Ref Range Status   06/07/2022 2.0 1.8 - 2.6 mg/dL Final   06/06/2022 2.6 1.8 - 2.6 mg/dL Final   05/31/2022 1.8 1.8 - 2.6 mg/dL Final          I/O:  I/O last 3 completed shifts:  In: 5879.35 [P.O.:240; I.V.:3809.35; Other:180; IV Piggyback:400]  Out: 4119 [Urine:2545; Blood:950; Chest Tube:624]       Physical Exam:    General: Patient seen up in chair.NAD. Conversant. Pleasant.   CV: RRR on monitor. 2+ peripheral pulses in all extremities. Mild edema.   Pulm: Non-labored effort on nasal cannula. Chest tubes in place, serosanguinous output, no air leak. Incision C/D/I.  Abd: Soft, NT, ND  : Strong with vielka urine  Ext: Mild pedal edema, SCDs in place, warm, distal pulses intact  Neuro: CNs grossly intact.       ASSESSMENT/PLAN:    Gonzalez Morton is a 61 year old male with a history of CAD who is POD#1 s/p CABGx5.    Active Problems:    Coronary artery disease involving native coronary artery of native heart, unspecified whether angina present        NEURO:   - Scheduled Tylenol and PRN Toradol/oxycodone/dilaudid for pain  -  PTA Requip    CV:   - Normotensive  - Weaning pressors as hemodynamics allow, currently on roxie (+nicardipine for radial artery graft protection).  - Will start amlodipine 2.5mg daily -- can turn nicardipine off 2 hours after admin.   - Beta blocker pending weaning from pressors  - ASA 162mg  - Atorvastatin 40mg daily  - Chest tubes to remain today    PULM:   - Maintaining oxygen saturations on nasal cannula  - Encourage pulmonary toilet    FEN/GI:  - Continue electrolyte replacement protocol  - Diet: Cardiac, ADAT   - Bowel regimen    RENAL:  - Adequate UOP/hr. Continue to monitor closely via nicholson.   - Cr 0.96  - Nicholson to remain in for close monitoring of I/O and during period of diuresis/relative immobility.   - Diuresis PRN and pending weaning from pressors    HEME:  - Acute blood loss anemia post-op.   - Hgb stable, no bleeding concerns. Hep SQ, ASA    ID:  - Avani op ppx complete, afebrile. No concerns for infection    ENDO:   - Transition to SSI    PPx:   - DVT: SCDs, SQ heparin TID, ambulation   - GI: Protonix 40mg PO daily    DISPO:   - Continue critical care in ICU        Patient discussed with Dr. Rocha.        _______  TIFFANY VargasC  Cardiothoracic Surgery  [phone]

## 2022-06-07 NOTE — PROGRESS NOTES
Patient extubated around 1855 to 6 lpm oxymask without incident initially. Pt placed on home CPAP unit with 6 lpm oxygen bleed to rest.  Pt has strong cough, is able to vocalize. RT will follow closely.

## 2022-06-08 ENCOUNTER — APPOINTMENT (OUTPATIENT)
Dept: OCCUPATIONAL THERAPY | Facility: HOSPITAL | Age: 62
DRG: 236 | End: 2022-06-08
Attending: THORACIC SURGERY (CARDIOTHORACIC VASCULAR SURGERY)
Payer: COMMERCIAL

## 2022-06-08 LAB
BASE EXCESS BLDV CALC-SCNC: 3.3 MMOL/L
CALCIUM, IONIZED MEASURED: 1.09 MMOL/L (ref 1.11–1.3)
CALCIUM, IONIZED MEASURED: 1.1 MMOL/L (ref 1.11–1.3)
HCO3 BLDV-SCNC: 27 MMOL/L (ref 24–30)
HCT VFR BLD AUTO: 26.2 % (ref 40–53)
HGB BLD-MCNC: 8.8 G/DL (ref 13.3–17.7)
ION CA PH 7.4: 1.07 MMOL/L (ref 1.11–1.3)
ION CA PH 7.4: 1.09 MMOL/L (ref 1.11–1.3)
MAGNESIUM SERPL-MCNC: 2 MG/DL (ref 1.8–2.6)
OXYHGB MFR BLDV: 61.8 % (ref 70–75)
PCO2 BLDV: 48 MM HG (ref 35–50)
PH BLDV: 7.38 [PH] (ref 7.35–7.45)
PH: 7.34 (ref 7.35–7.45)
PH: 7.4 (ref 7.35–7.45)
PHOSPHATE SERPL-MCNC: 2.5 MG/DL (ref 2.5–4.5)
PO2 BLDV: 32 MM HG (ref 25–47)
POTASSIUM BLD-SCNC: 4.2 MMOL/L (ref 3.5–5)
SAO2 % BLDV: 63.4 % (ref 70–75)

## 2022-06-08 PROCEDURE — 250N000011 HC RX IP 250 OP 636: Performed by: THORACIC SURGERY (CARDIOTHORACIC VASCULAR SURGERY)

## 2022-06-08 PROCEDURE — 82330 ASSAY OF CALCIUM: CPT | Performed by: THORACIC SURGERY (CARDIOTHORACIC VASCULAR SURGERY)

## 2022-06-08 PROCEDURE — 36415 COLL VENOUS BLD VENIPUNCTURE: CPT | Performed by: THORACIC SURGERY (CARDIOTHORACIC VASCULAR SURGERY)

## 2022-06-08 PROCEDURE — 200N000001 HC R&B ICU

## 2022-06-08 PROCEDURE — 250N000011 HC RX IP 250 OP 636: Performed by: PHYSICIAN ASSISTANT

## 2022-06-08 PROCEDURE — 82330 ASSAY OF CALCIUM: CPT | Performed by: PHYSICIAN ASSISTANT

## 2022-06-08 PROCEDURE — 97110 THERAPEUTIC EXERCISES: CPT | Mod: GO

## 2022-06-08 PROCEDURE — 84132 ASSAY OF SERUM POTASSIUM: CPT | Performed by: THORACIC SURGERY (CARDIOTHORACIC VASCULAR SURGERY)

## 2022-06-08 PROCEDURE — 85014 HEMATOCRIT: CPT | Performed by: THORACIC SURGERY (CARDIOTHORACIC VASCULAR SURGERY)

## 2022-06-08 PROCEDURE — 250N000013 HC RX MED GY IP 250 OP 250 PS 637: Performed by: THORACIC SURGERY (CARDIOTHORACIC VASCULAR SURGERY)

## 2022-06-08 PROCEDURE — 83735 ASSAY OF MAGNESIUM: CPT | Performed by: THORACIC SURGERY (CARDIOTHORACIC VASCULAR SURGERY)

## 2022-06-08 PROCEDURE — 94799 UNLISTED PULMONARY SVC/PX: CPT

## 2022-06-08 PROCEDURE — 120N000013 HC R&B IMCU

## 2022-06-08 PROCEDURE — 999N000157 HC STATISTIC RCP TIME EA 10 MIN

## 2022-06-08 PROCEDURE — 82805 BLOOD GASES W/O2 SATURATION: CPT | Performed by: THORACIC SURGERY (CARDIOTHORACIC VASCULAR SURGERY)

## 2022-06-08 PROCEDURE — 97535 SELF CARE MNGMENT TRAINING: CPT | Mod: GO

## 2022-06-08 PROCEDURE — 250N000013 HC RX MED GY IP 250 OP 250 PS 637: Performed by: PHYSICIAN ASSISTANT

## 2022-06-08 PROCEDURE — 84100 ASSAY OF PHOSPHORUS: CPT | Performed by: THORACIC SURGERY (CARDIOTHORACIC VASCULAR SURGERY)

## 2022-06-08 RX ORDER — CALCIUM GLUCONATE 20 MG/ML
1 INJECTION, SOLUTION INTRAVENOUS ONCE
Status: COMPLETED | OUTPATIENT
Start: 2022-06-08 | End: 2022-06-08

## 2022-06-08 RX ORDER — MAGNESIUM OXIDE 400 MG/1
400 TABLET ORAL 2 TIMES DAILY
Status: COMPLETED | OUTPATIENT
Start: 2022-06-08 | End: 2022-06-09

## 2022-06-08 RX ORDER — FUROSEMIDE 10 MG/ML
40 INJECTION INTRAMUSCULAR; INTRAVENOUS 3 TIMES DAILY
Status: DISCONTINUED | OUTPATIENT
Start: 2022-06-08 | End: 2022-06-10 | Stop reason: HOSPADM

## 2022-06-08 RX ADMIN — MUPIROCIN 0.5 G: 20 OINTMENT TOPICAL at 21:17

## 2022-06-08 RX ADMIN — Medication 400 MG: at 20:41

## 2022-06-08 RX ADMIN — CALCIUM GLUCONATE 1 G: 20 INJECTION, SOLUTION INTRAVENOUS at 15:01

## 2022-06-08 RX ADMIN — SENNOSIDES AND DOCUSATE SODIUM 1 TABLET: 8.6; 5 TABLET ORAL at 08:16

## 2022-06-08 RX ADMIN — ACETAMINOPHEN 975 MG: 325 TABLET ORAL at 04:21

## 2022-06-08 RX ADMIN — HEPARIN SODIUM 5000 UNITS: 10000 INJECTION, SOLUTION INTRAVENOUS; SUBCUTANEOUS at 20:41

## 2022-06-08 RX ADMIN — ROPINIROLE HYDROCHLORIDE 0.5 MG: 0.25 TABLET, FILM COATED ORAL at 20:38

## 2022-06-08 RX ADMIN — HEPARIN SODIUM 5000 UNITS: 10000 INJECTION, SOLUTION INTRAVENOUS; SUBCUTANEOUS at 04:21

## 2022-06-08 RX ADMIN — AMLODIPINE BESYLATE 2.5 MG: 2.5 TABLET ORAL at 08:16

## 2022-06-08 RX ADMIN — PANTOPRAZOLE SODIUM 40 MG: 40 TABLET, DELAYED RELEASE ORAL at 08:16

## 2022-06-08 RX ADMIN — FUROSEMIDE 40 MG: 10 INJECTION, SOLUTION INTRAMUSCULAR; INTRAVENOUS at 11:54

## 2022-06-08 RX ADMIN — Medication 400 MG: at 08:16

## 2022-06-08 RX ADMIN — OXYCODONE HYDROCHLORIDE 5 MG: 5 TABLET ORAL at 23:42

## 2022-06-08 RX ADMIN — ACETAMINOPHEN 975 MG: 325 TABLET ORAL at 20:38

## 2022-06-08 RX ADMIN — HEPARIN SODIUM 5000 UNITS: 10000 INJECTION, SOLUTION INTRAVENOUS; SUBCUTANEOUS at 11:54

## 2022-06-08 RX ADMIN — ASPIRIN 81 MG CHEWABLE TABLET 162 MG: 81 TABLET CHEWABLE at 08:16

## 2022-06-08 RX ADMIN — FUROSEMIDE 40 MG: 10 INJECTION, SOLUTION INTRAMUSCULAR; INTRAVENOUS at 17:54

## 2022-06-08 RX ADMIN — FUROSEMIDE 40 MG: 10 INJECTION, SOLUTION INTRAMUSCULAR; INTRAVENOUS at 08:19

## 2022-06-08 RX ADMIN — METOPROLOL TARTRATE 12.5 MG: 25 TABLET, FILM COATED ORAL at 08:16

## 2022-06-08 RX ADMIN — POTASSIUM & SODIUM PHOSPHATES POWDER PACK 280-160-250 MG 1 PACKET: 280-160-250 PACK at 08:19

## 2022-06-08 RX ADMIN — OXYCODONE HYDROCHLORIDE 10 MG: 5 TABLET ORAL at 01:33

## 2022-06-08 RX ADMIN — CALCIUM GLUCONATE 1 G: 20 INJECTION, SOLUTION INTRAVENOUS at 05:18

## 2022-06-08 RX ADMIN — SENNOSIDES AND DOCUSATE SODIUM 1 TABLET: 8.6; 5 TABLET ORAL at 20:41

## 2022-06-08 RX ADMIN — METOPROLOL TARTRATE 12.5 MG: 25 TABLET, FILM COATED ORAL at 15:00

## 2022-06-08 RX ADMIN — MUPIROCIN 0.5 G: 20 OINTMENT TOPICAL at 08:17

## 2022-06-08 RX ADMIN — METOPROLOL TARTRATE 12.5 MG: 25 TABLET, FILM COATED ORAL at 17:52

## 2022-06-08 RX ADMIN — POTASSIUM & SODIUM PHOSPHATES POWDER PACK 280-160-250 MG 1 PACKET: 280-160-250 PACK at 21:01

## 2022-06-08 RX ADMIN — ACETAMINOPHEN 975 MG: 325 TABLET ORAL at 11:54

## 2022-06-08 RX ADMIN — KETOROLAC TROMETHAMINE 15 MG: 15 INJECTION, SOLUTION INTRAMUSCULAR; INTRAVENOUS at 21:01

## 2022-06-08 RX ADMIN — OXYCODONE HYDROCHLORIDE 10 MG: 5 TABLET ORAL at 06:40

## 2022-06-08 RX ADMIN — POLYETHYLENE GLYCOL 3350 17 GRAM ORAL POWDER PACKET 17 G: at 08:16

## 2022-06-08 RX ADMIN — OXYCODONE HYDROCHLORIDE 10 MG: 5 TABLET ORAL at 17:52

## 2022-06-08 RX ADMIN — ATORVASTATIN CALCIUM 40 MG: 40 TABLET, FILM COATED ORAL at 08:16

## 2022-06-08 ASSESSMENT — ACTIVITIES OF DAILY LIVING (ADL)
ADLS_ACUITY_SCORE: 30

## 2022-06-08 NOTE — PLAN OF CARE
Goal Outcome Evaluation:        Problem: Risk for Delirium  Goal: Optimal Coping  Outcome: Ongoing, Progressing     Problem: Cardiac Function Impaired (Cardiovascular Surgery)  Goal: Effective Cardiac Function  Outcome: Ongoing, Progressing     Ar titrated off this shift. Patient tolerated well, VSS. Patient able to turn and reposition with assist of 1-2. Patient noted to be slightly desating at times through out shift, o2 up to 8L with CPAP. Oxycodone administered for pain with relief. Will continue to monitor.

## 2022-06-08 NOTE — PROGRESS NOTES
CVTS Daily Progress Note   POD#2 s/p CABGx5  Attending: Aj  LOS: 2    SUBJECTIVE/INTERVAL EVENTS:    No acute events overnight. Weaned from roxie yesterday. Patient progressing well. Maintaining oxygen saturations on nasal cannula. Ambulating with therapy. Pain well controlled. - BM / + flatus. Tolerating diet without nausea. UOP adequate. Chest tube output appropriate. Hgb 8.8 (10.1) and likely dilutional. Patient denies new chest pain, shortness of breath, abdominal pain, calf pain, nausea. Patient has no questions today.    OBJECTIVE:  Temp:  [99  F (37.2  C)-100.8  F (38.2  C)] 99  F (37.2  C)  Pulse:  [58-80] 66  BP: (111)/(55) 111/55  MAP:  [58 mmHg-86 mmHg] 78 mmHg  Arterial Line BP: ()/(44-67) 110/59  SpO2:  [84 %-97 %] 96 %  Resp: 18    Vitals:    06/06/22 0530 06/07/22 0609 06/08/22 0600   Weight: 119.6 kg (263 lb 9.6 oz) (P) 126.1 kg (278 lb 1.6 oz) 129.5 kg (285 lb 6.4 oz)       Current Medications:    Scheduled Meds:    acetaminophen  975 mg Oral Q8H     amLODIPine  2.5 mg Oral Daily     aspirin  162 mg Oral or NG Tube Daily     atorvastatin  40 mg Oral Daily     furosemide  40 mg Intravenous TID     heparin ANTICOAGULANT  5,000 Units Subcutaneous Q8H     magnesium oxide  400 mg Oral BID     metoprolol tartrate  12.5 mg Oral BID     mupirocin  0.5 g Both Nostrils BID     pantoprazole  40 mg Oral or NG Tube Daily    Or     pantoprazole  40 mg Oral Daily     polyethylene glycol  17 g Oral Daily     potassium & sodium phosphates  1 packet Oral or Feeding Tube BID     rOPINIRole  0.5 mg Oral At Bedtime     senna-docusate  1 tablet Oral BID     sodium chloride (PF)  3 mL Intracatheter Q8H     Continuous Infusions:    PRN Meds:.[START ON 6/9/2022] acetaminophen, bisacodyl, calcium gluconate, calcium gluconate, calcium gluconate, glucose **OR** dextrose **OR** glucagon, hydrALAZINE, HYDROmorphone **OR** HYDROmorphone, ketorolac, lactated ringers, lidocaine 4%, lidocaine (buffered or not buffered),  magnesium hydroxide, naloxone **OR** naloxone **OR** naloxone **OR** naloxone, ondansetron **OR** ondansetron, oxyCODONE **OR** oxyCODONE, prochlorperazine **OR** prochlorperazine, sodium chloride (PF)    Cardiographics:    Telemetry monitoring demonstrates NSR with rates in the 60s per my personal review.    Imaging:  Results for orders placed or performed during the hospital encounter of 06/06/22   XR Chest Port 1 View    Impression    IMPRESSION: Poststernotomy changes with mediastinal and left pleural chest tubes.     Endotracheal tube is  2 cm from the ashanti. Right IJ [place]-Niko catheter tip is in the main pulmonary outflow tract.    Superior mediastinum is widened and likely exaggerated due to positioning and body habitus.    Low lung volumes. No pneumothorax. Heart and pulmonary vascularity are normal.    Findings discussed by the undersigned with his nurse Michael at 1358. No significant output from his mediastinal chest tube.   XR Chest Port 1 View    Impression    IMPRESSION: Interval extubation. Right heart catheter remains in place terminating over the main pulmonary artery. Heart size is normal status post median sternotomy. Lung volumes are low. Improving left basilar atelectasis. No pneumothorax.       Labs, personally reviewed.  Hemoglobin   Date Value Ref Range Status   06/08/2022 8.8 (L) 13.3 - 17.7 g/dL Final   06/07/2022 10.1 (L) 13.3 - 17.7 g/dL Final   06/06/2022 10.3 (L) 13.3 - 17.7 g/dL Final     WBC Count   Date Value Ref Range Status   06/07/2022 16.1 (H) 4.0 - 11.0 10e3/uL Final   06/06/2022 14.1 (H) 4.0 - 11.0 10e3/uL Final   06/06/2022 20.3 (H) 4.0 - 11.0 10e3/uL Final     Platelet Count   Date Value Ref Range Status   06/07/2022 213 150 - 450 10e3/uL Final   06/06/2022 188 150 - 450 10e3/uL Final   06/06/2022 247 150 - 450 10e3/uL Final     Creatinine   Date Value Ref Range Status   06/07/2022 0.96 0.70 - 1.30 mg/dL Final   06/06/2022 0.80 0.70 - 1.30 mg/dL Final   06/06/2022 0.75 0.70 -  1.30 mg/dL Final     Potassium   Date Value Ref Range Status   06/08/2022 4.2 3.5 - 5.0 mmol/L Final   06/07/2022 3.7 3.5 - 5.0 mmol/L Final   06/07/2022 4.0 3.5 - 5.0 mmol/L Final     Magnesium   Date Value Ref Range Status   06/08/2022 2.0 1.8 - 2.6 mg/dL Final   06/07/2022 2.0 1.8 - 2.6 mg/dL Final   06/06/2022 2.6 1.8 - 2.6 mg/dL Final          I/O:  I/O last 3 completed shifts:  In: 3302.36 [P.O.:1610; I.V.:1692.36]  Out: 1985 [Urine:1575; Chest Tube:410]       Physical Exam:    General: Patient seen up in chair. NAD. Conversant. Pleasant.   CV: RRR on monitor. 2+ peripheral pulses in all extremities. Mild edema.   Pulm: Non-labored effort on nasal cannula. Chest tubes in place, serosanguinous output, no air leak. Incision C/D/I.  Abd: Soft, NT, ND  : Strong with vielka urine  Ext: Mild pedal edema, SCDs in place, warm, distal pulses intact  Neuro: CNs grossly intact.       ASSESSMENT/PLAN:    Gonzalez Morton is a 61 year old male with a history of CAD who is POD#2 s/p CABGx5.    Active Problems:    Coronary artery disease involving native coronary artery of native heart, unspecified whether angina present        NEURO:   - Scheduled Tylenol and PRN Toradol/oxycodone/dilaudid for pain  - PTA Requip    CV:   - Normotensive off pressors  - Amlodipine 2.5mg daily (radial artery graft protection)  - Metoprolol 12.5mg BID with hold parameters  - ASA 162mg  - Atorvastatin 40mg daily  - Chest tubes to remain today    PULM:   - Maintaining oxygen saturations on nasal cannula  - Encourage pulmonary toilet    FEN/GI:  - Continue electrolyte replacement protocol  - Diet: Cardiac, ADAT   - Bowel regimen    RENAL:  - Adequate UOP/hr. Continue to monitor closely.  - Discontinue Strong   - Diuresis with Lasix 40mg IV TID    HEME:  - Acute blood loss anemia post-op.   - No bleeding concerns. Hep SQ, ASA    ID:  - Avani op ppx complete, afebrile. No concerns for infection    ENDO:   - No issues    PPx:   - DVT: SCDs, SQ  heparin TID, ambulation   - GI: Protonix 40mg PO daily    DISPO:   - Transfer to general telemetry status.         Patient discussed with Dr. Rocha.        _______  Catherine Champion PA-C  Cardiothoracic Surgery  [phone]

## 2022-06-08 NOTE — TREATMENT PLAN
RCAT Treatment Plan    Patient Score: 9  Patient Acuity: 4    Clinical Indication for Therapy: atelectasis    Therapy Ordered: Flutter/IS    Assessment Summary: Patient able to demonstrate that he can complete independently. O2 requirements are minimal, BS/CXR showing improvement. RT will sign off at this time.     Evelia Mckenzie, RT  6/8/2022

## 2022-06-09 ENCOUNTER — APPOINTMENT (OUTPATIENT)
Dept: OCCUPATIONAL THERAPY | Facility: HOSPITAL | Age: 62
DRG: 236 | End: 2022-06-09
Attending: THORACIC SURGERY (CARDIOTHORACIC VASCULAR SURGERY)
Payer: COMMERCIAL

## 2022-06-09 ENCOUNTER — TELEPHONE (OUTPATIENT)
Dept: FAMILY MEDICINE | Facility: CLINIC | Age: 62
End: 2022-06-09
Payer: COMMERCIAL

## 2022-06-09 LAB
CALCIUM, IONIZED MEASURED: 1.09 MMOL/L (ref 1.11–1.3)
ERYTHROCYTE [DISTWIDTH] IN BLOOD BY AUTOMATED COUNT: 13.4 % (ref 10–15)
HCT VFR BLD AUTO: 25.5 % (ref 40–53)
HGB BLD-MCNC: 8.5 G/DL (ref 13.3–17.7)
ION CA PH 7.4: 1.08 MMOL/L (ref 1.11–1.3)
MAGNESIUM SERPL-MCNC: 2.1 MG/DL (ref 1.8–2.6)
MCH RBC QN AUTO: 28.7 PG (ref 26.5–33)
MCHC RBC AUTO-ENTMCNC: 33.3 G/DL (ref 31.5–36.5)
MCV RBC AUTO: 86 FL (ref 78–100)
PH: 7.38 (ref 7.35–7.45)
PHOSPHATE SERPL-MCNC: 3.1 MG/DL (ref 2.5–4.5)
PLATELET # BLD AUTO: 147 10E3/UL (ref 150–450)
POTASSIUM BLD-SCNC: 3.2 MMOL/L (ref 3.5–5)
POTASSIUM BLD-SCNC: 3.6 MMOL/L (ref 3.5–5)
RBC # BLD AUTO: 2.96 10E6/UL (ref 4.4–5.9)
WBC # BLD AUTO: 10 10E3/UL (ref 4–11)

## 2022-06-09 PROCEDURE — 97110 THERAPEUTIC EXERCISES: CPT | Mod: GO

## 2022-06-09 PROCEDURE — 250N000013 HC RX MED GY IP 250 OP 250 PS 637: Performed by: PHYSICIAN ASSISTANT

## 2022-06-09 PROCEDURE — 36415 COLL VENOUS BLD VENIPUNCTURE: CPT | Performed by: PHYSICIAN ASSISTANT

## 2022-06-09 PROCEDURE — 250N000011 HC RX IP 250 OP 636: Performed by: PHYSICIAN ASSISTANT

## 2022-06-09 PROCEDURE — 84132 ASSAY OF SERUM POTASSIUM: CPT | Performed by: PHYSICIAN ASSISTANT

## 2022-06-09 PROCEDURE — 210N000001 HC R&B IMCU HEART CARE

## 2022-06-09 PROCEDURE — 250N000013 HC RX MED GY IP 250 OP 250 PS 637: Performed by: THORACIC SURGERY (CARDIOTHORACIC VASCULAR SURGERY)

## 2022-06-09 PROCEDURE — 84132 ASSAY OF SERUM POTASSIUM: CPT | Performed by: THORACIC SURGERY (CARDIOTHORACIC VASCULAR SURGERY)

## 2022-06-09 PROCEDURE — 83735 ASSAY OF MAGNESIUM: CPT | Performed by: THORACIC SURGERY (CARDIOTHORACIC VASCULAR SURGERY)

## 2022-06-09 PROCEDURE — 85027 COMPLETE CBC AUTOMATED: CPT | Performed by: PHYSICIAN ASSISTANT

## 2022-06-09 PROCEDURE — 82330 ASSAY OF CALCIUM: CPT | Performed by: PHYSICIAN ASSISTANT

## 2022-06-09 PROCEDURE — 36415 COLL VENOUS BLD VENIPUNCTURE: CPT | Performed by: THORACIC SURGERY (CARDIOTHORACIC VASCULAR SURGERY)

## 2022-06-09 PROCEDURE — 999N000157 HC STATISTIC RCP TIME EA 10 MIN

## 2022-06-09 PROCEDURE — 84100 ASSAY OF PHOSPHORUS: CPT | Performed by: THORACIC SURGERY (CARDIOTHORACIC VASCULAR SURGERY)

## 2022-06-09 RX ORDER — POTASSIUM CHLORIDE 1500 MG/1
40 TABLET, EXTENDED RELEASE ORAL ONCE
Status: COMPLETED | OUTPATIENT
Start: 2022-06-09 | End: 2022-06-09

## 2022-06-09 RX ORDER — POTASSIUM CHLORIDE 1500 MG/1
20 TABLET, EXTENDED RELEASE ORAL ONCE
Status: COMPLETED | OUTPATIENT
Start: 2022-06-09 | End: 2022-06-09

## 2022-06-09 RX ADMIN — POLYETHYLENE GLYCOL 3350 17 GRAM ORAL POWDER PACKET 17 G: at 08:05

## 2022-06-09 RX ADMIN — OXYCODONE HYDROCHLORIDE 5 MG: 5 TABLET ORAL at 20:16

## 2022-06-09 RX ADMIN — ACETAMINOPHEN 975 MG: 325 TABLET ORAL at 11:44

## 2022-06-09 RX ADMIN — ASPIRIN 81 MG CHEWABLE TABLET 162 MG: 81 TABLET CHEWABLE at 08:05

## 2022-06-09 RX ADMIN — SENNOSIDES AND DOCUSATE SODIUM 1 TABLET: 8.6; 5 TABLET ORAL at 08:05

## 2022-06-09 RX ADMIN — MUPIROCIN 0.5 G: 20 OINTMENT TOPICAL at 08:12

## 2022-06-09 RX ADMIN — HEPARIN SODIUM 5000 UNITS: 10000 INJECTION, SOLUTION INTRAVENOUS; SUBCUTANEOUS at 04:31

## 2022-06-09 RX ADMIN — OXYCODONE HYDROCHLORIDE 5 MG: 5 TABLET ORAL at 23:54

## 2022-06-09 RX ADMIN — FUROSEMIDE 40 MG: 10 INJECTION, SOLUTION INTRAMUSCULAR; INTRAVENOUS at 11:44

## 2022-06-09 RX ADMIN — METOPROLOL TARTRATE 12.5 MG: 25 TABLET, FILM COATED ORAL at 16:38

## 2022-06-09 RX ADMIN — POTASSIUM CHLORIDE 20 MEQ: 1500 TABLET, EXTENDED RELEASE ORAL at 12:54

## 2022-06-09 RX ADMIN — FUROSEMIDE 40 MG: 10 INJECTION, SOLUTION INTRAMUSCULAR; INTRAVENOUS at 17:04

## 2022-06-09 RX ADMIN — Medication 400 MG: at 08:11

## 2022-06-09 RX ADMIN — ATORVASTATIN CALCIUM 40 MG: 40 TABLET, FILM COATED ORAL at 08:11

## 2022-06-09 RX ADMIN — SENNOSIDES AND DOCUSATE SODIUM 1 TABLET: 8.6; 5 TABLET ORAL at 20:16

## 2022-06-09 RX ADMIN — AMLODIPINE BESYLATE 2.5 MG: 2.5 TABLET ORAL at 08:05

## 2022-06-09 RX ADMIN — OXYCODONE HYDROCHLORIDE 5 MG: 5 TABLET ORAL at 04:37

## 2022-06-09 RX ADMIN — Medication 400 MG: at 20:16

## 2022-06-09 RX ADMIN — FUROSEMIDE 40 MG: 10 INJECTION, SOLUTION INTRAMUSCULAR; INTRAVENOUS at 06:23

## 2022-06-09 RX ADMIN — METOPROLOL TARTRATE 12.5 MG: 25 TABLET, FILM COATED ORAL at 12:53

## 2022-06-09 RX ADMIN — HEPARIN SODIUM 5000 UNITS: 10000 INJECTION, SOLUTION INTRAVENOUS; SUBCUTANEOUS at 11:44

## 2022-06-09 RX ADMIN — ACETAMINOPHEN 975 MG: 325 TABLET ORAL at 04:31

## 2022-06-09 RX ADMIN — METOPROLOL TARTRATE 12.5 MG: 25 TABLET, FILM COATED ORAL at 08:05

## 2022-06-09 RX ADMIN — POTASSIUM CHLORIDE 40 MEQ: 1500 TABLET, EXTENDED RELEASE ORAL at 06:23

## 2022-06-09 RX ADMIN — CALCIUM GLUCONATE 1 G: 20 INJECTION, SOLUTION INTRAVENOUS at 06:24

## 2022-06-09 RX ADMIN — PANTOPRAZOLE SODIUM 40 MG: 40 TABLET, DELAYED RELEASE ORAL at 08:05

## 2022-06-09 RX ADMIN — HEPARIN SODIUM 5000 UNITS: 10000 INJECTION, SOLUTION INTRAVENOUS; SUBCUTANEOUS at 20:16

## 2022-06-09 RX ADMIN — ROPINIROLE HYDROCHLORIDE 0.5 MG: 0.25 TABLET, FILM COATED ORAL at 20:21

## 2022-06-09 ASSESSMENT — ACTIVITIES OF DAILY LIVING (ADL)
ADLS_ACUITY_SCORE: 30

## 2022-06-09 NOTE — TELEPHONE ENCOUNTER
Forms Request  Name of form/paperwork: the [facility]  Have you been seen for this request: N/A  Do we have the form: yes, in providers inbox  When is form needed by: when done  How would you like the form returned: fax  Patient Notified form requests are processed in 3-5 business days: n/a    Okay to leave a detailed message? n/a

## 2022-06-09 NOTE — PROGRESS NOTES
CVTS Daily Progress Note   POD #3 S/P CABG X 5  Attending: Dr. Mayank Rocha MD  LOS: 3    SUBJECTIVE/INTERVAL EVENTS:   Awake and alert, out of bed to chair, with no apparent distress  No acute events overnight. Patient progressing well.  States pain is well managed on current regimen. Slept well overnight.  Maintaining oxygen saturations on 2 L nasal cannula. Normotensive. Ambulating with therapy and tolerating well. + Bowel sounds but no BM yet. Tolerating diet well. UOP adequate. Chest tube output appropriate.   Patient denies new chest pain, shortness of breath, abdominal pain, calf pain, nausea. Patient has no questions today.    OBJECTIVE:  Temp:  [97.7  F (36.5  C)-98.8  F (37.1  C)] 97.8  F (36.6  C)  Pulse:  [58-83] 63  Resp:  [16-20] 18  BP: ()/(53-68) 101/56  SpO2:  [90 %-94 %] 90 %  FiO2 (%): 3 %  Resp: 18    Vitals:    06/06/22 0530 06/07/22 0609 06/08/22 0600 06/09/22 0400   Weight: 119.6 kg (263 lb 9.6 oz) (P) 126.1 kg (278 lb 1.6 oz) 129.5 kg (285 lb 6.4 oz) 126.6 kg (279 lb)       Current Medications:    Scheduled Meds:    acetaminophen  975 mg Oral Q8H     amLODIPine  2.5 mg Oral Daily     aspirin  162 mg Oral or NG Tube Daily     atorvastatin  40 mg Oral Daily     furosemide  40 mg Intravenous TID     heparin ANTICOAGULANT  5,000 Units Subcutaneous Q8H     magnesium oxide  400 mg Oral BID     metoprolol tartrate  12.5 mg Oral BID     metoprolol tartrate  12.5 mg Oral TID     mupirocin  0.5 g Both Nostrils BID     pantoprazole  40 mg Oral or NG Tube Daily    Or     pantoprazole  40 mg Oral Daily     polyethylene glycol  17 g Oral Daily     rOPINIRole  0.5 mg Oral At Bedtime     senna-docusate  1 tablet Oral BID     sodium chloride (PF)  3 mL Intracatheter Q8H     Continuous Infusions:  PRN Meds:.acetaminophen, bisacodyl, calcium gluconate, calcium gluconate, calcium gluconate, glucose **OR** dextrose **OR** glucagon, hydrALAZINE, HYDROmorphone **OR** HYDROmorphone, ketorolac, lactated  ringers, lidocaine 4%, lidocaine (buffered or not buffered), magnesium hydroxide, naloxone **OR** naloxone **OR** naloxone **OR** naloxone, ondansetron **OR** ondansetron, oxyCODONE **OR** oxyCODONE, prochlorperazine **OR** prochlorperazine, sodium chloride (PF)    Cardiographics:    Telemetry monitoring demonstrates NSR with rates in the 60s per my personal review.    Imaging:  All available imaging reviewed, no acute concerns    Labs, personally reviewed.  BMP  Recent Labs   Lab 06/09/22  0401 06/08/22  0427 06/07/22  2102 06/07/22  1646 06/07/22  1141 06/07/22  0736 06/07/22  0207 06/07/22  0139 06/07/22  0103 06/07/22  0005 06/06/22  1337 06/06/22  1333 06/06/22  1025   NA  --   --   --   --   --   --   --   --   --  141  --  141 140   POTASSIUM 3.2* 4.2  --   --   --   --   --  3.7  --  4.0  --  3.7  3.7 3.9   CHLORIDE  --   --   --   --   --   --   --   --   --  107  --  110* 109*   KEATON  --   --   --   --   --   --   --   --   --  8.0*  --  7.8* 8.4*   CO2  --   --   --   --   --   --   --   --   --  24  --  21* 22   BUN  --   --   --   --   --   --   --   --   --  19  --  17 14   CR  --   --   --   --   --   --   --   --   --  0.96  --  0.80 0.75   GLC  --   --  100* 123* 162* 127*   < >  --    < > 142*  147*   < > 168* 167*    < > = values in this interval not displayed.     CBC  Recent Labs   Lab 06/09/22  0401 06/08/22  0427 06/07/22  0509 06/06/22  2207 06/06/22  1333 06/06/22  1025   WBC 10.0  --  16.1*  --  14.1* 20.3*   RBC 2.96*  --  3.43*  --  4.02* 4.06*   HGB 8.5* 8.8* 10.1* 10.3* 11.6* 11.8*   HCT 25.5* 26.2* 29.0*  --  34.0* 34.5*   MCV 86  --  85  --  85 85   MCH 28.7  --  29.4  --  28.9 29.1   MCHC 33.3  --  34.8  --  34.1 34.2   RDW 13.4  --  13.4  --  13.0 13.0   *  --  213  --  188 247     INR  Recent Labs   Lab 06/06/22  1333 06/06/22  1025   INR 1.28* 1.39*      Hepatic Panel  Recent Labs   Lab 06/07/22  0005 06/06/22  1333   AST 33 26   ALT 15 19   ALKPHOS 58 75   BILITOTAL 0.4  "0.5   ALBUMIN 3.5 3.5       Magnesium   Date Value Ref Range Status   06/08/2022 2.0 1.8 - 2.6 mg/dL Final   06/07/2022 2.0 1.8 - 2.6 mg/dL Final   06/06/2022 2.6 1.8 - 2.6 mg/dL Final        I/O:  I/O last 3 completed shifts:  In: 1320 [P.O.:1320]  Out: 5115 [Urine:5000; Chest Tube:115]       Blood pressure 101/56, pulse 63, temperature 97.8  F (36.6  C), resp. rate 18, height 1.753 m (5' 9\"), weight 126.6 kg (279 lb), SpO2 90 %.  Vitals:    06/07/22 0609 06/08/22 0600 06/09/22 0400   Weight: (P) 126.1 kg (278 lb 1.6 oz) 129.5 kg (285 lb 6.4 oz) 126.6 kg (279 lb)        Physical Exam:  Weight 126.6 kg from 129.5 kg yesterday, preop weight 119.6 kg and trending down.   24 hr Fluid status; net loss -3795 mL. UOP 5000 mL  MAPs: 81    Gen: A&Ox4, out of bed to chair, with NAD  Neuro: no focal deficits   CV: RRR, normal S1 S2, no murmurs, rubs or gallops.  No JVD  Pulm: CTA, no wheezing or rhonchi, normal breathing on 2 lpm  Abd: nondistended, normal BS, soft, nontender  Ext: Mild peripheral edema appreciated  Incision: clean, dry, intact, no erythema, sternum stable  Tubes/drain sites: dressing clean and dry, serosanguinous output, no air leak. 24 hr output 65/115 mL.     ASSESSMENT/PLAN:  Gonzalez Morton is a 61 year old male with a history of rosacea, sleep apnea with home CPAP and hypertension who is POD # 3 S/P CABG X 5.  Awake and alert, out of bed to chair, with no apparent distress  AVSS, normal sinus rhythm per monitor  Denies incisional chest pain and no shortness of breath  Postop respiratory insufficiency treatment with supplemental oxygen 2 L nasal cannula, saturation 98%  Excellent inspiratory effort, I-S 1500 cc  Incisions are clean dry and intact, lungs sound diminished to bases  Chest tube drainage 65/115 mL, chest tubes and pacer wires removed 06/09/2022, patient tolerated well  Abdomen is soft nondistended  Weight 128.2 kg from 128.5 kg yesterday, preop weight 119.6 kg  Continue Lasix 40 mg 3 " times a day  Continue to encourage pulmonary toilet and mobilize and ambulate 2-3 times daily  Transfer to telemetry floor when bed is available            NEURO:   - Scheduled Tylenol and PRN oxycodone for pain  - Melatonin QHS    CV:   - Normotensive  - Metoprolol 12.5 mg two times a day   - ASA 162mg  - Atorvastatin 40 mg daily  - Chest tubes and pacer wires removed 06/09/20/2022    PULM:   - Maintaining oxygen saturations on 2 L nasal cannula  - Encourage pulmonary toilet    FEN/GI:  - Continue electrolyte replacement protocol  - Diet: Cardiac, ADAT   - Bowel regimen    RENAL:  - Adequate UOP/hr. Continue to monitor closely.   - Cr 0.96  - Strong catheter removed 06/08/2022  - Diuresis with Lasix 40mg IV  three times a day    HEME:  - Acute blood loss anemia post-op.   - Hgb stable, no bleeding concerns. Hep SQ, [id number]    ID:  - Avani op ppx complete, afebrile . No concerns for infection    ENDO:    - Continue SSI    PPx:   - DVT: SCDs, SQ heparin TID, ambulation   - GI: Protonix 40mg PO daily    DISPO:   - Transfer to general telemetry unit once bed is available      Patient discussed with Dr. Mayank Rocha MD.    Terrell Forte PA-C  Cardiothoracic Surgery  Pager [phone]    9:02 AM   June 9, 2022

## 2022-06-10 ENCOUNTER — APPOINTMENT (OUTPATIENT)
Dept: RADIOLOGY | Facility: HOSPITAL | Age: 62
DRG: 236 | End: 2022-06-10
Attending: PHYSICIAN ASSISTANT
Payer: COMMERCIAL

## 2022-06-10 ENCOUNTER — APPOINTMENT (OUTPATIENT)
Dept: OCCUPATIONAL THERAPY | Facility: HOSPITAL | Age: 62
DRG: 236 | End: 2022-06-10
Attending: THORACIC SURGERY (CARDIOTHORACIC VASCULAR SURGERY)
Payer: COMMERCIAL

## 2022-06-10 VITALS
WEIGHT: 275.4 LBS | RESPIRATION RATE: 22 BRPM | BODY MASS INDEX: 40.79 KG/M2 | OXYGEN SATURATION: 95 % | HEIGHT: 69 IN | DIASTOLIC BLOOD PRESSURE: 63 MMHG | HEART RATE: 65 BPM | SYSTOLIC BLOOD PRESSURE: 103 MMHG | TEMPERATURE: 98 F

## 2022-06-10 LAB
MAGNESIUM SERPL-MCNC: 2 MG/DL (ref 1.8–2.6)
PHOSPHATE SERPL-MCNC: 2.7 MG/DL (ref 2.5–4.5)
PLATELET # BLD AUTO: 202 10E3/UL (ref 150–450)
POTASSIUM BLD-SCNC: 3.5 MMOL/L (ref 3.5–5)

## 2022-06-10 PROCEDURE — 83735 ASSAY OF MAGNESIUM: CPT | Performed by: THORACIC SURGERY (CARDIOTHORACIC VASCULAR SURGERY)

## 2022-06-10 PROCEDURE — 84132 ASSAY OF SERUM POTASSIUM: CPT | Performed by: THORACIC SURGERY (CARDIOTHORACIC VASCULAR SURGERY)

## 2022-06-10 PROCEDURE — 250N000011 HC RX IP 250 OP 636: Performed by: PHYSICIAN ASSISTANT

## 2022-06-10 PROCEDURE — 250N000013 HC RX MED GY IP 250 OP 250 PS 637: Performed by: PHYSICIAN ASSISTANT

## 2022-06-10 PROCEDURE — 97110 THERAPEUTIC EXERCISES: CPT | Mod: GO

## 2022-06-10 PROCEDURE — 36415 COLL VENOUS BLD VENIPUNCTURE: CPT | Performed by: PHYSICIAN ASSISTANT

## 2022-06-10 PROCEDURE — 250N000013 HC RX MED GY IP 250 OP 250 PS 637: Performed by: THORACIC SURGERY (CARDIOTHORACIC VASCULAR SURGERY)

## 2022-06-10 PROCEDURE — 97535 SELF CARE MNGMENT TRAINING: CPT | Mod: GO

## 2022-06-10 PROCEDURE — 84100 ASSAY OF PHOSPHORUS: CPT | Performed by: THORACIC SURGERY (CARDIOTHORACIC VASCULAR SURGERY)

## 2022-06-10 PROCEDURE — 71046 X-RAY EXAM CHEST 2 VIEWS: CPT

## 2022-06-10 PROCEDURE — 85049 AUTOMATED PLATELET COUNT: CPT | Performed by: PHYSICIAN ASSISTANT

## 2022-06-10 RX ORDER — POTASSIUM CHLORIDE 1500 MG/1
20 TABLET, EXTENDED RELEASE ORAL ONCE
Status: COMPLETED | OUTPATIENT
Start: 2022-06-10 | End: 2022-06-10

## 2022-06-10 RX ORDER — FUROSEMIDE 20 MG
20 TABLET ORAL DAILY
Qty: 20 TABLET | Refills: 0 | Status: SHIPPED | OUTPATIENT
Start: 2022-06-10 | End: 2022-06-22

## 2022-06-10 RX ORDER — OXYCODONE HYDROCHLORIDE 5 MG/1
5 TABLET ORAL EVERY 4 HOURS PRN
Qty: 20 TABLET | Refills: 0 | Status: SHIPPED | OUTPATIENT
Start: 2022-06-10 | End: 2022-06-22

## 2022-06-10 RX ORDER — AMLODIPINE BESYLATE 2.5 MG/1
2.5 TABLET ORAL DAILY
Qty: 30 TABLET | Refills: 5 | Status: SHIPPED | OUTPATIENT
Start: 2022-06-11 | End: 2022-11-08

## 2022-06-10 RX ORDER — POTASSIUM CHLORIDE 750 MG/1
20 TABLET, EXTENDED RELEASE ORAL DAILY
Qty: 10 TABLET | Refills: 0 | Status: SHIPPED | OUTPATIENT
Start: 2022-06-10 | End: 2022-06-22

## 2022-06-10 RX ORDER — AMOXICILLIN 250 MG
1 CAPSULE ORAL 2 TIMES DAILY PRN
COMMUNITY
Start: 2022-06-10 | End: 2022-06-22

## 2022-06-10 RX ORDER — METOPROLOL TARTRATE 25 MG/1
25 TABLET, FILM COATED ORAL 2 TIMES DAILY
Status: DISCONTINUED | OUTPATIENT
Start: 2022-06-10 | End: 2022-06-10 | Stop reason: HOSPADM

## 2022-06-10 RX ORDER — MAGNESIUM OXIDE 400 MG/1
400 TABLET ORAL 2 TIMES DAILY
Status: DISCONTINUED | OUTPATIENT
Start: 2022-06-10 | End: 2022-06-10 | Stop reason: HOSPADM

## 2022-06-10 RX ORDER — ASPIRIN 81 MG/1
162 TABLET, CHEWABLE ORAL DAILY
Qty: 60 TABLET | Refills: 11 | Status: ON HOLD | OUTPATIENT
Start: 2022-06-11 | End: 2023-08-16

## 2022-06-10 RX ORDER — METOPROLOL TARTRATE 25 MG/1
25 TABLET, FILM COATED ORAL 2 TIMES DAILY
Qty: 60 TABLET | Refills: 2 | Status: SHIPPED | OUTPATIENT
Start: 2022-06-10 | End: 2022-06-22

## 2022-06-10 RX ADMIN — OXYCODONE HYDROCHLORIDE 10 MG: 5 TABLET ORAL at 15:37

## 2022-06-10 RX ADMIN — POLYETHYLENE GLYCOL 3350 17 GRAM ORAL POWDER PACKET 17 G: at 08:34

## 2022-06-10 RX ADMIN — AMLODIPINE BESYLATE 2.5 MG: 2.5 TABLET ORAL at 08:31

## 2022-06-10 RX ADMIN — ACETAMINOPHEN 650 MG: 325 TABLET ORAL at 07:06

## 2022-06-10 RX ADMIN — Medication 400 MG: at 08:33

## 2022-06-10 RX ADMIN — ASPIRIN 81 MG CHEWABLE TABLET 162 MG: 81 TABLET CHEWABLE at 08:31

## 2022-06-10 RX ADMIN — OXYCODONE HYDROCHLORIDE 5 MG: 5 TABLET ORAL at 08:37

## 2022-06-10 RX ADMIN — METOPROLOL TARTRATE 25 MG: 25 TABLET, FILM COATED ORAL at 08:42

## 2022-06-10 RX ADMIN — ACETAMINOPHEN 650 MG: 325 TABLET ORAL at 15:34

## 2022-06-10 RX ADMIN — POTASSIUM CHLORIDE 20 MEQ: 1500 TABLET, EXTENDED RELEASE ORAL at 08:32

## 2022-06-10 RX ADMIN — ATORVASTATIN CALCIUM 40 MG: 40 TABLET, FILM COATED ORAL at 08:32

## 2022-06-10 RX ADMIN — HEPARIN SODIUM 5000 UNITS: 10000 INJECTION, SOLUTION INTRAVENOUS; SUBCUTANEOUS at 12:46

## 2022-06-10 RX ADMIN — OXYCODONE HYDROCHLORIDE 10 MG: 5 TABLET ORAL at 03:10

## 2022-06-10 RX ADMIN — HEPARIN SODIUM 5000 UNITS: 10000 INJECTION, SOLUTION INTRAVENOUS; SUBCUTANEOUS at 03:04

## 2022-06-10 RX ADMIN — PANTOPRAZOLE SODIUM 40 MG: 40 TABLET, DELAYED RELEASE ORAL at 08:33

## 2022-06-10 RX ADMIN — FUROSEMIDE 40 MG: 10 INJECTION, SOLUTION INTRAMUSCULAR; INTRAVENOUS at 07:00

## 2022-06-10 RX ADMIN — POTASSIUM & SODIUM PHOSPHATES POWDER PACK 280-160-250 MG 1 PACKET: 280-160-250 PACK at 08:45

## 2022-06-10 RX ADMIN — SENNOSIDES AND DOCUSATE SODIUM 1 TABLET: 8.6; 5 TABLET ORAL at 08:35

## 2022-06-10 ASSESSMENT — ACTIVITIES OF DAILY LIVING (ADL)
ADLS_ACUITY_SCORE: 30
ADLS_ACUITY_SCORE: 28
ADLS_ACUITY_SCORE: 23
ADLS_ACUITY_SCORE: 28
ADLS_ACUITY_SCORE: 30

## 2022-06-10 NOTE — PROGRESS NOTES
Care Management Discharge Note    Discharge Date: 06/10/2022       Discharge Disposition: Home    Discharge Services:  (Outpatient Cardiac Rehab)    Discharge DME:      Discharge Transportation: family or friend will provide    Private pay costs discussed: Not applicable    Education Provided on the Discharge Plan:  yes  Persons Notified of Discharge Plans: yes  Patient/Family in Agreement with the Plan: yes      Additional Information:  Reviewed pt chart and plan for pt to discharge home with OP Cardiac Rehab. No CM d.c needs indicated,         Dayanna Majano LGSW

## 2022-06-10 NOTE — PLAN OF CARE
Cardiac Rehab Discharge Summary    Reason for therapy discharge:    Discharged to home with outpatient therapy.    Progress towards therapy goal(s). See goals on Care Plan in [facility] electronic health record for goal details.  Goals met    Therapy recommendation(s):    Continued therapy is recommended.  Rationale/Recommendations:  reiviewed home education.  pt would benefit from outpatient cardiac rehab for further monitoring and endurance building for independence and health.  Continue home exercise program.

## 2022-06-10 NOTE — PLAN OF CARE
sc  Patient Name: Gonzalez Morton   MRN: [id number]   Date of Admission: 6/6/2022    Procedure: Procedure(s):  CORONARY ARTERY BYPASS GRAFT X 5, ENDOSCOPIC VESSEL PROCUREMENT LEFT LEG, INTERNAL MAMMARY ARTERY HARVEST, LEFT RADIAL ARTERY HARVEST, ANESTHESIA TRANSESOPHAGEAL ECHOCARDIOGRAM, EPIAORTIC ULTRASOUND    Post Op day #:3-4 NOC    Subjective (Patient focus/Primary Problem for shift): sleep          Pain Goal no pain Pain Rating 7-8          Pain Medication/ Regime effective to reduce patient pain PRN PO oxycodone  Declines ice pack  Objective (Physical assessment):           Rhythm: normal sinus rhythm            Bowel Activity: no if Yes indicate when: 6/9/2022          Bowel Medications: no            Incision: healing well          Incentive Spirometry Q 1-2 hour when awake:  yes Volume:           Epicardial Pacing Wires:  no            Patient Activity:           Up to chair for meals: yes          Ambulation with RN x2 (Not including CR): yes            Is patient in home clothes:no             Chest Tubes   Pleural: no Draining: not applicable               Suction: no              Mediastinal: no Draining: not applicable               Suction: no   Dressing Change Daily:no If No, why?pressure dressing in place, CDI                     Urinary Catheter: no           Preventative WOC consult (need MD order): no       Assessment (Nursing primary shift focus): Pt sleeping between cares and rounding.  Up to bathroom with SBA.      Plan (Patient Care Plan/focus): Home       Dayanna Song RN   6/10/2022   6:05 AM

## 2022-06-10 NOTE — PROGRESS NOTES
sc  Patient Name: Gonzalez Morton   MRN: [id number]   Date of Admission: 6/6/2022    Procedure: Procedure(s):  CORONARY ARTERY BYPASS GRAFT X 5, ENDOSCOPIC VESSEL PROCUREMENT LEFT LEG, INTERNAL MAMMARY ARTERY HARVEST, LEFT RADIAL ARTERY HARVEST, ANESTHESIA TRANSESOPHAGEAL ECHOCARDIOGRAM, EPIAORTIC ULTRASOUND    Post Op day #:4    Subjective (Patient focus/Primary Problem for shift): pain          Pain Goal0 Pain Rating6           Pain Medication/ Regime effective to reduce patient pain incision site and chest tube removal site     Objective (Physical assessment):           Rhythm: normal sinus rhythm            Bowel Activity: yes if Yes indicate when: 6-9-22          Bowel Medications: yes            Incision: healing well          Incentive Spirometry Q 1-2 hour when awake:  yes Volume: 2500          Epicardial Pacing Wires:  not applicable            Patient Activity:           Up to chair for meals: yes          Ambulation with RN x2 (Not including CR): no            Is patient in home clothes:no             Chest Tubes   Pleural: no Draining: no               Suction: no              Mediastinal: no Draining: no               Suction: no   Dressing Change Daily:yes                      Urinary Catheter: no           Preventative WOC consult (need MD order): no       Assessment (Nursing primary shift focus). LENNY Alexander was in early am and explained plan of care and plan to go home today. Patient in agreement. Independent in the room. And going for chest xray in a w/c. NSR. Wife to come see patient and take him home. Pain med prn for incisional discomfort.     Plan (Patient Care Plan/focus): Patient incisional pain controlled with oxycodone. Went for CXRAY with one assist. Patient said he feels ready to go home. Belongings to go home with patient. CABG teaching and sternal precaution teaching reviewed. Call light in reach.       Vanda Delgado RN   6/10/2022   10:00 AM

## 2022-06-10 NOTE — DISCHARGE SUMMARY
Cardiovascular Surgery Discharge Summary    Primary Care Physician:  Dylon Shafer  Discharge Provider: Catherine Champion,   Admission Date: 6/6/2022  Admission Diagnoses: CAD (coronary artery disease) [I25.10]  Coronary artery disease involving native coronary artery of native heart, unspecified whether angina present [I25.10]  Discharge Date: 6/10/2022  Disposition: Home  Condition at Discharge: Good  Code Status: Full Code     Principal Diagnosis:   Coronary artery disease s/p CABGx5    Discharge Diagnoses:    Active Problems:      Patient Active Problem List   Diagnosis     Benign Essential Hypertension     Mixed hyperlipidemia     Rosacea     History of prostate cancer     Morbid obesity (H)     Erectile dysfunction following radical prostatectomy     History of malignant neoplasm of prostate     Abnormal cardiovascular stress test     Family history of ischemic heart disease     Elevated coronary artery calcium score     Obstructive sleep apnea syndrome     Coronary artery disease involving native coronary artery of native heart, unspecified whether angina present         Consult/s: Dietary, critical care medicine    Surgery:   6/6/2022 with Dr. gross  1. Coronary artery bypass grafting x 5   A. Radial artery graft to the right posterior descending coronary artery  B. Reversed saphenous vein graft to the first obtuse marginal branch of the left circumflex coronary artery              B. Reversed saphenous vein graft to the second obtuse marginal branch of the left circumflex coronary artery  C. Reversed saphenous vein graft to the first diagonal branch of the left anterior descending coronary artery  D. Pedicled left internal mammary artery to second diagonal branch of the left anterior descending coronary artery  2. Endoscopic vein harvest of the greater saphenous vein from the left lower extremity.  3. Endoscopic radial artery harvest from the left upper extremity  4. Epiaortic ultrasound  5.  Transesophageal echocardiogram by anesthesia      Discharge Medications:      Review of your medicines      START taking      Dose / Directions   amLODIPine 2.5 MG tablet  Commonly known as: NORVASC  Used for: S/P CABG (coronary artery bypass graft)      Dose: 2.5 mg  Start taking on: June 11, 2022  Take 1 tablet (2.5 mg) by mouth daily  Quantity: 30 tablet  Refills: 5     aspirin 81 MG chewable tablet  Commonly known as: ASA  Used for: S/P CABG (coronary artery bypass graft)  Replaces: aspirin 81 MG EC tablet      Dose: 162 mg  Start taking on: June 11, 2022  2 tablets (162 mg) by Oral or NG Tube route daily  Quantity: 60 tablet  Refills: 11     furosemide 20 MG tablet  Commonly known as: LASIX  Used for: S/P CABG (coronary artery bypass graft)      Dose: 20 mg  Take 1 tablet (20 mg) by mouth daily For 10 days, then stop  Quantity: 20 tablet  Refills: 0     metoprolol tartrate 25 MG tablet  Commonly known as: LOPRESSOR  Used for: S/P CABG (coronary artery bypass graft)      Dose: 25 mg  Take 1 tablet (25 mg) by mouth 2 times daily  Quantity: 60 tablet  Refills: 2     oxyCODONE 5 MG tablet  Commonly known as: ROXICODONE  Used for: S/P CABG (coronary artery bypass graft)      Dose: 5 mg  Take 1 tablet (5 mg) by mouth every 4 hours as needed for breakthrough pain  Quantity: 20 tablet  Refills: 0     potassium chloride ER 10 MEQ CR tablet  Commonly known as: KLOR-CON M  Used for: S/P CABG (coronary artery bypass graft)      Dose: 20 mEq  Take 2 tablets (20 mEq) by mouth daily For 10 days, then stop  Quantity: 10 tablet  Refills: 0     senna-docusate 8.6-50 MG tablet  Commonly known as: SENOKOT-S/PERICOLACE  Used for: S/P CABG (coronary artery bypass graft)      Dose: 1 tablet  Take 1 tablet by mouth 2 times daily as needed for constipation  Refills: 0        CONTINUE these medicines which may have CHANGED, or have new prescriptions. If we are uncertain of the size of tablets/capsules you have at home, strength may be  "listed as something that might have changed.      Dose / Directions   hydrochlorothiazide 25 MG tablet  Commonly known as: HYDRODIURIL  This may have changed:     how much to take    how to take this    when to take this  Used for: Benign essential hypertension      [HYDROCHLOROTHIAZIDE (HYDRODIURIL) 25 MG TABLET] TAKE 1 TABLET BY MOUTH EVERY DAY  Quantity: 90 tablet  Refills: 3     nitroGLYcerin 0.4 MG sublingual tablet  Commonly known as: NITROSTAT  This may have changed:     how much to take    how to take this    when to take this    reasons to take this  Used for: Elevated coronary artery calcium score, Mixed hyperlipidemia, Abnormal cardiovascular stress test, Family history of ischemic heart disease      One tablet under the tongue every 5 minutes if needed for chest pain. May repeat every 5 minutes for a maximum of 3 doses in 15 minutes\"  Quantity: 25 tablet  Refills: 3        CONTINUE these medicines which have NOT CHANGED      Dose / Directions   acetaminophen 650 MG CR tablet  Commonly known as: TYLENOL      Dose: 1,300 mg  Take 1,300 mg by mouth every 8 hours as needed for pain  Refills: 0     atorvastatin 40 MG tablet  Commonly known as: LIPITOR  Used for: Mixed hyperlipidemia, Elevated coronary artery calcium score      TAKE 1 TABLET BY MOUTH EVERY DAY  Quantity: 90 tablet  Refills: 1     ibuprofen 200 MG capsule  Commonly known as: ADVIL/MOTRIN      Dose: 800 mg  Take 800 mg by mouth every 8 hours as needed for inflammatory pain  Refills: 0     rOPINIRole 0.25 MG tablet  Commonly known as: REQUIP  Used for: Restless legs syndrome      Dose: 0.5 mg  Take 2 tablets (0.5 mg) by mouth At Bedtime  Quantity: 180 tablet  Refills: 2        STOP taking    aspirin 81 MG EC tablet  Replaced by: aspirin 81 MG chewable tablet        lisinopril 20 MG tablet  Commonly known as: ZESTRIL              Where to get your medicines      These medications were sent to [facility] [place], [place] - [address] " "[address] [address]    Phone: [phone]     amLODIPine 2.5 MG tablet    aspirin 81 MG chewable tablet    furosemide 20 MG tablet    metoprolol tartrate 25 MG tablet    oxyCODONE 5 MG tablet    potassium chloride ER 10 MEQ CR tablet     Some of these will need a paper prescription and others can be bought over the counter. Ask your nurse if you have questions.    You don't need a prescription for these medications    senna-docusate 8.6-50 MG tablet         Discharge Instructions:    Follow up appointment with Primary Care Physician: Dylon Shafer within 7 days of discharge.  Follow up appointment with Specialist:    Follow with CV Surgery as scheduled.   Follow-up with cardiology as scheduled    Diet: Cardiac    Activity/Restrictions: As tolerated with sternal precautions in mind (see below). No driving for 4 weeks or while on pain medication.     - Shower and wash your incisions daily with soap and water. No tub baths/hot tubs for 4 weeks. An antibacterial soap such as Dial or Safeguard is recommended.    - Check your incisions every day. If you notice any redness, drainage, or anything unusual, please call the surgeons office.    - No driving for 4 weeks after surgery or while on pain medication     - Do not lift anything more than 10 pounds for 6 weeks after surgery. After 6 weeks, advance lifting is tolerated.    - You may have watery drainage from your chest tube site for 2-3 weeks after surgery. Your may cover with a Band-Aid to protect your clothing. Remove the Band-Aid every day and wash the site.    - If you have a leg lesion, you may have swelling for 2-3 months. Elevate your leg any time you are not walking.    - If you feel any \"popping\" or \"clicking\" sensations in your chest, your arms are out too far or you are putting too much weight into arm movements. Do not reach over your head or out to the side to pull something. Do not do any arm " exercises or use any exercise equipment that involves arm movement. If you feel your sternum moving, call the surgeon's office.    - Increase your daily activity as explained by Cardiac Rehab. You are encouraged to enroll in an Outpatient Cardiac Rehab Program.    - No active sports using your upper arms for 3 months. This includes fishing, hunting, bowling, swimming, tennis or golf.    - No physical activity such as cutting the grass, raking, vacuuming, changing sheets on your bed, snow shoveling, or using a  for 3 months.    - Use incentive spirometer 6-8 times per day for 2 weeks.       Hospital Summary:   Gonzalez Morton is a 61 year old male who was admitted to [facility] on 6/6/2022 following an abnormal stress test and coronary angiogram demonstrating severe multi-vessel coronary artery disease. He was referred to CV surgery for evaluation for possible elective coronary artery revascularization.     Patient was deemed a candidate for coronary artery bypass surgery, and was taken to the operating room on 6/6/2022 where patient underwent five vessel coronary artery bypass, endoscopic left radial artery harvest, and endoscopic vein harvest from the left leg. Surgery was uneventful and patient was brought to the ICU post-operatively. He was extubated on POD#0 and weaned from pressors. Patient was awake and alert, afebrile, and with stable vitals. Insulin drip was discontinued and he was transitioned to a sliding scale. He was transferred to the general telemetry floor on POD#2 where patient has had return of bowel function, is maintaining oxygen saturations on room air, had his chest tubes removed, and has no complaints of chest pain or shortness of breath. On 06/10/22, patient was stable enough to be discharged to home.    Of note, patient is maintained on amlodipine for radial artery graft protection. He should be on this for at least 6 months post op before considering discontinuation  "(1/2023).    Patient has mild bilateral lower extremity edema and is slightly weight up at discharge; due to this he will be sent with 10 days of Lasix therapy with potassium supplementation.       Vital signs:  Temp: 98.3  F (36.8  C) Temp src: Oral BP: 111/68 Pulse: 70   Resp: 20 SpO2: 93 % O2 Device: None (Room air) Oxygen Delivery: 2 LPM Height: 175.3 cm (5' 9\") Weight: 124.9 kg (275 lb 6.4 oz)  Estimated body mass index is 40.67 kg/m  as calculated from the following:    Height as of this encounter: 1.753 m (5' 9\").    Weight as of this encounter: 124.9 kg (275 lb 6.4 oz).        Physical Exam:    Pertinent exam findings on day of discharge include:  Gen: Seen up in chair. NAD. Pleasant and conversant.   CV: RRR on monitor. Mild bilateral extremity edema.  Pulm: Non-labored breathing on room air.  Abd: Soft, non-tender, non-distended  Neuro: CNs grossly intact  Inc: C/D/I      _______  Catherine Champion PA-C  Cardiothoracic Surgery  [phone]    "

## 2022-06-10 NOTE — PROGRESS NOTES
"Patient discharge orders are in and he plans to walk with cardiac rehab,  eat lunch, take a shower with stand by assist and than writer to go over discharge with patient and the wife. Teaching on ,\" Going Home after Heart Surgery\"  DVD seen by the patient. HE said it was a good review and he read the booklet 3 times already. Patient had no further questions about post op cares and aware of sternal precautions. Call light in reach.   "

## 2022-06-14 ENCOUNTER — TELEPHONE (OUTPATIENT)
Dept: CARDIOLOGY | Facility: CLINIC | Age: 62
End: 2022-06-14
Payer: COMMERCIAL

## 2022-06-14 NOTE — TELEPHONE ENCOUNTER
Cardiovascular Surgery  [facility]    DISCHARGE FOLLOW UP PHONE CALL      POST OP MONITORING  How is your pain on a 0-10 scale, how are you managing your pain? Pain is being managed well with tylenol during the day, and oxycodone at night.    ACTIVITY  How is your activity tolerance? Walking frequently, was currently out at [facility].  Are you still doing sternal precautions? Yes.  Do you hear any clicking when you are moving or taking a deep breath? No.    Are you weighing yourself daily? Yes, pt is 255 lbs.    SIGNS AND SYMPTOMS OF INFECTION  1. INCREASE IN PAIN - No  2. FEVER - No  3. DRAINAGE - No If so, color: NA  4. REDNESS - No  5. SWELLING - No    ASSISTANCE  Do you have someone at home to assist you with your daily activities? Spouse is helping pt at home.    MEDICATIONS  Is someone helping you to set up your medications? Pt is independent.  Do you have any questions about your medications? No.    Are you on a blood thinner? No.  Who is managing your INRs? NA    FOLLOW UP  You are scheduled to see your primary care physician on 6/22.  You are scheduled to see our surgery advanced practive provider for post operative follow up on 7/5.    You are scheduled for cardiac rehab on 6/16.  You are scheduled to see your cardiologist on 8/9.    CONTACT INFORMATION  Please feel free to call us with any questions or symptoms that are concerning for you at [phone]. If it is after 4:00 in the afternoon, or a weekend please call [phone] and ask for the on call specialist. We want to do everything we can to help prevent you needing to return to the ED, so please do not hesitate to call us.    Julianna Ley, RNCC  Cardiovascular Surgery  [phone]

## 2022-06-15 ENCOUNTER — TELEPHONE (OUTPATIENT)
Dept: ADMINISTRATIVE | Facility: CLINIC | Age: 62
End: 2022-06-15
Payer: COMMERCIAL

## 2022-06-15 NOTE — TELEPHONE ENCOUNTER
"Patient's wife called to report that Mr Praveen ate salmon for dinner last night and since then his lips began to swell, described as now looking \"like he was punched\" and are now very swollen. No difficulty breathing and she called to ask if it was okay to take Benadryl. I told her she should bring him to the ER. She asked me to just ask about the Benadryl and call the patient to let him know if that was okay. I did check with one of our APPs, who recommended he go to the ER or his primary care provider, but that Benadryl would be okay to take--but ER or primary is their recommendation. I did call the patient to pass on that information.   "

## 2022-06-16 ENCOUNTER — HOSPITAL ENCOUNTER (OUTPATIENT)
Dept: CARDIAC REHAB | Facility: CLINIC | Age: 62
Discharge: HOME | End: 2022-06-16
Attending: PHYSICIAN ASSISTANT
Payer: COMMERCIAL

## 2022-06-16 DIAGNOSIS — Z95.1 S/P CABG (CORONARY ARTERY BYPASS GRAFT): ICD-10-CM

## 2022-06-16 PROCEDURE — 93798 PHYS/QHP OP CAR RHAB W/ECG: CPT

## 2022-06-16 PROCEDURE — 93797 PHYS/QHP OP CAR RHAB WO ECG: CPT

## 2022-06-17 ENCOUNTER — NURSE TRIAGE (OUTPATIENT)
Dept: FAMILY MEDICINE | Facility: CLINIC | Age: 62
End: 2022-06-17
Payer: COMMERCIAL

## 2022-06-17 ENCOUNTER — MYC MEDICAL ADVICE (OUTPATIENT)
Dept: FAMILY MEDICINE | Facility: CLINIC | Age: 62
End: 2022-06-17
Payer: COMMERCIAL

## 2022-06-17 ENCOUNTER — TELEPHONE (OUTPATIENT)
Dept: FAMILY MEDICINE | Facility: CLINIC | Age: 62
End: 2022-06-17
Payer: COMMERCIAL

## 2022-06-17 ENCOUNTER — NURSE TRIAGE (OUTPATIENT)
Dept: NURSING | Facility: CLINIC | Age: 62
End: 2022-06-17
Payer: COMMERCIAL

## 2022-06-17 DIAGNOSIS — I25.10 CORONARY ARTERY DISEASE INVOLVING NATIVE CORONARY ARTERY OF NATIVE HEART, UNSPECIFIED WHETHER ANGINA PRESENT: Primary | ICD-10-CM

## 2022-06-17 DIAGNOSIS — T78.40XA ALLERGIC REACTION, INITIAL ENCOUNTER: ICD-10-CM

## 2022-06-17 RX ORDER — PREDNISONE 20 MG/1
TABLET ORAL
Qty: 30 TABLET | Refills: 0 | Status: SHIPPED | OUTPATIENT
Start: 2022-06-17 | End: 2023-05-17

## 2022-06-17 RX ORDER — CARVEDILOL 3.12 MG/1
3.12 TABLET ORAL 2 TIMES DAILY WITH MEALS
Qty: 60 TABLET | Refills: 0 | Status: SHIPPED | OUTPATIENT
Start: 2022-06-17 | End: 2022-07-13

## 2022-06-17 RX ORDER — TRIAMCINOLONE ACETONIDE 1 MG/G
CREAM TOPICAL 2 TIMES DAILY
Qty: 30 G | Refills: 0 | Status: SHIPPED | OUTPATIENT
Start: 2022-06-17 | End: 2023-05-17

## 2022-06-17 NOTE — TELEPHONE ENCOUNTER
Attempted to contact patient. No answer. When patient returns call, Please transfer to RN Triage line to discuss hives.  Madiha Ramirez RN on 6/17/2022 at 11:31 AM

## 2022-06-17 NOTE — TELEPHONE ENCOUNTER
Patient is calling and noticed on Wednesday at [place] and now he has hives and is using benadryl.  Hives are present and they come and go.  Patient states that hives are itching.  Patient states that he just started taking Metoprolol tartrate and now has hives.  Patient denies any allergy to fish.  Patient is requesting to speak with Dylon Dolan regarding his medication.  Clinic please phone patient within 2 hours.    COVID 19 Nurse Triage Plan/Patient Instructions    Please be aware that novel coronavirus (COVID-19) may be circulating in the community. If you develop symptoms such as fever, cough, or SOB or if you have concerns about the presence of another infection including coronavirus (COVID-19), please contact your health care provider or visit [email or web address].     Disposition/Instructions    In-Person Visit with provider recommended. Reference Visit Selection Guide.    Thank you for taking steps to prevent the spread of this virus.  o Limit your contact with others.  o Wear a simple mask to cover your cough.  o Wash your hands well and often.    Resources    [facility]: About COVID-19: [email or web address]    CDC: What to Do If You're Sick: www.cdc.gov/coronavirus/2019-ncov/about/steps-when-sick.html    CDC: Ending Home Isolation: www.cdc.gov/coronavirus/2019-ncov/hcp/disposition-in-home-patients.html     CDC: Caring for Someone: www.cdc.gov/coronavirus/2019-ncov/if-you-are-sick/care-for-someone.html     [facility]: Interim Guidance for Hospital Discharge to Home: www.health[facility]/diseases/coronavirus/hcp/hospdischarge.pdf    [facility] clinical trials (COVID-19 research studies): [email or web address]/um-clinical-trials     Below are the COVID-19 hotlines at the [place] Department of Health ([facility]). Interpreters are available.   o For health questions: Call 543-150-2100 or 1-205.181.7868 (7 a.m. to 7 p.m.)  o For questions about schools and childcare:  Call [phone] or 1-430.214.4015 (7 a.m. to 7 p.m.)                       Reason for Disposition    MODERATE-SEVERE hives persist (i.e., hives interfere with normal activities or work) and taking antihistamine (e.g., Benadryl, Claritin) > 24 hours    Additional Information    Negative: Difficulty breathing or wheezing now    Negative: Rapid onset of swollen tongue    Negative: Rapid onset of hoarseness or cough    Negative: Very weak (can't stand)    Negative: Difficult to awaken or acting confused (e.g., disoriented, slurred speech)    Negative: Life-threatening reaction (anaphylaxis) in the past to similar substance (e.g., food, insect bite/sting, chemical, etc.) and < 2 hours since exposure    Negative: Sounds like a life-threatening emergency to the triager    Negative: Swollen tongue    Negative: Widespread hives, itching, or facial swelling and onset < 2 hours of exposure to high-risk allergen (e.g., 1st dose of antibiotic, nuts, sting)    Negative: Patient sounds very sick or weak to the triager    Protocols used: HIVES-A-OH

## 2022-06-17 NOTE — TELEPHONE ENCOUNTER
See telephone encounter. Attempted to contact patient, no answer. Please transfer patient to RN Triage line when patient calls back r/t hives.  Madiha Ramirez RN on 6/17/2022 at 11:33 AM

## 2022-06-17 NOTE — TELEPHONE ENCOUNTER
Discussed with patient.    He notes that after he started metoprolol in the hospital that he developed itching.  He initially attributed this to the opioids, but the itching continued and has now developed into hives and lip swelling.  Over-the-counter Benadryl has been moderately beneficial at bringing the lip swelling down.  No airway compromise.  Triamcinolone sent for spot treatment.  Prednisone sent to start tomorrow.  Carvedilol sent to replace metoprolol given that symptoms started after initiation of metoprolol.  We will follow-up with patient next week.  If worsening symptoms over the weekend, REMA Shafer NP

## 2022-06-20 ENCOUNTER — HOSPITAL ENCOUNTER (OUTPATIENT)
Dept: CARDIAC REHAB | Facility: CLINIC | Age: 62
Discharge: HOME | End: 2022-06-20
Attending: THORACIC SURGERY (CARDIOTHORACIC VASCULAR SURGERY)
Payer: COMMERCIAL

## 2022-06-20 PROCEDURE — 93798 PHYS/QHP OP CAR RHAB W/ECG: CPT

## 2022-06-22 ENCOUNTER — HOSPITAL ENCOUNTER (OUTPATIENT)
Dept: CARDIAC REHAB | Facility: CLINIC | Age: 62
Discharge: HOME | End: 2022-06-22
Attending: THORACIC SURGERY (CARDIOTHORACIC VASCULAR SURGERY)
Payer: COMMERCIAL

## 2022-06-22 ENCOUNTER — OFFICE VISIT (OUTPATIENT)
Dept: FAMILY MEDICINE | Facility: CLINIC | Age: 62
End: 2022-06-22
Payer: COMMERCIAL

## 2022-06-22 VITALS
SYSTOLIC BLOOD PRESSURE: 130 MMHG | OXYGEN SATURATION: 97 % | DIASTOLIC BLOOD PRESSURE: 78 MMHG | RESPIRATION RATE: 14 BRPM | HEART RATE: 62 BPM | BODY MASS INDEX: 37.82 KG/M2 | WEIGHT: 256.1 LBS

## 2022-06-22 DIAGNOSIS — I25.10 CORONARY ARTERY DISEASE INVOLVING NATIVE CORONARY ARTERY OF NATIVE HEART, UNSPECIFIED WHETHER ANGINA PRESENT: ICD-10-CM

## 2022-06-22 DIAGNOSIS — E87.6 HYPOKALEMIA: ICD-10-CM

## 2022-06-22 DIAGNOSIS — E66.01 MORBID OBESITY (H): ICD-10-CM

## 2022-06-22 DIAGNOSIS — Z95.1 STATUS POST CORONARY ARTERY BYPASS GRAFT: ICD-10-CM

## 2022-06-22 DIAGNOSIS — D62 ACUTE BLOOD LOSS AS CAUSE OF POSTOPERATIVE ANEMIA: Primary | ICD-10-CM

## 2022-06-22 LAB
HGB BLD-MCNC: 12.2 G/DL (ref 13.3–17.7)
POTASSIUM BLD-SCNC: 4.7 MMOL/L (ref 3.5–5)

## 2022-06-22 PROCEDURE — 85018 HEMOGLOBIN: CPT | Performed by: NURSE PRACTITIONER

## 2022-06-22 PROCEDURE — 93798 PHYS/QHP OP CAR RHAB W/ECG: CPT

## 2022-06-22 PROCEDURE — 36415 COLL VENOUS BLD VENIPUNCTURE: CPT | Performed by: NURSE PRACTITIONER

## 2022-06-22 PROCEDURE — 84132 ASSAY OF SERUM POTASSIUM: CPT | Performed by: NURSE PRACTITIONER

## 2022-06-22 PROCEDURE — 99495 TRANSJ CARE MGMT MOD F2F 14D: CPT | Performed by: NURSE PRACTITIONER

## 2022-06-22 RX ORDER — DOXYCYCLINE 100 MG/1
CAPSULE ORAL
COMMUNITY
Start: 2022-05-10 | End: 2022-06-22

## 2022-06-22 ASSESSMENT — PAIN SCALES - GENERAL: PAINLEVEL: NO PAIN (0)

## 2022-06-22 NOTE — PROGRESS NOTES
Assessment & Plan     ICD-10-CM    1. Acute blood loss as cause of postoperative anemia  D62 Hemoglobin     Hemoglobin   2. Hypokalemia  E87.6 Potassium     Potassium   3. Morbid obesity (H)  E66.01    4. Coronary artery disease involving native coronary artery of native heart, unspecified whether angina present  I25.10    5. Status post aorto-coronary artery bypass graft  Z95.1      Patient is doing very well.  No red flags on exam.  Did have some mild potassium issues during this hospital stay and postoperative anemia.  Recheck to ensure stability/improvement.  Continue working with cardiac rehab.  No further reaction issues to his medications since changing metoprolol to carvedilol.    Total time spent was 31 minutes including reviewing records prior to arrival, consultation, placing orders, education, and reviewing the plan of care on the date of service.      Subjective     HPI       Hospital Follow-up Visit:    Hospital/Nursing Home/IP Rehab Facility: [facility]  Date of Admission: 6/6/22  Date of Discharge: 6/10/22  Reason(s) for Admission: CABG    Was your hospitalization related to COVID-19? No   Problems taking medications regularly:  None  Medication changes since discharge: changed metoprolol to carvedilol due to potential side effects  Problems adhering to non-medication therapy:  None    Summary of hospitalization:  [facility] discharge summary reviewed  Diagnostic Tests/Treatments reviewed.  Follow up needed: cardiac rehab and cardiology  Other Healthcare Providers Involved in Patient s Care: cardiology  Update since discharge: improved. Post Discharge Medication Reconciliation: discharge medications reconciled and changed, per note/orders.  Plan of care communicated with patient     Hospital Summary:   Gonzalez Morton is a 61 year old male who was admitted to [facility] on 6/6/2022 following an abnormal stress test and coronary angiogram  demonstrating severe multi-vessel coronary artery disease. He was referred to CV surgery for evaluation for possible elective coronary artery revascularization.      Patient was deemed a candidate for coronary artery bypass surgery, and was taken to the operating room on 6/6/2022 where patient underwent five vessel coronary artery bypass, endoscopic left radial artery harvest, and endoscopic vein harvest from the left leg. Surgery was uneventful and patient was brought to the ICU post-operatively. He was extubated on POD#0 and weaned from pressors. Patient was awake and alert, afebrile, and with stable vitals. Insulin drip was discontinued and he was transitioned to a sliding scale. He was transferred to the general telemetry floor on POD#2 where patient has had return of bowel function, is maintaining oxygen saturations on room air, had his chest tubes removed, and has no complaints of chest pain or shortness of breath. On 06/10/22, patient was stable enough to be discharged to home.     Of note, patient is maintained on amlodipine for radial artery graft protection. He should be on this for at least 6 months post op before considering discontinuation (1/2023).     Patient has mild bilateral lower extremity edema and is slightly weight up at discharge; due to this he will be sent with 10 days of Lasix therapy with potassium supplementation.     Since discharge doing well. Incision is doing well. Weight daily without fluctuations. breathing well. No hemoptysis. No LE swelling issues.  He has since finished up his furosemide.  No lower extremity swelling issues.  No chest pain or palpitations.  At one of the sites of his chest tubes he did have a scant amount of serosanguineous drainage which stopped with some pressure and was read bandage.  No purulent discharge.  No spreading erythema.  He did have some mild tremors with the prednisone, but no sleep disturbances or agitation.  The diffuse pruritus and rash did  resolve with it.      Review of Systems - negative except for what's listed in the HPI      Objective    /78 (BP Location: Right arm, Patient Position: Sitting, Cuff Size: Adult Regular)   Pulse 62   Resp 14   Wt 116.2 kg (256 lb 1.6 oz)   SpO2 97%   BMI 37.82 kg/m    Physical Exam   General appearance - alert, well appearing, and in no distress  Mental status - alert, oriented to person, place, and time  Eyes -PERRLA  Ears - bilateral TM's and external ear canals normal  Nose - normal and patent, no erythema, discharge or polyps  Mouth - mucous membranes moist. No oral lesions.  Neck - no significant adenopathy  Lymphatics - no palpable lymphadenopathy  Chest - clear to auscultation, no wheezes, rales or rhonchi, symmetric air entry  Heart - normal rate and regular rhythm, S1 and S2 normal, no murmurs noted  Abdomen - soft, nontender, nondistended, no masses or organomegaly  Neurological - alert, oriented, normal speech, no focal findings or movement disorder noted, neck supple without rigidity, cranial nerves II through XII intact, motor and sensory grossly normal bilaterally, normal muscle tone, no tremors, strength 5/5  Musculoskeletal - no joint tenderness, deformity or swelling  Extremities - peripheral pulses normal, no peripheral edema  Skin - normal coloration and turgor.  Incision sites are CDI.    Dylon Shafer CNP    This note has been dictated using voice recognition software. Any grammatical or context distortions are unintentional and inherent to the software.

## 2022-06-23 PROBLEM — Z95.1 STATUS POST AORTO-CORONARY ARTERY BYPASS GRAFT: Status: ACTIVE | Noted: 2022-06-23

## 2022-06-23 PROBLEM — R94.39 ABNORMAL CARDIOVASCULAR STRESS TEST: Status: RESOLVED | Noted: 2022-04-03 | Resolved: 2022-06-23

## 2022-06-24 ENCOUNTER — HOSPITAL ENCOUNTER (OUTPATIENT)
Dept: CARDIAC REHAB | Facility: CLINIC | Age: 62
Discharge: HOME | End: 2022-06-24
Attending: THORACIC SURGERY (CARDIOTHORACIC VASCULAR SURGERY)
Payer: COMMERCIAL

## 2022-06-24 PROCEDURE — 93798 PHYS/QHP OP CAR RHAB W/ECG: CPT

## 2022-06-27 ENCOUNTER — HOSPITAL ENCOUNTER (OUTPATIENT)
Dept: CARDIAC REHAB | Facility: CLINIC | Age: 62
Discharge: HOME | End: 2022-06-27
Attending: THORACIC SURGERY (CARDIOTHORACIC VASCULAR SURGERY)
Payer: COMMERCIAL

## 2022-06-27 PROCEDURE — 93798 PHYS/QHP OP CAR RHAB W/ECG: CPT

## 2022-06-29 ENCOUNTER — HOSPITAL ENCOUNTER (OUTPATIENT)
Dept: CARDIAC REHAB | Facility: CLINIC | Age: 62
Discharge: HOME | End: 2022-06-29
Attending: THORACIC SURGERY (CARDIOTHORACIC VASCULAR SURGERY)
Payer: COMMERCIAL

## 2022-06-29 PROCEDURE — 93798 PHYS/QHP OP CAR RHAB W/ECG: CPT

## 2022-07-01 ENCOUNTER — HOSPITAL ENCOUNTER (OUTPATIENT)
Dept: CARDIAC REHAB | Facility: CLINIC | Age: 62
Discharge: HOME | End: 2022-07-01
Attending: THORACIC SURGERY (CARDIOTHORACIC VASCULAR SURGERY)
Payer: COMMERCIAL

## 2022-07-01 PROCEDURE — 93798 PHYS/QHP OP CAR RHAB W/ECG: CPT

## 2022-07-05 ENCOUNTER — OFFICE VISIT (OUTPATIENT)
Dept: CARDIOLOGY | Facility: CLINIC | Age: 62
End: 2022-07-05
Payer: COMMERCIAL

## 2022-07-05 VITALS
BODY MASS INDEX: 36.56 KG/M2 | RESPIRATION RATE: 16 BRPM | DIASTOLIC BLOOD PRESSURE: 62 MMHG | HEIGHT: 70 IN | SYSTOLIC BLOOD PRESSURE: 116 MMHG | WEIGHT: 255.4 LBS | HEART RATE: 64 BPM

## 2022-07-05 DIAGNOSIS — Z95.1 S/P CORONARY ARTERY BYPASS GRAFT X 5: Primary | ICD-10-CM

## 2022-07-05 PROCEDURE — 99024 POSTOP FOLLOW-UP VISIT: CPT | Performed by: PHYSICIAN ASSISTANT

## 2022-07-05 NOTE — PROGRESS NOTES
CARDIOTHORACIC SURGERY FOLLOW-UP VISIT     Gonzalez Morton   1960   [id number]      Reason for visit: Post operative clinic visit. Patient underwent CABG X 5 with Dr. Mayank Rocha MD and assisted by resident surgeon Dr. Gareth Christianson MD on 06/06/2022 at [facility].    HPI: Gonzalez Morton is a 61 year old year old male seen in clinic for a routine follow-up appointment after surgery. Patient has past medical history as below. Hospital course was was uneventful. Patient was discharged to to home on 06/10/2022.    Patient has been doing well since discharge. Patient did follow-up with primary care since leaving the hospital.  All surgical incisions are healing well.  He denies fevers, peripheral edema. Appetite is back to normal, bowel movement is regular with the aid of MiraLAX and patient is voiding without difficulty. Weight has been stable. Pain management at this point with Tylenol. Patient has been attending outpatient cardiac rehab and this is going doing well. Patient is not on anti-coagulation.     PAST MEDICAL HISTORY:  Past Medical History:   Diagnosis Date     Abnormal cardiovascular stress test 4/3/2022     CAD (coronary artery disease)      Hypertension      Knee injury     right     Rosacea      Sleep apnea     CPAP       PAST SURGICAL HISTORY:  Past Surgical History:   Procedure Laterality Date     APPENDECTOMY       ARTHROSCOPY KNEE Bilateral      BYPASS GRAFT ARTERY CORONARY N/A 6/6/2022    Procedure: CORONARY ARTERY BYPASS GRAFT X 5, ENDOSCOPIC VESSEL PROCUREMENT LEFT LEG, INTERNAL MAMMARY ARTERY HARVEST, LEFT RADIAL ARTERY HARVEST, ANESTHESIA TRANSESOPHAGEAL ECHOCARDIOGRAM, EPIAORTIC ULTRASOUND;  Surgeon: Mayank Rocha MD;  Location: [facility] Main OR     CV CORONARY ANGIOGRAM N/A 4/7/2022    Procedure: Coronary Angiogram;  Surgeon: Mery Lyons MD;  Location: [facility] CATH LAB CV     PROSTATECTOMY       TOTAL KNEE ARTHROPLASTY Left      WRIST SURGERY         CURRENT  "MEDICATIONS:     Current Outpatient Medications:      acetaminophen (TYLENOL) 650 MG CR tablet, Take 1,300 mg by mouth every 8 hours as needed for pain, Disp: , Rfl:      amLODIPine (NORVASC) 2.5 MG tablet, Take 1 tablet (2.5 mg) by mouth daily, Disp: 30 tablet, Rfl: 5     aspirin (ASA) 81 MG chewable tablet, 2 tablets (162 mg) by Oral or NG Tube route daily, Disp: 60 tablet, Rfl: 11     atorvastatin (LIPITOR) 40 MG tablet, TAKE 1 TABLET BY MOUTH EVERY DAY (Patient taking differently: Take 40 mg by mouth daily), Disp: 90 tablet, Rfl: 1     carvedilol (COREG) 3.125 MG tablet, Take 1 tablet (3.125 mg) by mouth 2 times daily (with meals) To replace metoprolol, Disp: 60 tablet, Rfl: 0     hydrochlorothiazide (HYDRODIURIL) 25 MG tablet, [HYDROCHLOROTHIAZIDE (HYDRODIURIL) 25 MG TABLET] TAKE 1 TABLET BY MOUTH EVERY DAY (Patient taking differently: Take 25 mg by mouth daily [HYDROCHLOROTHIAZIDE (HYDRODIURIL) 25 MG TABLET] TAKE 1 TABLET BY MOUTH EVERY DAY), Disp: 90 tablet, Rfl: 3     ibuprofen (ADVIL/MOTRIN) 200 MG capsule, Take 800 mg by mouth every 8 hours as needed for inflammatory pain, Disp: , Rfl:      nitroGLYcerin (NITROSTAT) 0.4 MG sublingual tablet, One tablet under the tongue every 5 minutes if needed for chest pain. May repeat every 5 minutes for a maximum of 3 doses in 15 minutes\" (Patient taking differently: Place 0.4 mg under the tongue every 5 minutes as needed for chest pain One tablet under the tongue every 5 minutes if needed for chest pain. May repeat every 5 minutes for a maximum of 3 doses in 15 minutes\"), Disp: 25 tablet, Rfl: 3     rOPINIRole (REQUIP) 0.25 MG tablet, Take 2 tablets (0.5 mg) by mouth At Bedtime, Disp: 180 tablet, Rfl: 2     triamcinolone (KENALOG) 0.1 % external cream, Apply topically 2 times daily, Disp: 30 g, Rfl: 0     predniSONE (DELTASONE) 20 MG tablet, 4 tabs for 3 days. 3 tabs for 3 days. 2 tabs for 3 days. 1 tab for 3 days. (Patient not taking: Reported on 7/5/2022), Disp: 30 " "tablet, Rfl: 0    ALLERGIES:      Allergies   Allergen Reactions     Metoprolol Tartrate Hives     Penicillins Hives     Tolerated CTX 06/2020, Ancef 6/2022       ROS:  Gen: No fevers, weight loss/gain  CV: Denies chest pain, peripheral edema  Pulm: Denies SOB  GI/: Voiding without problems, appetite improving.     LABS:  None    IMAGING:  None    PHYSICAL EXAM:   /62 (BP Location: Right arm, Patient Position: Sitting, Cuff Size: Adult Large)   Pulse 64   Resp 16   Ht 1.765 m (5' 9.5\")   Wt 115.8 kg (255 lb 6.4 oz)   BMI 37.18 kg/m    General: Alert and oriented, pleasant, no acute distress.  CV:  No peripheral edema.  Pulm: Easy work of breathing on room air.   GI: Soft, non-tender, and non-distended  Incision: Chest and left leg incisions clean dry and intact without erythema, swelling or drainage  Neuro: CNs grossly intact.      ASSESSMENT/PLAN:  Gonzalez Morton is a 61 year old year old male S/P coronary artery bypass graft x 5 with LIMA-LAD, rSVG-Diag, rSVG-OM1, rSVG-OM2, Radial-PDA, EVH from LLE on 06/06/2022 at [facility] by DR. Mayank Rocha MD and assisted by Gareth Christianson MD.  Patient hospital course was uneventful and patient was discharged to home on 06/10/2022  She presents today 07/05/202 for a post-op CV Surgery follow-up.  Patient dose not appear short of breath during this visit.  He denies incisional chest pain and or shortness of breath.  Lung sounds clear in all lobes, incisions are  well approximated, healing and without any sternal instability.  Appetite is back to  normal, bowel movement is regular with MiRalax and he voids without difficulties.  Patient saw his PCP Dylon Wing CNP on 06/22/2022.  Patient started outpatient cardiac rehab on 06/20/2022, and is tolerating well.  He is scheduled to see his primary cardiologist Dr. Mery Lyons MD on 08/09/2022.  Patient may start driving by 07/05/2022, but advised to continue the 10 pounds weight restriction for " the full 8 weeks.  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  He is otherwise doing well and will not need any further CV surgery follow-up, but is advised to call CV Surgery at [phone], with questions or concerns.           Terrell Forte PA-C  Cardiothoracic Surgery  [phone]

## 2022-07-05 NOTE — PATIENT INSTRUCTIONS
S/P coronary artery bypass graft x 5 with LIMA-LAD, rSVG-Diag, rSVG-OM1, rSVG-OM2, Radial-PDA, EVH from LLE on 06/06/2022 at [facility] by DR. Mayank Rocha MD and assisted by Gareth Christianson MD.  Patient hospital course was uneventful and patient was discharged to home on 06/10/2022  She presents today 07/05/202 for a post-op CV Surgery follow-up.  Patient dose not appear short of breath during this visit.  He denies incisional chest pain and or shortness of breath  Lung sounds clear in all lobes, incisions are  well approximated, healing and without any sternal instability  Appetite is back to  normal, bowel movement is regular with MiRalax and he voids without difficulties  Patient saw his PCP Dylon Wing CNP on 06/22/2022  Patient started outpatient cardiac rehab on 06/20/2022, and is tolerating well  He is scheduled to see his primary cardiologist Dr. 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

## 2022-07-05 NOTE — LETTER
7/5/2022    Dylon Shafer, NP  [address] [place] [address]    RE: Gonzalez Morton       Dear Colleague,     I had the pleasure of seeing Gonzalez Morton in the [facility].  CARDIOTHORACIC SURGERY FOLLOW-UP VISIT     Gonzalez Morton   1960   [id number]      Reason for visit: Post operative clinic visit. Patient underwent CABG X 5 with Dr. Mayank Rocha MD and assisted by resident surgeon Dr. Gareth Christianson MD on 06/06/2022 at [facility].    HPI: Gonzalez Morton is a 61 year old year old male seen in clinic for a routine follow-up appointment after surgery. Patient has past medical history as below. Hospital course was was uneventful. Patient was discharged to to home on 06/10/2022.    Patient has been doing well since discharge. Patient did follow-up with primary care since leaving the hospital.  All surgical incisions are healing well.  He denies fevers, peripheral edema. Appetite is back to normal, bowel movement is regular with the aid of MiraLAX and patient is voiding without difficulty. Weight has been stable. Pain management at this point with Tylenol. Patient has been attending outpatient cardiac rehab and this is going doing well. Patient is not on anti-coagulation.     PAST MEDICAL HISTORY:  Past Medical History:   Diagnosis Date     Abnormal cardiovascular stress test 4/3/2022     CAD (coronary artery disease)      Hypertension      Knee injury     right     Rosacea      Sleep apnea     CPAP       PAST SURGICAL HISTORY:  Past Surgical History:   Procedure Laterality Date     APPENDECTOMY       ARTHROSCOPY KNEE Bilateral      BYPASS GRAFT ARTERY CORONARY N/A 6/6/2022    Procedure: CORONARY ARTERY BYPASS GRAFT X 5, ENDOSCOPIC VESSEL PROCUREMENT LEFT LEG, INTERNAL MAMMARY ARTERY HARVEST, LEFT RADIAL ARTERY HARVEST, ANESTHESIA TRANSESOPHAGEAL ECHOCARDIOGRAM, EPIAORTIC ULTRASOUND;  Surgeon: Mayank Rocha MD;  Location: [facility] OR       "CORONARY ANGIOGRAM N/A 4/7/2022    Procedure: Coronary Angiogram;  Surgeon: Mery Lyons MD;  Location: [facility] CV     PROSTATECTOMY       TOTAL KNEE ARTHROPLASTY Left      WRIST SURGERY         CURRENT MEDICATIONS:     Current Outpatient Medications:      acetaminophen (TYLENOL) 650 MG CR tablet, Take 1,300 mg by mouth every 8 hours as needed for pain, Disp: , Rfl:      amLODIPine (NORVASC) 2.5 MG tablet, Take 1 tablet (2.5 mg) by mouth daily, Disp: 30 tablet, Rfl: 5     aspirin (ASA) 81 MG chewable tablet, 2 tablets (162 mg) by Oral or NG Tube route daily, Disp: 60 tablet, Rfl: 11     atorvastatin (LIPITOR) 40 MG tablet, TAKE 1 TABLET BY MOUTH EVERY DAY (Patient taking differently: Take 40 mg by mouth daily), Disp: 90 tablet, Rfl: 1     carvedilol (COREG) 3.125 MG tablet, Take 1 tablet (3.125 mg) by mouth 2 times daily (with meals) To replace metoprolol, Disp: 60 tablet, Rfl: 0     hydrochlorothiazide (HYDRODIURIL) 25 MG tablet, [HYDROCHLOROTHIAZIDE (HYDRODIURIL) 25 MG TABLET] TAKE 1 TABLET BY MOUTH EVERY DAY (Patient taking differently: Take 25 mg by mouth daily [HYDROCHLOROTHIAZIDE (HYDRODIURIL) 25 MG TABLET] TAKE 1 TABLET BY MOUTH EVERY DAY), Disp: 90 tablet, Rfl: 3     ibuprofen (ADVIL/MOTRIN) 200 MG capsule, Take 800 mg by mouth every 8 hours as needed for inflammatory pain, Disp: , Rfl:      nitroGLYcerin (NITROSTAT) 0.4 MG sublingual tablet, One tablet under the tongue every 5 minutes if needed for chest pain. May repeat every 5 minutes for a maximum of 3 doses in 15 minutes\" (Patient taking differently: Place 0.4 mg under the tongue every 5 minutes as needed for chest pain One tablet under the tongue every 5 minutes if needed for chest pain. May repeat every 5 minutes for a maximum of 3 doses in 15 minutes\"), Disp: 25 tablet, Rfl: 3     rOPINIRole (REQUIP) 0.25 MG tablet, Take 2 tablets (0.5 mg) by mouth At Bedtime, Disp: 180 tablet, Rfl: 2     triamcinolone (KENALOG) 0.1 % external cream, " "Apply topically 2 times daily, Disp: 30 g, Rfl: 0     predniSONE (DELTASONE) 20 MG tablet, 4 tabs for 3 days. 3 tabs for 3 days. 2 tabs for 3 days. 1 tab for 3 days. (Patient not taking: Reported on 7/5/2022), Disp: 30 tablet, Rfl: 0    ALLERGIES:      Allergies   Allergen Reactions     Metoprolol Tartrate Hives     Penicillins Hives     Tolerated CTX 06/2020, Ancef 6/2022       ROS:  Gen: No fevers, weight loss/gain  CV: Denies chest pain, peripheral edema  Pulm: Denies SOB  GI/: Voiding without problems, appetite improving.     LABS:  None    IMAGING:  None    PHYSICAL EXAM:   /62 (BP Location: Right arm, Patient Position: Sitting, Cuff Size: Adult Large)   Pulse 64   Resp 16   Ht 1.765 m (5' 9.5\")   Wt 115.8 kg (255 lb 6.4 oz)   BMI 37.18 kg/m    General: Alert and oriented, pleasant, no acute distress.  CV:  No peripheral edema.  Pulm: Easy work of breathing on room air.   GI: Soft, non-tender, and non-distended  Incision: Chest and left leg incisions clean dry and intact without erythema, swelling or drainage  Neuro: CNs grossly intact.      ASSESSMENT/PLAN:  Gonzalez Morton is a 61 year old year old male S/P coronary artery bypass graft x 5 with LIMA-LAD, rSVG-Diag, rSVG-OM1, rSVG-OM2, Radial-PDA, EVH from LLE on 06/06/2022 at [facility] by DR. Mayank Rocha MD and assisted by Gareth Christianson MD.  Patient hospital course was uneventful and patient was discharged to home on 06/10/2022  She presents today 07/05/202 for a post-op CV Surgery follow-up.  Patient dose not appear short of breath during this visit.  He denies incisional chest pain and or shortness of breath.  Lung sounds clear in all lobes, incisions are  well approximated, healing and without any sternal instability.  Appetite is back to  normal, bowel movement is regular with MiRalax and he voids without difficulties.  Patient saw his PCP Dylon Wing CNP on 06/22/2022.  Patient started outpatient cardiac rehab on " 06/20/2022, and is tolerating well.  He is scheduled to see his primary cardiologist Dr. Mery Lyons MD on 08/09/2022.  Patient may start driving by 07/05/2022, but advised to continue the 10 pounds weight restriction for the full 8 weeks.  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  He is otherwise doing well and will not need any further CV surgery follow-up, but is advised to call CV Surgery at [phone], with questions or concerns.           Terrell Forte PA-C  Cardiothoracic Surgery  [phone]        Thank you for allowing me to participate in the care of your patient.      Sincerely,     Terrell Forte PA-C     [facility]  cc:   No referring provider defined for this encounter.

## 2022-07-06 ENCOUNTER — HOSPITAL ENCOUNTER (OUTPATIENT)
Dept: CARDIAC REHAB | Facility: CLINIC | Age: 62
Discharge: HOME | End: 2022-07-06
Attending: THORACIC SURGERY (CARDIOTHORACIC VASCULAR SURGERY)
Payer: COMMERCIAL

## 2022-07-06 PROCEDURE — 93798 PHYS/QHP OP CAR RHAB W/ECG: CPT

## 2022-07-08 ENCOUNTER — HOSPITAL ENCOUNTER (OUTPATIENT)
Dept: CARDIAC REHAB | Facility: CLINIC | Age: 62
Discharge: HOME | End: 2022-07-08
Attending: THORACIC SURGERY (CARDIOTHORACIC VASCULAR SURGERY)
Payer: COMMERCIAL

## 2022-07-08 PROCEDURE — 93798 PHYS/QHP OP CAR RHAB W/ECG: CPT

## 2022-07-11 ENCOUNTER — HOSPITAL ENCOUNTER (OUTPATIENT)
Dept: CARDIAC REHAB | Facility: CLINIC | Age: 62
Discharge: HOME | End: 2022-07-11
Attending: THORACIC SURGERY (CARDIOTHORACIC VASCULAR SURGERY)
Payer: COMMERCIAL

## 2022-07-11 PROCEDURE — 93798 PHYS/QHP OP CAR RHAB W/ECG: CPT

## 2022-07-13 ENCOUNTER — HOSPITAL ENCOUNTER (OUTPATIENT)
Dept: CARDIAC REHAB | Facility: CLINIC | Age: 62
Discharge: HOME | End: 2022-07-13
Attending: THORACIC SURGERY (CARDIOTHORACIC VASCULAR SURGERY)
Payer: COMMERCIAL

## 2022-07-13 ENCOUNTER — TELEPHONE (OUTPATIENT)
Dept: NURSING | Facility: CLINIC | Age: 62
End: 2022-07-13

## 2022-07-13 DIAGNOSIS — I25.10 CORONARY ARTERY DISEASE INVOLVING NATIVE CORONARY ARTERY OF NATIVE HEART, UNSPECIFIED WHETHER ANGINA PRESENT: ICD-10-CM

## 2022-07-13 PROCEDURE — 93797 PHYS/QHP OP CAR RHAB WO ECG: CPT

## 2022-07-13 PROCEDURE — 93798 PHYS/QHP OP CAR RHAB W/ECG: CPT

## 2022-07-13 RX ORDER — CARVEDILOL 3.12 MG/1
3.12 TABLET ORAL 2 TIMES DAILY WITH MEALS
Qty: 180 TABLET | Refills: 3 | Status: SHIPPED | OUTPATIENT
Start: 2022-07-13 | End: 2023-05-17

## 2022-07-13 NOTE — TELEPHONE ENCOUNTER
Patient stated pharmacy hasn't received call back from clinic about refill of carvedilol.  Patient said he is out.  Patient has enough left for four days.  Told patient I'd route high priority.    Betsy STONER RN [facility]

## 2022-07-13 NOTE — TELEPHONE ENCOUNTER
"Last Written Prescription Date:  6/17/22  Last Fill Quantity: 60,  # refills: 0   Last office visit provider:  6/22/22     Requested Prescriptions   Pending Prescriptions Disp Refills     carvedilol (COREG) 3.125 MG tablet 60 tablet 0     Sig: Take 1 tablet (3.125 mg) by mouth 2 times daily (with meals) To replace metoprolol       Beta-Blockers Protocol Passed - 7/13/2022 11:40 AM        Passed - Blood pressure under 140/90 in past 12 months     BP Readings from Last 3 Encounters:   07/05/22 116/62   06/22/22 130/78   06/10/22 103/63                 Passed - Patient is age 6 or older        Passed - Recent (12 mo) or future (30 days) visit within the authorizing provider's specialty     Patient has had an office visit with the authorizing provider or a provider within the authorizing providers department within the previous 12 mos or has a future within next 30 days. See \"Patient Info\" tab in inbasket, or \"Choose Columns\" in Meds & Orders section of the refill encounter.              Passed - Medication is active on med list             Emir Kline RN 07/13/22 11:40 AM  "

## 2022-07-18 ENCOUNTER — HOSPITAL ENCOUNTER (OUTPATIENT)
Dept: CARDIAC REHAB | Facility: CLINIC | Age: 62
Discharge: HOME | End: 2022-07-18
Attending: THORACIC SURGERY (CARDIOTHORACIC VASCULAR SURGERY)
Payer: COMMERCIAL

## 2022-07-18 PROCEDURE — 93798 PHYS/QHP OP CAR RHAB W/ECG: CPT

## 2022-07-20 ENCOUNTER — HOSPITAL ENCOUNTER (OUTPATIENT)
Dept: CARDIAC REHAB | Facility: CLINIC | Age: 62
Discharge: HOME | End: 2022-07-20
Attending: THORACIC SURGERY (CARDIOTHORACIC VASCULAR SURGERY)
Payer: COMMERCIAL

## 2022-07-20 PROCEDURE — 93798 PHYS/QHP OP CAR RHAB W/ECG: CPT

## 2022-07-21 ENCOUNTER — TRANSFERRED RECORDS (OUTPATIENT)
Dept: HEALTH INFORMATION MANAGEMENT | Facility: CLINIC | Age: 62
End: 2022-07-21

## 2022-07-22 ENCOUNTER — HOSPITAL ENCOUNTER (OUTPATIENT)
Dept: CARDIAC REHAB | Facility: CLINIC | Age: 62
Discharge: HOME | End: 2022-07-22
Attending: THORACIC SURGERY (CARDIOTHORACIC VASCULAR SURGERY)
Payer: COMMERCIAL

## 2022-07-22 PROCEDURE — 93798 PHYS/QHP OP CAR RHAB W/ECG: CPT

## 2022-07-25 ENCOUNTER — HOSPITAL ENCOUNTER (OUTPATIENT)
Dept: CARDIAC REHAB | Facility: CLINIC | Age: 62
Discharge: HOME | End: 2022-07-25
Attending: THORACIC SURGERY (CARDIOTHORACIC VASCULAR SURGERY)
Payer: COMMERCIAL

## 2022-07-25 PROCEDURE — 93798 PHYS/QHP OP CAR RHAB W/ECG: CPT

## 2022-07-29 ENCOUNTER — HOSPITAL ENCOUNTER (OUTPATIENT)
Dept: CARDIAC REHAB | Facility: CLINIC | Age: 62
Discharge: HOME | End: 2022-07-29
Attending: THORACIC SURGERY (CARDIOTHORACIC VASCULAR SURGERY)
Payer: COMMERCIAL

## 2022-07-29 DIAGNOSIS — Z95.1 S/P CABG (CORONARY ARTERY BYPASS GRAFT): ICD-10-CM

## 2022-07-29 PROCEDURE — 93798 PHYS/QHP OP CAR RHAB W/ECG: CPT

## 2022-08-01 ENCOUNTER — HOSPITAL ENCOUNTER (OUTPATIENT)
Dept: CARDIAC REHAB | Facility: CLINIC | Age: 62
Discharge: HOME | End: 2022-08-01
Attending: THORACIC SURGERY (CARDIOTHORACIC VASCULAR SURGERY)
Payer: COMMERCIAL

## 2022-08-01 PROCEDURE — 93798 PHYS/QHP OP CAR RHAB W/ECG: CPT | Performed by: OCCUPATIONAL THERAPIST

## 2022-08-03 ENCOUNTER — HOSPITAL ENCOUNTER (OUTPATIENT)
Dept: CARDIAC REHAB | Facility: CLINIC | Age: 62
Discharge: HOME | End: 2022-08-03
Attending: INTERNAL MEDICINE
Payer: COMMERCIAL

## 2022-08-03 PROCEDURE — 93798 PHYS/QHP OP CAR RHAB W/ECG: CPT

## 2022-08-03 RX ORDER — AMLODIPINE BESYLATE 2.5 MG/1
TABLET ORAL
Qty: 30 TABLET | Refills: 4 | OUTPATIENT
Start: 2022-08-03

## 2022-08-03 RX ORDER — ASPIRIN 81 MG
TABLET,CHEWABLE ORAL
Qty: 60 TABLET | Refills: 10 | OUTPATIENT
Start: 2022-08-03

## 2022-08-03 NOTE — TELEPHONE ENCOUNTER
Patient calling for refill update.  Requesting they get filled today as he is out.    Tian Ocoha RN  [facility]

## 2022-08-03 NOTE — TELEPHONE ENCOUNTER
"Routing refill request to provider for review/approval because:  Recently refilled 6/11, Patient should have refills    Last Written Prescription Date:  6/11/22  Last Fill Quantity: 30,  # refills: 11   Last office visit provider:  6/22/22     Requested Prescriptions   Pending Prescriptions Disp Refills     ASPIRIN LOW DOSE 81 MG chewable tablet [Pharmacy Med Name: ASPIRIN 81 MG CHEWABLE TABLET] 60 tablet 10     Sig: TAKE 2 TABLETS BY MOUTH OR NG TUBE DAILY       Analgesics (Non-Narcotic Tylenol and ASA Only) Passed - 8/3/2022  9:34 AM        Passed - Recent (12 mo) or future (30 days) visit within the authorizing provider's specialty     Patient has had an office visit with the authorizing provider or a provider within the authorizing providers department within the previous 12 mos or has a future within next 30 days. See \"Patient Info\" tab in inbasket, or \"Choose Columns\" in Meds & Orders section of the refill encounter.              Passed - Patient is age 20 years or older     If ASA is flagged for ages under 20 years old. Forward to provider for confirmation Ryes Syndrome is not a concern.              Passed - Medication is active on med list           amLODIPine (NORVASC) 2.5 MG tablet [Pharmacy Med Name: AMLODIPINE BESYLATE 2.5 MG TAB] 30 tablet 4     Sig: TAKE 1 TABLET BY MOUTH EVERY DAY       Calcium Channel Blockers Protocol  Passed - 8/3/2022  9:34 AM        Passed - Blood pressure under 140/90 in past 12 months     BP Readings from Last 3 Encounters:   07/05/22 116/62   06/22/22 130/78   06/10/22 103/63                 Passed - Recent (12 mo) or future (30 days) visit within the authorizing provider's specialty     Patient has had an office visit with the authorizing provider or a provider within the authorizing providers department within the previous 12 mos or has a future within next 30 days. See \"Patient Info\" tab in inbasket, or \"Choose Columns\" in Meds & Orders section of the refill encounter.  "             Passed - Medication is active on med list        Passed - Patient is age 18 or older        Passed - Normal serum creatinine on file in past 12 months     Recent Labs   Lab Test 06/07/22  0005   CR 0.96       Ok to refill medication if creatinine is low               Laverne Duncan RN 08/03/22 10:28 AM

## 2022-08-05 ENCOUNTER — HOSPITAL ENCOUNTER (OUTPATIENT)
Dept: CARDIAC REHAB | Facility: CLINIC | Age: 62
Discharge: HOME | End: 2022-08-05
Attending: INTERNAL MEDICINE
Payer: COMMERCIAL

## 2022-08-05 PROCEDURE — 93798 PHYS/QHP OP CAR RHAB W/ECG: CPT

## 2022-08-08 ENCOUNTER — HOSPITAL ENCOUNTER (OUTPATIENT)
Dept: CARDIAC REHAB | Facility: CLINIC | Age: 62
Discharge: HOME | End: 2022-08-08
Attending: INTERNAL MEDICINE
Payer: COMMERCIAL

## 2022-08-08 PROCEDURE — 93798 PHYS/QHP OP CAR RHAB W/ECG: CPT

## 2022-08-09 ENCOUNTER — OFFICE VISIT (OUTPATIENT)
Dept: CARDIOLOGY | Facility: CLINIC | Age: 62
End: 2022-08-09
Payer: COMMERCIAL

## 2022-08-09 VITALS
HEART RATE: 68 BPM | RESPIRATION RATE: 16 BRPM | SYSTOLIC BLOOD PRESSURE: 126 MMHG | HEIGHT: 69 IN | DIASTOLIC BLOOD PRESSURE: 72 MMHG | BODY MASS INDEX: 38.36 KG/M2 | WEIGHT: 259 LBS

## 2022-08-09 DIAGNOSIS — I25.83 CORONARY ARTERY DISEASE DUE TO LIPID RICH PLAQUE: Primary | ICD-10-CM

## 2022-08-09 DIAGNOSIS — I25.10 CORONARY ARTERY DISEASE DUE TO LIPID RICH PLAQUE: Primary | ICD-10-CM

## 2022-08-09 PROCEDURE — 99215 OFFICE O/P EST HI 40 MIN: CPT | Performed by: INTERNAL MEDICINE

## 2022-08-09 RX ORDER — DOXYCYCLINE 100 MG/1
1 CAPSULE ORAL DAILY
COMMUNITY
End: 2023-03-03

## 2022-08-09 NOTE — PROGRESS NOTES
"  HEART CARE ENCOUNTER CONSULTATON NOTE      [facility]  [phone]      Assessment/Recommendations   Assessment/Plan:  CAD s/p CABG - no chest pain. Continue aspirin and statin long term.  Diet and exercise reviewed.    Hypertension - controlled with meds    Hyperlipidemia - at goal on statin    Obesity - diet and exercise discussed    F/U 12 months       History of Present Illness/Subjective    HPI: Gonzalez Morton is a 61 year old male with CAD s/p 5 vessel CABG 6/2022 (LIMA-D2, SVG-D2, SVG-OM2, SVG-OM1, radial-rPDA), hypertension, hyperlipidemia, self diagnosed ERIN using a self purchased CPAP, and prostate cancer in 2019. His distal/apical LAD was diffusely disease and was not bypassed.    Mr. Morton comes for post CABG follow up today. He is doing well. His sternum feels good but his skin can be sensitive. There is no angina, dyspnea, pnd/orhtopnea, dizziness, palpitations or syncope. He has many questions about diet and weight lifting limitations. He feels like he is doing well.        Physical Examination  Review of Systems   Vitals: /72 (BP Location: Left arm, Patient Position: Sitting, Cuff Size: Adult Large)   Pulse 68   Resp 16   Ht 1.753 m (5' 9\")   Wt 117.5 kg (259 lb)   BMI 38.25 kg/m    BMI= Body mass index is 38.25 kg/m .  Wt Readings from Last 3 Encounters:   08/09/22 117.5 kg (259 lb)   07/05/22 115.8 kg (255 lb 6.4 oz)   06/22/22 116.2 kg (256 lb 1.6 oz)       General Appearance:   no distress, obese body habitus   ENT/Mouth: membranes moist, no oral lesions or bleeding gums.      EYES:  no scleral icterus, normal conjunctivae   Neck: no carotid bruits or thyromegaly   Chest/Lungs:   lungs are clear to auscultation, no rales or wheezing, stable sternal scar, equal chest wall expansion    Cardiovascular:   Regular. Normal first and second heart sounds with no murmur. No rubs or gallops; the right carotid, radial and posterior tibial pulses are intact and the left " carotid, radial and posterior tibial pulses are intact.  Jugular venous pressure is flat, no edema bilaterally    Abdomen:  no organomegaly, masses, bruits, or tenderness; bowel sounds are present   Extremities: no cyanosis or clubbing   Skin: no xanthelasma, warm.    Neurologic: normal  bilateral, no tremors     Psychiatric: alert and oriented x3, calm        Please refer above for cardiac ROS details.        Medical History  Surgical History Family History Social History   Past Medical History:   Diagnosis Date     Abnormal cardiovascular stress test 4/3/2022     CAD (coronary artery disease)      Hypertension      Knee injury     right     Rosacea      Sleep apnea     CPAP     Past Surgical History:   Procedure Laterality Date     APPENDECTOMY       ARTHROSCOPY KNEE Bilateral      BYPASS GRAFT ARTERY CORONARY N/A 6/6/2022    Procedure: CORONARY ARTERY BYPASS GRAFT X 5, ENDOSCOPIC VESSEL PROCUREMENT LEFT LEG, INTERNAL MAMMARY ARTERY HARVEST, LEFT RADIAL ARTERY HARVEST, ANESTHESIA TRANSESOPHAGEAL ECHOCARDIOGRAM, EPIAORTIC ULTRASOUND;  Surgeon: Mayank Rocha MD;  Location: [facility] Main OR     CV CORONARY ANGIOGRAM N/A 4/7/2022    Procedure: Coronary Angiogram;  Surgeon: Mery Lyons MD;  Location: [facility] CATH LAB CV     PROSTATECTOMY       TOTAL KNEE ARTHROPLASTY Left      WRIST SURGERY       Family History   Problem Relation Age of Onset     Diabetes Mother      Heart Disease Mother      Heart Disease Father      Atrophic kidney Sister      Diabetes Sister      Diabetes Type 1 Brother         Social History     Socioeconomic History     Marital status:      Spouse name: Not on file     Number of children: Not on file     Years of education: Not on file     Highest education level: Not on file   Occupational History     Not on file   Tobacco Use     Smoking status: Never Smoker     Smokeless tobacco: Never Used   Substance and Sexual Activity     Alcohol use: Yes     Alcohol/week: 1.0 standard  "drink     Types: 1 Standard drinks or equivalent per week     Drug use: Never     Sexual activity: Not on file   Other Topics Concern     Not on file   Social History Narrative     Not on file     Social Determinants of Health     Financial Resource Strain: Not on file   Food Insecurity: Not on file   Transportation Needs: Not on file   Physical Activity: Not on file   Stress: Not on file   Social Connections: Not on file   Intimate Partner Violence: Not on file   Housing Stability: Not on file           Medications  Allergies   Current Outpatient Medications   Medication Sig Dispense Refill     acetaminophen (TYLENOL) 650 MG CR tablet Take 1,300 mg by mouth every 8 hours as needed for pain       amLODIPine (NORVASC) 2.5 MG tablet Take 1 tablet (2.5 mg) by mouth daily 30 tablet 5     aspirin (ASA) 81 MG chewable tablet 2 tablets (162 mg) by Oral or NG Tube route daily (Patient taking differently: Take 162 mg by mouth daily) 60 tablet 11     atorvastatin (LIPITOR) 40 MG tablet TAKE 1 TABLET BY MOUTH EVERY DAY (Patient taking differently: Take 40 mg by mouth daily) 90 tablet 1     carvedilol (COREG) 3.125 MG tablet Take 1 tablet (3.125 mg) by mouth 2 times daily (with meals) To replace metoprolol 180 tablet 3     doxycycline hyclate (VIBRAMYCIN) 100 MG capsule Take 1 capsule by mouth daily       hydrochlorothiazide (HYDRODIURIL) 25 MG tablet [HYDROCHLOROTHIAZIDE (HYDRODIURIL) 25 MG TABLET] TAKE 1 TABLET BY MOUTH EVERY DAY (Patient taking differently: Take 25 mg by mouth daily [HYDROCHLOROTHIAZIDE (HYDRODIURIL) 25 MG TABLET] TAKE 1 TABLET BY MOUTH EVERY DAY) 90 tablet 3     ibuprofen (ADVIL/MOTRIN) 200 MG capsule Take 800 mg by mouth every 8 hours as needed for inflammatory pain       nitroGLYcerin (NITROSTAT) 0.4 MG sublingual tablet One tablet under the tongue every 5 minutes if needed for chest pain. May repeat every 5 minutes for a maximum of 3 doses in 15 minutes\" (Patient taking differently: Place 0.4 mg under " "the tongue every 5 minutes as needed for chest pain One tablet under the tongue every 5 minutes if needed for chest pain. May repeat every 5 minutes for a maximum of 3 doses in 15 minutes\") 25 tablet 3     rOPINIRole (REQUIP) 0.25 MG tablet Take 2 tablets (0.5 mg) by mouth At Bedtime 180 tablet 2     predniSONE (DELTASONE) 20 MG tablet 4 tabs for 3 days. 3 tabs for 3 days. 2 tabs for 3 days. 1 tab for 3 days. (Patient not taking: No sig reported) 30 tablet 0     triamcinolone (KENALOG) 0.1 % external cream Apply topically 2 times daily (Patient not taking: Reported on 8/9/2022) 30 g 0       Allergies   Allergen Reactions     Metoprolol Tartrate Hives     Penicillins Hives     Tolerated CTX 06/2020, Ancef 6/2022          Lab Results    Chemistry/lipid CBC Cardiac Enzymes/BNP/TSH/INR   Recent Labs   Lab Test 04/07/22  0711   CHOL 96   HDL 30*   LDL 48   TRIG 89     Recent Labs   Lab Test 04/07/22  0711 08/20/21  0932 02/24/20  1010   LDL 48 69 100     Recent Labs   Lab Test 06/22/22  1314 06/08/22  0427 06/07/22  2102 06/07/22  0103 06/07/22  0005   NA  --   --   --   --  141   POTASSIUM 4.7   < >  --    < > 4.0   CHLORIDE  --   --   --   --  107   CO2  --   --   --   --  24   GLC  --   --  100*   < > 142*  147*   BUN  --   --   --   --  19   CR  --   --   --   --  0.96   GFRESTIMATED  --   --   --   --  90   KEATON  --   --   --   --  8.0*    < > = values in this interval not displayed.     Recent Labs   Lab Test 06/07/22  0005 06/06/22  1333 06/06/22  1025   CR 0.96 0.80 0.75     Recent Labs   Lab Test 05/31/22  0823 08/20/21  0932 02/24/20  1010   A1C 5.8* 5.9* 5.9          Recent Labs   Lab Test 06/22/22  1314 06/10/22  0603 06/09/22  0401   WBC  --   --  10.0   HGB 12.2*  --  8.5*   HCT  --   --  25.5*   MCV  --   --  86   PLT  --  202 147*     Recent Labs   Lab Test 06/22/22  1314 06/09/22  0401 06/08/22  0427   HGB 12.2* 8.5* 8.8*    No results for input(s): TROPONINI in the last 14628 hours.  No results for " input(s): BNP, NTBNPI, NTBNP in the last 46764 hours.  No results for input(s): TSH in the last 49866 hours.  Recent Labs   Lab Test 06/06/22  1333 06/06/22  1025 05/31/22  0823   INR 1.28* 1.39* 1.05        Today's clinic visit entailed:  [facility]  40 minutes spent on the date of the encounter doing chart review, review of outside records, review of test results, interpretation of tests, patient visit and documentation   Provider  Link to MDM Help Grid     The level of medical decision making during this visit was of moderate complexity.        Mery Lyons MD

## 2022-08-09 NOTE — LETTER
"8/9/2022    Dylon Shafer, NP  [address] [place] [address]    RE: Gonzalez Morton       Dear Colleague,     I had the pleasure of seeing Gonzalez Morton in the [facility].    HEART CARE ENCOUNTER CONSULTATON NOTE      [facility]  [phone]      Assessment/Recommendations   Assessment/Plan:  CAD s/p CABG - no chest pain. Continue aspirin and statin long term.  Diet and exercise reviewed.    Hypertension - controlled with meds    Hyperlipidemia - at goal on statin    Obesity - diet and exercise discussed    F/U 12 months       History of Present Illness/Subjective    HPI: Gonzalez Morton is a 61 year old male with CAD s/p 5 vessel CABG 6/2022 (LIMA-D2, SVG-D2, SVG-OM2, SVG-OM1, radial-rPDA), hypertension, hyperlipidemia, self diagnosed ERIN using a self purchased CPAP, and prostate cancer in 2019. His distal/apical LAD was diffusely disease and was not bypassed.    Mr. Morton comes for post CABG follow up today. He is doing well. His sternum feels good but his skin can be sensitive. There is no angina, dyspnea, pnd/orhtopnea, dizziness, palpitations or syncope. He has many questions about diet and weight lifting limitations. He feels like he is doing well.        Physical Examination  Review of Systems   Vitals: /72 (BP Location: Left arm, Patient Position: Sitting, Cuff Size: Adult Large)   Pulse 68   Resp 16   Ht 1.753 m (5' 9\")   Wt 117.5 kg (259 lb)   BMI 38.25 kg/m    BMI= Body mass index is 38.25 kg/m .  Wt Readings from Last 3 Encounters:   08/09/22 117.5 kg (259 lb)   07/05/22 115.8 kg (255 lb 6.4 oz)   06/22/22 116.2 kg (256 lb 1.6 oz)       General Appearance:   no distress, obese body habitus   ENT/Mouth: membranes moist, no oral lesions or bleeding gums.      EYES:  no scleral icterus, normal conjunctivae   Neck: no carotid bruits or thyromegaly   Chest/Lungs:   lungs are clear to auscultation, no rales or wheezing, stable " sternal scar, equal chest wall expansion    Cardiovascular:   Regular. Normal first and second heart sounds with no murmur. No rubs or gallops; the right carotid, radial and posterior tibial pulses are intact and the left carotid, radial and posterior tibial pulses are intact.  Jugular venous pressure is flat, no edema bilaterally    Abdomen:  no organomegaly, masses, bruits, or tenderness; bowel sounds are present   Extremities: no cyanosis or clubbing   Skin: no xanthelasma, warm.    Neurologic: normal  bilateral, no tremors     Psychiatric: alert and oriented x3, calm        Please refer above for cardiac ROS details.        Medical History  Surgical History Family History Social History   Past Medical History:   Diagnosis Date     Abnormal cardiovascular stress test 4/3/2022     CAD (coronary artery disease)      Hypertension      Knee injury     right     Rosacea      Sleep apnea     CPAP     Past Surgical History:   Procedure Laterality Date     APPENDECTOMY       ARTHROSCOPY KNEE Bilateral      BYPASS GRAFT ARTERY CORONARY N/A 6/6/2022    Procedure: CORONARY ARTERY BYPASS GRAFT X 5, ENDOSCOPIC VESSEL PROCUREMENT LEFT LEG, INTERNAL MAMMARY ARTERY HARVEST, LEFT RADIAL ARTERY HARVEST, ANESTHESIA TRANSESOPHAGEAL ECHOCARDIOGRAM, EPIAORTIC ULTRASOUND;  Surgeon: Mayank Rocha MD;  Location: [facility] Main OR     CV CORONARY ANGIOGRAM N/A 4/7/2022    Procedure: Coronary Angiogram;  Surgeon: Mery Lyons MD;  Location: [facility] CATH LAB CV     PROSTATECTOMY       TOTAL KNEE ARTHROPLASTY Left      WRIST SURGERY       Family History   Problem Relation Age of Onset     Diabetes Mother      Heart Disease Mother      Heart Disease Father      Atrophic kidney Sister      Diabetes Sister      Diabetes Type 1 Brother         Social History     Socioeconomic History     Marital status:      Spouse name: Not on file     Number of children: Not on file     Years of education: Not on file     Highest education  level: Not on file   Occupational History     Not on file   Tobacco Use     Smoking status: Never Smoker     Smokeless tobacco: Never Used   Substance and Sexual Activity     Alcohol use: Yes     Alcohol/week: 1.0 standard drink     Types: 1 Standard drinks or equivalent per week     Drug use: Never     Sexual activity: Not on file   Other Topics Concern     Not on file   Social History Narrative     Not on file     Social Determinants of Health     Financial Resource Strain: Not on file   Food Insecurity: Not on file   Transportation Needs: Not on file   Physical Activity: Not on file   Stress: Not on file   Social Connections: Not on file   Intimate Partner Violence: Not on file   Housing Stability: Not on file           Medications  Allergies   Current Outpatient Medications   Medication Sig Dispense Refill     acetaminophen (TYLENOL) 650 MG CR tablet Take 1,300 mg by mouth every 8 hours as needed for pain       amLODIPine (NORVASC) 2.5 MG tablet Take 1 tablet (2.5 mg) by mouth daily 30 tablet 5     aspirin (ASA) 81 MG chewable tablet 2 tablets (162 mg) by Oral or NG Tube route daily (Patient taking differently: Take 162 mg by mouth daily) 60 tablet 11     atorvastatin (LIPITOR) 40 MG tablet TAKE 1 TABLET BY MOUTH EVERY DAY (Patient taking differently: Take 40 mg by mouth daily) 90 tablet 1     carvedilol (COREG) 3.125 MG tablet Take 1 tablet (3.125 mg) by mouth 2 times daily (with meals) To replace metoprolol 180 tablet 3     doxycycline hyclate (VIBRAMYCIN) 100 MG capsule Take 1 capsule by mouth daily       hydrochlorothiazide (HYDRODIURIL) 25 MG tablet [HYDROCHLOROTHIAZIDE (HYDRODIURIL) 25 MG TABLET] TAKE 1 TABLET BY MOUTH EVERY DAY (Patient taking differently: Take 25 mg by mouth daily [HYDROCHLOROTHIAZIDE (HYDRODIURIL) 25 MG TABLET] TAKE 1 TABLET BY MOUTH EVERY DAY) 90 tablet 3     ibuprofen (ADVIL/MOTRIN) 200 MG capsule Take 800 mg by mouth every 8 hours as needed for inflammatory pain       nitroGLYcerin  "(NITROSTAT) 0.4 MG sublingual tablet One tablet under the tongue every 5 minutes if needed for chest pain. May repeat every 5 minutes for a maximum of 3 doses in 15 minutes\" (Patient taking differently: Place 0.4 mg under the tongue every 5 minutes as needed for chest pain One tablet under the tongue every 5 minutes if needed for chest pain. May repeat every 5 minutes for a maximum of 3 doses in 15 minutes\") 25 tablet 3     rOPINIRole (REQUIP) 0.25 MG tablet Take 2 tablets (0.5 mg) by mouth At Bedtime 180 tablet 2     predniSONE (DELTASONE) 20 MG tablet 4 tabs for 3 days. 3 tabs for 3 days. 2 tabs for 3 days. 1 tab for 3 days. (Patient not taking: No sig reported) 30 tablet 0     triamcinolone (KENALOG) 0.1 % external cream Apply topically 2 times daily (Patient not taking: Reported on 8/9/2022) 30 g 0       Allergies   Allergen Reactions     Metoprolol Tartrate Hives     Penicillins Hives     Tolerated CTX 06/2020, Ancef 6/2022          Lab Results    Chemistry/lipid CBC Cardiac Enzymes/BNP/TSH/INR   Recent Labs   Lab Test 04/07/22  0711   CHOL 96   HDL 30*   LDL 48   TRIG 89     Recent Labs   Lab Test 04/07/22  0711 08/20/21  0932 02/24/20  1010   LDL 48 69 100     Recent Labs   Lab Test 06/22/22  1314 06/08/22  0427 06/07/22  2102 06/07/22  0103 06/07/22  0005   NA  --   --   --   --  141   POTASSIUM 4.7   < >  --    < > 4.0   CHLORIDE  --   --   --   --  107   CO2  --   --   --   --  24   GLC  --   --  100*   < > 142*  147*   BUN  --   --   --   --  19   CR  --   --   --   --  0.96   GFRESTIMATED  --   --   --   --  90   KEATON  --   --   --   --  8.0*    < > = values in this interval not displayed.     Recent Labs   Lab Test 06/07/22  0005 06/06/22  1333 06/06/22  1025   CR 0.96 0.80 0.75     Recent Labs   Lab Test 05/31/22  0823 08/20/21  0932 02/24/20  1010   A1C 5.8* 5.9* 5.9          Recent Labs   Lab Test 06/22/22  1314 06/10/22  0603 06/09/22  0401   WBC  --   --  10.0   HGB 12.2*  --  8.5*   HCT  --   --  " 25.5*   MCV  --   --  86   PLT  --  202 147*     Recent Labs   Lab Test 06/22/22  1314 06/09/22  0401 06/08/22  0427   HGB 12.2* 8.5* 8.8*    No results for input(s): TROPONINI in the last 76889 hours.  No results for input(s): BNP, NTBNPI, NTBNP in the last 32408 hours.  No results for input(s): TSH in the last 73067 hours.  Recent Labs   Lab Test 06/06/22  1333 06/06/22  1025 05/31/22  0823   INR 1.28* 1.39* 1.05        Today's clinic visit entailed:  Review of prior external note(s) from [facility] information from epic reviewed  40 minutes spent on the date of the encounter doing chart review, review of outside records, review of test results, interpretation of tests, patient visit and documentation   Provider  Link to [facility] Help Grid     The level of medical decision making during this visit was of moderate complexity.        Mery Lyons MD              Thank you for allowing me to participate in the care of your patient.      Sincerely,     Mery Lyons MD     [facility]  cc:   No referring provider defined for this encounter.

## 2022-08-09 NOTE — PATIENT INSTRUCTIONS
It was a pleasure seeing you at [facility] today.        Here are my suggestions for your care:    1.  Stick to a heart healthy mediterranean diet    2.  Keep up the good work with diet and exercise      Let's meet again in 12 months.    You can always call my nurse Sherri Mora RN who is a nurse helping me in the care of my patients. She can be reached at [phone] if you have any questions.    For scheduling, please call my  Julianna Au at [phone].    Thank you again for trusting me with your care. Please feel free to call my office at any time if you have any question or if I can assist you in any way.    Mery Lyons MD  [facility]

## 2022-08-10 ENCOUNTER — HOSPITAL ENCOUNTER (OUTPATIENT)
Dept: CARDIAC REHAB | Facility: CLINIC | Age: 62
Discharge: HOME | End: 2022-08-10
Attending: INTERNAL MEDICINE
Payer: COMMERCIAL

## 2022-08-10 PROCEDURE — 93798 PHYS/QHP OP CAR RHAB W/ECG: CPT

## 2022-08-12 ENCOUNTER — HOSPITAL ENCOUNTER (OUTPATIENT)
Dept: CARDIAC REHAB | Facility: CLINIC | Age: 62
Discharge: HOME | End: 2022-08-12
Attending: INTERNAL MEDICINE
Payer: COMMERCIAL

## 2022-08-12 DIAGNOSIS — I10 BENIGN ESSENTIAL HYPERTENSION: ICD-10-CM

## 2022-08-12 PROCEDURE — 93798 PHYS/QHP OP CAR RHAB W/ECG: CPT

## 2022-08-12 RX ORDER — HYDROCHLOROTHIAZIDE 25 MG/1
TABLET ORAL
Qty: 90 TABLET | Refills: 2 | Status: SHIPPED | OUTPATIENT
Start: 2022-08-12 | End: 2023-05-17

## 2022-08-12 RX ORDER — LISINOPRIL 20 MG/1
TABLET ORAL
Qty: 90 TABLET | Refills: 3 | OUTPATIENT
Start: 2022-08-12

## 2022-08-12 NOTE — TELEPHONE ENCOUNTER
"Last Written Prescription Date:  8/20/21  Last Fill Quantity: 90,  # refills: 3   Last office visit provider:  6/22/22     Requested Prescriptions   Pending Prescriptions Disp Refills     hydrochlorothiazide (HYDRODIURIL) 25 MG tablet [Pharmacy Med Name: HYDROCHLOROTHIAZIDE 25 MG TAB] 90 tablet 3     Sig: TAKE 1 TABLET BY MOUTH EVERY DAY       Diuretics (Including Combos) Protocol Passed - 8/12/2022  2:48 AM        Passed - Blood pressure under 140/90 in past 12 months     BP Readings from Last 3 Encounters:   08/09/22 126/72   07/05/22 116/62   06/22/22 130/78                 Passed - Recent (12 mo) or future (30 days) visit within the authorizing provider's specialty     Patient has had an office visit with the authorizing provider or a provider within the authorizing providers department within the previous 12 mos or has a future within next 30 days. See \"Patient Info\" tab in inbasket, or \"Choose Columns\" in Meds & Orders section of the refill encounter.              Passed - Medication is active on med list        Passed - Patient is age 18 or older        Passed - Normal serum creatinine on file in past 12 months     Recent Labs   Lab Test 06/07/22  0005   CR 0.96              Passed - Normal serum potassium on file in past 12 months     Recent Labs   Lab Test 06/22/22  1314   POTASSIUM 4.7                    Passed - Normal serum sodium on file in past 12 months     Recent Labs   Lab Test 06/07/22  0005                    lisinopril (ZESTRIL) 20 MG tablet [Pharmacy Med Name: LISINOPRIL 20 MG TABLET] 90 tablet 3     Sig: TAKE 1 TABLET BY MOUTH EVERY DAY       ACE Inhibitors (Including Combos) Protocol Failed - 8/12/2022  2:48 AM        Failed - Medication is active on med list        Passed - Blood pressure under 140/90 in past 12 months     BP Readings from Last 3 Encounters:   08/09/22 126/72   07/05/22 116/62   06/22/22 130/78                 Passed - Recent (12 mo) or future (30 days) visit within the " "authorizing provider's specialty     Patient has had an office visit with the authorizing provider or a provider within the authorizing providers department within the previous 12 mos or has a future within next 30 days. See \"Patient Info\" tab in inbasket, or \"Choose Columns\" in Meds & Orders section of the refill encounter.              Passed - Patient is age 18 or older        Passed - Normal serum creatinine on file in past 12 months     Recent Labs   Lab Test 06/07/22  0005   CR 0.96       Ok to refill medication if creatinine is low          Passed - Normal serum potassium on file in past 12 months     Recent Labs   Lab Test 06/22/22  1314   POTASSIUM 4.7                lisinopril (ZESTRIL) 20 MG tablet (Discontinued) 90 tablet 3 8/20/2021 6/10/2022 No   Sig: [LISINOPRIL (PRINIVIL,ZESTRIL) 20 MG TABLET] TAKE 1 TABLET BY MOUTH EVERY DAY   Patient taking differently: Take 20 mg by mouth daily [LISINOPRIL (PRINIVIL,ZESTRIL) 20 MG TABLET] TAKE 1 TABLET BY MOUTH EVERY DAY        Sent to pharmacy as: Lisinopril 20 MG Oral Tablet (ZESTRIL)   Class: E-Prescribe   Order: [id number]       Josefina Chandra RN 08/12/22 4:50 PM  "

## 2022-08-15 ENCOUNTER — HOSPITAL ENCOUNTER (OUTPATIENT)
Dept: CARDIAC REHAB | Facility: CLINIC | Age: 62
Discharge: HOME | End: 2022-08-15
Attending: INTERNAL MEDICINE
Payer: COMMERCIAL

## 2022-08-15 PROCEDURE — 93798 PHYS/QHP OP CAR RHAB W/ECG: CPT

## 2022-08-22 ENCOUNTER — HOSPITAL ENCOUNTER (OUTPATIENT)
Dept: CARDIAC REHAB | Facility: CLINIC | Age: 62
Discharge: HOME | End: 2022-08-22
Attending: INTERNAL MEDICINE
Payer: COMMERCIAL

## 2022-08-22 PROCEDURE — 93798 PHYS/QHP OP CAR RHAB W/ECG: CPT

## 2022-08-24 ENCOUNTER — HOSPITAL ENCOUNTER (OUTPATIENT)
Dept: CARDIAC REHAB | Facility: CLINIC | Age: 62
Discharge: HOME | End: 2022-08-24
Attending: INTERNAL MEDICINE
Payer: COMMERCIAL

## 2022-08-24 PROCEDURE — 93798 PHYS/QHP OP CAR RHAB W/ECG: CPT

## 2022-08-26 ENCOUNTER — HOSPITAL ENCOUNTER (OUTPATIENT)
Dept: CARDIAC REHAB | Facility: CLINIC | Age: 62
Discharge: HOME | End: 2022-08-26
Attending: INTERNAL MEDICINE
Payer: COMMERCIAL

## 2022-08-26 PROCEDURE — 93798 PHYS/QHP OP CAR RHAB W/ECG: CPT

## 2022-09-16 ENCOUNTER — HOSPITAL ENCOUNTER (OUTPATIENT)
Dept: CARDIAC REHAB | Facility: CLINIC | Age: 62
Discharge: HOME | End: 2022-09-16
Attending: INTERNAL MEDICINE
Payer: COMMERCIAL

## 2022-09-16 PROCEDURE — 93798 PHYS/QHP OP CAR RHAB W/ECG: CPT

## 2022-09-16 PROCEDURE — 93797 PHYS/QHP OP CAR RHAB WO ECG: CPT

## 2022-09-24 ENCOUNTER — HEALTH MAINTENANCE LETTER (OUTPATIENT)
Age: 62
End: 2022-09-24

## 2022-10-06 ENCOUNTER — TRANSFERRED RECORDS (OUTPATIENT)
Dept: HEALTH INFORMATION MANAGEMENT | Facility: CLINIC | Age: 62
End: 2022-10-06

## 2022-11-03 DIAGNOSIS — Z95.1 S/P CABG (CORONARY ARTERY BYPASS GRAFT): ICD-10-CM

## 2022-11-07 ENCOUNTER — MYC MEDICAL ADVICE (OUTPATIENT)
Dept: FAMILY MEDICINE | Facility: CLINIC | Age: 62
End: 2022-11-07

## 2022-11-08 ENCOUNTER — NURSE TRIAGE (OUTPATIENT)
Dept: NURSING | Facility: CLINIC | Age: 62
End: 2022-11-08

## 2022-11-08 ENCOUNTER — MYC MEDICAL ADVICE (OUTPATIENT)
Dept: CARDIOLOGY | Facility: CLINIC | Age: 62
End: 2022-11-08

## 2022-11-08 DIAGNOSIS — Z95.1 S/P CABG (CORONARY ARTERY BYPASS GRAFT): ICD-10-CM

## 2022-11-08 RX ORDER — AMLODIPINE BESYLATE 2.5 MG/1
2.5 TABLET ORAL DAILY
Qty: 30 TABLET | Refills: 11 | Status: SHIPPED | OUTPATIENT
Start: 2022-11-08 | End: 2023-05-17

## 2022-11-08 NOTE — TELEPHONE ENCOUNTER
Patient calling because he needs his amlodipine rx refilled. Pharm says he only has 15 tabs left in his prescription. Amlodipine is ordered by cardiologist.     Message routed to Dr. Lyons (per patient, his primary cardiolgist). Patient states that he will also contact cardiology via [employer].    Abbey Barragan RN on 11/8/2022 at 10:34 AM       Reason for Disposition    Caller with prescription and triager answers question    Additional Information    Negative: New-onset or worsening symptoms, see that protocol (e.g., diarrhea, runny nose, sore throat)    Negative: Medicine question not related to refill or renewal    Negative: Caller (e.g., patient or pharmacist) requesting information about a new medicine    Negative: Caller requesting information unrelated to medicine    Negative: Prescription refill request for ESSENTIAL medicine (i.e., likelihood of harm to patient if not taken) and triager unable to refill per department policy    Negative: Prescription not at pharmacy and was prescribed by PCP recently  (Exception: triager has access to EMR and prescription is recorded there. Go to Home Care and confirm for pharmacy.)    Negative: Pharmacy calling with prescription questions and triager unable to answer question    Negative: Caller requesting a CONTROLLED substance prescription refill (e.g., narcotics, ADHD medicines)    Negative: Prescription refill request for NON-ESSENTIAL medicine (i.e., no harm to patient if med not taken) and triager unable to refill per department policy    Negative: Caller has NON-URGENT medicine question about med that PCP prescribed and triager unable to answer question    Negative: Prescription request for new medicine (not a refill)    Negative: Prescription prescribed recently is not at pharmacy and triager has access to patient's EMR and prescription is recorded in the EMR    Negative: Caller requesting a prescription renewal (no refills left), no triage required, and  triager able to renew prescription per department policy    Negative: Patient has refills remaining on their prescription    Negative: Pharmacy with prescription refill question and triager answers question    Protocols used: MEDICATION REFILL AND RENEWAL CALL-A-OH

## 2022-11-08 NOTE — TELEPHONE ENCOUNTER
===View-only below this line===  ----- Message -----  From: Mery Lyons MD  Sent: 11/8/2022   1:53 PM CST  To: Flakito Mora RN    Please refill

## 2022-12-15 DIAGNOSIS — G25.81 RESTLESS LEGS SYNDROME: ICD-10-CM

## 2022-12-15 RX ORDER — ROPINIROLE 0.25 MG/1
0.5 TABLET, FILM COATED ORAL AT BEDTIME
Qty: 180 TABLET | Refills: 2 | Status: SHIPPED | OUTPATIENT
Start: 2022-12-15 | End: 2023-05-17

## 2022-12-16 NOTE — TELEPHONE ENCOUNTER
"Last Written Prescription Date:  3/28/2022  Last Fill Quantity: 180,  # refills: 2   Last office visit provider:  6/22/2022     Requested Prescriptions   Pending Prescriptions Disp Refills     rOPINIRole (REQUIP) 0.25 MG tablet [Pharmacy Med Name: ROPINIROLE HCL 0.25 MG TABLET] 180 tablet 2     Sig: TAKE 2 TABLETS (0.5 MG) BY MOUTH AT BEDTIME       Antiparkinson's Agents Protocol Passed - 12/15/2022  9:37 AM        Passed - Blood pressure under 140/90 in past 12 months     BP Readings from Last 3 Encounters:   08/09/22 126/72   07/05/22 116/62   06/22/22 130/78                 Passed - CBC on record in past 12 months     Recent Labs   Lab Test 06/22/22  1314 06/10/22  0603 06/09/22  0401   WBC  --   --  10.0   RBC  --   --  2.96*   HGB 12.2*  --  8.5*   HCT  --   --  25.5*   PLT  --  202 147*                 Passed - ALT on record in past 12 months         Recent Labs   Lab Test 06/07/22  0005   ALT 15             Passed - Serum Creatinine on file in past 12 months     Recent Labs   Lab Test 06/07/22  0005   CR 0.96       Ok to refill medication if creatinine is low          Passed - Medication is active on med list        Passed - Patient is age 18 or older        Passed - Recent (6 mo) or future (30 days) visit within the authorizing provider's specialty     Patient had office visit in the last 6 months or has a visit in the next 30 days with authorizing provider or within the authorizing provider's specialty.  See \"Patient Info\" tab in inbasket, or \"Choose Columns\" in Meds & Orders section of the refill encounter.                 Ann Marie Haas RN 12/15/22 8:02 PM  "

## 2023-02-01 RX ORDER — AMLODIPINE BESYLATE 2.5 MG/1
TABLET ORAL
Qty: 90 TABLET | Refills: 1 | OUTPATIENT
Start: 2023-02-01

## 2023-03-03 ENCOUNTER — MYC REFILL (OUTPATIENT)
Dept: FAMILY MEDICINE | Facility: CLINIC | Age: 63
End: 2023-03-03
Payer: COMMERCIAL

## 2023-03-03 DIAGNOSIS — L71.9 ROSACEA: Primary | ICD-10-CM

## 2023-03-06 RX ORDER — DOXYCYCLINE 100 MG/1
100 CAPSULE ORAL 2 TIMES DAILY
Qty: 180 CAPSULE | Refills: 0 | Status: SHIPPED | OUTPATIENT
Start: 2023-03-06 | End: 2023-08-09

## 2023-05-17 ENCOUNTER — OFFICE VISIT (OUTPATIENT)
Dept: FAMILY MEDICINE | Facility: CLINIC | Age: 63
End: 2023-05-17
Payer: COMMERCIAL

## 2023-05-17 VITALS
BODY MASS INDEX: 36.75 KG/M2 | OXYGEN SATURATION: 94 % | HEIGHT: 69 IN | WEIGHT: 248.1 LBS | TEMPERATURE: 98.1 F | SYSTOLIC BLOOD PRESSURE: 144 MMHG | DIASTOLIC BLOOD PRESSURE: 92 MMHG | HEART RATE: 64 BPM | RESPIRATION RATE: 16 BRPM

## 2023-05-17 DIAGNOSIS — L71.9 ROSACEA: ICD-10-CM

## 2023-05-17 DIAGNOSIS — E78.2 MIXED HYPERLIPIDEMIA: ICD-10-CM

## 2023-05-17 DIAGNOSIS — I25.10 CORONARY ARTERY DISEASE INVOLVING NATIVE CORONARY ARTERY OF NATIVE HEART, UNSPECIFIED WHETHER ANGINA PRESENT: ICD-10-CM

## 2023-05-17 DIAGNOSIS — L98.9 SKIN LESION: ICD-10-CM

## 2023-05-17 DIAGNOSIS — Z95.1 S/P CABG (CORONARY ARTERY BYPASS GRAFT): ICD-10-CM

## 2023-05-17 DIAGNOSIS — I10 BENIGN ESSENTIAL HYPERTENSION: ICD-10-CM

## 2023-05-17 DIAGNOSIS — Z00.00 ROUTINE GENERAL MEDICAL EXAMINATION AT A HEALTH CARE FACILITY: Primary | ICD-10-CM

## 2023-05-17 LAB
ALBUMIN SERPL BCG-MCNC: 4.6 G/DL (ref 3.5–5.2)
ALP SERPL-CCNC: 112 U/L (ref 40–129)
ALT SERPL W P-5'-P-CCNC: 57 U/L (ref 10–50)
ANION GAP SERPL CALCULATED.3IONS-SCNC: 14 MMOL/L (ref 7–15)
AST SERPL W P-5'-P-CCNC: 35 U/L (ref 10–50)
BILIRUB SERPL-MCNC: 0.6 MG/DL
BUN SERPL-MCNC: 19.4 MG/DL (ref 8–23)
CALCIUM SERPL-MCNC: 9.5 MG/DL (ref 8.8–10.2)
CHLORIDE SERPL-SCNC: 101 MMOL/L (ref 98–107)
CHOLEST SERPL-MCNC: 141 MG/DL
CREAT SERPL-MCNC: 0.9 MG/DL (ref 0.67–1.17)
DEPRECATED HCO3 PLAS-SCNC: 26 MMOL/L (ref 22–29)
GFR SERPL CREATININE-BSD FRML MDRD: >90 ML/MIN/1.73M2
GLUCOSE SERPL-MCNC: 109 MG/DL (ref 70–99)
HDLC SERPL-MCNC: 35 MG/DL
LDLC SERPL CALC-MCNC: 75 MG/DL
NONHDLC SERPL-MCNC: 106 MG/DL
POTASSIUM SERPL-SCNC: 4 MMOL/L (ref 3.4–5.3)
PROT SERPL-MCNC: 7.3 G/DL (ref 6.4–8.3)
SODIUM SERPL-SCNC: 141 MMOL/L (ref 136–145)
TRIGL SERPL-MCNC: 156 MG/DL

## 2023-05-17 PROCEDURE — 99396 PREV VISIT EST AGE 40-64: CPT | Performed by: NURSE PRACTITIONER

## 2023-05-17 PROCEDURE — 80053 COMPREHEN METABOLIC PANEL: CPT | Performed by: NURSE PRACTITIONER

## 2023-05-17 PROCEDURE — 80061 LIPID PANEL: CPT | Performed by: NURSE PRACTITIONER

## 2023-05-17 PROCEDURE — 36415 COLL VENOUS BLD VENIPUNCTURE: CPT | Performed by: NURSE PRACTITIONER

## 2023-05-17 RX ORDER — CARVEDILOL 3.12 MG/1
3.12 TABLET ORAL 2 TIMES DAILY WITH MEALS
Qty: 180 TABLET | Refills: 3 | Status: SHIPPED | OUTPATIENT
Start: 2023-05-17 | End: 2024-05-28

## 2023-05-17 RX ORDER — NITROGLYCERIN 0.4 MG/1
TABLET SUBLINGUAL
Qty: 25 TABLET | Refills: 3 | Status: SHIPPED | OUTPATIENT
Start: 2023-05-17

## 2023-05-17 RX ORDER — HYDROCHLOROTHIAZIDE 25 MG/1
TABLET ORAL
Qty: 90 TABLET | Refills: 2 | Status: SHIPPED | OUTPATIENT
Start: 2023-05-17 | End: 2024-02-15

## 2023-05-17 RX ORDER — DOXYCYCLINE 100 MG/1
100 CAPSULE ORAL 2 TIMES DAILY
Qty: 180 CAPSULE | Refills: 0 | Status: CANCELLED | OUTPATIENT
Start: 2023-05-17

## 2023-05-17 RX ORDER — AMLODIPINE BESYLATE 5 MG/1
5 TABLET ORAL DAILY
Qty: 90 TABLET | Refills: 3 | Status: SHIPPED | OUTPATIENT
Start: 2023-05-17 | End: 2024-05-28

## 2023-05-17 RX ORDER — METRONIDAZOLE 7.5 MG/G
GEL TOPICAL 2 TIMES DAILY
Qty: 45 G | Refills: 3 | Status: SHIPPED | OUTPATIENT
Start: 2023-05-17 | End: 2024-05-28

## 2023-05-17 ASSESSMENT — ENCOUNTER SYMPTOMS
FREQUENCY: 0
SORE THROAT: 0
EYE PAIN: 0
PALPITATIONS: 0
SHORTNESS OF BREATH: 0
DYSURIA: 0
HEADACHES: 0
FEVER: 0
CHILLS: 0
DIZZINESS: 0
NERVOUS/ANXIOUS: 0
COUGH: 0
JOINT SWELLING: 0
ABDOMINAL PAIN: 0
MYALGIAS: 0
DIARRHEA: 0
NAUSEA: 0
CONSTIPATION: 0
HEARTBURN: 0
HEMATOCHEZIA: 0
HEMATURIA: 0
WEAKNESS: 0
PARESTHESIAS: 0
ARTHRALGIAS: 1

## 2023-05-17 ASSESSMENT — PAIN SCALES - GENERAL: PAINLEVEL: NO PAIN (0)

## 2023-05-17 NOTE — PATIENT INSTRUCTIONS
Refill of the nitroglycerin tablets sent to the pharmacy.    Lets increase the amlodipine up to 5 mg which I sent to your pharmacy.  You can take 2 tablets of the 2.5 mg that you have at home if you want to use up your current prescription.    We can try metronidazole topically in place of the doxycycline to see if that helps keep the rosacea under control.    I did place that dermatology referral for both the skin growths and to see if they have anything to offer regarding the rosacea.          Preventive Health Recommendations  Male Ages 50 - 64    Yearly exam:             See your health care provider every year in order to  o   Review health changes.   o   Discuss preventive care.    o   Review your medicines if your doctor has prescribed any.   Have a cholesterol test every 5 years, or more frequently if you are at risk for high cholesterol/heart disease.   Have a diabetes test (fasting glucose) every three years. If you are at risk for diabetes, you should have this test more often.   Have a colonoscopy at age 50, or have a yearly FIT test (stool test). These exams will check for colon cancer.    Talk with your health care provider about whether or not a prostate cancer screening test (PSA) is right for you.  You should be tested each year for STDs (sexually transmitted diseases), if you re at risk.     Shots: Get a flu shot each year. Get a tetanus shot every 10 years.     Nutrition:  Eat at least 5 servings of fruits and vegetables daily.   Eat whole-grain bread, whole-wheat pasta and brown rice instead of white grains and rice.   Get adequate Calcium and Vitamin D.     Lifestyle  Exercise for at least 150 minutes a week (30 minutes a day, 5 days a week). This will help you control your weight and prevent disease.   Limit alcohol to one drink per day.   No smoking.   Wear sunscreen to prevent skin cancer.   See your dentist every six months for an exam and cleaning.   See your eye doctor every 1 to 2  years.

## 2023-05-17 NOTE — PROGRESS NOTES
SUBJECTIVE:   CC: Gonzalez is an 62 year old who presents for preventative health visit.       5/17/2023     8:09 AM   Additional Questions   Roomed by Sayra Regalado CMA   Patient has been advised of split billing requirements and indicates understanding: Yes     Overdue for dental checkup.      Will get colonoscopy this fall.      Due for follow up with cardiology at the end of Summer.       Healthy Habits:     Getting at least 3 servings of Calcium per day:  Yes    Bi-annual eye exam:  Yes    Dental care twice a year:  NO    Sleep apnea or symptoms of sleep apnea:  Sleep apnea    Diet:  Low salt and Low fat/cholesterol    Frequency of exercise:  None    Taking medications regularly:  Yes    Medication side effects:  Not applicable    PHQ-2 Total Score: 0    Additional concerns today:  No    Today's PHQ-2 Score:       5/17/2023     8:11 AM   PHQ-2 ( 1999 [employer])   Q1: Little interest or pleasure in doing things 0   Q2: Feeling down, depressed or hopeless 0   PHQ-2 Score 0   Q1: Little interest or pleasure in doing things Not at all   Q2: Feeling down, depressed or hopeless Not at all   PHQ-2 Score 0           Social History     Tobacco Use     Smoking status: Never     Smokeless tobacco: Never   Vaping Use     Vaping status: Not on file   Substance Use Topics     Alcohol use: Yes     Alcohol/week: 1.0 standard drink of alcohol     Types: 1 Standard drinks or equivalent per week             5/17/2023     8:11 AM   Alcohol Use   Prescreen: >3 drinks/day or >7 drinks/week? No       Last PSA:   Prostate Specific Antigen Screen   Date Value Ref Range Status   02/27/2019 11.0 (H) 0.0 - 3.5 ng/mL Final       Reviewed orders with patient. Reviewed health maintenance and updated orders accordingly - Yes  Lab work is in process    Reviewed and updated as needed this visit by clinical staff   Tobacco  Allergies  Meds              Reviewed and updated as needed this visit by Provider                 Past Medical History:  "  Diagnosis Date     Abnormal cardiovascular stress test 4/3/2022     CAD (coronary artery disease)      Hypertension      Knee injury     right     Rosacea      Sleep apnea     CPAP      Past Surgical History:   Procedure Laterality Date     APPENDECTOMY       ARTHROSCOPY KNEE Bilateral      BYPASS GRAFT ARTERY CORONARY N/A 6/6/2022    Procedure: CORONARY ARTERY BYPASS GRAFT X 5, ENDOSCOPIC VESSEL PROCUREMENT LEFT LEG, INTERNAL MAMMARY ARTERY HARVEST, LEFT RADIAL ARTERY HARVEST, ANESTHESIA TRANSESOPHAGEAL ECHOCARDIOGRAM, EPIAORTIC ULTRASOUND;  Surgeon: Mayank Rocha MD;  Location: [facility] Main OR     CV CORONARY ANGIOGRAM N/A 4/7/2022    Procedure: Coronary Angiogram;  Surgeon: Mery Lyons MD;  Location: [facility] CATH LAB CV     PROSTATECTOMY       TOTAL KNEE ARTHROPLASTY Left      WRIST SURGERY         Review of Systems   Constitutional: Negative for chills and fever.   HENT: Negative for congestion, ear pain, hearing loss and sore throat.    Eyes: Negative for pain and visual disturbance.   Respiratory: Negative for cough and shortness of breath.    Cardiovascular: Negative for chest pain, palpitations and peripheral edema.   Gastrointestinal: Negative for abdominal pain, constipation, diarrhea, heartburn, hematochezia and nausea.   Genitourinary: Negative for dysuria, frequency, genital sores, hematuria, impotence, penile discharge and urgency.   Musculoskeletal: Positive for arthralgias. Negative for joint swelling and myalgias.   Skin: Negative for rash.   Neurological: Negative for dizziness, weakness, headaches and paresthesias.   Psychiatric/Behavioral: The patient is not nervous/anxious.      OBJECTIVE:   BP (!) 144/92 (BP Location: Left arm, Patient Position: Sitting, Cuff Size: Adult Large)   Pulse 64   Temp 98.1  F (36.7  C) (Oral)   Resp 16   Ht 1.753 m (5' 9\")   Wt 112.5 kg (248 lb 1.6 oz)   SpO2 94%   BMI 36.64 kg/m      Physical Exam  GENERAL: healthy, alert and no distress  EYES: " Eyes grossly normal to inspection, PERRL and conjunctivae and sclerae normal  HENT: ear canals and TM's normal, nose and mouth without ulcers or lesions  NECK: no adenopathy, no asymmetry, masses, or scars and thyroid normal to palpation  RESP: lungs clear to auscultation - no rales, rhonchi or wheezes  CV: regular rate and rhythm, normal S1 S2, no S3 or S4, no murmur, click or rub, no peripheral edema and peripheral pulses strong  ABDOMEN: soft, nontender, no hepatosplenomegaly, no masses and bowel sounds normal  MS: no gross musculoskeletal defects noted, no edema  SKIN: no suspicious lesions or rashes  NEURO: Normal strength and tone, mentation intact and speech normal  PSYCH: mentation appears normal, affect normal/bright    Diagnostic Test Results:  Labs reviewed in Epic    ASSESSMENT/PLAN:       ICD-10-CM    1. Routine general medical examination at a health care facility  Z00.00       2. S/P CABG (coronary artery bypass graft)  Z95.1 amLODIPine (NORVASC) 5 MG tablet      3. Coronary artery disease involving native coronary artery of native heart, unspecified whether angina present  I25.10 carvedilol (COREG) 3.125 MG tablet     Comprehensive metabolic panel (BMP + Alb, Alk Phos, ALT, AST, Total. Bili, TP)     Lipid panel     nitroGLYcerin (NITROSTAT) 0.4 MG sublingual tablet     Comprehensive metabolic panel (BMP + Alb, Alk Phos, ALT, AST, Total. Bili, TP)     Lipid panel      4. Rosacea  L71.9 metroNIDAZOLE (METROGEL) 0.75 % external gel     Adult Dermatology Referral      5. Benign essential hypertension  I10 hydrochlorothiazide (HYDRODIURIL) 25 MG tablet     Comprehensive metabolic panel (BMP + Alb, Alk Phos, ALT, AST, Total. Bili, TP)     Comprehensive metabolic panel (BMP + Alb, Alk Phos, ALT, AST, Total. Bili, TP)      6. Skin lesion  L98.9 Adult Dermatology Referral      7. Mixed hyperlipidemia  E78.2         Try metronidazole for rosacea.  Increase amlodipine to 5 mg for better blood pressure control.   "Follow-up with cardiology later this year.  Refill nitroglycerin tablets sent to hold onto.  PSA/prostate cancer history managed by urology.  Dermatology referral for skin check and rosacea follow-up.  Congratulated on weight loss and healthy diet changes.  Follow-up with orthopedics when ready to get TKA    Patient has been advised of split billing requirements and indicates understanding: Yes      COUNSELING:   Reviewed preventive health counseling, as reflected in patient instructions      BMI:   Estimated body mass index is 36.64 kg/m  as calculated from the following:    Height as of this encounter: 1.753 m (5' 9\").    Weight as of this encounter: 112.5 kg (248 lb 1.6 oz).   Weight management plan: Discussed healthy diet and exercise guidelines      He reports that he has never smoked. He has never used smokeless tobacco.      Dylon Shafer NP  [facility]  "

## 2023-05-18 PROBLEM — R73.03 PREDIABETES: Status: ACTIVE | Noted: 2023-05-18

## 2023-05-24 DIAGNOSIS — G25.81 RESTLESS LEGS SYNDROME: ICD-10-CM

## 2023-05-25 NOTE — TELEPHONE ENCOUNTER
"Routing refill request to provider for review/approval because:  Blood Pressure out of range    Last Written Prescription Date:  03/28/2023  Last Fill Quantity: 60,  # refills: 0   Last office visit provider:  05/17/2023     Requested Prescriptions   Pending Prescriptions Disp Refills     pramipexole (MIRAPEX) 0.25 MG tablet [Pharmacy Med Name: PRAMIPEXOLE 0.25 MG TABLET] 60 tablet 0     Sig: TAKE 1 TABLET BY MOUTH EVERYDAY AT BEDTIME       Antiparkinson's Agents Protocol Failed - 5/25/2023 11:23 AM        Failed - Blood pressure under 140/90 in past 12 months     BP Readings from Last 3 Encounters:   05/17/23 (!) 144/92   08/09/22 126/72   07/05/22 116/62                 Passed - CBC on record in past 12 months     Recent Labs   Lab Test 06/22/22  1314 06/10/22  0603 06/09/22  0401   WBC  --   --  10.0   RBC  --   --  2.96*   HGB 12.2*  --  8.5*   HCT  --   --  25.5*   PLT  --  202 147*                 Passed - ALT on record in past 12 months         Recent Labs   Lab Test 05/17/23  0900   ALT 57*             Passed - Serum Creatinine on file in past 12 months     Recent Labs   Lab Test 05/17/23  0900   CR 0.90       Ok to refill medication if creatinine is low          Passed - Medication is active on med list        Passed - Patient is age 18 or older        Passed - Recent (6 mo) or future (30 days) visit within the authorizing provider's specialty     Patient had office visit in the last 6 months or has a visit in the next 30 days with authorizing provider or within the authorizing provider's specialty.  See \"Patient Info\" tab in inbasket, or \"Choose Columns\" in Meds & Orders section of the refill encounter.                 Alysha Sánchez RN 05/25/23 11:23 AM  "

## 2023-05-26 RX ORDER — PRAMIPEXOLE DIHYDROCHLORIDE 0.25 MG/1
TABLET ORAL
Qty: 90 TABLET | Refills: 3 | Status: SHIPPED | OUTPATIENT
Start: 2023-05-26 | End: 2024-05-14

## 2023-06-20 DIAGNOSIS — Z95.1 S/P CABG (CORONARY ARTERY BYPASS GRAFT): ICD-10-CM

## 2023-06-27 ENCOUNTER — TRANSFERRED RECORDS (OUTPATIENT)
Dept: HEALTH INFORMATION MANAGEMENT | Facility: CLINIC | Age: 63
End: 2023-06-27
Payer: COMMERCIAL

## 2023-06-28 ENCOUNTER — TELEPHONE (OUTPATIENT)
Dept: CARDIOLOGY | Facility: CLINIC | Age: 63
End: 2023-06-28
Payer: COMMERCIAL

## 2023-06-28 ENCOUNTER — TELEPHONE (OUTPATIENT)
Dept: FAMILY MEDICINE | Facility: CLINIC | Age: 63
End: 2023-06-28

## 2023-06-28 NOTE — TELEPHONE ENCOUNTER
Called patient to review recent elevated blood pressure reading.  Per [place] Community Measures guidelines, patients blood pressure is out of parameters and recheck blood pressure is recommended.    Last Blood Pressure: 144/92  Last Heart Rate: 64  Date: 5/17/23  Location: PCP    Per Patient, his PCP monitors his blood pressure readings and medications.    Mi Wall, CMA

## 2023-07-13 ENCOUNTER — TRANSFERRED RECORDS (OUTPATIENT)
Dept: HEALTH INFORMATION MANAGEMENT | Facility: CLINIC | Age: 63
End: 2023-07-13
Payer: COMMERCIAL

## 2023-07-18 ENCOUNTER — TRANSFERRED RECORDS (OUTPATIENT)
Dept: HEALTH INFORMATION MANAGEMENT | Facility: CLINIC | Age: 63
End: 2023-07-18
Payer: COMMERCIAL

## 2023-08-01 ENCOUNTER — OFFICE VISIT (OUTPATIENT)
Dept: FAMILY MEDICINE | Facility: CLINIC | Age: 63
End: 2023-08-01
Payer: COMMERCIAL

## 2023-08-01 ENCOUNTER — TRANSFERRED RECORDS (OUTPATIENT)
Dept: CARDIOLOGY | Facility: CLINIC | Age: 63
End: 2023-08-01

## 2023-08-01 VITALS
WEIGHT: 251 LBS | TEMPERATURE: 97.6 F | SYSTOLIC BLOOD PRESSURE: 120 MMHG | BODY MASS INDEX: 37.18 KG/M2 | RESPIRATION RATE: 16 BRPM | DIASTOLIC BLOOD PRESSURE: 88 MMHG | OXYGEN SATURATION: 96 % | HEIGHT: 69 IN | HEART RATE: 65 BPM

## 2023-08-01 DIAGNOSIS — Z01.818 PREOP GENERAL PHYSICAL EXAM: Primary | ICD-10-CM

## 2023-08-01 DIAGNOSIS — I10 BENIGN ESSENTIAL HYPERTENSION: ICD-10-CM

## 2023-08-01 DIAGNOSIS — Z95.1 STATUS POST CORONARY ARTERY BYPASS GRAFT: ICD-10-CM

## 2023-08-01 DIAGNOSIS — M17.11 PRIMARY OSTEOARTHRITIS OF RIGHT KNEE: ICD-10-CM

## 2023-08-01 DIAGNOSIS — I25.10 CORONARY ARTERY DISEASE INVOLVING NATIVE CORONARY ARTERY OF NATIVE HEART, UNSPECIFIED WHETHER ANGINA PRESENT: ICD-10-CM

## 2023-08-01 LAB
ALBUMIN SERPL BCG-MCNC: 4.6 G/DL (ref 3.5–5.2)
ALP SERPL-CCNC: 122 U/L (ref 40–129)
ALT SERPL W P-5'-P-CCNC: 42 U/L (ref 0–70)
ANION GAP SERPL CALCULATED.3IONS-SCNC: 12 MMOL/L (ref 7–15)
AST SERPL W P-5'-P-CCNC: 28 U/L (ref 0–45)
BILIRUB SERPL-MCNC: 0.5 MG/DL
BUN SERPL-MCNC: 16.3 MG/DL (ref 8–23)
CALCIUM SERPL-MCNC: 9.6 MG/DL (ref 8.8–10.2)
CHLORIDE SERPL-SCNC: 98 MMOL/L (ref 98–107)
CREAT SERPL-MCNC: 0.85 MG/DL (ref 0.67–1.17)
DEPRECATED HCO3 PLAS-SCNC: 28 MMOL/L (ref 22–29)
ERYTHROCYTE [DISTWIDTH] IN BLOOD BY AUTOMATED COUNT: 12.4 % (ref 10–15)
GFR SERPL CREATININE-BSD FRML MDRD: >90 ML/MIN/1.73M2
GLUCOSE SERPL-MCNC: 119 MG/DL (ref 70–99)
HCT VFR BLD AUTO: 46.3 % (ref 40–53)
HGB BLD-MCNC: 16.5 G/DL (ref 13.3–17.7)
MCH RBC QN AUTO: 29.2 PG (ref 26.5–33)
MCHC RBC AUTO-ENTMCNC: 35.6 G/DL (ref 31.5–36.5)
MCV RBC AUTO: 82 FL (ref 78–100)
PLATELET # BLD AUTO: 228 10E3/UL (ref 150–450)
POTASSIUM SERPL-SCNC: 3.6 MMOL/L (ref 3.4–5.3)
PROT SERPL-MCNC: 7.4 G/DL (ref 6.4–8.3)
RBC # BLD AUTO: 5.65 10E6/UL (ref 4.4–5.9)
SODIUM SERPL-SCNC: 138 MMOL/L (ref 136–145)
WBC # BLD AUTO: 6.9 10E3/UL (ref 4–11)

## 2023-08-01 PROCEDURE — 80053 COMPREHEN METABOLIC PANEL: CPT | Performed by: NURSE PRACTITIONER

## 2023-08-01 PROCEDURE — 93005 ELECTROCARDIOGRAM TRACING: CPT | Performed by: NURSE PRACTITIONER

## 2023-08-01 PROCEDURE — 85027 COMPLETE CBC AUTOMATED: CPT | Performed by: NURSE PRACTITIONER

## 2023-08-01 PROCEDURE — 36415 COLL VENOUS BLD VENIPUNCTURE: CPT | Performed by: NURSE PRACTITIONER

## 2023-08-01 PROCEDURE — 99215 OFFICE O/P EST HI 40 MIN: CPT | Performed by: NURSE PRACTITIONER

## 2023-08-01 RX ORDER — LISINOPRIL 20 MG/1
TABLET ORAL
Qty: 90 TABLET | Refills: 3 | Status: SHIPPED | OUTPATIENT
Start: 2023-08-01 | End: 2024-05-28

## 2023-08-01 ASSESSMENT — PAIN SCALES - GENERAL: PAINLEVEL: EXTREME PAIN (8)

## 2023-08-01 NOTE — PATIENT INSTRUCTIONS
I will get your paperwork faxed to your surgeon.    Follow your surgeon's directions regarding eating and drinking prior to surgery.     Medications you can take the morning of surgery include any inhalers and: Carvedilol and amlodipine    No advil, ibuprofen, aleve, naproxen, or aspirin a week before surgery. Tylenol is ok.    Stop all supplements a week before surgery.    Your surgeon will manage any complications after your procedure.

## 2023-08-01 NOTE — H&P (VIEW-ONLY)
[facility]  [address]  [place]  [facility] [address]  Phone: [phone]  Fax: [phone]  Primary Provider: Dylon Shafer  Pre-op Performing Provider: DYLON SHAFER    PREOPERATIVE EVALUATION:  Today's date: 8/1/2023    Gonzalez Morton is a 62 year old male who presents for a preoperative evaluation.      5/17/2023     8:09 AM   Additional Questions   Roomed by Sayra Regalado CMA       Surgical Information:  Surgery/Procedure: right knee replacement   Surgery Location: [facility]   Surgeon: Dr Redd   Surgery Date: 8/16/23  Time of Surgery: ?  Where patient plans to recover: At home with family  Fax number for surgical facility: Note does not need to be faxed, will be available electronically in Epic.    Assessment & Plan     The proposed surgical procedure is considered INTERMEDIATE risk.    Preop general physical exam  Medically optimized for surgery.  Await labs and cardiology interpretation of ECG.  - CBC with platelets; Future  - Comprehensive metabolic panel (BMP + Alb, Alk Phos, ALT, AST, Total. Bili, TP); Future  - EKG 12-lead, tracing only  - CBC with platelets  - Comprehensive metabolic panel (BMP + Alb, Alk Phos, ALT, AST, Total. Bili, TP)    Primary osteoarthritis of right knee    Planned TKA    Benign essential hypertension  Better control with increase in amlodipine dosing.  - lisinopril (ZESTRIL) 20 MG tablet; [LISINOPRIL (PRINIVIL,ZESTRIL) 20 MG TABLET] TAKE 1 TABLET BY MOUTH EVERY DAY    Status post coronary artery bypass graft  Stable.  Will be seeing cardiology next week for his 1 year follow-up.    Coronary artery disease involving native coronary artery of native heart, unspecified whether angina present  Doing well status post CABG.  No chest pain       - No identified additional risk factors other than previously addressed      Additional Medication Instructions:  Ok to take amlodipine and carvedilol morning of  surgery with small sip of water    RECOMMENDATION:  APPROVAL GIVEN to proceed with proposed procedure, without further diagnostic evaluation.    Cardiology note x1, ECG x1, echocardiogram x1    Total time spent was 40 minutes including reviewing records prior to arrival, consultation, placing orders, education, and reviewing the plan of care on the date of service.        Subjective       HPI related to upcoming procedure: Patient has been dealing with known DJD in the knees.  Already has had left knee replacement.  Now it is time for the right        7/27/2023     8:17 AM   Preop Questions   1. Have you ever had a heart attack or stroke? No   2. Have you ever had surgery on your heart or blood vessels, such as a stent placement, a coronary artery bypass, or surgery on an artery in your head, neck, heart, or legs? YES -CABG X5   3. Do you have chest pain with activity? No   4. Do you have a history of  heart failure? No   5. Do you currently have a cold, bronchitis or symptoms of other infection? No   6. Do you have a cough, shortness of breath, or wheezing? No   7. Do you or anyone in your family have previous history of blood clots? No   8. Do you or does anyone in your family have a serious bleeding problem such as prolonged bleeding following surgeries or cuts? No   9. Have you ever had problems with anemia or been told to take iron pills? No   10. Have you had any abnormal blood loss such as black, tarry or bloody stools? No   11. Have you ever had a blood transfusion? No   12. Are you willing to have a blood transfusion if it is medically needed before, during, or after your surgery? Yes   13. Have you or any of your relatives ever had problems with anesthesia? No   14. Do you have sleep apnea, excessive snoring or daytime drowsiness? YES - REIN   14a. Do you have a CPAP machine? Yes   15. Do you have any artifical heart valves or other implanted medical devices like a pacemaker, defibrillator, or continuous  glucose monitor? No   16. Do you have artificial joints? YES -left TKA   17. Are you allergic to latex? No       Health Care Directive:  Patient does not have a Health Care Directive or Living Will: previously reviewed    Preoperative Review of :   reviewed - no record of controlled substances prescribed.    Status of Chronic Conditions:  See problem list for active medical problems.  Problems all longstanding and stable, except as noted/documented.  See ROS for pertinent symptoms related to these conditions.    Review of Systems  CONSTITUTIONAL: NEGATIVE for fever, chills, change in weight  INTEGUMENTARY/SKIN: NEGATIVE for worrisome rashes, moles or lesions  EYES: NEGATIVE for vision changes or irritation  ENT/MOUTH: NEGATIVE for ear, mouth and throat problems  RESP: NEGATIVE for significant cough or SOB  CV: NEGATIVE for chest pain, palpitations or peripheral edema  GI: NEGATIVE for nausea, abdominal pain, heartburn, or change in bowel habits  : NEGATIVE for frequency, dysuria, or hematuria  MUSCULOSKELETAL: NEGATIVE for significant arthralgias or myalgia  NEURO: NEGATIVE for weakness, dizziness or paresthesias  ENDOCRINE: NEGATIVE for temperature intolerance, skin/hair changes  HEME: NEGATIVE for bleeding problems  PSYCHIATRIC: NEGATIVE for changes in mood or affect    Patient Active Problem List    Diagnosis Date Noted    Prediabetes 05/18/2023     Priority: Medium    Status post coronary artery bypass graft 06/23/2022     Priority: Medium    Coronary artery disease involving native coronary artery of native heart, unspecified whether angina present 06/06/2022     Priority: Medium    Obstructive sleep apnea syndrome 05/10/2022     Priority: Medium    Elevated coronary artery calcium score      Priority: Medium    Family history of ischemic heart disease 04/03/2022     Priority: Medium    Morbid obesity (H) 08/20/2021     Priority: Medium    Mixed hyperlipidemia      Priority: Medium     Created by  Conversion        History of malignant neoplasm of prostate 09/05/2019     Priority: Medium    Erectile dysfunction following radical prostatectomy 08/29/2019     Priority: Medium    History of prostate cancer 05/29/2019     Priority: Medium    Benign Essential Hypertension      Priority: Medium     Created by Conversion        Rosacea      Priority: Medium     Created by Conversion          Past Medical History:   Diagnosis Date    Abnormal cardiovascular stress test 04/03/2022    CAD (coronary artery disease)     Hypertension     Knee injury     right    Rosacea     Sleep apnea     CPAP     Past Surgical History:   Procedure Laterality Date    APPENDECTOMY      ARTHROSCOPY KNEE Bilateral     BYPASS GRAFT ARTERY CORONARY N/A 6/6/2022    Procedure: CORONARY ARTERY BYPASS GRAFT X 5, ENDOSCOPIC VESSEL PROCUREMENT LEFT LEG, INTERNAL MAMMARY ARTERY HARVEST, LEFT RADIAL ARTERY HARVEST, ANESTHESIA TRANSESOPHAGEAL ECHOCARDIOGRAM, EPIAORTIC ULTRASOUND;  Surgeon: Mayank Rocha MD;  Location: [facility] Main OR    CV CORONARY ANGIOGRAM N/A 4/7/2022    Procedure: Coronary Angiogram;  Surgeon: Mery Lyons MD;  Location: [facility] CATH LAB CV    PROSTATECTOMY      TOTAL KNEE ARTHROPLASTY Left     WRIST SURGERY       Current Outpatient Medications   Medication Sig Dispense Refill    acetaminophen (TYLENOL) 650 MG CR tablet Take 1,300 mg by mouth every 8 hours as needed for pain      amLODIPine (NORVASC) 5 MG tablet Take 1 tablet (5 mg) by mouth daily 90 tablet 3    aspirin (ASA) 81 MG chewable tablet 2 tablets (162 mg) by Oral or NG Tube route daily (Patient taking differently: Take 162 mg by mouth daily) 60 tablet 11    atorvastatin (LIPITOR) 40 MG tablet TAKE 1 TABLET BY MOUTH EVERY DAY 90 tablet 3    carvedilol (COREG) 3.125 MG tablet Take 1 tablet (3.125 mg) by mouth 2 times daily (with meals) To replace metoprolol 180 tablet 3    hydrochlorothiazide (HYDRODIURIL) 25 MG tablet TAKE 1 TABLET BY MOUTH EVERY DAY 90 tablet 2  "   metroNIDAZOLE (METROGEL) 0.75 % external gel Apply topically 2 times daily 45 g 3    pramipexole (MIRAPEX) 0.25 MG tablet TAKE 1 TABLET BY MOUTH EVERYDAY AT BEDTIME 90 tablet 3    doxycycline hyclate (VIBRAMYCIN) 100 MG capsule Take 1 capsule (100 mg) by mouth 2 times daily 180 capsule 0    nitroGLYcerin (NITROSTAT) 0.4 MG sublingual tablet One tablet under the tongue every 5 minutes if needed for chest pain. May repeat every 5 minutes for a maximum of 3 doses in 15 minutes\" (Patient not taking: Reported on 8/1/2023) 25 tablet 3       Allergies   Allergen Reactions    Metoprolol Tartrate Hives    Penicillins Hives     Tolerated CTX 06/2020, Ancef 6/2022        Social History     Tobacco Use    Smoking status: Never    Smokeless tobacco: Never   Substance Use Topics    Alcohol use: Yes     Alcohol/week: 1.0 standard drink of alcohol     Types: 1 Standard drinks or equivalent per week     Family History   Problem Relation Age of Onset    Diabetes Mother     Heart Disease Mother     Heart Disease Father     Atrophic kidney Sister     Diabetes Sister     Diabetes Type 1 Brother      History   Drug Use Unknown         Objective     /88   Pulse 65   Temp 97.6  F (36.4  C)   Resp 16   Ht 1.753 m (5' 9\")   Wt 113.9 kg (251 lb)   SpO2 96%   BMI 37.07 kg/m      Physical Exam    GENERAL APPEARANCE: healthy, alert and no distress     EYES: EOMI,  PERRL     HENT: ear canals and TM's normal and nose and mouth without ulcers or lesions     NECK: no adenopathy, no asymmetry, masses, or scars and thyroid normal to palpation     RESP: lungs clear to auscultation - no rales, rhonchi or wheezes     CV: regular rates and rhythm, normal S1 S2, no S3 or S4 and no murmur, click or rub     ABDOMEN:  soft, nontender, no HSM or masses and bowel sounds normal     MS: extremities normal- no gross deformities noted, no evidence of inflammation in joints, FROM in all extremities.     SKIN: no suspicious lesions or rashes     " NEURO: Normal strength and tone, sensory exam grossly normal, mentation intact and speech normal     PSYCH: mentation appears normal. and affect normal/bright     LYMPHATICS: No cervical adenopathy    Recent Labs   Lab Test 05/17/23  0900 06/22/22  1314 06/10/22  0603 06/09/22  1107 06/09/22  0401 06/07/22  0139 06/07/22  0005 06/06/22  2207 06/06/22  1333 06/06/22  1025 05/31/22  0823 02/01/22  0857 08/20/21  0932   HGB  --  12.2*  --   --  8.5*   < >  --    < > 11.6* 11.8* 15.4   < >  --    PLT  --   --  202  --  147*   < >  --   --  188 247 219   < >  --    INR  --   --   --   --   --   --   --   --  1.28* 1.39* 1.05   < >  --      --   --   --   --   --  141  --  141 140 136   < > 139   POTASSIUM 4.0 4.7 3.5   < > 3.2*   < > 4.0  --  3.7  3.7 3.9 3.7   < > 4.2   CR 0.90  --   --   --   --   --  0.96  --  0.80 0.75 0.82   < > 0.88   A1C  --   --   --   --   --   --   --   --   --   --  5.8*  --  5.9*    < > = values in this interval not displayed.        Diagnostics:  Labs pending at this time.  Results will be reviewed when available.       Revised Cardiac Risk Index (RCRI):  The patient has the following serious cardiovascular risks for perioperative complications:   - Coronary Artery Disease (MI, positive stress test, angina, Qs on EKG) = 1 point     RCRI Interpretation: 1 point: Class II (low risk - 0.9% complication rate)         Signed Electronically by: Dylon Shafer NP  Copy of this evaluation report is provided to requesting physician.

## 2023-08-01 NOTE — PROGRESS NOTES
[facility]  [address]  [place]  [facility] [address]  Phone: [phone]  Fax: [phone]  Primary Provider: Dylon Shafer  Pre-op Performing Provider: DYLON SHAFER    PREOPERATIVE EVALUATION:  Today's date: 8/1/2023    Gonzalez Morton is a 62 year old male who presents for a preoperative evaluation.      5/17/2023     8:09 AM   Additional Questions   Roomed by Sayra Regalado CMA       Surgical Information:  Surgery/Procedure: right knee replacement   Surgery Location: [facility]   Surgeon: Dr Redd   Surgery Date: 8/16/23  Time of Surgery: ?  Where patient plans to recover: At home with family  Fax number for surgical facility: Note does not need to be faxed, will be available electronically in Epic.    Assessment & Plan     The proposed surgical procedure is considered INTERMEDIATE risk.    Preop general physical exam  Medically optimized for surgery.  Await labs and cardiology interpretation of ECG.  - CBC with platelets; Future  - Comprehensive metabolic panel (BMP + Alb, Alk Phos, ALT, AST, Total. Bili, TP); Future  - EKG 12-lead, tracing only  - CBC with platelets  - Comprehensive metabolic panel (BMP + Alb, Alk Phos, ALT, AST, Total. Bili, TP)    Primary osteoarthritis of right knee    Planned TKA    Benign essential hypertension  Better control with increase in amlodipine dosing.  - lisinopril (ZESTRIL) 20 MG tablet; [LISINOPRIL (PRINIVIL,ZESTRIL) 20 MG TABLET] TAKE 1 TABLET BY MOUTH EVERY DAY    Status post coronary artery bypass graft  Stable.  Will be seeing cardiology next week for his 1 year follow-up.    Coronary artery disease involving native coronary artery of native heart, unspecified whether angina present  Doing well status post CABG.  No chest pain       - No identified additional risk factors other than previously addressed      Additional Medication Instructions:  Ok to take amlodipine and carvedilol morning of  surgery with small sip of water    RECOMMENDATION:  APPROVAL GIVEN to proceed with proposed procedure, without further diagnostic evaluation.    Cardiology note x1, ECG x1, echocardiogram x1    Total time spent was 40 minutes including reviewing records prior to arrival, consultation, placing orders, education, and reviewing the plan of care on the date of service.        Subjective       HPI related to upcoming procedure: Patient has been dealing with known DJD in the knees.  Already has had left knee replacement.  Now it is time for the right        7/27/2023     8:17 AM   Preop Questions   1. Have you ever had a heart attack or stroke? No   2. Have you ever had surgery on your heart or blood vessels, such as a stent placement, a coronary artery bypass, or surgery on an artery in your head, neck, heart, or legs? YES -CABG X5   3. Do you have chest pain with activity? No   4. Do you have a history of  heart failure? No   5. Do you currently have a cold, bronchitis or symptoms of other infection? No   6. Do you have a cough, shortness of breath, or wheezing? No   7. Do you or anyone in your family have previous history of blood clots? No   8. Do you or does anyone in your family have a serious bleeding problem such as prolonged bleeding following surgeries or cuts? No   9. Have you ever had problems with anemia or been told to take iron pills? No   10. Have you had any abnormal blood loss such as black, tarry or bloody stools? No   11. Have you ever had a blood transfusion? No   12. Are you willing to have a blood transfusion if it is medically needed before, during, or after your surgery? Yes   13. Have you or any of your relatives ever had problems with anesthesia? No   14. Do you have sleep apnea, excessive snoring or daytime drowsiness? YES - ERIN   14a. Do you have a CPAP machine? Yes   15. Do you have any artifical heart valves or other implanted medical devices like a pacemaker, defibrillator, or continuous  glucose monitor? No   16. Do you have artificial joints? YES -left TKA   17. Are you allergic to latex? No       Health Care Directive:  Patient does not have a Health Care Directive or Living Will: previously reviewed    Preoperative Review of :   reviewed - no record of controlled substances prescribed.    Status of Chronic Conditions:  See problem list for active medical problems.  Problems all longstanding and stable, except as noted/documented.  See ROS for pertinent symptoms related to these conditions.    Review of Systems  CONSTITUTIONAL: NEGATIVE for fever, chills, change in weight  INTEGUMENTARY/SKIN: NEGATIVE for worrisome rashes, moles or lesions  EYES: NEGATIVE for vision changes or irritation  ENT/MOUTH: NEGATIVE for ear, mouth and throat problems  RESP: NEGATIVE for significant cough or SOB  CV: NEGATIVE for chest pain, palpitations or peripheral edema  GI: NEGATIVE for nausea, abdominal pain, heartburn, or change in bowel habits  : NEGATIVE for frequency, dysuria, or hematuria  MUSCULOSKELETAL: NEGATIVE for significant arthralgias or myalgia  NEURO: NEGATIVE for weakness, dizziness or paresthesias  ENDOCRINE: NEGATIVE for temperature intolerance, skin/hair changes  HEME: NEGATIVE for bleeding problems  PSYCHIATRIC: NEGATIVE for changes in mood or affect    Patient Active Problem List    Diagnosis Date Noted    Prediabetes 05/18/2023     Priority: Medium    Status post coronary artery bypass graft 06/23/2022     Priority: Medium    Coronary artery disease involving native coronary artery of native heart, unspecified whether angina present 06/06/2022     Priority: Medium    Obstructive sleep apnea syndrome 05/10/2022     Priority: Medium    Elevated coronary artery calcium score      Priority: Medium    Family history of ischemic heart disease 04/03/2022     Priority: Medium    Morbid obesity (H) 08/20/2021     Priority: Medium    Mixed hyperlipidemia      Priority: Medium     Created by  Conversion        History of malignant neoplasm of prostate 09/05/2019     Priority: Medium    Erectile dysfunction following radical prostatectomy 08/29/2019     Priority: Medium    History of prostate cancer 05/29/2019     Priority: Medium    Benign Essential Hypertension      Priority: Medium     Created by Conversion        Rosacea      Priority: Medium     Created by Conversion          Past Medical History:   Diagnosis Date    Abnormal cardiovascular stress test 04/03/2022    CAD (coronary artery disease)     Hypertension     Knee injury     right    Rosacea     Sleep apnea     CPAP     Past Surgical History:   Procedure Laterality Date    APPENDECTOMY      ARTHROSCOPY KNEE Bilateral     BYPASS GRAFT ARTERY CORONARY N/A 6/6/2022    Procedure: CORONARY ARTERY BYPASS GRAFT X 5, ENDOSCOPIC VESSEL PROCUREMENT LEFT LEG, INTERNAL MAMMARY ARTERY HARVEST, LEFT RADIAL ARTERY HARVEST, ANESTHESIA TRANSESOPHAGEAL ECHOCARDIOGRAM, EPIAORTIC ULTRASOUND;  Surgeon: Mayank Rocha MD;  Location: [facility] Main OR    CV CORONARY ANGIOGRAM N/A 4/7/2022    Procedure: Coronary Angiogram;  Surgeon: Mery Lyons MD;  Location: [facility] CATH LAB CV    PROSTATECTOMY      TOTAL KNEE ARTHROPLASTY Left     WRIST SURGERY       Current Outpatient Medications   Medication Sig Dispense Refill    acetaminophen (TYLENOL) 650 MG CR tablet Take 1,300 mg by mouth every 8 hours as needed for pain      amLODIPine (NORVASC) 5 MG tablet Take 1 tablet (5 mg) by mouth daily 90 tablet 3    aspirin (ASA) 81 MG chewable tablet 2 tablets (162 mg) by Oral or NG Tube route daily (Patient taking differently: Take 162 mg by mouth daily) 60 tablet 11    atorvastatin (LIPITOR) 40 MG tablet TAKE 1 TABLET BY MOUTH EVERY DAY 90 tablet 3    carvedilol (COREG) 3.125 MG tablet Take 1 tablet (3.125 mg) by mouth 2 times daily (with meals) To replace metoprolol 180 tablet 3    hydrochlorothiazide (HYDRODIURIL) 25 MG tablet TAKE 1 TABLET BY MOUTH EVERY DAY 90 tablet 2  "   metroNIDAZOLE (METROGEL) 0.75 % external gel Apply topically 2 times daily 45 g 3    pramipexole (MIRAPEX) 0.25 MG tablet TAKE 1 TABLET BY MOUTH EVERYDAY AT BEDTIME 90 tablet 3    doxycycline hyclate (VIBRAMYCIN) 100 MG capsule Take 1 capsule (100 mg) by mouth 2 times daily 180 capsule 0    nitroGLYcerin (NITROSTAT) 0.4 MG sublingual tablet One tablet under the tongue every 5 minutes if needed for chest pain. May repeat every 5 minutes for a maximum of 3 doses in 15 minutes\" (Patient not taking: Reported on 8/1/2023) 25 tablet 3       Allergies   Allergen Reactions    Metoprolol Tartrate Hives    Penicillins Hives     Tolerated CTX 06/2020, Ancef 6/2022        Social History     Tobacco Use    Smoking status: Never    Smokeless tobacco: Never   Substance Use Topics    Alcohol use: Yes     Alcohol/week: 1.0 standard drink of alcohol     Types: 1 Standard drinks or equivalent per week     Family History   Problem Relation Age of Onset    Diabetes Mother     Heart Disease Mother     Heart Disease Father     Atrophic kidney Sister     Diabetes Sister     Diabetes Type 1 Brother      History   Drug Use Unknown         Objective     /88   Pulse 65   Temp 97.6  F (36.4  C)   Resp 16   Ht 1.753 m (5' 9\")   Wt 113.9 kg (251 lb)   SpO2 96%   BMI 37.07 kg/m      Physical Exam    GENERAL APPEARANCE: healthy, alert and no distress     EYES: EOMI,  PERRL     HENT: ear canals and TM's normal and nose and mouth without ulcers or lesions     NECK: no adenopathy, no asymmetry, masses, or scars and thyroid normal to palpation     RESP: lungs clear to auscultation - no rales, rhonchi or wheezes     CV: regular rates and rhythm, normal S1 S2, no S3 or S4 and no murmur, click or rub     ABDOMEN:  soft, nontender, no HSM or masses and bowel sounds normal     MS: extremities normal- no gross deformities noted, no evidence of inflammation in joints, FROM in all extremities.     SKIN: no suspicious lesions or rashes     " NEURO: Normal strength and tone, sensory exam grossly normal, mentation intact and speech normal     PSYCH: mentation appears normal. and affect normal/bright     LYMPHATICS: No cervical adenopathy    Recent Labs   Lab Test 05/17/23  0900 06/22/22  1314 06/10/22  0603 06/09/22  1107 06/09/22  0401 06/07/22  0139 06/07/22  0005 06/06/22  2207 06/06/22  1333 06/06/22  1025 05/31/22  0823 02/01/22  0857 08/20/21  0932   HGB  --  12.2*  --   --  8.5*   < >  --    < > 11.6* 11.8* 15.4   < >  --    PLT  --   --  202  --  147*   < >  --   --  188 247 219   < >  --    INR  --   --   --   --   --   --   --   --  1.28* 1.39* 1.05   < >  --      --   --   --   --   --  141  --  141 140 136   < > 139   POTASSIUM 4.0 4.7 3.5   < > 3.2*   < > 4.0  --  3.7  3.7 3.9 3.7   < > 4.2   CR 0.90  --   --   --   --   --  0.96  --  0.80 0.75 0.82   < > 0.88   A1C  --   --   --   --   --   --   --   --   --   --  5.8*  --  5.9*    < > = values in this interval not displayed.        Diagnostics:  Labs pending at this time.  Results will be reviewed when available.       Revised Cardiac Risk Index (RCRI):  The patient has the following serious cardiovascular risks for perioperative complications:   - Coronary Artery Disease (MI, positive stress test, angina, Qs on EKG) = 1 point     RCRI Interpretation: 1 point: Class II (low risk - 0.9% complication rate)         Signed Electronically by: Dylon Shafer NP  Copy of this evaluation report is provided to requesting physician.

## 2023-08-08 NOTE — PROGRESS NOTES
Discharge plan according to [facility]:       07/10/23 0714   Discharge Planning   Patient/Family Anticipates Transition to home   Concerns to be Addressed all concerns addressed in this encounter   Living Arrangements   People in Home spouse;child(jazzmine), adult   Type of Residence Private Residence   Is your private residence a single family home or apartment? Single family home   Stair Railings, Within Home, Primary railings safe and in good condition   Once home, are you able to live on one level? Yes   Which level? Main Level   Bathroom Shower/Tub Tub/Shower unit   Equipment Currently Used at Home none   Support System   Support Systems Spouse/Significant Other;Children   Do you have someone available to stay with you one or two nights once you are home? Yes

## 2023-08-09 ENCOUNTER — OFFICE VISIT (OUTPATIENT)
Dept: CARDIOLOGY | Facility: CLINIC | Age: 63
End: 2023-08-09
Attending: INTERNAL MEDICINE
Payer: COMMERCIAL

## 2023-08-09 VITALS
HEART RATE: 74 BPM | SYSTOLIC BLOOD PRESSURE: 128 MMHG | HEIGHT: 69 IN | BODY MASS INDEX: 36.88 KG/M2 | DIASTOLIC BLOOD PRESSURE: 82 MMHG | WEIGHT: 249 LBS | RESPIRATION RATE: 16 BRPM

## 2023-08-09 DIAGNOSIS — I25.83 CORONARY ARTERY DISEASE DUE TO LIPID RICH PLAQUE: ICD-10-CM

## 2023-08-09 DIAGNOSIS — I25.10 CORONARY ARTERY DISEASE DUE TO LIPID RICH PLAQUE: ICD-10-CM

## 2023-08-09 LAB
CHOLEST SERPL-MCNC: 147 MG/DL
HDLC SERPL-MCNC: 36 MG/DL
LDLC SERPL CALC-MCNC: 78 MG/DL
NONHDLC SERPL-MCNC: 111 MG/DL
TRIGL SERPL-MCNC: 164 MG/DL

## 2023-08-09 PROCEDURE — 80061 LIPID PANEL: CPT | Performed by: INTERNAL MEDICINE

## 2023-08-09 PROCEDURE — 99214 OFFICE O/P EST MOD 30 MIN: CPT | Performed by: INTERNAL MEDICINE

## 2023-08-09 PROCEDURE — 36415 COLL VENOUS BLD VENIPUNCTURE: CPT | Performed by: INTERNAL MEDICINE

## 2023-08-09 NOTE — PATIENT INSTRUCTIONS
It was a pleasure seeing you at [facility] today.        Here are my suggestions for your care:    1. Cholesterol check today    2. Schedule a follow up appointment with either Dr. García or Dr. Fritz in 1 year      You can always call my nurse Sherri Mora RN who is a nurse helping me in the care of my patients. She can be reached at [phone] if you have any questions.    For scheduling, please call my  Julianna Au at [phone].    Thank you again for trusting me with your care. Please feel free to call my office at any time if you have any question or if I can assist you in any way.    Mery Lyons MD  [facility]

## 2023-08-09 NOTE — PROGRESS NOTES
"  HEART CARE ENCOUNTER CONSULTATON NOTE      [facility]  [phone]      Assessment/Recommendations   Assessment/Plan:  CAD s/p CABG - On aspirin and high intensity statin long term. Diet and exercise discussed. He has no concerning symptoms    Hypertension - controlled with meds and low sodium diet    ERIN - using CPAP     Hyperlipidemia - LDL increased to 75 in May.  He did have  period of time where he was waiting on a refill.  Will recheck today as he reports medication compliance.  May need to titrate his atorvastatin to 80mg daily    Obesity - losing weight with diet.  Exercise anticipated.    F/U 12 months with one of my [place] colleagues       History of Present Illness/Subjective    HPI: Gonzalez Morton is a 62 year old male with CAD s/p 5 vessel CABG 6/2022 (LIMA-D2, SVG-D2, SVG-OM2, SVG-OM1, radial-rPDA), hypertension, hyperlipidemia, self diagnosed ERIN using a self purchased CPAP, and prostate cancer in 2019. His distal/apical LAD was diffusely disease and was not bypassed.    Stevne returns for follow up today. He is schedule for right knee replacement next week and has been severely limited by his knee pain.  Steven continues on a low fat, low sodium diet and is down to 249 lbs today with plans to lose more weight once he can exercise. There is no chest pain, dyspnea, fatigue, pnd/orthopnea, dizziness, syncope or palpitations. He will have dependent edema at the end of the day after being on his feet all day, but not otherwise. He does wear compression socks at work.         Physical Examination  Review of Systems   Vitals: /82 (BP Location: Left arm, Patient Position: Sitting, Cuff Size: Adult Large)   Pulse 74   Resp 16   Ht 1.753 m (5' 9\")   Wt 112.9 kg (249 lb)   BMI 36.77 kg/m    BMI= Body mass index is 36.77 kg/m .  Wt Readings from Last 3 Encounters:   08/09/23 112.9 kg (249 lb)   08/01/23 113.9 kg (251 lb)   05/17/23 112.5 kg (248 lb 1.6 oz)       General " Appearance:   no distress, overweight body habitus   ENT/Mouth: membranes moist, no oral lesions or bleeding gums.      EYES:  no scleral icterus, normal conjunctivae   Neck: no carotid bruits or thyromegaly   Chest/Lungs:   lungs are clear to auscultation, no rales or wheezing, stable sternal scar, equal chest wall expansion    Cardiovascular:   Regular. Normal first and second heart sounds with no murmur. No rubs or gallops; the right carotid, radial and posterior tibial pulses are intact and the left carotid, radial and posterior tibial pulses are intact.  Jugular venous pressure is flat, no edema bilaterally    Abdomen:  no organomegaly, masses, bruits, or tenderness; bowel sounds are present   Extremities: no cyanosis or clubbing   Skin: no xanthelasma, warm.    Neurologic: normal  bilateral, no tremors     Psychiatric: alert and oriented x3, calm        Please refer above for cardiac ROS details.        Medical History  Surgical History Family History Social History   Past Medical History:   Diagnosis Date    Abnormal cardiovascular stress test 04/03/2022    CAD (coronary artery disease)     Elevated coronary artery calcium score     Hypertension     Knee injury     right    Rosacea     Sleep apnea     CPAP     Past Surgical History:   Procedure Laterality Date    APPENDECTOMY      ARTHROSCOPY KNEE Bilateral     BYPASS GRAFT ARTERY CORONARY N/A 6/6/2022    Procedure: CORONARY ARTERY BYPASS GRAFT X 5, ENDOSCOPIC VESSEL PROCUREMENT LEFT LEG, INTERNAL MAMMARY ARTERY HARVEST, LEFT RADIAL ARTERY HARVEST, ANESTHESIA TRANSESOPHAGEAL ECHOCARDIOGRAM, EPIAORTIC ULTRASOUND;  Surgeon: Mayank Rocha MD;  Location: [facility] Main OR    CV CORONARY ANGIOGRAM N/A 4/7/2022    Procedure: Coronary Angiogram;  Surgeon: Mery Lyons MD;  Location: [facility] CATH LAB CV    PROSTATECTOMY      TOTAL KNEE ARTHROPLASTY Left     WRIST SURGERY       Family History   Problem Relation Age of Onset    Diabetes Mother     Heart  Disease Mother     Heart Disease Father     Atrophic kidney Sister     Diabetes Sister     Diabetes Type 1 Brother         Social History     Socioeconomic History    Marital status:      Spouse name: Not on file    Number of children: Not on file    Years of education: Not on file    Highest education level: Not on file   Occupational History    Not on file   Tobacco Use    Smoking status: Never    Smokeless tobacco: Never   Vaping Use    Vaping Use: Never used   Substance and Sexual Activity    Alcohol use: Yes     Alcohol/week: 1.0 standard drink of alcohol     Types: 1 Standard drinks or equivalent per week    Drug use: Never    Sexual activity: Not on file   Other Topics Concern    Not on file   Social History Narrative    Not on file     Social Determinants of Health     Financial Resource Strain: Not on file   Food Insecurity: Not on file   Transportation Needs: Not on file   Physical Activity: Not on file   Stress: Not on file   Social Connections: Not on file   Intimate Partner Violence: Not on file   Housing Stability: Not on file           Medications  Allergies   Current Outpatient Medications   Medication Sig Dispense Refill    acetaminophen (TYLENOL) 650 MG CR tablet Take 1,300 mg by mouth every 8 hours as needed for pain      amLODIPine (NORVASC) 5 MG tablet Take 1 tablet (5 mg) by mouth daily 90 tablet 3    aspirin (ASA) 81 MG chewable tablet 2 tablets (162 mg) by Oral or NG Tube route daily (Patient taking differently: Take 162 mg by mouth daily) 60 tablet 11    atorvastatin (LIPITOR) 40 MG tablet TAKE 1 TABLET BY MOUTH EVERY DAY 90 tablet 3    carvedilol (COREG) 3.125 MG tablet Take 1 tablet (3.125 mg) by mouth 2 times daily (with meals) To replace metoprolol 180 tablet 3    hydrochlorothiazide (HYDRODIURIL) 25 MG tablet TAKE 1 TABLET BY MOUTH EVERY DAY 90 tablet 2    lisinopril (ZESTRIL) 20 MG tablet [LISINOPRIL (PRINIVIL,ZESTRIL) 20 MG TABLET] TAKE 1 TABLET BY MOUTH EVERY DAY 90 tablet 3  "   metroNIDAZOLE (METROGEL) 0.75 % external gel Apply topically 2 times daily 45 g 3    nitroGLYcerin (NITROSTAT) 0.4 MG sublingual tablet One tablet under the tongue every 5 minutes if needed for chest pain. May repeat every 5 minutes for a maximum of 3 doses in 15 minutes\" 25 tablet 3    pramipexole (MIRAPEX) 0.25 MG tablet TAKE 1 TABLET BY MOUTH EVERYDAY AT BEDTIME 90 tablet 3    doxycycline hyclate (VIBRAMYCIN) 100 MG capsule Take 1 capsule (100 mg) by mouth 2 times daily 180 capsule 0       Allergies   Allergen Reactions    Metoprolol Tartrate Hives    Penicillins Hives     Tolerated CTX 06/2020, Ancef 6/2022          Lab Results    Chemistry/lipid CBC Cardiac Enzymes/BNP/TSH/INR   Recent Labs   Lab Test 05/17/23  0900   CHOL 141   HDL 35*   LDL 75   TRIG 156*     Recent Labs   Lab Test 05/17/23  0900 04/07/22  0711 08/20/21  0932   LDL 75 48 69     Recent Labs   Lab Test 08/01/23  0829      POTASSIUM 3.6   CHLORIDE 98   CO2 28   *   BUN 16.3   CR 0.85   GFRESTIMATED >90   KEATON 9.6     Recent Labs   Lab Test 08/01/23  0829 05/17/23  0900 06/07/22  0005   CR 0.85 0.90 0.96     Recent Labs   Lab Test 05/31/22  0823 08/20/21  0932 02/24/20  1010   A1C 5.8* 5.9* 5.9          Recent Labs   Lab Test 08/01/23  0829   WBC 6.9   HGB 16.5   HCT 46.3   MCV 82        Recent Labs   Lab Test 08/01/23  0829 06/22/22  1314 06/09/22  0401   HGB 16.5 12.2* 8.5*    No results for input(s): TROPONINI in the last 29129 hours.  No results for input(s): BNP, NTBNPI, NTBNP in the last 57939 hours.  No results for input(s): TSH in the last 29190 hours.  Recent Labs   Lab Test 06/06/22  1333 06/06/22  1025 05/31/22  0823   INR 1.28* 1.39* 1.05        Today's clinic visit entailed:  Review of prior external note(s) from - CareEverywhere information from epic reviewed  40 minutes spent by me on the date of the encounter doing chart review, history and exam, documentation and further activities per the note  Provider  " Link to MDM Help Grid     The level of medical decision making during this visit was of moderate complexity.        Mery Lyons MD

## 2023-08-09 NOTE — LETTER
"8/9/2023    Dylon Shafer, NP  [address] [place] [address]    RE: Gonzalez Morton       Dear Colleague,     I had the pleasure of seeing Gonzalez Morton in the [facility].    HEART CARE ENCOUNTER CONSULTATON NOTE      [facility]  [phone]      Assessment/Recommendations   Assessment/Plan:  CAD s/p CABG - On aspirin and high intensity statin long term. Diet and exercise discussed. He has no concerning symptoms    Hypertension - controlled with meds and low sodium diet    ERIN - using CPAP     Hyperlipidemia - LDL increased to 75 in May.  He did have  period of time where he was waiting on a refill.  Will recheck today as he reports medication compliance.  May need to titrate his atorvastatin to 80mg daily    Obesity - losing weight with diet.  Exercise anticipated.    F/U 12 months with one of my [facility] colleagues       History of Present Illness/Subjective    HPI: Gonzalez Morton is a 62 year old male with CAD s/p 5 vessel CABG 6/2022 (LIMA-D2, SVG-D2, SVG-OM2, SVG-OM1, radial-rPDA), hypertension, hyperlipidemia, self diagnosed ERIN using a self purchased CPAP, and prostate cancer in 2019. His distal/apical LAD was diffusely disease and was not bypassed.    Steven returns for follow up today. He is schedule for right knee replacement next week and has been severely limited by his knee pain.  Steven continues on a low fat, low sodium diet and is down to 249 lbs today with plans to lose more weight once he can exercise. There is no chest pain, dyspnea, fatigue, pnd/orthopnea, dizziness, syncope or palpitations. He will have dependent edema at the end of the day after being on his feet all day, but not otherwise. He does wear compression socks at work.         Physical Examination  Review of Systems   Vitals: /82 (BP Location: Left arm, Patient Position: Sitting, Cuff Size: Adult Large)   Pulse 74   Resp 16   Ht 1.753 m (5' 9\")   Wt 112.9 " kg (249 lb)   BMI 36.77 kg/m    BMI= Body mass index is 36.77 kg/m .  Wt Readings from Last 3 Encounters:   08/09/23 112.9 kg (249 lb)   08/01/23 113.9 kg (251 lb)   05/17/23 112.5 kg (248 lb 1.6 oz)       General Appearance:   no distress, overweight body habitus   ENT/Mouth: membranes moist, no oral lesions or bleeding gums.      EYES:  no scleral icterus, normal conjunctivae   Neck: no carotid bruits or thyromegaly   Chest/Lungs:   lungs are clear to auscultation, no rales or wheezing, stable sternal scar, equal chest wall expansion    Cardiovascular:   Regular. Normal first and second heart sounds with no murmur. No rubs or gallops; the right carotid, radial and posterior tibial pulses are intact and the left carotid, radial and posterior tibial pulses are intact.  Jugular venous pressure is flat, no edema bilaterally    Abdomen:  no organomegaly, masses, bruits, or tenderness; bowel sounds are present   Extremities: no cyanosis or clubbing   Skin: no xanthelasma, warm.    Neurologic: normal  bilateral, no tremors     Psychiatric: alert and oriented x3, calm        Please refer above for cardiac ROS details.        Medical History  Surgical History Family History Social History   Past Medical History:   Diagnosis Date    Abnormal cardiovascular stress test 04/03/2022    CAD (coronary artery disease)     Elevated coronary artery calcium score     Hypertension     Knee injury     right    Rosacea     Sleep apnea     CPAP     Past Surgical History:   Procedure Laterality Date    APPENDECTOMY      ARTHROSCOPY KNEE Bilateral     BYPASS GRAFT ARTERY CORONARY N/A 6/6/2022    Procedure: CORONARY ARTERY BYPASS GRAFT X 5, ENDOSCOPIC VESSEL PROCUREMENT LEFT LEG, INTERNAL MAMMARY ARTERY HARVEST, LEFT RADIAL ARTERY HARVEST, ANESTHESIA TRANSESOPHAGEAL ECHOCARDIOGRAM, EPIAORTIC ULTRASOUND;  Surgeon: Mayank Rocha MD;  Location: [facility] Main OR    CV CORONARY ANGIOGRAM N/A 4/7/2022    Procedure: Coronary Angiogram;   Surgeon: Mery Lyons MD;  Location: [facility] CV    PROSTATECTOMY      TOTAL KNEE ARTHROPLASTY Left     WRIST SURGERY       Family History   Problem Relation Age of Onset    Diabetes Mother     Heart Disease Mother     Heart Disease Father     Atrophic kidney Sister     Diabetes Sister     Diabetes Type 1 Brother         Social History     Socioeconomic History    Marital status:      Spouse name: Not on file    Number of children: Not on file    Years of education: Not on file    Highest education level: Not on file   Occupational History    Not on file   Tobacco Use    Smoking status: Never    Smokeless tobacco: Never   Vaping Use    Vaping Use: Never used   Substance and Sexual Activity    Alcohol use: Yes     Alcohol/week: 1.0 standard drink of alcohol     Types: 1 Standard drinks or equivalent per week    Drug use: Never    Sexual activity: Not on file   Other Topics Concern    Not on file   Social History Narrative    Not on file     Social Determinants of Health     Financial Resource Strain: Not on file   Food Insecurity: Not on file   Transportation Needs: Not on file   Physical Activity: Not on file   Stress: Not on file   Social Connections: Not on file   Intimate Partner Violence: Not on file   Housing Stability: Not on file           Medications  Allergies   Current Outpatient Medications   Medication Sig Dispense Refill    acetaminophen (TYLENOL) 650 MG CR tablet Take 1,300 mg by mouth every 8 hours as needed for pain      amLODIPine (NORVASC) 5 MG tablet Take 1 tablet (5 mg) by mouth daily 90 tablet 3    aspirin (ASA) 81 MG chewable tablet 2 tablets (162 mg) by Oral or NG Tube route daily (Patient taking differently: Take 162 mg by mouth daily) 60 tablet 11    atorvastatin (LIPITOR) 40 MG tablet TAKE 1 TABLET BY MOUTH EVERY DAY 90 tablet 3    carvedilol (COREG) 3.125 MG tablet Take 1 tablet (3.125 mg) by mouth 2 times daily (with meals) To replace metoprolol 180 tablet 3     "hydrochlorothiazide (HYDRODIURIL) 25 MG tablet TAKE 1 TABLET BY MOUTH EVERY DAY 90 tablet 2    lisinopril (ZESTRIL) 20 MG tablet [LISINOPRIL (PRINIVIL,ZESTRIL) 20 MG TABLET] TAKE 1 TABLET BY MOUTH EVERY DAY 90 tablet 3    metroNIDAZOLE (METROGEL) 0.75 % external gel Apply topically 2 times daily 45 g 3    nitroGLYcerin (NITROSTAT) 0.4 MG sublingual tablet One tablet under the tongue every 5 minutes if needed for chest pain. May repeat every 5 minutes for a maximum of 3 doses in 15 minutes\" 25 tablet 3    pramipexole (MIRAPEX) 0.25 MG tablet TAKE 1 TABLET BY MOUTH EVERYDAY AT BEDTIME 90 tablet 3    doxycycline hyclate (VIBRAMYCIN) 100 MG capsule Take 1 capsule (100 mg) by mouth 2 times daily 180 capsule 0       Allergies   Allergen Reactions    Metoprolol Tartrate Hives    Penicillins Hives     Tolerated CTX 06/2020, Ancef 6/2022          Lab Results    Chemistry/lipid CBC Cardiac Enzymes/BNP/TSH/INR   Recent Labs   Lab Test 05/17/23  0900   CHOL 141   HDL 35*   LDL 75   TRIG 156*     Recent Labs   Lab Test 05/17/23  0900 04/07/22  0711 08/20/21  0932   LDL 75 48 69     Recent Labs   Lab Test 08/01/23  0829      POTASSIUM 3.6   CHLORIDE 98   CO2 28   *   BUN 16.3   CR 0.85   GFRESTIMATED >90   KEATON 9.6     Recent Labs   Lab Test 08/01/23  0829 05/17/23  0900 06/07/22  0005   CR 0.85 0.90 0.96     Recent Labs   Lab Test 05/31/22  0823 08/20/21  0932 02/24/20  1010   A1C 5.8* 5.9* 5.9          Recent Labs   Lab Test 08/01/23  0829   WBC 6.9   HGB 16.5   HCT 46.3   MCV 82        Recent Labs   Lab Test 08/01/23  0829 06/22/22  1314 06/09/22  0401   HGB 16.5 12.2* 8.5*    No results for input(s): TROPONINI in the last 18780 hours.  No results for input(s): BNP, NTBNPI, NTBNP in the last 95092 hours.  No results for input(s): TSH in the last 13447 hours.  Recent Labs   Lab Test 06/06/22  1333 06/06/22  1025 05/31/22  0823   INR 1.28* 1.39* 1.05        Today's clinic visit entailed:  Review of prior " external note(s) from - CareEverywhere information from epic reviewed  40 minutes spent by me on the date of the encounter doing chart review, history and exam, documentation and further activities per the note  Provider  Link to [facility] Help Grid     The level of medical decision making during this visit was of moderate complexity.        Mery Lyons MD              Thank you for allowing me to participate in the care of your patient.      Sincerely,     Mery Lyons MD     [facility]  cc:   Mery Lyons MD  [address] [address]

## 2023-08-10 DIAGNOSIS — E78.2 MIXED HYPERLIPIDEMIA: Primary | ICD-10-CM

## 2023-08-10 DIAGNOSIS — R93.1 ELEVATED CORONARY ARTERY CALCIUM SCORE: ICD-10-CM

## 2023-08-10 RX ORDER — ATORVASTATIN CALCIUM 80 MG/1
80 TABLET, FILM COATED ORAL EVERY EVENING
Qty: 90 TABLET | Refills: 3 | Status: SHIPPED | OUTPATIENT
Start: 2023-08-10 | End: 2024-08-14

## 2023-08-15 ENCOUNTER — ANESTHESIA EVENT (OUTPATIENT)
Dept: SURGERY | Facility: CLINIC | Age: 63
End: 2023-08-15
Payer: COMMERCIAL

## 2023-08-15 NOTE — TREATMENT PLAN
Orthopedic Surgery Pre-Op Plan: Gonzalez Morton  pre-op review. This is NOT an H&P   Surgeon: Dr. Redd   [facility]: [facility]  Name of Surgery: Right Total Knee Arthroplasty  Date of Surgery: 8/16/23  H&P: Completed on 8/1/23 by Dylon Shafer NP at [facility].   History of ASA, NSAIDS, vitamin and/or herbal supplements within 10 days: Yes- on  mg daily for CAD-S/P CABG x5 in 6/2022-patient instructed to hold ASA for 7 days before surgery.  History of blood thinners: No    Plan:   1) Discharge Plan: Home POD 1 with assist of Spouse and Adult Children. Please see Discharge Planning section near bottom of this note for further details.     2) Coronary Artery Disease: S/P CABG x5 in June 2022: Reviewed recent Cardiology visit with Dr. Lyons on 8/9/23: patient doing well s/p CABG and denies any concerning cardiac symptoms. Denies chest pain, dyspnea, fatigue, palpitations, dizziness or syncope. Appears there are not concerns from cardiac standpoint to proceeding with knee surgery. Will continue medical management of CAD with  mg daily (temporarily held 7 days before surgery), statin and nitroglycerin as needed. Will follow up with Cardiology again in 1 year.     3) Hyperlipidemia: On atorvastatin.    4) Hypertension: Per PCP-BPs have been better controlled recently since amlodipine dose increased.  On amlodipine, carvedilol, hydrochlorothiazide, and lisinopril.  Patient instructed to hold hydrochlorothiazide and lisinopril on the morning of surgery but to continue taking amlodipine and carvedilol.    5) Obstructive Sleep Apnea: on CPAP: Reminded patient to bring CPAP machine to the hospital and use it whenever sleeping or napping.    6) Obesity: BMI 36.7, Wt: 249 lbs. patient has been working on losing weight recently with low-fat, low-sodium diet.  He plans to continue to work on weight loss after recovery from the surgery when he can increase exercise level.     7)  Prediabetes: Last hemoglobin A1c 5.8 on 5/31/2022.   on 8/1/2023. Will check updated A1c on day of surgery.  We will monitor BG's during hospital stay.  Goal BG <180 to decrease risk for infection and postop wound healing complications.  If BG >180, nursing to please notify Hospitalist.       Patient appears medically optimized for upcoming surgery. I would recommend Hospitalist Consult to assist with medical management. Please call me below with any questions on this patient.       Review of Systems Notable for: Coronary artery disease-s/p CABG x5 in June 2022, hyperlipidemia, hypertension, obstructive sleep apnea-on CPAP, obesity.    Past Medical History:   Past Medical History:   Diagnosis Date    Abnormal cardiovascular stress test 04/03/2022    CAD (coronary artery disease)     Elevated coronary artery calcium score     Hypertension     Knee injury     right    Obese     Rosacea     Sleep apnea     CPAP     Past Surgical History:   Procedure Laterality Date    APPENDECTOMY      ARTHROSCOPY KNEE Bilateral     BYPASS GRAFT ARTERY CORONARY N/A 6/6/2022    Procedure: CORONARY ARTERY BYPASS GRAFT X 5, ENDOSCOPIC VESSEL PROCUREMENT LEFT LEG, INTERNAL MAMMARY ARTERY HARVEST, LEFT RADIAL ARTERY HARVEST, ANESTHESIA TRANSESOPHAGEAL ECHOCARDIOGRAM, EPIAORTIC ULTRASOUND;  Surgeon: Mayank Rocha MD;  Location: [facility] Main OR    CV CORONARY ANGIOGRAM N/A 4/7/2022    Procedure: Coronary Angiogram;  Surgeon: Mery Lyons MD;  Location: [facility] CATH LAB CV    PROSTATECTOMY      TOTAL KNEE ARTHROPLASTY Left     WRIST SURGERY         Current Medications:  Patient's Medications   New Prescriptions    No medications on file   Previous Medications    ACETAMINOPHEN (TYLENOL) 650 MG CR TABLET    Take 1,300 mg by mouth every 8 hours as needed for pain    AMLODIPINE (NORVASC) 5 MG TABLET    Take 1 tablet (5 mg) by mouth daily    ASPIRIN (ASA) 81 MG CHEWABLE TABLET    2 tablets (162 mg) by Oral or NG Tube route daily     "ATORVASTATIN (LIPITOR) 80 MG TABLET    Take 1 tablet (80 mg) by mouth every evening    CARVEDILOL (COREG) 3.125 MG TABLET    Take 1 tablet (3.125 mg) by mouth 2 times daily (with meals) To replace metoprolol    HYDROCHLOROTHIAZIDE (HYDRODIURIL) 25 MG TABLET    TAKE 1 TABLET BY MOUTH EVERY DAY    LISINOPRIL (ZESTRIL) 20 MG TABLET    [LISINOPRIL (PRINIVIL,ZESTRIL) 20 MG TABLET] TAKE 1 TABLET BY MOUTH EVERY DAY    METRONIDAZOLE (METROGEL) 0.75 % EXTERNAL GEL    Apply topically 2 times daily    NITROGLYCERIN (NITROSTAT) 0.4 MG SUBLINGUAL TABLET    One tablet under the tongue every 5 minutes if needed for chest pain. May repeat every 5 minutes for a maximum of 3 doses in 15 minutes\"    PRAMIPEXOLE (MIRAPEX) 0.25 MG TABLET    TAKE 1 TABLET BY MOUTH EVERYDAY AT BEDTIME   Modified Medications    No medications on file   Discontinued Medications    No medications on file       ALLERGIES:  Allergies   Allergen Reactions    Metoprolol Tartrate Hives    Penicillins Hives     Tolerated CTX 06/2020, Ancef 6/2022       Social History  Social History     Tobacco Use    Smoking status: Never    Smokeless tobacco: Never   Vaping Use    Vaping Use: Never used   Substance Use Topics    Alcohol use: Yes     Alcohol/week: 1.0 standard drink of alcohol     Types: 1 Standard drinks or equivalent per week    Drug use: Never       Any Abnormal Recent Diagnostics? Yes  Hemoglobin A1c 5.8 on 5/31/2022: In prediabetes range.  Blood glucose 119 on 8/1/2023: We will monitor BG's closely during hospital stay.  Goal BG <180.    Discharge Planning:   Discharge plan according to [facility]:      Home POD 1 with assist of Spouse and Adult Children     07/10/23 0714   Discharge Planning   Patient/Family Anticipates Transition to home   Concerns to be Addressed all concerns addressed in this encounter   Living Arrangements   People in Home spouse;child(jazzmine), adult   Type of Residence Private Residence   Is your private residence a single family " home or apartment? Single family home   Stair Railings, Within Home, Primary railings safe and in good condition   Once home, are you able to live on one level? Yes   Which level? Main Level   Bathroom Shower/Tub Tub/Shower unit   Equipment Currently Used at Home none   Support System   Support Systems Spouse/Significant Other;Children   Do you have someone available to stay with you one or two nights once you are home? Yes       ANGELA Bass, CNP   Advanced Practice Nurse Navigator- Orthopedics  [facility]   Phone: [phone]

## 2023-08-16 ENCOUNTER — ANESTHESIA (OUTPATIENT)
Dept: SURGERY | Facility: CLINIC | Age: 63
End: 2023-08-16
Payer: COMMERCIAL

## 2023-08-16 ENCOUNTER — ANCILLARY PROCEDURE (OUTPATIENT)
Dept: ULTRASOUND IMAGING | Facility: CLINIC | Age: 63
End: 2023-08-16
Attending: ANESTHESIOLOGY
Payer: COMMERCIAL

## 2023-08-16 ENCOUNTER — HOSPITAL ENCOUNTER (OUTPATIENT)
Facility: CLINIC | Age: 63
Discharge: HOME | End: 2023-08-17
Attending: ORTHOPAEDIC SURGERY | Admitting: ORTHOPAEDIC SURGERY
Payer: COMMERCIAL

## 2023-08-16 DIAGNOSIS — M17.11 PRIMARY OSTEOARTHRITIS OF RIGHT KNEE: Primary | ICD-10-CM

## 2023-08-16 PROBLEM — M17.9 KNEE OSTEOARTHRITIS: Status: ACTIVE | Noted: 2023-08-16

## 2023-08-16 LAB — HBA1C MFR BLD: 5.6 %

## 2023-08-16 PROCEDURE — 999N000141 HC STATISTIC PRE-PROCEDURE NURSING ASSESSMENT: Performed by: ORTHOPAEDIC SURGERY

## 2023-08-16 PROCEDURE — 250N000011 HC RX IP 250 OP 636: Mod: JZ | Performed by: NURSE ANESTHETIST, CERTIFIED REGISTERED

## 2023-08-16 PROCEDURE — 250N000009 HC RX 250: Performed by: NURSE ANESTHETIST, CERTIFIED REGISTERED

## 2023-08-16 PROCEDURE — 250N000013 HC RX MED GY IP 250 OP 250 PS 637: Performed by: ORTHOPAEDIC SURGERY

## 2023-08-16 PROCEDURE — 250N000011 HC RX IP 250 OP 636: Performed by: ANESTHESIOLOGY

## 2023-08-16 PROCEDURE — C1713 ANCHOR/SCREW BN/BN,TIS/BN: HCPCS | Performed by: ORTHOPAEDIC SURGERY

## 2023-08-16 PROCEDURE — 250N000013 HC RX MED GY IP 250 OP 250 PS 637: Performed by: PHYSICIAN ASSISTANT

## 2023-08-16 PROCEDURE — 258N000003 HC RX IP 258 OP 636: Performed by: ANESTHESIOLOGY

## 2023-08-16 PROCEDURE — C1776 JOINT DEVICE (IMPLANTABLE): HCPCS | Performed by: ORTHOPAEDIC SURGERY

## 2023-08-16 PROCEDURE — 258N000003 HC RX IP 258 OP 636: Performed by: ORTHOPAEDIC SURGERY

## 2023-08-16 PROCEDURE — 83036 HEMOGLOBIN GLYCOSYLATED A1C: CPT | Performed by: NURSE PRACTITIONER

## 2023-08-16 PROCEDURE — 258N000003 HC RX IP 258 OP 636: Performed by: NURSE ANESTHETIST, CERTIFIED REGISTERED

## 2023-08-16 PROCEDURE — 250N000013 HC RX MED GY IP 250 OP 250 PS 637: Performed by: HOSPITALIST

## 2023-08-16 PROCEDURE — 360N000077 HC SURGERY LEVEL 4, PER MIN: Performed by: ORTHOPAEDIC SURGERY

## 2023-08-16 PROCEDURE — 250N000011 HC RX IP 250 OP 636: Mod: JZ | Performed by: ORTHOPAEDIC SURGERY

## 2023-08-16 PROCEDURE — 250N000011 HC RX IP 250 OP 636: Performed by: PHYSICIAN ASSISTANT

## 2023-08-16 PROCEDURE — 710N000010 HC RECOVERY PHASE 1, LEVEL 2, PER MIN: Performed by: ORTHOPAEDIC SURGERY

## 2023-08-16 PROCEDURE — 250N000009 HC RX 250: Performed by: PHYSICIAN ASSISTANT

## 2023-08-16 PROCEDURE — 272N000001 HC OR GENERAL SUPPLY STERILE: Performed by: ORTHOPAEDIC SURGERY

## 2023-08-16 PROCEDURE — 250N000009 HC RX 250: Performed by: ORTHOPAEDIC SURGERY

## 2023-08-16 PROCEDURE — 250N000011 HC RX IP 250 OP 636: Mod: JZ | Performed by: ANESTHESIOLOGY

## 2023-08-16 PROCEDURE — 370N000017 HC ANESTHESIA TECHNICAL FEE, PER MIN: Performed by: ORTHOPAEDIC SURGERY

## 2023-08-16 PROCEDURE — 36415 COLL VENOUS BLD VENIPUNCTURE: CPT | Performed by: NURSE PRACTITIONER

## 2023-08-16 DEVICE — TIBIAL BEARING INSERT - CS
Type: IMPLANTABLE DEVICE | Site: KNEE | Status: NON-FUNCTIONAL
Brand: TRIATHLON
Removed: 2023-11-02

## 2023-08-16 DEVICE — CRUCIATE RETAINING FEMORAL
Type: IMPLANTABLE DEVICE | Site: KNEE | Status: FUNCTIONAL
Brand: TRIATHLON

## 2023-08-16 DEVICE — SIMPLEX® HV IS A FAST-SETTING ACRYLIC RESIN FOR USE IN BONE SURGERY. MIXING THE TWO SEPARATE STERILE COMPONENTS PRODUCES A DUCTILE BONE CEMENT WHICH, AFTER HARDENING, FIXES THE IMPLANT AND TRANSFERS STRESSES PRODUCED DURING MOVEMENT EVENLY TO THE BONE. SIMPLEX® HV CEMENT POWDER ALSO CONTAINS INSOLUBLE ZIRCONIUM DIOXIDE AS AN X-RAY CONTRAST MEDIUM. SIMPLEX® HV DOES NOT EMIT A SIGNAL AND DOES NOT POSE A SAFETY RISK IN A MAGNETIC RESONANCE ENVIRONMENT.
Type: IMPLANTABLE DEVICE | Site: KNEE | Status: FUNCTIONAL
Brand: SIMPLEX HV

## 2023-08-16 DEVICE — PATELLA
Type: IMPLANTABLE DEVICE | Site: KNEE | Status: FUNCTIONAL
Brand: TRIATHLON

## 2023-08-16 DEVICE — TIBIAL COMPONENT
Type: IMPLANTABLE DEVICE | Site: KNEE | Status: FUNCTIONAL
Brand: TRIATHLON

## 2023-08-16 RX ORDER — NALOXONE HYDROCHLORIDE 0.4 MG/ML
0.4 INJECTION, SOLUTION INTRAMUSCULAR; INTRAVENOUS; SUBCUTANEOUS
Status: DISCONTINUED | OUTPATIENT
Start: 2023-08-16 | End: 2023-08-17 | Stop reason: HOSPADM

## 2023-08-16 RX ORDER — MAGNESIUM HYDROXIDE 1200 MG/15ML
LIQUID ORAL PRN
Status: DISCONTINUED | OUTPATIENT
Start: 2023-08-16 | End: 2023-08-16 | Stop reason: HOSPADM

## 2023-08-16 RX ORDER — SODIUM CHLORIDE, SODIUM LACTATE, POTASSIUM CHLORIDE, CALCIUM CHLORIDE 600; 310; 30; 20 MG/100ML; MG/100ML; MG/100ML; MG/100ML
INJECTION, SOLUTION INTRAVENOUS CONTINUOUS
Status: DISCONTINUED | OUTPATIENT
Start: 2023-08-16 | End: 2023-08-16 | Stop reason: HOSPADM

## 2023-08-16 RX ORDER — LIDOCAINE 40 MG/G
CREAM TOPICAL
Status: DISCONTINUED | OUTPATIENT
Start: 2023-08-16 | End: 2023-08-17 | Stop reason: HOSPADM

## 2023-08-16 RX ORDER — FENTANYL CITRATE 50 UG/ML
100 INJECTION, SOLUTION INTRAMUSCULAR; INTRAVENOUS ONCE
Status: DISCONTINUED | OUTPATIENT
Start: 2023-08-16 | End: 2023-08-16 | Stop reason: HOSPADM

## 2023-08-16 RX ORDER — OXYCODONE HYDROCHLORIDE 5 MG/1
5 TABLET ORAL
Status: DISCONTINUED | OUTPATIENT
Start: 2023-08-16 | End: 2023-08-16 | Stop reason: HOSPADM

## 2023-08-16 RX ORDER — DEXAMETHASONE SODIUM PHOSPHATE 10 MG/ML
INJECTION, SOLUTION INTRAMUSCULAR; INTRAVENOUS PRN
Status: DISCONTINUED | OUTPATIENT
Start: 2023-08-16 | End: 2023-08-16

## 2023-08-16 RX ORDER — ONDANSETRON 2 MG/ML
INJECTION INTRAMUSCULAR; INTRAVENOUS PRN
Status: DISCONTINUED | OUTPATIENT
Start: 2023-08-16 | End: 2023-08-16

## 2023-08-16 RX ORDER — AMOXICILLIN 250 MG
1 CAPSULE ORAL 2 TIMES DAILY
Status: DISCONTINUED | OUTPATIENT
Start: 2023-08-16 | End: 2023-08-17 | Stop reason: HOSPADM

## 2023-08-16 RX ORDER — PROPOFOL 10 MG/ML
INJECTION, EMULSION INTRAVENOUS PRN
Status: DISCONTINUED | OUTPATIENT
Start: 2023-08-16 | End: 2023-08-16

## 2023-08-16 RX ORDER — PRAMIPEXOLE DIHYDROCHLORIDE 0.25 MG/1
0.25 TABLET ORAL AT BEDTIME
Status: DISCONTINUED | OUTPATIENT
Start: 2023-08-16 | End: 2023-08-17 | Stop reason: HOSPADM

## 2023-08-16 RX ORDER — CARVEDILOL 3.12 MG/1
3.12 TABLET ORAL 2 TIMES DAILY WITH MEALS
Status: DISCONTINUED | OUTPATIENT
Start: 2023-08-16 | End: 2023-08-17 | Stop reason: HOSPADM

## 2023-08-16 RX ORDER — ONDANSETRON 2 MG/ML
4 INJECTION INTRAMUSCULAR; INTRAVENOUS EVERY 30 MIN PRN
Status: DISCONTINUED | OUTPATIENT
Start: 2023-08-16 | End: 2023-08-16 | Stop reason: HOSPADM

## 2023-08-16 RX ORDER — BUPIVACAINE HYDROCHLORIDE 5 MG/ML
INJECTION, SOLUTION EPIDURAL; INTRACAUDAL PRN
Status: DISCONTINUED | OUTPATIENT
Start: 2023-08-16 | End: 2023-08-16

## 2023-08-16 RX ORDER — HYDROMORPHONE HCL IN WATER/PF 6 MG/30 ML
0.4 PATIENT CONTROLLED ANALGESIA SYRINGE INTRAVENOUS
Status: DISCONTINUED | OUTPATIENT
Start: 2023-08-16 | End: 2023-08-17 | Stop reason: HOSPADM

## 2023-08-16 RX ORDER — ONDANSETRON 4 MG/1
4 TABLET, ORALLY DISINTEGRATING ORAL EVERY 6 HOURS PRN
Status: DISCONTINUED | OUTPATIENT
Start: 2023-08-16 | End: 2023-08-17 | Stop reason: HOSPADM

## 2023-08-16 RX ORDER — CEFAZOLIN SODIUM 2 G/100ML
2 INJECTION, SOLUTION INTRAVENOUS EVERY 8 HOURS
Status: COMPLETED | OUTPATIENT
Start: 2023-08-16 | End: 2023-08-17

## 2023-08-16 RX ORDER — SODIUM CHLORIDE, SODIUM LACTATE, POTASSIUM CHLORIDE, CALCIUM CHLORIDE 600; 310; 30; 20 MG/100ML; MG/100ML; MG/100ML; MG/100ML
INJECTION, SOLUTION INTRAVENOUS CONTINUOUS
Status: DISCONTINUED | OUTPATIENT
Start: 2023-08-16 | End: 2023-08-17 | Stop reason: HOSPADM

## 2023-08-16 RX ORDER — PROCHLORPERAZINE MALEATE 10 MG
10 TABLET ORAL EVERY 6 HOURS PRN
Status: DISCONTINUED | OUTPATIENT
Start: 2023-08-16 | End: 2023-08-17 | Stop reason: HOSPADM

## 2023-08-16 RX ORDER — OXYCODONE HYDROCHLORIDE 5 MG/1
10 TABLET ORAL EVERY 4 HOURS PRN
Status: DISCONTINUED | OUTPATIENT
Start: 2023-08-16 | End: 2023-08-17 | Stop reason: HOSPADM

## 2023-08-16 RX ORDER — PROPOFOL 10 MG/ML
INJECTION, EMULSION INTRAVENOUS CONTINUOUS PRN
Status: DISCONTINUED | OUTPATIENT
Start: 2023-08-16 | End: 2023-08-16

## 2023-08-16 RX ORDER — LIDOCAINE HYDROCHLORIDE 10 MG/ML
INJECTION, SOLUTION INFILTRATION; PERINEURAL PRN
Status: DISCONTINUED | OUTPATIENT
Start: 2023-08-16 | End: 2023-08-16

## 2023-08-16 RX ORDER — TRANEXAMIC ACID 650 MG/1
1950 TABLET ORAL ONCE
Status: COMPLETED | OUTPATIENT
Start: 2023-08-16 | End: 2023-08-16

## 2023-08-16 RX ORDER — FENTANYL CITRATE 50 UG/ML
25 INJECTION, SOLUTION INTRAMUSCULAR; INTRAVENOUS EVERY 5 MIN PRN
Status: DISCONTINUED | OUTPATIENT
Start: 2023-08-16 | End: 2023-08-16 | Stop reason: HOSPADM

## 2023-08-16 RX ORDER — NALOXONE HYDROCHLORIDE 0.4 MG/ML
0.2 INJECTION, SOLUTION INTRAMUSCULAR; INTRAVENOUS; SUBCUTANEOUS
Status: DISCONTINUED | OUTPATIENT
Start: 2023-08-16 | End: 2023-08-17 | Stop reason: HOSPADM

## 2023-08-16 RX ORDER — CLINDAMYCIN PHOSPHATE 900 MG/50ML
900 INJECTION, SOLUTION INTRAVENOUS
Status: DISCONTINUED | OUTPATIENT
Start: 2023-08-16 | End: 2023-08-16 | Stop reason: HOSPADM

## 2023-08-16 RX ORDER — BUPIVACAINE HYDROCHLORIDE 7.5 MG/ML
INJECTION, SOLUTION INTRASPINAL PRN
Status: DISCONTINUED | OUTPATIENT
Start: 2023-08-16 | End: 2023-08-16

## 2023-08-16 RX ORDER — FENTANYL CITRATE 50 UG/ML
100 INJECTION, SOLUTION INTRAMUSCULAR; INTRAVENOUS
Status: COMPLETED | OUTPATIENT
Start: 2023-08-16 | End: 2023-08-16

## 2023-08-16 RX ORDER — HYDROMORPHONE HCL IN WATER/PF 6 MG/30 ML
0.4 PATIENT CONTROLLED ANALGESIA SYRINGE INTRAVENOUS EVERY 5 MIN PRN
Status: DISCONTINUED | OUTPATIENT
Start: 2023-08-16 | End: 2023-08-16 | Stop reason: HOSPADM

## 2023-08-16 RX ORDER — ONDANSETRON 4 MG/1
4 TABLET, ORALLY DISINTEGRATING ORAL EVERY 30 MIN PRN
Status: DISCONTINUED | OUTPATIENT
Start: 2023-08-16 | End: 2023-08-16 | Stop reason: HOSPADM

## 2023-08-16 RX ORDER — ACETAMINOPHEN 325 MG/1
650 TABLET ORAL EVERY 4 HOURS PRN
Status: DISCONTINUED | OUTPATIENT
Start: 2023-08-19 | End: 2023-08-17 | Stop reason: HOSPADM

## 2023-08-16 RX ORDER — EPHEDRINE SULFATE 50 MG/ML
INJECTION, SOLUTION INTRAMUSCULAR; INTRAVENOUS; SUBCUTANEOUS PRN
Status: DISCONTINUED | OUTPATIENT
Start: 2023-08-16 | End: 2023-08-16

## 2023-08-16 RX ORDER — ASPIRIN 81 MG/1
162 TABLET, CHEWABLE ORAL DAILY
Status: ON HOLD | COMMUNITY
End: 2023-08-17

## 2023-08-16 RX ORDER — OXYCODONE HYDROCHLORIDE 5 MG/1
5-10 TABLET ORAL EVERY 4 HOURS PRN
Qty: 35 TABLET | Refills: 0 | Status: SHIPPED | OUTPATIENT
Start: 2023-08-16 | End: 2023-10-13

## 2023-08-16 RX ORDER — OXYCODONE HYDROCHLORIDE 5 MG/1
10 TABLET ORAL
Status: DISCONTINUED | OUTPATIENT
Start: 2023-08-16 | End: 2023-08-16 | Stop reason: HOSPADM

## 2023-08-16 RX ORDER — AMOXICILLIN 250 MG
1-2 CAPSULE ORAL 2 TIMES DAILY
Qty: 30 TABLET | Refills: 0 | Status: SHIPPED | OUTPATIENT
Start: 2023-08-16 | End: 2023-10-13

## 2023-08-16 RX ORDER — ASPIRIN 81 MG/1
81 TABLET ORAL 2 TIMES DAILY
Status: DISCONTINUED | OUTPATIENT
Start: 2023-08-16 | End: 2023-08-17 | Stop reason: HOSPADM

## 2023-08-16 RX ORDER — HYDROMORPHONE HCL IN WATER/PF 6 MG/30 ML
0.2 PATIENT CONTROLLED ANALGESIA SYRINGE INTRAVENOUS
Status: DISCONTINUED | OUTPATIENT
Start: 2023-08-16 | End: 2023-08-17 | Stop reason: HOSPADM

## 2023-08-16 RX ORDER — LIDOCAINE 40 MG/G
CREAM TOPICAL
Status: DISCONTINUED | OUTPATIENT
Start: 2023-08-16 | End: 2023-08-16 | Stop reason: HOSPADM

## 2023-08-16 RX ORDER — BISACODYL 10 MG
10 SUPPOSITORY, RECTAL RECTAL DAILY PRN
Status: DISCONTINUED | OUTPATIENT
Start: 2023-08-16 | End: 2023-08-17 | Stop reason: HOSPADM

## 2023-08-16 RX ORDER — ACETAMINOPHEN 325 MG/1
975 TABLET ORAL EVERY 8 HOURS
Status: DISCONTINUED | OUTPATIENT
Start: 2023-08-16 | End: 2023-08-17 | Stop reason: HOSPADM

## 2023-08-16 RX ORDER — POLYETHYLENE GLYCOL 3350 17 G/17G
17 POWDER, FOR SOLUTION ORAL DAILY
Status: DISCONTINUED | OUTPATIENT
Start: 2023-08-17 | End: 2023-08-17 | Stop reason: HOSPADM

## 2023-08-16 RX ORDER — FENTANYL CITRATE 50 UG/ML
50 INJECTION, SOLUTION INTRAMUSCULAR; INTRAVENOUS EVERY 5 MIN PRN
Status: DISCONTINUED | OUTPATIENT
Start: 2023-08-16 | End: 2023-08-16 | Stop reason: HOSPADM

## 2023-08-16 RX ORDER — AMLODIPINE BESYLATE 5 MG/1
5 TABLET ORAL DAILY
Status: DISCONTINUED | OUTPATIENT
Start: 2023-08-17 | End: 2023-08-17 | Stop reason: HOSPADM

## 2023-08-16 RX ORDER — HYDROMORPHONE HCL IN WATER/PF 6 MG/30 ML
0.2 PATIENT CONTROLLED ANALGESIA SYRINGE INTRAVENOUS EVERY 5 MIN PRN
Status: DISCONTINUED | OUTPATIENT
Start: 2023-08-16 | End: 2023-08-16 | Stop reason: HOSPADM

## 2023-08-16 RX ORDER — ASPIRIN 81 MG/1
81 TABLET ORAL 2 TIMES DAILY
Qty: 60 TABLET | Refills: 0 | Status: ON HOLD | OUTPATIENT
Start: 2023-08-16 | End: 2023-11-03

## 2023-08-16 RX ORDER — ONDANSETRON 2 MG/ML
4 INJECTION INTRAMUSCULAR; INTRAVENOUS EVERY 6 HOURS PRN
Status: DISCONTINUED | OUTPATIENT
Start: 2023-08-16 | End: 2023-08-17 | Stop reason: HOSPADM

## 2023-08-16 RX ORDER — CLINDAMYCIN PHOSPHATE 900 MG/50ML
900 INJECTION, SOLUTION INTRAVENOUS SEE ADMIN INSTRUCTIONS
Status: DISCONTINUED | OUTPATIENT
Start: 2023-08-16 | End: 2023-08-16 | Stop reason: HOSPADM

## 2023-08-16 RX ORDER — OXYCODONE HYDROCHLORIDE 5 MG/1
5 TABLET ORAL EVERY 4 HOURS PRN
Status: DISCONTINUED | OUTPATIENT
Start: 2023-08-16 | End: 2023-08-17 | Stop reason: HOSPADM

## 2023-08-16 RX ORDER — ATORVASTATIN CALCIUM 40 MG/1
80 TABLET, FILM COATED ORAL EVERY EVENING
Status: DISCONTINUED | OUTPATIENT
Start: 2023-08-16 | End: 2023-08-17 | Stop reason: HOSPADM

## 2023-08-16 RX ADMIN — PHENYLEPHRINE HYDROCHLORIDE 100 MCG: 10 INJECTION INTRAVENOUS at 12:10

## 2023-08-16 RX ADMIN — Medication 5 MG: at 12:31

## 2023-08-16 RX ADMIN — SODIUM CHLORIDE, POTASSIUM CHLORIDE, SODIUM LACTATE AND CALCIUM CHLORIDE: 600; 310; 30; 20 INJECTION, SOLUTION INTRAVENOUS at 17:04

## 2023-08-16 RX ADMIN — Medication 5 MG: at 12:21

## 2023-08-16 RX ADMIN — PHENYLEPHRINE HYDROCHLORIDE 100 MCG: 10 INJECTION INTRAVENOUS at 12:36

## 2023-08-16 RX ADMIN — PHENYLEPHRINE HYDROCHLORIDE 100 MCG: 10 INJECTION INTRAVENOUS at 12:00

## 2023-08-16 RX ADMIN — CEFAZOLIN SODIUM 2 G: 2 INJECTION, SOLUTION INTRAVENOUS at 17:04

## 2023-08-16 RX ADMIN — OXYCODONE HYDROCHLORIDE 10 MG: 5 TABLET ORAL at 17:03

## 2023-08-16 RX ADMIN — BUPIVACAINE HYDROCHLORIDE IN DEXTROSE 1.7 ML: 7.5 INJECTION, SOLUTION SUBARACHNOID at 11:43

## 2023-08-16 RX ADMIN — PROPOFOL 50 MG: 10 INJECTION, EMULSION INTRAVENOUS at 11:48

## 2023-08-16 RX ADMIN — ACETAMINOPHEN 975 MG: 325 TABLET ORAL at 17:03

## 2023-08-16 RX ADMIN — Medication 10 MG: at 12:14

## 2023-08-16 RX ADMIN — ATORVASTATIN CALCIUM 80 MG: 40 TABLET, FILM COATED ORAL at 20:08

## 2023-08-16 RX ADMIN — MIDAZOLAM HYDROCHLORIDE 2 MG: 1 INJECTION, SOLUTION INTRAMUSCULAR; INTRAVENOUS at 10:29

## 2023-08-16 RX ADMIN — LIDOCAINE HYDROCHLORIDE 20 MG: 10 INJECTION, SOLUTION INFILTRATION; PERINEURAL at 11:48

## 2023-08-16 RX ADMIN — ONDANSETRON 4 MG: 2 INJECTION INTRAMUSCULAR; INTRAVENOUS at 12:37

## 2023-08-16 RX ADMIN — PHENYLEPHRINE HYDROCHLORIDE 100 MCG: 10 INJECTION INTRAVENOUS at 12:43

## 2023-08-16 RX ADMIN — CARVEDILOL 3.12 MG: 3.12 TABLET, FILM COATED ORAL at 20:08

## 2023-08-16 RX ADMIN — PHENYLEPHRINE HYDROCHLORIDE 100 MCG: 10 INJECTION INTRAVENOUS at 12:06

## 2023-08-16 RX ADMIN — SODIUM CHLORIDE, POTASSIUM CHLORIDE, SODIUM LACTATE AND CALCIUM CHLORIDE: 600; 310; 30; 20 INJECTION, SOLUTION INTRAVENOUS at 14:31

## 2023-08-16 RX ADMIN — SODIUM CHLORIDE, POTASSIUM CHLORIDE, SODIUM LACTATE AND CALCIUM CHLORIDE: 600; 310; 30; 20 INJECTION, SOLUTION INTRAVENOUS at 12:14

## 2023-08-16 RX ADMIN — SENNOSIDES AND DOCUSATE SODIUM 1 TABLET: 50; 8.6 TABLET ORAL at 20:08

## 2023-08-16 RX ADMIN — ASPIRIN 81 MG: 81 TABLET, COATED ORAL at 20:08

## 2023-08-16 RX ADMIN — SODIUM CHLORIDE, POTASSIUM CHLORIDE, SODIUM LACTATE AND CALCIUM CHLORIDE: 600; 310; 30; 20 INJECTION, SOLUTION INTRAVENOUS at 09:44

## 2023-08-16 RX ADMIN — TRANEXAMIC ACID 1950 MG: 650 TABLET ORAL at 09:49

## 2023-08-16 RX ADMIN — PROPOFOL 100 MCG/KG/MIN: 10 INJECTION, EMULSION INTRAVENOUS at 11:48

## 2023-08-16 RX ADMIN — DEXAMETHASONE SODIUM PHOSPHATE 4 MG: 10 INJECTION, SOLUTION INTRAMUSCULAR; INTRAVENOUS at 11:53

## 2023-08-16 RX ADMIN — FENTANYL CITRATE 100 MCG: 50 INJECTION, SOLUTION INTRAMUSCULAR; INTRAVENOUS at 10:28

## 2023-08-16 RX ADMIN — PHENYLEPHRINE HYDROCHLORIDE 100 MCG: 10 INJECTION INTRAVENOUS at 12:50

## 2023-08-16 RX ADMIN — PRAMIPEXOLE DIHYDROCHLORIDE 0.25 MG: 0.25 TABLET ORAL at 20:08

## 2023-08-16 RX ADMIN — CLINDAMYCIN PHOSPHATE 900 MG: 900 INJECTION, SOLUTION INTRAVENOUS at 10:54

## 2023-08-16 RX ADMIN — Medication 5 MG: at 12:24

## 2023-08-16 RX ADMIN — PHENYLEPHRINE HYDROCHLORIDE 100 MCG: 10 INJECTION INTRAVENOUS at 11:55

## 2023-08-16 RX ADMIN — BUPIVACAINE HYDROCHLORIDE 15 ML: 5 INJECTION, SOLUTION EPIDURAL; INTRACAUDAL; PERINEURAL at 10:28

## 2023-08-16 ASSESSMENT — ACTIVITIES OF DAILY LIVING (ADL)
ADLS_ACUITY_SCORE: 20
ADLS_ACUITY_SCORE: 27

## 2023-08-16 NOTE — ANESTHESIA POSTPROCEDURE EVALUATION
Patient: Gonzalez Morton    Procedure: Procedure(s):  RIGHT TOTAL KNEE ARTHROPLASTY       Anesthesia Type:  Spinal    Note:  Disposition: Inpatient   Postop Pain Control:             Sign Out: Well controlled pain   PONV: No   Neuro/Psych:             Sign Out: Acceptable/Baseline neuro status   Airway/Respiratory:             Sign Out: Acceptable/Baseline resp. status   CV/Hemodynamics:             Sign Out: Acceptable CV status   Other NRE: NONE   DID A NON-ROUTINE EVENT OCCUR?            Last vitals:  Vitals Value Taken Time   /71 08/16/23 1430   Temp 36.1  C (96.9  F) 08/16/23 1350   Pulse 68 08/16/23 1454   Resp 16 08/16/23 1354   SpO2 92 % 08/16/23 1454   Vitals shown include unvalidated device data.    Electronically Signed By: Brice Mai MD  August 16, 2023  2:55 PM

## 2023-08-16 NOTE — INTERVAL H&P NOTE
"I have reviewed the surgical (or preoperative) H&P that is linked to this encounter, and examined the patient. There are no significant changes    Clinical Conditions Present on Arrival:  Clinically Significant Risk Factors Present on Admission                 # Drug Induced Platelet Defect: home medication list includes an antiplatelet medication  # Obesity: Estimated body mass index is 36.92 kg/m  as calculated from the following:    Height as of this encounter: 1.753 m (5' 9\").    Weight as of this encounter: 113.4 kg (250 lb).       "

## 2023-08-16 NOTE — ANESTHESIA PREPROCEDURE EVALUATION
Anesthesia Pre-Procedure Evaluation    Patient: Gonzalez Morton   MRN: [id number] : 1960        Procedure : Procedure(s):  RIGHT TOTAL KNEE ARTHROPLASTY          Past Medical History:   Diagnosis Date    Abnormal cardiovascular stress test 2022    CAD (coronary artery disease)     Elevated coronary artery calcium score     Hypertension     Knee injury     right    Obese     Rosacea     Sleep apnea     CPAP      Past Surgical History:   Procedure Laterality Date    APPENDECTOMY      ARTHROSCOPY KNEE Bilateral     BYPASS GRAFT ARTERY CORONARY N/A 2022    Procedure: CORONARY ARTERY BYPASS GRAFT X 5, ENDOSCOPIC VESSEL PROCUREMENT LEFT LEG, INTERNAL MAMMARY ARTERY HARVEST, LEFT RADIAL ARTERY HARVEST, ANESTHESIA TRANSESOPHAGEAL ECHOCARDIOGRAM, EPIAORTIC ULTRASOUND;  Surgeon: Mayank Rocha MD;  Location: [facility] Main OR    CV CORONARY ANGIOGRAM N/A 2022    Procedure: Coronary Angiogram;  Surgeon: Mery Lyons MD;  Location: [facility] CATH LAB CV    PROSTATECTOMY      TOTAL KNEE ARTHROPLASTY Left     WRIST SURGERY        Allergies   Allergen Reactions    Metoprolol Tartrate Hives    Penicillins Hives     Tolerated CTX 2020, Ancef 2022      Social History     Tobacco Use    Smoking status: Never    Smokeless tobacco: Never   Substance Use Topics    Alcohol use: Yes     Alcohol/week: 1.0 standard drink of alcohol     Types: 1 Standard drinks or equivalent per week      Wt Readings from Last 1 Encounters:   23 113.4 kg (250 lb)        Anesthesia Evaluation            ROS/MED HX  ENT/Pulmonary:  - neg pulmonary ROS     Neurologic:  - neg neurologic ROS     Cardiovascular:       METS/Exercise Tolerance: >4 METS    Hematologic:  - neg hematologic  ROS     Musculoskeletal:  - neg musculoskeletal ROS     GI/Hepatic:  - neg GI/hepatic ROS     Renal/Genitourinary:  - neg Renal ROS     Endo:       Psychiatric/Substance Use:  - neg psychiatric ROS     Infectious Disease:  - neg infectious  disease ROS     Malignancy:  - neg malignancy ROS     Other:  - neg other ROS          Physical Exam    Airway  airway exam normal      Mallampati: II       Respiratory Devices and Support         Dental  no notable dental history         Cardiovascular   cardiovascular exam normal          Pulmonary   pulmonary exam normal                OUTSIDE LABS:  CBC:   Lab Results   Component Value Date    WBC 6.9 08/01/2023    WBC 10.0 06/09/2022    HGB 16.5 08/01/2023    HGB 12.2 (L) 06/22/2022    HCT 46.3 08/01/2023    HCT 25.5 (L) 06/09/2022     08/01/2023     06/10/2022     BMP:   Lab Results   Component Value Date     08/01/2023     05/17/2023    POTASSIUM 3.6 08/01/2023    POTASSIUM 4.0 05/17/2023    CHLORIDE 98 08/01/2023    CHLORIDE 101 05/17/2023    CO2 28 08/01/2023    CO2 26 05/17/2023    BUN 16.3 08/01/2023    BUN 19.4 05/17/2023    CR 0.85 08/01/2023    CR 0.90 05/17/2023     (H) 08/01/2023     (H) 05/17/2023     COAGS:   Lab Results   Component Value Date    PTT 30 06/06/2022    INR 1.28 (H) 06/06/2022    FIBR 354 06/06/2022     POC: No results found for: BGM, HCG, HCGS  HEPATIC:   Lab Results   Component Value Date    ALBUMIN 4.6 08/01/2023    PROTTOTAL 7.4 08/01/2023    ALT 42 08/01/2023    AST 28 08/01/2023    ALKPHOS 122 08/01/2023    BILITOTAL 0.5 08/01/2023     OTHER:   Lab Results   Component Value Date    PH 7.38 06/09/2022    LACT 2.4 (H) 06/06/2022    A1C 5.8 (H) 05/31/2022    KEATON 9.6 08/01/2023    PHOS 2.7 06/10/2022    MAG 2.0 06/10/2022    CRP 25.6 (H) 06/19/2020       Anesthesia Plan    ASA Status:  3       Anesthesia Type: Spinal.              Consents    Anesthesia Plan(s) and associated risks, benefits, and realistic alternatives discussed. Questions answered and patient/representative(s) expressed understanding.     - Discussed:     - Discussed with:  Patient            Postoperative Care    Pain management: Peripheral nerve block (Single Shot).         Comments:                Brice Mai MD

## 2023-08-16 NOTE — ANESTHESIA CARE TRANSFER NOTE
Patient: Gonzalez Morton    Procedure: Procedure(s):  RIGHT TOTAL KNEE ARTHROPLASTY       Diagnosis: Osteoarthritis of right knee [M17.11]  Diagnosis Additional Information: No value filed.    Anesthesia Type:   Spinal     Note:    Oropharynx: oropharynx clear of all foreign objects  Level of Consciousness: awake  Oxygen Supplementation: face mask  Level of Supplemental Oxygen (L/min / FiO2): 6  Independent Airway: airway patency satisfactory and stable  Dentition: dentition unchanged  Vital Signs Stable: post-procedure vital signs reviewed and stable  Report to RN Given: handoff report given  Patient transferred to: PACU    Handoff Report: Identifed the Patient, Identified the Reponsible Provider, Reviewed the pertinent medical history, Discussed the surgical course, Reviewed Intra-OP anesthesia mangement and issues during anesthesia, Set expectations for post-procedure period and Allowed opportunity for questions and acknowledgement of understanding      Vitals:  Vitals Value Taken Time   BP     Temp     Pulse     Resp     SpO2         Electronically Signed By: ANGELA Novak CRNA  August 16, 2023  1:06 PM

## 2023-08-16 NOTE — ANESTHESIA PROCEDURE NOTES
Adductor canal Procedure Note    Pre-Procedure   Staff -        Anesthesiologist:  Brice Mai MD       Performed By: anesthesiologist       Location: pre-op       Procedure Start/Stop Times: 8/16/2023 10:25 AM and 8/16/2023 10:30 AM       Pre-Anesthestic Checklist: patient identified, IV checked, site marked, risks and benefits discussed, informed consent, monitors and equipment checked, pre-op evaluation, at physician/surgeon's request and post-op pain management  Timeout:       Correct Patient: Yes        Correct Procedure: Yes        Correct Site: Yes        Correct Position: Yes        Correct Laterality: Yes        Site Marked: Yes  Procedure Documentation  Procedure: Adductor canal       Laterality: right       Patient Position: supine       Skin prep: Chloraprep       Local skin infiltrated with 3 mL of 1% lidocaine.        Needle Type: other       Needle Gauge: 20.        Needle Length (Inches): 6        Ultrasound guided       1. Ultrasound was used to identify targeted nerve, plexus, vascular marker, or fascial plane and place a needle adjacent to it in real-time.       2. Ultrasound was used to visualize the spread of anesthetic in close proximity to the above referenced structure.       3. A permanent image is entered into the patient's record.       4. The visualized anatomic structures appeared normal.       5. There were no apparent abnormal pathologic findings.    Assessment/Narrative         The placement was negative for: blood aspirated, painful injection and site bleeding       Paresthesias: No.       Test dose of 3 mL at.         Test dose negative, 3 minutes after injection, for signs of intravascular, subdural, or intrathecal injection.       Bolus given via needle. no blood aspirated via catheter.        Secured via.        Insertion/Infusion Method: Single Shot       Complications: none       Injection made incrementally with aspirations every 5 mL.    Medication(s) Administered  "  Medication Administration Time: 8/16/2023 10:25 AM      FOR [facility] ONLY:   Pain Team Contact information: please page the Pain Team Via [employer]. Search \"Pain\". During daytime hours, please page the attending first. At night please page the resident first.      "

## 2023-08-16 NOTE — OP NOTE
Operative Report    PATIENT:  Gonzalez Morton    DATE OF SURGERY:  8/16/2023    SURGEON  Bradley Redd MD.      FIRST ASSISTANT  Cem Fonseca PA-C  (Expert NINA assist was required throughout for patient positioning, soft tissue retraction, appropriate use of knee instrumentation, and patient safety)     PREOPERATIVE DIAGNOSIS  right knee osteoarthritis     POSTOPERATIVE DIAGNOSIS  right knee osteoarthritis.         PROCEDURE  right Total Knee Arthroplasty.         ANESTHESIA  Spinal with Block    SPECIMENS: none     ESTIMATED BLOOD LOSS  50cc     INDICATIONS  Mr. Gonzalez Morton is a pleasant 62 year old-year-old male with an ongoing history of increasing and progressive pain in the right knee with severe disability. Pain and disability due to knee arthritis are severely affecting quality of life and ability to perform even simple activities of daily living.  X-rays have shown bone-on-bone degenerative change. Consequently after trying and failing all conservative management of knee arthritis, discussion regarding the risk and benefits of knee replacement was undertaken and the patient elected to proceed.     FINDINGS:  The operative knee showed a severe full-thickness cartilage loss on the femur and tibia in the medial and lateral compartment of the knee.  The patellofemoral joint also showed advanced arthritic changes.      IMPLANTS  1. Lizella, Triathalon, CR porous femoral component, size % .  2. Lizella, Triathalon, Tritanium tibial component, size 5.  3. Lizella, Triathalon,  all polyethylene articular surface 14 mm thickness.  CS  4. Angela, Triathalon, all polyethylene patellar button, 38mm diameter      PROCEDURE  Once consent was obtained and the operative site marked in the preop holding area, the patient was brought to the operating room.  Anesthesia was established without difficulty. All bony prominences and the non-operative leg were padded appropriately. The right leg was  sterilely prepped and draped in the usual fashion after placement of a proximal tourniquet.  Tourniquet was  inflated.     A longitudinal incision made over the knee.  Dissection was carried down through the extensor mechanism.  A medial parapatellar arthrotomy  was performed.  The patella was luxed laterally and protected. Standard medial release performed.    An intramedullary guide was used in the femur.  The distal femoral was made at 4 degrees of valgus.  The femoral cutting block  was applied and the rest of the femoral cuts completed. ACL was removed and the PCL was recessed.    Attention was then turned to the tibia.  This was cut using an extramedullary tibial cutting guide perpendicular to its mechanical axis.  The trial tibial and femoral components were then placed and the knee reduced. The patella was resurfaced and found to track well. Flexion and extension gaps were appropriate and varus valgus stability was good.     The knee was copiously irrigated and all bony surfaces dried.  Femoral and tibial componenets were press fit due to young age and very good bone quality. Cement was mixed and patellar button was cemented and held until firm.  The knee was again trialed.  The  Polyethylene was opened and snapped into place, the locking mechanism was ensured.  The knee was copiously irrigated. The extensor mechanism was closed with # 1 interrupted Vicryl suture. Deep dermis was closed layer-wise of # 2-0 interrupted inverted Vicryl sutures followed by running # 3-0 Monocryl in the skin.  Dressings were applied. The patient tolerated the procedure well and was returned to the postop recovery area in stable condition.          BRADLEY PRINCE MD    @C(1)@  Bradley Prince MD    @C(2)@  Dylon Shafer

## 2023-08-16 NOTE — ANESTHESIA PROCEDURE NOTES
"Intrathecal injection Procedure Note    Pre-Procedure   Staff -        Anesthesiologist:  Brice Mai MD       Performed By: anesthesiologist       Location: OR       Procedure Start/Stop Times: 8/16/2023 11:45 AM and 8/16/2023 11:47 AM       Pre-Anesthestic Checklist: patient identified, IV checked, risks and benefits discussed, informed consent, monitors and equipment checked, pre-op evaluation, at physician/surgeon's request and post-op pain management  Timeout:       Correct Patient: Yes        Correct Procedure: Yes        Correct Site: Yes        Correct Position: Yes   Procedure Documentation  Procedure: intrathecal injection       Patient Position: sitting       Skin prep: Chloraprep       Insertion Site: L4-5. (right paramedian approach).       Needle Gauge: 24.        Needle Length (Inches): 3.5        Spinal Needle Type: Pencan       Introducer used       # of attempts: 1 and  # of redirects:     Assessment/Narrative         Paresthesias: No.       CSF fluid: clear.    Medication(s) Administered   Medication Administration Time: 8/16/2023 11:45 AM      FOR [facility] [place]:   Pain Team Contact information: please page the Pain Team Via [facility]. Search \"Pain\". During daytime hours, please page the attending first. At night please page the resident first.      "

## 2023-08-16 NOTE — PHARMACY-ADMISSION MEDICATION HISTORY
"Pharmacist PETER Medication History    Admission medication history is complete. The information provided in this note is only as accurate as the sources available at the time of the update.    Medication reconciliation/reorder completed by provider prior to medication history? No    Information Source(s): Patient and Clinic records and Care Everywhere via in-person    Pertinent Information: N/A    Medication Affordability:  Not including over the counter (OTC) medications, was there a time in the past 3 months when you did not take your medications as prescribed because of cost?: No    Allergies reviewed with patient and updates made in EHR: yes    Medications available for use during hospital stay: None.      Medication History Completed By: Fly Orosco [facility] 8/16/2023 9:55 AM    PTA Med List   Medication Sig Last Dose    acetaminophen (TYLENOL) 650 MG CR tablet Take 1,300 mg by mouth every 8 hours as needed for pain 8/15/2023 at 1900    amLODIPine (NORVASC) 5 MG tablet Take 1 tablet (5 mg) by mouth daily 8/16/2023 at AM    aspirin (ASA) 81 MG chewable tablet Take 162 mg by mouth daily 8/9/2023 at AM    atorvastatin (LIPITOR) 80 MG tablet Take 1 tablet (80 mg) by mouth every evening 8/15/2023 at PM    carvedilol (COREG) 3.125 MG tablet Take 1 tablet (3.125 mg) by mouth 2 times daily (with meals) To replace metoprolol 8/16/2023 at AM    hydrochlorothiazide (HYDRODIURIL) 25 MG tablet TAKE 1 TABLET BY MOUTH EVERY DAY 8/15/2023 at AM    lisinopril (ZESTRIL) 20 MG tablet [LISINOPRIL (PRINIVIL,ZESTRIL) 20 MG TABLET] TAKE 1 TABLET BY MOUTH EVERY DAY 8/15/2023 at AM    metroNIDAZOLE (METROGEL) 0.75 % external gel Apply topically 2 times daily 8/14/2023 at PM    nitroGLYcerin (NITROSTAT) 0.4 MG sublingual tablet One tablet under the tongue every 5 minutes if needed for chest pain. May repeat every 5 minutes for a maximum of 3 doses in 15 minutes\" Unknown    pramipexole (MIRAPEX) 0.25 MG tablet TAKE 1 TABLET BY MOUTH " DATE OF ADMISSION:  03/15/2017

 

HISTORY OF PRESENT ILLNESS:  Jania is a pleasant, 29-year-old, right handed

female, nonsmoker, with a history of Chiari malformation type I who has decided

to undergo bilateral suboccipital craniectomy hoping for improvement of her

symptoms.  She is accompanied by her 4-year-old son today.  She states she was

diagnosed with Chiari malformation type I in May.  She has had symptoms of right

sided headache with occasional double or blurred vision, nausea and cough

productive of mucus, dizziness, photo sensitivity and difficulty swallowing

thought to be due to hypersensitive gag reflex.  She reports choking on food on

occasion, but not frequently.  She attributes this to her enlarging, low lying

tonsils.  She describes the headache pain as her head is on fire and sometimes

with increased pressure.  It seems these symptoms last for days.  Onset and

frequency of the headaches varies with activities.  She reports trying many

prescriptions for the headache pain relief and often unsuccessful.  Only hot

showers provide mild relief of symptoms.  She notices a subtle weakness of her

left arm and leg when working out in the gym.

 

She reports a history of chest pain and shortness of breath when she has

concluded her work out and she states there have been no findings.  She states

her chest pain is mainly on the left side and describes it as a sharp stabbing

pain.  These episodes occur about two times per week and the duration varies.  
It

can occur with activity or rest.  She is unsure if these episodes are related to

anxiety.

 

She denies any recent illnesses, fevers, night sweats, sudden onset of weakness,

fatigue, weight loss or gain, change in energy, bladder or bowel incontinence,

vomiting, or diarrhea.

 

She does not take any blood thinners and has been avoiding the use of NSAIDS

within the past two weeks in anticipation of this upcoming surgery.

 

She has not received general anesthesia in the past.

 

MEDICATIONS PRIOR TO THIS VISIT:

-  Imitrex 25 mg one tablet as necessary

-  ibuprofen as needed

 

ALLERGIES:

-  PENICILLIN

-  BACTRIM

 

PAST MEDICAL HISTORY:

Problem List:

1.  Spinal bifida without hydrocephalus.

2.  Cervical occipital neuralgia.

3.  Cervical spondylosis.

4.  Lumbar spondylosis.

5.  Sacroiliitis.

6.  Carpal tunnel syndrome.

7.  Lesion of ulnar nerve.

 

HEALTH MAINTENANCE:  Negative for influenza vaccinations.

 

She denies a past medical history of hypertension, seizure, blood clot, anemia 
or

other blood disorders, lung disorders, diabetes, or thyroid symptoms.

 

FAMILY HISTORY:  Mother:  Alcoholism and smoker.

Father:  Unknown.

Maternal Grandmother:  Cardiac aneurysm at age 34.

Maternal Grandfather:  Hypertension, heart disease and diabetes.

Brother:  Stage IV Hodgkin's lymphoma.

Children:  Three, healthy.

 

SOCIAL HISTORY:  She is currently working as a .  She lives at home with

her three children.  She is a nonsmoker and has never smoked.  Reports drinking

alcohol socially; maybe twice a month.  She denies use of any illicit drugs.

 

SURGICAL HISTORY:

1.  Dilation and curettage (D and C) in .

2.   section in , .

3.  Wilberforce tooth extraction in .

4.  Cyst removal in .

 

REVIEW OF SYSTEMS:

CONSTITUTIONAL:  She denies fever or chills or any recent illnesses.

EYES:  See history of present illness (HPI).

THROAT:  She reports difficulty swallowing at times with food, not with water.

CARDIOVASCULAR:  See HPI.

RESPIRATORY:  Denies respiratory symptoms.

GASTROINTESTINAL (GI):  Denies nausea, vomiting and abdominal pain.

SKIN:  No rashes or lesions.

NEUROLOGIC:  See HPI.

PSYCHIATRIC:  Reports history of anxiety and depression, but denies being

diagnosed with this.

HEMATOLOGIC:  Denies easy bruising.

 

VITAL SIGNS:  Blood pressure 196/73, pulse 78, height 62 inches, weight 130

pounds, body mass index (BMI) 23.8.

 

PHYSICAL EXAMINATION:

CONSTITUTIONAL:  Appears healthy and well developed.  No signs of acute distress

present.  The patient appears her stated age.

HEAD:  Atraumatic, normocephalic.  Normal head movement and normal facial

development on inspection.

EYES:  Vision is grossly normal on gross visual field and confrontation shows no

abnormalities.  Extraocular movements are brisk with

nystagmus on the left in horizontal gaze.  Conjunctivae clear.  No discharge 
from

the eyes.  Sclerae nonicteric.

NECK:  Supple.  Full range of motion.  No swelling lymphadenopathy.

HEENT:  Midline.  Mallampati score of 3.  There are no loose teeth.

RESPIRATORY:  Rate normal.  Clear to auscultation.

CARDIOVASCULAR:  Rate is regular, rhythm is regular.  No murmurs detected. No

peripheral edema, ecchymosis or lesions.  Radial and dorsalis pedis pulses are 2
+

bilaterally.

ABDOMEN:  No bulging.  Abdomen is flat, soft and nontender.  No abdominal masses

palpable.

SKIN:  Warm and dry with no evidence of any rashes or lesions.

NEUROLOGICAL EXAMINATION:

Mental Status:  Alert and oriented times three.  Mood is normal.  Affect is

normal.  Memory is grossly intact.  Attention is within normal limits.  Speech 
is

articulate and fluent.  Thought processes appear clear and approriate.

Cranial nerves II-XII are grossly intact with the exception of the extraocular

movements.

Motor System:  No pronator drift.

Reflexes:  Upper extremity deep tendon reflexes are symmetric bilateral.  Lower

extremity deep tendon reflexes are normal and symmetric bilaterally.  Plantar

reflexes downgoing bilaterally.  Chowdhury's sign is negative bilaterally.

Coordination:  Normal rapid alternating movements is performed well with no

coordination difficulty.  

Sensation:  Sensation intact to light touch and vibration.  Sensation is

decreased to sharp on plantar aspect of the feet.  Vibration sensation is

intact.

Heel to toe and tandem walk were normal.  Romberg testing negative.

Affect:  Cooperative.  Mood normal.  Affect is normal.

 

IMAGING:  MRI of the brain performed in 2016 demonstrates a 7.5 mm

tonsillar ectopia consistent with Arnold-Chiari type malformation.

 

ASSESSMENT:

1.  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

 

CARE PLAN:

1.  Patient is admitted to undergo a bilateral occipital craniectomy excision of

posterior wall of the foramen magnum, C1 and possible C2 laminectomies with or

without dural graft and possible decompression of C2 sensory ganglia

tomorrow, 2017.  The preoperative blood work has been ordered.  Her

surgical options were once again discussed with her.  She understands surgery is

not curative and that she may require multiple surgeries in the future. She

understands that no guarantees can be given of any kind and aware of the scope,

expected outcome, sequelae and all possible risks of surgery.  The risks of

surgery discussed, including but not limited to, death, CSF leak, meningitis,

seizure disorder, persistent medicated state, loss of bodily functions,

dependency on life support, persistent or worsening of symptoms or deficit,

failure of surgery, need for multiple surgeries, infection, bleeding, blood

clots, CVA, loss or injury of vital structures, loss of swallowing, need for

feeding tube, or catastrophic sequelae.  The patient

wishes to proceed with surgery.  Arrangements have been made.

GEORGES EVERYDAY AT BEDTIME 8/15/2023 at PM    [DISCONTINUED] atorvastatin (LIPITOR) 40 MG tablet TAKE 1 TABLET BY MOUTH EVERY DAY

## 2023-08-17 ENCOUNTER — APPOINTMENT (OUTPATIENT)
Dept: PHYSICAL THERAPY | Facility: CLINIC | Age: 63
End: 2023-08-17
Attending: ORTHOPAEDIC SURGERY
Payer: COMMERCIAL

## 2023-08-17 VITALS
WEIGHT: 250 LBS | OXYGEN SATURATION: 94 % | BODY MASS INDEX: 37.03 KG/M2 | TEMPERATURE: 97.8 F | HEIGHT: 69 IN | SYSTOLIC BLOOD PRESSURE: 136 MMHG | DIASTOLIC BLOOD PRESSURE: 79 MMHG | HEART RATE: 65 BPM | RESPIRATION RATE: 18 BRPM

## 2023-08-17 PROCEDURE — 250N000011 HC RX IP 250 OP 636: Mod: JZ | Performed by: ORTHOPAEDIC SURGERY

## 2023-08-17 PROCEDURE — 99204 OFFICE O/P NEW MOD 45 MIN: CPT | Performed by: HOSPITALIST

## 2023-08-17 PROCEDURE — 97116 GAIT TRAINING THERAPY: CPT | Mod: GP

## 2023-08-17 PROCEDURE — 97110 THERAPEUTIC EXERCISES: CPT | Mod: GP

## 2023-08-17 PROCEDURE — 97161 PT EVAL LOW COMPLEX 20 MIN: CPT | Mod: GP

## 2023-08-17 PROCEDURE — 999N000111 HC STATISTIC OT IP EVAL DEFER

## 2023-08-17 PROCEDURE — 250N000013 HC RX MED GY IP 250 OP 250 PS 637: Performed by: ORTHOPAEDIC SURGERY

## 2023-08-17 PROCEDURE — 250N000013 HC RX MED GY IP 250 OP 250 PS 637: Performed by: HOSPITALIST

## 2023-08-17 RX ORDER — LISINOPRIL 20 MG/1
20 TABLET ORAL DAILY
Status: DISCONTINUED | OUTPATIENT
Start: 2023-08-17 | End: 2023-08-17 | Stop reason: HOSPADM

## 2023-08-17 RX ORDER — METRONIDAZOLE 7.5 MG/G
GEL TOPICAL 2 TIMES DAILY
Status: DISCONTINUED | OUTPATIENT
Start: 2023-08-17 | End: 2023-08-17 | Stop reason: HOSPADM

## 2023-08-17 RX ORDER — ACETAMINOPHEN 325 MG/1
975 TABLET ORAL EVERY 8 HOURS
Qty: 100 TABLET | Refills: 0 | Status: ON HOLD | OUTPATIENT
Start: 2023-08-17 | End: 2023-11-03

## 2023-08-17 RX ORDER — HYDROCHLOROTHIAZIDE 25 MG/1
25 TABLET ORAL DAILY
Status: DISCONTINUED | OUTPATIENT
Start: 2023-08-17 | End: 2023-08-17 | Stop reason: HOSPADM

## 2023-08-17 RX ADMIN — ASPIRIN 81 MG: 81 TABLET, COATED ORAL at 08:22

## 2023-08-17 RX ADMIN — POLYETHYLENE GLYCOL 3350 17 G: 17 POWDER, FOR SOLUTION ORAL at 08:22

## 2023-08-17 RX ADMIN — ACETAMINOPHEN 975 MG: 325 TABLET ORAL at 00:05

## 2023-08-17 RX ADMIN — HYDROCHLOROTHIAZIDE 25 MG: 25 TABLET ORAL at 10:19

## 2023-08-17 RX ADMIN — ACETAMINOPHEN 975 MG: 325 TABLET ORAL at 08:22

## 2023-08-17 RX ADMIN — LISINOPRIL 20 MG: 20 TABLET ORAL at 10:19

## 2023-08-17 RX ADMIN — CEFAZOLIN SODIUM 2 G: 2 INJECTION, SOLUTION INTRAVENOUS at 00:05

## 2023-08-17 RX ADMIN — OXYCODONE HYDROCHLORIDE 10 MG: 5 TABLET ORAL at 10:15

## 2023-08-17 RX ADMIN — AMLODIPINE BESYLATE 5 MG: 5 TABLET ORAL at 08:22

## 2023-08-17 RX ADMIN — OXYCODONE HYDROCHLORIDE 10 MG: 5 TABLET ORAL at 00:05

## 2023-08-17 RX ADMIN — CARVEDILOL 3.12 MG: 3.12 TABLET, FILM COATED ORAL at 08:22

## 2023-08-17 RX ADMIN — SENNOSIDES AND DOCUSATE SODIUM 1 TABLET: 50; 8.6 TABLET ORAL at 08:23

## 2023-08-17 ASSESSMENT — ACTIVITIES OF DAILY LIVING (ADL)
ADLS_ACUITY_SCORE: 20
ADLS_ACUITY_SCORE: 22
ADLS_ACUITY_SCORE: 20

## 2023-08-17 NOTE — PLAN OF CARE
Goal Outcome Evaluation:    VSS. Pt cleared for discharge. Discharge instructions gone over with pt and family. All questions and concerns addressed.

## 2023-08-17 NOTE — DISCHARGE SUMMARY
"ORTHOPEDIC DISCHARGE SUMMARY       Gonzalez Morton,  1960, MRN [id number]    Admission Date: 2023      Admission Diagnoses: Osteoarthritis of right knee [M17.11]  Knee osteoarthritis [M17.9]     Discharge Date:  23     Post-operative Day:  1 Day Post-Op    Reason for Admission: The patient was admitted for the following: Procedure(s):  RIGHT TOTAL KNEE ARTHROPLASTY    BRIEF HOSPITAL COURSE   Gonzalez Morton is a pleasant 62 year old male who underwent the aforementioned procedure with Dr. Redd  on . There were no intraoperative complications and the patient was transferred to the recovery room and later the orthopedic unit in stable condition. Once the patient reached the orthopedic floor our orthopedic pain protocol was implemented along with the following:    Anticoagulation Medications: ASA  Therapy: PT and OT  Activity: WBAT  Bracing: None    Consultations during Admission: Hospitalist service for medical management     COMPLICATIONS/SIGNIFICANT FINDINGS    NONE    DISCHARGE INFORMATION   Condition at discharge: Good  Discharge destination: Home  Patient was seen by myself on the date of discharge.    FOLLOW UP CARE   Follow up with orthopedics in 2 weeks or sooner should the need arise. Ortho will continue to manage pain control, post op anticoagulation and incision care.     Follow up with your PCP for management of chronic medical problems and to evaluate for post op medical complications including constipation, nausea/vomiting, DVT/PE, anemia, changes in blood pressure, fevers/chills, urinary retention and atelectasis/pneumonia.     Subjective   Patient is doing well on POD #1. Pain is well controlled with oral medications. Ambulating. Tolerating oral intake.     Physical Exam   /79   Pulse 65   Temp 97.8  F (36.6  C) (Oral)   Resp 18   Ht 1.753 m (5' 9\")   Wt 113.4 kg (250 lb)   SpO2 94%   BMI 36.92 kg/m    The patient is A&Ox3. Appears comfortable, sitting up " at bedside.  Sensation is intact to light touch & equal bilaterally in the L2 through S1 dermatomes.  Calves are soft and non-tender.  Dorsiflexion and plantar flexion is intact bilaterally.  Appropriate flexion and extension of the toes bilaterally.   Brisk capillary refill in the toes bilaterally.   Palpable right dorsalis pedis pulse.  right knee dressing C/D/I.     Pertinent Results at Discharge     Hemoglobin   Date/Time Value Ref Range Status   08/01/2023 08:29 AM 16.5 13.3 - 17.7 g/dL Final   06/22/2022 01:14 PM 12.2 (L) 13.3 - 17.7 g/dL Final   06/09/2022 04:01 AM 8.5 (L) 13.3 - 17.7 g/dL Final     INR   Date/Time Value Ref Range Status   06/06/2022 01:33 PM 1.28 (H) 0.85 - 1.15 Final   06/06/2022 10:25 AM 1.39 (H) 0.85 - 1.15 Final   05/31/2022 08:23 AM 1.05 0.85 - 1.15 Final     Platelet Count   Date/Time Value Ref Range Status   08/01/2023 08:29  150 - 450 10e3/uL Final   06/10/2022 06:03  150 - 450 10e3/uL Final   06/09/2022 04:01  (L) 150 - 450 10e3/uL Final       Problem List   Principal Problem:    Knee osteoarthritis      Rossi Fox PA-C/Dr. Redd  [facility]  [phone]  Date: 8/17/2023  Time: 10:39 AM

## 2023-08-17 NOTE — PROGRESS NOTES
08/17/23 0845   Appointment Info   Signing Clinician's Name / Credentials (PT) Yohana Pulliam, PT, DPT   Quick Adds   Quick Adds Certification   Living Environment   People in Home spouse   Current Living Arrangements house   Home Accessibility stairs to enter home;stairs within home   Number of Stairs, Main Entrance 3   Stair Railings, Main Entrance railings on both sides of stairs   Number of Stairs, Within Home, Primary greater than 10 stairs   Stair Railings, Within Home, Primary railing on right side (ascending)   Living Environment Comments Able to live on main level   Self-Care   Usual Activity Tolerance good   Current Activity Tolerance good   Equipment Currently Used at Home walker, rolling;cane, straight  (Not using prior to surgery)   Fall history within last six months no   General Information   Onset of Illness/Injury or Date of Surgery 08/16/23   Referring Physician Dr. Bradley Redd   Patient/Family Therapy Goals Statement (PT) Walk without pain   Pertinent History of Current Problem (include personal factors and/or comorbidities that impact the POC) S/P R TKA, H/O L TKA S/P 2 years, bypass June 2022   Existing Precautions/Restrictions no known precautions/restrictions   Weight-Bearing Status - LLE full weight-bearing   Weight-Bearing Status - RLE weight-bearing as tolerated   Range of Motion (ROM)   ROM Comment Decreased ROM S/P R TKA   Strength (Manual Muscle Testing)   Strength Comments Decreased strength S/P R TKA   Bed Mobility   Comment, (Bed Mobility) Pt in chair   Transfers   Transfers sit-stand transfer   Sit-Stand Transfer   Sit-Stand [place] (Transfers) supervision;verbal cues   Assistive Device (Sit-Stand Transfers) walker, front-wheeled   Gait/Stairs (Locomotion)   [place] Level (Gait) contact guard;verbal cues   Assistive Device (Gait) walker, front-wheeled   Distance in Feet 10   Distance in Feet (Gait) 250   Pattern (Gait) step-through   Deviations/Abnormal Patterns  (Gait) antalgic;gait speed decreased;stride length decreased;salinas decreased   Clinical Impression   Criteria for Skilled Therapeutic Intervention Yes, treatment indicated   PT Diagnosis (PT) Impaired functional mobility   Influenced by the following impairments Weakness, pain   Functional limitations due to impairments Gait, transfers, stairs   Clinical Presentation (PT Evaluation Complexity) Stable/Uncomplicated   Clinical Presentation Rationale Pt presents as medically diagnosed   Clinical Decision Making (Complexity) low complexity   Planned Therapy Interventions (PT) home exercise program;gait training;patient/family education;ROM (range of motion);stair training;strengthening;transfer training;balance training   Anticipated Equipment Needs at Discharge (PT) walker, rolling   Risk & Benefits of therapy have been explained care plan/treatment goals reviewed;patient   PT Total Evaluation Time   PT Eval, Low Complexity Minutes (39365) 10   Plan of Care Review   Plan of Care Reviewed With patient   Therapy Certification   Start of care date 08/17/23   Certification date from 08/17/23   Certification date to 09/17/23   Medical Diagnosis TKA   Physical Therapy Goals   PT Frequency One time eval and treatment only   PT Predicted Duration/Target Date for Goal Attainment 08/17/23   PT Goals Transfers;Gait;Stairs;PT Goal 1   PT: Transfers Modified independent;Sit to/from stand;Assistive device;Goal Met   PT: Gait Modified independent;Rolling walker;150 feet;Goal Met   PT: Stairs Supervision/stand-by assist;3 stairs;Rail on both sides;Goal Met   PT: Goal 1 Independent with written HEP for LE strengthening and ROM, goal met   Interventions   Interventions Quick Adds Gait Training;Therapeutic Activity;Therapeutic Procedure   Therapeutic Procedure/Exercise   Ther. Procedure: strength, endurance, ROM, flexibillity Minutes (83668) 15   Symptoms Noted During/After Treatment none;increased pain   Treatment Detail/Skilled  Intervention TK HEP x10 reps with verbal cueing. independent following education   Therapeutic Activity   Treatment Detail/Skilled Intervention sit to/from stand with cueing for UE placement, Mod I following education   Gait Training   Gait Training Minutes (34835) 10   Symptoms Noted During/After Treatment (Gait Training) none;increased pain   Treatment Detail/Skilled Intervention Ambulated with FWW and CGA, verbal cueing for decresing step length and safety. Education on caution with over-dosing walking distance. STAIRS: ascended and descended 3 steps with non-reciprocal pattern, SBA, and bilat railings for UE support. Verbal cueing and eduction on ascending with the L LE, and decending with the R LE.   [place] Level (Gait Training) independent   Physical Assistance Level (Gait Training) supervision;verbal cues   Weight Bearing (Gait Training) weight-bearing as tolerated   Assistive Device (Gait Training) rolling walker   Pattern Analysis (Gait Training) swing-through gait   Gait Analysis Deviations decreased [place];decreased step length   Impairments (Gait Analysis/Training) pain;strength decreased   PT Discharge Planning   PT Plan D/C PT   PT Discharge Recommendation (DC Rec)   (defer to ortho)   PT Brief overview of current status Pt ambulated 250 feet with FWW and CGA. Pt was able to ascend and descend 3 steps with step to pattern and CGA.   Total Session Time   Timed Code Treatment Minutes 25   Total Session Time (sum of timed and untimed services) 35     [facility]  OUTPATIENT PHYSICAL THERAPY EVALUATION  PLAN OF TREATMENT FOR OUTPATIENT REHABILITATION  (COMPLETE FOR INITIAL CLAIMS ONLY)  Patient's Last Name, First Name, M.I.  YOB: 1960  Gonzalez Morton                        Provider's Name  [facility] Medical Record No.  [id number]                             Onset Date:  08/16/23   Start of Care Date:  08/17/23    Type:     _X_PT   ___OT   ___SLP Medical Diagnosis:  TKA              PT Diagnosis:  Impaired functional mobility Visits from SOC:  1     See note for plan of treatment, functional goals and certification details    I CERTIFY THE NEED FOR THESE SERVICES FURNISHED UNDER        THIS PLAN OF TREATMENT AND WHILE UNDER MY CARE     (Physician co-signature of this document indicates review and certification of the therapy plan).

## 2023-08-17 NOTE — PROGRESS NOTES
Patient vital signs are at baseline: Yes  Patient able to ambulate as they were prior to admission or with assist devices provided by therapies during their stay:  Yes  Patient MUST void prior to discharge:  Yes  Patient able to tolerate oral intake:  Yes  Pain has adequate pain control using Oral analgesics:  Yes  Does patient have an identified :  Yes  Has goal D/C date and time been discussed with patient:  Yes    Pt A&Ox4. VSS on RA. CMS intact. Dressing c/d/i. Voiding adequately. Up with sba with walker and gait belt when ambulating. Pain managed with PRN oxycodone 10 mg and scheduled tylenol. LR infusing at 75 mL/hr, will SL this AM. Discharge home pending.

## 2023-08-17 NOTE — CONSULTS
"[facility]  Consult Note - Hospitalist Service  Date of Admission:  8/16/2023  Consult Requested by: orthopaedics  Reason for Consult: post operative medical management    Assessment & Plan   Gonzalez Morton is a 62 year old male admitted s/p R TKA.  He is currently clinically stable.  Recommendations as below.    # s/p R TKA  - per orthopaedics    # HTN  - amlodipine, lisinopril, hydrochlorothiazide  - carvedilol    # ERIN  - CPAP    # CAD    # Rosacea  - metronidazole gel         Clinically Significant Risk Factors Present on Admission                # Drug Induced Platelet Defect: home medication list includes an antiplatelet medication   # Hypertension: Noted on problem list      # Obesity: Estimated body mass index is 36.92 kg/m  as calculated from the following:    Height as of this encounter: 1.753 m (5' 9\").    Weight as of this encounter: 113.4 kg (250 lb).        # History of CABG: noted on surgical history       Cuba Moody MD  Hospitalist Service  Securely message with [employer] (more info)  Text page via [facility] Paging/Directory   ______________________________________________________________________    Chief Complaint   S/p R TKA    History is obtained from the patient    History of Present Illness   Gonzalez Morton is a 62 year old male with history of CAD s/p CABG, HTN, and ERIN admitted s/p R TKA.    The patient reports he has been ambulatory.  Reports right knee pain after working with physical therapy.  Denies chest pain/pressure or SOB.  He has been urinating and passing gas.  Has not had a bowel movement.  He reports he wore his CPAP last night.      Past Medical History    Past Medical History:   Diagnosis Date    Abnormal cardiovascular stress test 04/03/2022    CAD (coronary artery disease)     Elevated coronary artery calcium score     Hypertension     Knee injury     right    Knee osteoarthritis 8/16/2023    Obese     Rosacea     Sleep apnea     CPAP "       Past Surgical History   Past Surgical History:   Procedure Laterality Date    APPENDECTOMY      ARTHROSCOPY KNEE Bilateral     BYPASS GRAFT ARTERY CORONARY N/A 6/6/2022    Procedure: CORONARY ARTERY BYPASS GRAFT X 5, ENDOSCOPIC VESSEL PROCUREMENT LEFT LEG, INTERNAL MAMMARY ARTERY HARVEST, LEFT RADIAL ARTERY HARVEST, ANESTHESIA TRANSESOPHAGEAL ECHOCARDIOGRAM, EPIAORTIC ULTRASOUND;  Surgeon: Mayank Rocha MD;  Location: [facility] Main OR    CV CORONARY ANGIOGRAM N/A 4/7/2022    Procedure: Coronary Angiogram;  Surgeon: Mery Lyons MD;  Location: [facility] CATH LAB CV    PROSTATECTOMY      TOTAL KNEE ARTHROPLASTY Left     WRIST SURGERY         Medications   I have reviewed this patient's current medications          Physical Exam   Vital Signs: Temp: 97.8  F (36.6  C) Temp src: Oral BP: (!) 145/72 Pulse: 65   Resp: 18 SpO2: 94 % O2 Device: None (Room air) Oxygen Delivery: 9 LPM  Weight: 250 lbs 0 oz    Gen:  sitting comfortably in chair, nad  Neuro: alert, conversant  CV: nl rate, regular rhythm  Pulm: no acute resp distress, ctab  GI:  abdomen soft, NTTP    Medical Decision Making             Data

## 2023-08-17 NOTE — PROGRESS NOTES
"Orthopedic Progress Note      Assessment: 1 Day Post-Op  S/P Procedure(s):  RIGHT TOTAL KNEE ARTHROPLASTY @    Plan:   - Continue PT/OT.   - Weightbearing status: WBAT.  - Anticoagulation: ASA in addition to SCDs, marie stockings and early ambulation.  - Discharge planning: Discharge to home today.     Subjective:  Pain: Well controlled on Tylenol & oxycodone.  Nausea, Vomiting:  No  Chest pain: No  Lightheadedness, Dizziness:  No  Neuro:  Patient denies new onset numbness or paresthesias in right lower extremity     Patient is doing well on POD #1. Ambulating, tolerating oral intake, voiding & pain is controlled with oral medication. Ready for discharge.      Objective:  /79   Pulse 65   Temp 97.8  F (36.6  C) (Oral)   Resp 18   Ht 1.753 m (5' 9\")   Wt 113.4 kg (250 lb)   SpO2 94%   BMI 36.92 kg/m    The patient is A&Ox3. Appears comfortable, sitting up at bedside.  Sensation is intact to light touch & equal bilaterally in the L2 through S1 dermatomes.  Calves are soft and non-tender.  Dorsiflexion and plantar flexion is intact bilaterally.  Appropriate flexion and extension of the toes bilaterally.   Brisk capillary refill in the toes bilaterally.   Palpable right dorsalis pedis pulse.  right knee dressing C/D/I.      Pertinent Labs   Lab Results: personally reviewed.   Lab Results   Component Value Date    INR 1.28 (H) 06/06/2022    INR 1.39 (H) 06/06/2022    INR 1.05 05/31/2022     Lab Results   Component Value Date    WBC 6.9 08/01/2023    HGB 16.5 08/01/2023    HCT 46.3 08/01/2023    MCV 82 08/01/2023     08/01/2023     Lab Results   Component Value Date     08/01/2023    CO2 28 08/01/2023         Report completed by:  Rossi Fox PA-C/[facility]    Date: 8/17/2023  Time: 10:37 AM    "

## 2023-08-17 NOTE — PLAN OF CARE
Patient vital signs are at baseline: Yes  Patient able to ambulate as they were prior to admission or with assist devices provided by therapies during their stay:  Yes  Patient MUST void prior to discharge:  Yes  Patient able to tolerate oral intake:  Yes  Pain has adequate pain control using Oral analgesics:  Yes  Does patient have an identified :  Yes  Has goal D/C date and time been discussed with patient:  Yes Goal Outcome Evaluation:      Plan of Care Reviewed With: patient    Overall Patient Progress: improvingOverall Patient Progress: improving pt A+0X4, pain managed with oral oxycodone, pt voided and ambulated. Potential for discharge tomorrow.

## 2023-08-17 NOTE — PLAN OF CARE
Occupational Therapy: Orders received. Chart reviewed and discussed with care team.? Occupational Therapy not indicated due to PT eval completed and found pt to have no OT needs.? Defer discharge recommendations to ortho team.? Will complete orders.

## 2023-08-30 ENCOUNTER — TRANSFERRED RECORDS (OUTPATIENT)
Dept: HEALTH INFORMATION MANAGEMENT | Facility: CLINIC | Age: 63
End: 2023-08-30
Payer: COMMERCIAL

## 2023-09-12 ENCOUNTER — TRANSFERRED RECORDS (OUTPATIENT)
Dept: HEALTH INFORMATION MANAGEMENT | Facility: CLINIC | Age: 63
End: 2023-09-12
Payer: COMMERCIAL

## 2023-09-14 ENCOUNTER — HOSPITAL ENCOUNTER (EMERGENCY)
Facility: CLINIC | Age: 63
Discharge: HOME | End: 2023-09-14
Admitting: EMERGENCY MEDICINE
Payer: COMMERCIAL

## 2023-09-14 VITALS
DIASTOLIC BLOOD PRESSURE: 87 MMHG | TEMPERATURE: 97.7 F | BODY MASS INDEX: 36.18 KG/M2 | OXYGEN SATURATION: 98 % | HEART RATE: 72 BPM | RESPIRATION RATE: 18 BRPM | WEIGHT: 245 LBS | SYSTOLIC BLOOD PRESSURE: 169 MMHG

## 2023-09-14 DIAGNOSIS — T14.8XXA WOUND INFECTION: ICD-10-CM

## 2023-09-14 DIAGNOSIS — L08.9 WOUND INFECTION: ICD-10-CM

## 2023-09-14 PROBLEM — D49.9 NEOPLASTIC DISEASE: Status: ACTIVE | Noted: 2019-06-06

## 2023-09-14 PROBLEM — R97.20 HIGH PROSTATE SPECIFIC ANTIGEN (PSA): Status: ACTIVE | Noted: 2019-03-05

## 2023-09-14 PROBLEM — N39.3 STRESS INCONTINENCE: Status: ACTIVE | Noted: 2019-08-29

## 2023-09-14 PROBLEM — C61 MALIGNANT TUMOR OF PROSTATE (H): Status: ACTIVE | Noted: 2019-04-04

## 2023-09-14 PROBLEM — R10.9 ABDOMINAL PAIN: Status: ACTIVE | Noted: 2020-06-18

## 2023-09-14 LAB
BASOPHILS # BLD AUTO: 0.1 10E3/UL (ref 0–0.2)
BASOPHILS NFR BLD AUTO: 1 %
CRP SERPL-MCNC: 101 MG/L
EOSINOPHIL # BLD AUTO: 1.1 10E3/UL (ref 0–0.7)
EOSINOPHIL NFR BLD AUTO: 10 %
ERYTHROCYTE [DISTWIDTH] IN BLOOD BY AUTOMATED COUNT: 12.5 % (ref 10–15)
ERYTHROCYTE [SEDIMENTATION RATE] IN BLOOD BY WESTERGREN METHOD: 42 MM/HR (ref 0–20)
HCT VFR BLD AUTO: 41.7 % (ref 40–53)
HGB BLD-MCNC: 14.2 G/DL (ref 13.3–17.7)
IMM GRANULOCYTES # BLD: 0.2 10E3/UL
IMM GRANULOCYTES NFR BLD: 2 %
LACTATE SERPL-SCNC: 1.7 MMOL/L (ref 0.7–2)
LYMPHOCYTES # BLD AUTO: 1.2 10E3/UL (ref 0.8–5.3)
LYMPHOCYTES NFR BLD AUTO: 12 %
MCH RBC QN AUTO: 29.2 PG (ref 26.5–33)
MCHC RBC AUTO-ENTMCNC: 34.1 G/DL (ref 31.5–36.5)
MCV RBC AUTO: 86 FL (ref 78–100)
MONOCYTES # BLD AUTO: 1 10E3/UL (ref 0–1.3)
MONOCYTES NFR BLD AUTO: 9 %
NEUTROPHILS # BLD AUTO: 7.2 10E3/UL (ref 1.6–8.3)
NEUTROPHILS NFR BLD AUTO: 66 %
NRBC # BLD AUTO: 0 10E3/UL
NRBC BLD AUTO-RTO: 0 /100
PLATELET # BLD AUTO: 243 10E3/UL (ref 150–450)
RBC # BLD AUTO: 4.87 10E6/UL (ref 4.4–5.9)
WBC # BLD AUTO: 10.8 10E3/UL (ref 4–11)

## 2023-09-14 PROCEDURE — 85025 COMPLETE CBC W/AUTO DIFF WBC: CPT

## 2023-09-14 PROCEDURE — 85652 RBC SED RATE AUTOMATED: CPT

## 2023-09-14 PROCEDURE — 99283 EMERGENCY DEPT VISIT LOW MDM: CPT

## 2023-09-14 PROCEDURE — 83605 ASSAY OF LACTIC ACID: CPT

## 2023-09-14 PROCEDURE — 36415 COLL VENOUS BLD VENIPUNCTURE: CPT

## 2023-09-14 PROCEDURE — 86140 C-REACTIVE PROTEIN: CPT

## 2023-09-14 ASSESSMENT — ACTIVITIES OF DAILY LIVING (ADL)
ADLS_ACUITY_SCORE: 35
ADLS_ACUITY_SCORE: 35

## 2023-09-14 ASSESSMENT — ENCOUNTER SYMPTOMS
CHILLS: 1
VOMITING: 0
FEVER: 0
WOUND: 1

## 2023-09-14 NOTE — DISCHARGE INSTRUCTIONS
You were seen and evaluated here in the emergency department for your infected wound.  After speaking with [facility] they do feel that this is likely an encapsulated suture and did not feel imaging was indicated here in the emergency department.  Your white blood cell count is normal and lactate is normal and they are reassured by this as well and as inflammatory markers.  They again did not feel that you need to be seen here further in the emergency department and recommended close follow-up with your surgeon in in clinic as soon as possible.    They recommended you continue to take the Keflex as previously prescribed.  If you develop fevers, severe worsening symptoms, severe worsening pain/inability to move the knee or other concerns return to the emergency department.  Otherwise follow-up with [facility] and call them today to get in the clinic as soon as possible.

## 2023-09-14 NOTE — ED NOTES
EMERGENCY DEPARTMENT SIGN OUT NOTE        ED COURSE AND MEDICAL DECISION MAKING  Patient was signed out to me by Jyoti Hardwick PA-C at 5:00 PM    In brief, Gonzalez Morton is a 63 year old male who initially presented with wound check after total right knee replacement.  Please see Jyoti Hardwick PA-C for further details.    Briefly, patient had right total knee replacement performed August 17 and a few days ago noticed some increased redness, swelling and drainage at the distal aspect of his wound.  He was seen by [facility] 2 days ago and was started on cephalexin it was thought to be an encapsulated suture.  He started the antibiotics last night and today noticed increased drainage and redness from the area and was concerned so he presents to the emergency department.  ESR elevated at 42, CRP elevated at 101 and white blood cell count and lactate are normal.  At time of sign out, disposition was pending call back from [facility].  I did speak with the on-call orthopedist Dr. Hanley who did not recommend imaging here in the emergency department, stated that CRP and ESR are as expected and with negative white blood cell count and lactate he can be sent home to follow-up closely in clinic.  Additionally, recommended continuing taking Keflex.  I discussed this with the patient and his wife and they were in agreement understanding.  They will call some orthopedics tomorrow to get in sooner than next Tuesday as was previously scheduled.  We discussed signs and symptoms to return to the emergency department and all questions were answered best my ability.  He was discharged in stable condition.    FINAL IMPRESSION    1. Wound infection        ED MEDS  Medications - No data to display    LAB  Labs Ordered and Resulted from Time of ED Arrival to Time of ED Departure   ERYTHROCYTE SEDIMENTATION RATE AUTO - Abnormal       Result Value    Erythrocyte Sedimentation Rate 42 (*)    CRP INFLAMMATION -  Abnormal    CRP Inflammation 101.00 (*)    CBC WITH PLATELETS AND DIFFERENTIAL - Abnormal    WBC Count 10.8      RBC Count 4.87      Hemoglobin 14.2      Hematocrit 41.7      MCV 86      MCH 29.2      MCHC 34.1      RDW 12.5      Platelet Count 243      % Neutrophils 66      % Lymphocytes 12      % Monocytes 9      % Eosinophils 10      % Basophils 1      % Immature Granulocytes 2      NRBCs per 100 WBC 0      Absolute Neutrophils 7.2      Absolute Lymphocytes 1.2      Absolute Monocytes 1.0      Absolute Eosinophils 1.1 (*)     Absolute Basophils 0.1      Absolute Immature Granulocytes 0.2      Absolute NRBCs 0.0     LACTIC ACID WHOLE BLOOD - Normal    Lactic Acid 1.7         EKG  None.    RADIOLOGY    No orders to display       DISCHARGE MEDS  Discharge Medication List as of 9/14/2023  5:22 PM          I, Christopher Dooley, am serving as a scribe to document services personally performed by Madiha Tovar PA-C based on my observation and the provider's statements to me. I, Madiha Tovar PA-C attest that Christopher Dooley is acting in a scribe capacity, has observed my performance of the services and has documented them in accordance with my direction.    Madiha Tovar PA-C  [facility] EMERGENCY ROOM  [address] [address] [phone]       Madiha Tovar PA-C  09/14/23 5054

## 2023-09-14 NOTE — ED PROVIDER NOTES
EMERGENCY DEPARTMENT ENCOUNTER      NAME: Gonzalez Morton  AGE: 63 year old male  YOB: 1960  MRN: [id number]  EVALUATION DATE & TIME: No admission date for patient encounter.    PCP: Dylon Shafer    ED PROVIDER: Jyoti Hardwick PA-C      Chief Complaint   Patient presents with    Wound Check     FINAL IMPRESSION:  No diagnosis found.      ED COURSE & MEDICAL DECISION MAKING:    Pertinent Labs & Imaging studies reviewed. (See chart for details)  63 year old male presents to the Emergency Department for evaluation of wound infection.  On 8/16 patient had a right total knee replacement.  Patient was recently followed up with [facility] yesterday when he started to notice the distal portion of his wound having pus discharge.  Patient was started on Keflex.  Patient presents to the ED today due to increased redness, warmth and increased pus discharge from the wound.  He denies fevers, chills, chest pain, shortness of breath.  Vital signs reviewed patient is hypertensive which is baseline for the patient.  Afebrile.  On exam patient has a linear well-healing incision.  The distal portion of the incision has significant amount of pus drainage.  Surrounding incision has erythema, warmth.  No ecchymosis or edema noted.  No tenderness to palpation of the right lower extremity.  Pulses are 2+ bilaterally.  Normal range of motion, sensation and strength of the right lower extremity.    CBC is unremarkable.  Lactic acid is not elevated.  CRP is elevated at 101.  ESR is elevated at 42.  [facility] PA saw the patient in the ED.. [facility] requested the patient continue to take Keflex as prescribed.  [facility] does not want any imaging at this time.  Patient was educated on his labs and treatment plan.  Orthopedics has been paged to updated them on the lab results. Patient be signed out to my oncoming PA pending disposition of the patient.      ED COURSE:   2:14 PM  I saw  the patient.   2:59 PM spoke with [facility] who requested CBC, basic, ESR, CRP.  Patient will continue to take Keflex as prescribed by them yesterday.  Patient will follow-up with orthopedics on Tuesday as planned.  [facility] did not want any imaging at this time.  5:00 PM to be signed out to my oncoming PA pending disposition.       At the conclusion of the encounter I discussed the results of all of the tests and the disposition. The questions were answered. The patient or family acknowledged understanding and was agreeable with the care plan.     0 minutes of critical care time       Additional Documentation    History:  Supplemental history from: Family Member/Significant Other  External Record(s) reviewed: Inpatient Record: [facility]    Work Up:  Chart documentation includes differential considered and any EKGs or imaging interpreted by provider.  In additional to work up documented, I considered the following work up: Documented in chart, if applicable.    External consultation:  Discussion of management with another provider: Documented in chart, if applicable and Orthopedics    Complicating factors:  Care impacted by chronic illness: Cancer/Chemotherapy, Heart Disease, Hyperlipidemia, and Hypertension  Care affected by social determinants of health: N/A    Disposition considerations: Discharge. No recommendations on prescription strength medication(s). See documentation for any additional details.      MEDICATIONS GIVEN IN THE EMERGENCY:  Medications - No data to display    NEW PRESCRIPTIONS STARTED AT TODAY'S ER VISIT  New Prescriptions    No medications on file          =================================================================    HPI    Patient information was obtained from: patient     Use of : N/A       Renepaulshana RONALD Morton is a 63 year old male with a pertinent history of total right knee replacement, hypertension, hyperlipidemia, prostate  "cancer, and CAD who presents to this ED via walk-in for evaluation of wound check.    The patient presents with redness and warmth over his right knee. He had a total right knee replacement with Dr. Redd at [facility] on 8/17/2023. Four days ago on 9/11/2023 his nata shorts caught one of the scabs that had formed over the incision on his knee. It immediately drained some light pink fluid and states it was similar to popping a pimple. He called [facility] and was seen the morning after (9/12) for evaluation. He was told that one of the dissolvable sutures had dissolved and became encapsulated. He was prescribed Keflex and has taken 3 doses so far. He endorses one episode of chills \"because I hadn't eaten\" that resolved after a meal. He denies fever, vomiting, or any other complaints at this time.       Chart Review:  8/17/2023: The patient was admitted for a total right knee arthroplasty with Dr. Redd last month. There were no complications and he left the orthopedic unit at [facility] in a stable condition the next day.       REVIEW OF SYSTEMS   Review of Systems   Constitutional:  Positive for chills. Negative for fever.   Gastrointestinal:  Negative for vomiting.   Skin:  Positive for wound (redness and warmth over right knee).   All other systems reviewed and are negative.       PAST MEDICAL HISTORY:  Past Medical History:   Diagnosis Date    Abnormal cardiovascular stress test 04/03/2022    CAD (coronary artery disease)     Elevated coronary artery calcium score     Hypertension     Knee injury     right    Knee osteoarthritis 8/16/2023    Obese     Rosacea     Sleep apnea     CPAP       PAST SURGICAL HISTORY:  Past Surgical History:   Procedure Laterality Date    APPENDECTOMY      ARTHROPLASTY KNEE Right 8/16/2023    Procedure: RIGHT TOTAL KNEE ARTHROPLASTY;  Surgeon: Bradley Redd MD;  Location: [facility] Main OR    ARTHROSCOPY KNEE Bilateral     BYPASS GRAFT ARTERY CORONARY N/A " 6/6/2022    Procedure: CORONARY ARTERY BYPASS GRAFT X 5, ENDOSCOPIC VESSEL PROCUREMENT LEFT LEG, INTERNAL MAMMARY ARTERY HARVEST, LEFT RADIAL ARTERY HARVEST, ANESTHESIA TRANSESOPHAGEAL ECHOCARDIOGRAM, EPIAORTIC ULTRASOUND;  Surgeon: Mayank Rocha MD;  Location: [facility] Main OR    CV CORONARY ANGIOGRAM N/A 4/7/2022    Procedure: Coronary Angiogram;  Surgeon: Mery Lyons MD;  Location: [facility] CATH LAB CV    PROSTATECTOMY      TOTAL KNEE ARTHROPLASTY Left     WRIST SURGERY             CURRENT MEDICATIONS:    acetaminophen (TYLENOL) 325 MG tablet  amLODIPine (NORVASC) 5 MG tablet  aspirin 81 MG EC tablet  atorvastatin (LIPITOR) 80 MG tablet  carvedilol (COREG) 3.125 MG tablet  hydrochlorothiazide (HYDRODIURIL) 25 MG tablet  lisinopril (ZESTRIL) 20 MG tablet  metroNIDAZOLE (METROGEL) 0.75 % external gel  nitroGLYcerin (NITROSTAT) 0.4 MG sublingual tablet  oxyCODONE (ROXICODONE) 5 MG tablet  pramipexole (MIRAPEX) 0.25 MG tablet  senna-docusate (SENOKOT-S/PERICOLACE) 8.6-50 MG tablet        ALLERGIES:  Allergies   Allergen Reactions    Metoprolol Tartrate Hives    Penicillins Hives     Tolerated CTX 06/2020, Ancef 6/2022       FAMILY HISTORY:  Family History   Problem Relation Age of Onset    Diabetes Mother     Heart Disease Mother     Heart Disease Father     Atrophic kidney Sister     Diabetes Sister     Diabetes Type 1 Brother        SOCIAL HISTORY:   Social History     Socioeconomic History    Marital status:    Tobacco Use    Smoking status: Never    Smokeless tobacco: Never   Vaping Use    Vaping Use: Never used   Substance and Sexual Activity    Alcohol use: Yes     Alcohol/week: 1.0 standard drink of alcohol     Types: 1 Standard drinks or equivalent per week    Drug use: Never       VITALS:  BP (!) 169/87   Pulse 72   Temp 97.7  F (36.5  C) (Temporal)   Resp 18   Wt 111.1 kg (245 lb)   SpO2 98%   BMI 36.18 kg/m      PHYSICAL EXAM    Physical Exam  Vitals and nursing note reviewed.    Constitutional:       Appearance: Normal appearance.   HENT:      Head: Atraumatic.      Right Ear: External ear normal.      Left Ear: External ear normal.      Nose: Nose normal.      Mouth/Throat:      Mouth: Mucous membranes are moist.   Eyes:      Conjunctiva/sclera: Conjunctivae normal.      Pupils: Pupils are equal, round, and reactive to light.   Cardiovascular:      Rate and Rhythm: Normal rate and regular rhythm.      Pulses: Normal pulses.      Heart sounds: Normal heart sounds. No murmur heard.     No friction rub. No gallop.   Pulmonary:      Effort: Pulmonary effort is normal.      Breath sounds: Normal breath sounds. No wheezing or rales.   Abdominal:      Tenderness: There is no abdominal tenderness. There is no guarding or rebound.   Musculoskeletal:      Cervical back: Normal range of motion.   Skin:     General: Skin is dry.      Comments: Linear well-healing incision on the right anterior knee.  The distal portion of the incision has an area with pus discharge.  Surrounding erythema, warmth.  No ecchymosis.  No tenderness to palpation of the right knee.  Normal range of motion, sensation and strength in the right lower extremity.  Pulses are 2+.   Neurological:      Mental Status: He is alert. Mental status is at baseline.   Psychiatric:         Mood and Affect: Mood normal.         Thought Content: Thought content normal.          LAB:  All pertinent labs reviewed and interpreted.  Labs Ordered and Resulted from Time of ED Arrival to Time of ED Departure   ERYTHROCYTE SEDIMENTATION RATE AUTO - Abnormal       Result Value    Erythrocyte Sedimentation Rate 42 (*)    CRP INFLAMMATION - Abnormal    CRP Inflammation 101.00 (*)    CBC WITH PLATELETS AND DIFFERENTIAL - Abnormal    WBC Count 10.8      RBC Count 4.87      Hemoglobin 14.2      Hematocrit 41.7      MCV 86      MCH 29.2      MCHC 34.1      RDW 12.5      Platelet Count 243      % Neutrophils 66      % Lymphocytes 12      % Monocytes 9       % Eosinophils 10      % Basophils 1      % Immature Granulocytes 2      NRBCs per 100 WBC 0      Absolute Neutrophils 7.2      Absolute Lymphocytes 1.2      Absolute Monocytes 1.0      Absolute Eosinophils 1.1 (*)     Absolute Basophils 0.1      Absolute Immature Granulocytes 0.2      Absolute NRBCs 0.0     LACTIC ACID WHOLE BLOOD - Normal    Lactic Acid 1.7          RADIOLOGY:  Reviewed all pertinent imaging. Please see official radiology report.  No orders to display     I, French Landry, am serving as a scribe to document services personally performed by Jyoti Hardwick PA-C, based on my observation and the provider's statements to me. I, Jyoti Hardwick PA-C, attest that French Landry is acting in a scribe capacity, has observed my performance of the services and has documented them in accordance with my direction.    Jyoti Hardwick PA-C  [facility] EMERGENCY ROOM  [address] [id number] [phone]       Jyoti Hardwick PA-C  09/14/23 [id number]

## 2023-09-14 NOTE — CONSULTS
[facility] Consultation    Subjective:    Patient presents with his wife today for concern of purulent drainage from distal aspect of his right total knee arthroplasty surgical incision. Surgery was 1 month ago by Dr. Redd at [facility]. He was seen by Dr. Redd's physician assistant 2 days ago at our office on 9/12/23, diagnosed with suture abscess, area was steriley cleaned with Betadine, and he was placed on 1 week of Keflex. Today, he states he had chills yesterday that were able to be resolved with tylenol 1000 mg. He is mostly concerned about the purulent drainage. He states that this has slightly improved since yesterday. Also reports slightly decreased ROM. No significant increase in pain. He denies any fevers at home, nausea, vomiting, SOB, chest pain. He overall generally feels well.     Physical exam:    Right knee:  Overlying skin intact with well approximated surgical incision to anterior knee. Very small area of purulent drainage from suture abscess site at inferior aspect of incision. There is slight erythema to medial aspect of knee. No ecchymosis. 1+ knee effusion.     Palpation: completely nontender to palpation    ROM: 0-100 active. Able to fire quads and straight leg raise.    Strength: 5/5 quads, hamstrings, gastroc/soleus, EHL. No pain with DF/PF.    Compartments are soft and nontender. Negative Connie's    DP pulse 2+.       Labs:     Wbc 10.8  ESR  42 (high)  Lactic acid 1.7  CRP pending    Assessment: 63-year-old male who is 1 month status post right total knee arthroplasty with suture abscess    Plan:      -obtain labs to include CBC, ESR, CRP, lactic acid  -aside from some high blood pressure his vitals are WNL, does not meet criteria for sepsis. He is not ill appearing.  -no imaging or aspiration at this time. No surgical intervention at this time.   -Continue Keflex as instructed by surgical team.  -follow up scheduled at [facility] on 9/19/23 with surgical team  -pending labs,  may reach out to call service for further discharge disposition or recommendations    -Please reach out with any further questions/concerns.     Raegan Bridges PA-C  [facility]

## 2023-09-14 NOTE — ED TRIAGE NOTES
"  Pt had right knee replacement 1 month ago and 12 week ago a stitch got ripped out, and saw PA Tuesday and \"said there was a stitch encapsulated\" pt started keflex last night. Pt states he is only here because his wife wanted him to come. Pt denies fevers, denies chills.       "

## 2023-09-18 RX ORDER — ASPIRIN 81 MG
TABLET,CHEWABLE ORAL
Qty: 60 TABLET | Refills: 10 | OUTPATIENT
Start: 2023-09-18

## 2023-09-19 ENCOUNTER — TRANSFERRED RECORDS (OUTPATIENT)
Dept: HEALTH INFORMATION MANAGEMENT | Facility: CLINIC | Age: 63
End: 2023-09-19
Payer: COMMERCIAL

## 2023-09-26 ENCOUNTER — TRANSFERRED RECORDS (OUTPATIENT)
Dept: HEALTH INFORMATION MANAGEMENT | Facility: CLINIC | Age: 63
End: 2023-09-26
Payer: COMMERCIAL

## 2023-10-12 ENCOUNTER — LAB (OUTPATIENT)
Dept: CARDIOLOGY | Facility: CLINIC | Age: 63
End: 2023-10-12
Payer: COMMERCIAL

## 2023-10-12 DIAGNOSIS — E78.2 MIXED HYPERLIPIDEMIA: ICD-10-CM

## 2023-10-12 DIAGNOSIS — R93.1 ELEVATED CORONARY ARTERY CALCIUM SCORE: ICD-10-CM

## 2023-10-12 LAB
CHOLEST SERPL-MCNC: 125 MG/DL
HDLC SERPL-MCNC: 35 MG/DL
LDLC SERPL CALC-MCNC: 65 MG/DL
NONHDLC SERPL-MCNC: 90 MG/DL
TRIGL SERPL-MCNC: 125 MG/DL

## 2023-10-12 PROCEDURE — 80061 LIPID PANEL: CPT

## 2023-10-12 PROCEDURE — 36415 COLL VENOUS BLD VENIPUNCTURE: CPT

## 2023-10-13 ENCOUNTER — OFFICE VISIT (OUTPATIENT)
Dept: INTERNAL MEDICINE | Facility: CLINIC | Age: 63
End: 2023-10-13
Payer: COMMERCIAL

## 2023-10-13 VITALS
RESPIRATION RATE: 16 BRPM | HEART RATE: 66 BPM | HEIGHT: 69 IN | DIASTOLIC BLOOD PRESSURE: 71 MMHG | BODY MASS INDEX: 38.63 KG/M2 | SYSTOLIC BLOOD PRESSURE: 115 MMHG | WEIGHT: 260.8 LBS | OXYGEN SATURATION: 99 %

## 2023-10-13 DIAGNOSIS — H61.22 IMPACTED CERUMEN OF LEFT EAR: ICD-10-CM

## 2023-10-13 DIAGNOSIS — H93.8X2 EAR PRESSURE, LEFT: Primary | ICD-10-CM

## 2023-10-13 PROCEDURE — 99203 OFFICE O/P NEW LOW 30 MIN: CPT | Performed by: INTERNAL MEDICINE

## 2023-10-13 NOTE — PROGRESS NOTES
Answers submitted by the patient for this visit:  General Questionnaire (Submitted on 10/10/2023)  Chief Complaint: Chronic problems general questions HPI Form  How many servings of fruits and vegetables do you eat daily?: 2-3  On average, how many sweetened beverages do you drink each day (Examples: soda, juice, sweet tea, etc.  Do NOT count diet or artificially sweetened beverages)?: 0  How many minutes a day do you exercise enough to make your heart beat faster?: 9 or less  How many days a week do you exercise enough to make your heart beat faster?: 3 or less  How many days per week do you miss taking your medication?: 0  General Concern (Submitted on 10/10/2023)  Chief Complaint: Chronic problems general questions HPI Form  What is the reason for your visit today?: Left ear plugged  When did your symptoms begin?: 1-3 days ago  What are your symptoms?: Cant hear  How would you describe these symptoms?: Severe  Are your symptoms:: Staying the same  Have you had these symptoms before?: No  Is there anything that makes you feel worse?: No  Is there anything that makes you feel better?: No    Assessment & Plan     Ear pressure, left    - Adult ENT  Referral; Future    Impacted cerumen of left ear    Decreased hearing and the left ear pressure for a week, no pain and drainage. He denies fever, chills, nasal congestion, cough. On the exam, has a deep hard wax completely covering the left TM. Right ear normal.   My CA did the ear wash today and the wax was completely removed. He tolerated procedure well.                 Kita Thomas MD  [facility]   Steven is a 63 year old, presenting for the following health issues:  Impacted Ear (Impacted L Ear)      10/13/2023     9:26 AM   Additional Questions   Roomed by Leticia       History of Present Illness       Reason for visit:  Left ear plugged  Symptom onset:  1-3 days ago  Symptoms include:  Cant hear  Symptom  "intensity:  Severe  Symptom progression:  Staying the same  Had these symptoms before:  No  What makes it worse:  No  What makes it better:  No    He eats 2-3 servings of fruits and vegetables daily.He consumes 0 sweetened beverage(s) daily.He exercises with enough effort to increase his heart rate 9 or less minutes per day.  He exercises with enough effort to increase his heart rate 3 or less days per week.   He is taking medications regularly.                 Review of Systems         Objective    /71   Pulse 66   Resp 16   Ht 1.753 m (5' 9\")   Wt 118.3 kg (260 lb 12.8 oz)   SpO2 99%   BMI 38.51 kg/m    Body mass index is 38.51 kg/m .  Physical Exam                         "

## 2023-10-16 NOTE — TELEPHONE ENCOUNTER
FUTURE VISIT INFORMATION      FUTURE VISIT INFORMATION:  Date: 10/24/23  Time: 4 PM  [place] [facility] [place]  REFERRAL INFORMATION:  Referring provider:  Kita Thomas MD   Referring providers clinic: [facility]    Reason for visit/diagnosis  Ear pressure, left. Ref by Kita Thomas MD. CSC verified. Records in Epic     RECORDS REQUESTED FROM:       Clinic name Comments Records Status Imaging Status   [facility] 10/13/23 note- Kita Thomas MD  Epic

## 2023-10-23 DIAGNOSIS — Z01.10 ENCOUNTER FOR HEARING TEST: Primary | ICD-10-CM

## 2023-10-24 ENCOUNTER — PRE VISIT (OUTPATIENT)
Dept: OTOLARYNGOLOGY | Facility: CLINIC | Age: 63
End: 2023-10-24

## 2023-10-24 ENCOUNTER — OFFICE VISIT (OUTPATIENT)
Dept: OTOLARYNGOLOGY | Facility: CLINIC | Age: 63
End: 2023-10-24
Attending: INTERNAL MEDICINE
Payer: COMMERCIAL

## 2023-10-24 ENCOUNTER — OFFICE VISIT (OUTPATIENT)
Dept: AUDIOLOGY | Facility: CLINIC | Age: 63
End: 2023-10-24
Attending: INTERNAL MEDICINE
Payer: COMMERCIAL

## 2023-10-24 VITALS
DIASTOLIC BLOOD PRESSURE: 94 MMHG | BODY MASS INDEX: 38.74 KG/M2 | HEART RATE: 64 BPM | WEIGHT: 262.31 LBS | SYSTOLIC BLOOD PRESSURE: 148 MMHG

## 2023-10-24 DIAGNOSIS — H91.20 SUDDEN-ONSET SENSORINEURAL HEARING LOSS: ICD-10-CM

## 2023-10-24 DIAGNOSIS — H90.3 ASYMMETRICAL SENSORINEURAL HEARING LOSS: Primary | ICD-10-CM

## 2023-10-24 DIAGNOSIS — H93.8X2 EAR PRESSURE, LEFT: ICD-10-CM

## 2023-10-24 DIAGNOSIS — H93.12 TINNITUS, LEFT: ICD-10-CM

## 2023-10-24 DIAGNOSIS — H90.3 SENSORINEURAL HEARING LOSS, BILATERAL: Primary | ICD-10-CM

## 2023-10-24 PROCEDURE — 92557 COMPREHENSIVE HEARING TEST: CPT | Performed by: AUDIOLOGIST

## 2023-10-24 PROCEDURE — 92565 STENGER TEST PURE TONE: CPT | Performed by: AUDIOLOGIST

## 2023-10-24 PROCEDURE — 92550 TYMPANOMETRY & REFLEX THRESH: CPT | Performed by: AUDIOLOGIST

## 2023-10-24 PROCEDURE — 99203 OFFICE O/P NEW LOW 30 MIN: CPT | Mod: 25 | Performed by: OTOLARYNGOLOGY

## 2023-10-24 PROCEDURE — 69801 INCISE INNER EAR: CPT | Mod: LT | Performed by: OTOLARYNGOLOGY

## 2023-10-24 ASSESSMENT — PAIN SCALES - GENERAL: PAINLEVEL: NO PAIN (0)

## 2023-10-24 NOTE — PROGRESS NOTES
Chief Complaint   Patient presents with    Ear Pressure     Blood pressure (!) 148/94, pulse 64, weight 119 kg (262 lb 5 oz).  Lucila Rosado LPN

## 2023-10-24 NOTE — PATIENT INSTRUCTIONS
1. You were seen in the ENT Clinic today by Dr. Nissen.  If you have any questions or concerns after your appointment, please call   - Option 1: ENT Clinic: [phone]   - Option 2: Lucila (Dr.Nissen's Nurse): [phone]       Linh (Dr. Nissen's Nurse): [phone]    2.   Plan to return to clinic in 2 weeks with hearing test same day or day before    How to Contact Us:  Send a [employer] message to your provider. Our team will respond to you via [employer]. Occasionally, we will need to call you to get further information.  For urgent matters (Monday-Friday), call the ENT Clinic: [phone] and speak with a call center team member - they will route your call appropriately.   If you'd like to speak directly with a nurse, please find our contact information below. We do our best to check voicemail frequently throughout the day, and will work to call you back within 1-2 days. For urgent matters, please use the general clinic phone numbers listed above.      Lucila VILLAGOMEZ LPN  [facility] - Otolaryngology

## 2023-10-24 NOTE — PROGRESS NOTES
Dear Dylon Wing:    I had the pleasure of meeting Gonzalez Morton in consultation today at the [facility] at your request.    CHIEF COMPLAINT: Left sudden sensorineural hearing loss    HISTORY OF PRESENT ILLNESS: Patient is a 63-year-old in today for consultation on left hearing loss referred from his family physician Dr. Thomas.  He reports he had a sudden onset of left hearing loss 2 weeks ago while he was doing physical therapy at home on his knee.  He feels he lost his hearing completely in the left ear.  He has not had any pain or drainage.  Does have pressure in the left ear.  Also has noticed tinnitus since the onset.  He denies any dizziness or balance concerns..  Denies any dysphagia, hoarseness, facial paresthesias.  He did see his local physician and cerumen was cleaned from the ear but with no real improvement he was sent here for consultation.    ALLERGIES:    Allergies   Allergen Reactions    Metoprolol Tartrate Hives    Penicillins Hives     Tolerated CTX 06/2020, Ancef 6/2022       HABITS: Social History    Substance and Sexual Activity      Alcohol use: Yes        Alcohol/week: 1.0 standard drink of alcohol        Types: 1 Standard drinks or equivalent per week     History   Smoking Status    Never   Smokeless Tobacco    Never         PAST MEDICAL HISTORY: Please see today's intake form (for the remainder of the PMH) which I reviewed and signed.  Past Medical History:   Diagnosis Date    Abnormal cardiovascular stress test 04/03/2022    CAD (coronary artery disease)     Elevated coronary artery calcium score     Hypertension     Knee injury     right    Knee osteoarthritis 8/16/2023    Obese     Rosacea     Sleep apnea     CPAP       FAMILY HISTORY/SOCIAL HISTORY:   Family History   Problem Relation Age of Onset    Diabetes Mother     Heart Disease Mother     Heart Disease Father     Atrophic kidney Sister     Diabetes Sister     Diabetes Type 1  Brother       Social History     Socioeconomic History    Marital status:      Spouse name: Not on file    Number of children: Not on file    Years of education: Not on file    Highest education level: Not on file   Occupational History    Not on file   Tobacco Use    Smoking status: Never    Smokeless tobacco: Never   Vaping Use    Vaping Use: Never used   Substance and Sexual Activity    Alcohol use: Yes     Alcohol/week: 1.0 standard drink of alcohol     Types: 1 Standard drinks or equivalent per week    Drug use: Never    Sexual activity: Not on file   Other Topics Concern    Not on file   Social History Narrative    Not on file     Social Determinants of Health     Financial Resource Strain: Low Risk  (10/10/2023)    Financial Resource Strain     Within the past 12 months, have you or your family members you live with been unable to get utilities (heat, electricity) when it was really needed?: No   Food Insecurity: Unknown (10/10/2023)    Food Insecurity     Within the past 12 months, did you worry that your food would run out before you got money to buy more?: Patient refused     Within the past 12 months, did the food you bought just not last and you didn t have money to get more?: Patient refused   Transportation Needs: Low Risk  (10/10/2023)    Transportation Needs     Within the past 12 months, has lack of transportation kept you from medical appointments, getting your medicines, non-medical meetings or appointments, work, or from getting things that you need?: No   Physical Activity: Not on file   Stress: Not on file   Social Connections: Not on file   Interpersonal Safety: Low Risk  (10/13/2023)    Interpersonal Safety     Do you feel physically and emotionally safe where you currently live?: Yes     Within the past 12 months, have you been hit, slapped, kicked or otherwise physically hurt by someone?: No     Within the past 12 months, have you been humiliated or emotionally abused in other ways  by your partner or ex-partner?: No   Housing Stability: Low Risk  (10/10/2023)    Housing Stability     Do you have housing? : Yes     Are you worried about losing your housing?: No       REVIEW OF SYSTEMS: Patient Supplied Answers to Review of Systems      10/17/2023    10:02 AM    ENT ROS   Ears, Nose, Throat Hearing loss    Ringing/noise in ears    Nasal congestion or drainage            The remainder of the 10 point ROS is negative    PHYSICIAL EXAMINATION:  Constitutional: The patient was well-groomed and in no acute distress.   Skin: Warm and pink.  Psychiatric: The patient's affect was calm, cooperative, and appropriate.   Respiratory: Breathing comfortably without stridor or exertion of accessory muscles.  Eyes: Pupils were equal and reactive. Extraocular movement intact.   Head: Normocephalic and atraumatic. No lesions or scars.  Ears: Patient placed under the microscope for microscopic evaluation and cleaning of cerumen which was obscuring full visualization and complete assessment of both TMs. Under high power magnification, the right ear was examined and cleaned of cerumen using instruments.  After cleaning, TM is fully visualized and has normal position with normal middle ear aeration. The left ear was then cleaned and inspected using microscope, instruments and similar techniques. After cleaning of cerumen, the TM has normal position with normal aeration to middle ear.  He does have considerable cerumen in both ears which was cleaned as above.  Nose: Sinuses were nontender. Anterior rhinoscopy revealed midline septum and absence of purulence or polyps.  Oral Cavity: Normal tongue, floor of mouth, buccal mucosa, and palate. No lesions or masses on inspection or palpation. No abnormal lymph tissue in the oropharynx.   Neck: The parotid is soft without masses. Supple with normal laryngeal and tracheal landmarks.   Lymphatic: There is no palpable lymphadenopathy or other masses in the neck.   Neurologic:  Alert and oriented x 3. Cranial nerves III-XI within normal limits. Voice quality normal.  Cerebellar Function Tests:  Grossly normal    Audiogram: Audiogram performed shows normal hearing in the right ear.  He has a complete  sensorineural hearing loss in the left ear.  96% discrimination on the right and 0% on the left.  He has normal bilateral type A tympanograms.      IMPRESSION AND PLAN:   Left sudden sensorineural hearing loss: Discussed this with him in detail.  Discussed possible retrocochlear cause but more common sudden sensorineural hearing loss syndrome.  Discussed transtympanic steroid injection to try to help promote recovery.  Also will get an MRI scan.  Risk of injection with persistent perforation, no improvement in hearing, worsening complications etc. all discussed.  He desired to proceed and injection completed out incident.  Left tinnitus: Result of the sensorineural hearing loss, treatment as above.  Left ear pressure: Sensorineural loss, treatment as above.    Procedure note: Patient placed spine.  Left ear was inspected.  A drop of phenol was placed on the posterior superior quadrant.  Dexamethasone a solution of 24 mg/cc was injected into the left middle ear using 25-gauge needle.  Total injection of 1 cc.  He remained supine for 20 minutes and was released to his own care.    Patient will follow-up in 2 weeks with repeat audio to reassess and possible further injection.    Thank you very much for the opportunity to participate in the care of your patient.    Rick L Nissen MD

## 2023-10-24 NOTE — PROGRESS NOTES
AUDIOLOGY REPORT    SUMMARY: Audiology visit completed. See audiogram for results.      RECOMMENDATIONS: Follow-up with ENT.    Dunia Machado, [facility]  Licensed Audiologist  [place] #[id number]

## 2023-10-24 NOTE — LETTER
10/24/2023       RE: Gonzalez Morton  [address] [address]     Dear Colleague,    Thank you for referring your patient, Gonzalez Morton, to the [facility] [place] at [facility]. Please see a copy of my visit note below.    Chief Complaint   Patient presents with    Ear Pressure     Blood pressure (!) 148/94, pulse 64, weight 119 kg (262 lb 5 oz).  Lucila Rosado LPN      Dear Dylon Wing:    I had the pleasure of meeting Gonzalez Morton in consultation today at the [facility] at your request.    CHIEF COMPLAINT: Left sudden sensorineural hearing loss    HISTORY OF PRESENT ILLNESS: Patient is a 63-year-old in today for consultation on left hearing loss referred from his family physician Dr. Thomas.  He reports he had a sudden onset of left hearing loss 2 weeks ago while he was doing physical therapy at home on his knee.  He feels he lost his hearing completely in the left ear.  He has not had any pain or drainage.  Does have pressure in the left ear.  Also has noticed tinnitus since the onset.  He denies any dizziness or balance concerns..  Denies any dysphagia, hoarseness, facial paresthesias.  He did see his local physician and cerumen was cleaned from the ear but with no real improvement he was sent here for consultation.    ALLERGIES:    Allergies   Allergen Reactions    Metoprolol Tartrate Hives    Penicillins Hives     Tolerated CTX 06/2020, Ancef 6/2022       HABITS: Social History    Substance and Sexual Activity      Alcohol use: Yes        Alcohol/week: 1.0 standard drink of alcohol        Types: 1 Standard drinks or equivalent per week     History   Smoking Status    Never   Smokeless Tobacco    Never         PAST MEDICAL HISTORY: Please see today's intake form (for the remainder of the PMH) which I reviewed and signed.  Past Medical History:    Diagnosis Date    Abnormal cardiovascular stress test 04/03/2022    CAD (coronary artery disease)     Elevated coronary artery calcium score     Hypertension     Knee injury     right    Knee osteoarthritis 8/16/2023    Obese     Rosacea     Sleep apnea     CPAP       FAMILY HISTORY/SOCIAL HISTORY:   Family History   Problem Relation Age of Onset    Diabetes Mother     Heart Disease Mother     Heart Disease Father     Atrophic kidney Sister     Diabetes Sister     Diabetes Type 1 Brother       Social History     Socioeconomic History    Marital status:      Spouse name: Not on file    Number of children: Not on file    Years of education: Not on file    Highest education level: Not on file   Occupational History    Not on file   Tobacco Use    Smoking status: Never    Smokeless tobacco: Never   Vaping Use    Vaping Use: Never used   Substance and Sexual Activity    Alcohol use: Yes     Alcohol/week: 1.0 standard drink of alcohol     Types: 1 Standard drinks or equivalent per week    Drug use: Never    Sexual activity: Not on file   Other Topics Concern    Not on file   Social History Narrative    Not on file     Social Determinants of Health     Financial Resource Strain: Low Risk  (10/10/2023)    Financial Resource Strain     Within the past 12 months, have you or your family members you live with been unable to get utilities (heat, electricity) when it was really needed?: No   Food Insecurity: Unknown (10/10/2023)    Food Insecurity     Within the past 12 months, did you worry that your food would run out before you got money to buy more?: Patient refused     Within the past 12 months, did the food you bought just not last and you didn t have money to get more?: Patient refused   Transportation Needs: Low Risk  (10/10/2023)    Transportation Needs     Within the past 12 months, has lack of transportation kept you from medical appointments, getting your medicines, non-medical meetings or appointments,  work, or from getting things that you need?: No   Physical Activity: Not on file   Stress: Not on file   Social Connections: Not on file   Interpersonal Safety: Low Risk  (10/13/2023)    Interpersonal Safety     Do you feel physically and emotionally safe where you currently live?: Yes     Within the past 12 months, have you been hit, slapped, kicked or otherwise physically hurt by someone?: No     Within the past 12 months, have you been humiliated or emotionally abused in other ways by your partner or ex-partner?: No   Housing Stability: Low Risk  (10/10/2023)    Housing Stability     Do you have housing? : Yes     Are you worried about losing your housing?: No       REVIEW OF SYSTEMS: Patient Supplied Answers to Review of Systems      10/17/2023    10:02 AM    ENT ROS   Ears, Nose, Throat Hearing loss    Ringing/noise in ears    Nasal congestion or drainage            The remainder of the 10 point ROS is negative    PHYSICIAL EXAMINATION:  Constitutional: The patient was well-groomed and in no acute distress.   Skin: Warm and pink.  Psychiatric: The patient's affect was calm, cooperative, and appropriate.   Respiratory: Breathing comfortably without stridor or exertion of accessory muscles.  Eyes: Pupils were equal and reactive. Extraocular movement intact.   Head: Normocephalic and atraumatic. No lesions or scars.  Ears: Patient placed under the microscope for microscopic evaluation and cleaning of cerumen which was obscuring full visualization and complete assessment of both TMs. Under high power magnification, the right ear was examined and cleaned of cerumen using instruments.  After cleaning, TM is fully visualized and has normal position with normal middle ear aeration. The left ear was then cleaned and inspected using microscope, instruments and similar techniques. After cleaning of cerumen, the TM has normal position with normal aeration to middle ear.  He does have considerable cerumen in both ears  which was cleaned as above.  Nose: Sinuses were nontender. Anterior rhinoscopy revealed midline septum and absence of purulence or polyps.  Oral Cavity: Normal tongue, floor of mouth, buccal mucosa, and palate. No lesions or masses on inspection or palpation. No abnormal lymph tissue in the oropharynx.   Neck: The parotid is soft without masses. Supple with normal laryngeal and tracheal landmarks.   Lymphatic: There is no palpable lymphadenopathy or other masses in the neck.   Neurologic: Alert and oriented x 3. Cranial nerves III-XI within normal limits. Voice quality normal.  Cerebellar Function Tests:  Grossly normal    Audiogram: Audiogram performed shows normal hearing in the right ear.  He has a complete  sensorineural hearing loss in the left ear.  96% discrimination on the right and 0% on the left.  He has normal bilateral type A tympanograms.      IMPRESSION AND PLAN:   Left sudden sensorineural hearing loss: Discussed this with him in detail.  Discussed possible retrocochlear cause but more common sudden sensorineural hearing loss syndrome.  Discussed transtympanic steroid injection to try to help promote recovery.  Also will get an MRI scan.  Risk of injection with persistent perforation, no improvement in hearing, worsening complications etc. all discussed.  He desired to proceed and injection completed out incident.  Left tinnitus: Result of the sensorineural hearing loss, treatment as above.  Left ear pressure: Sensorineural loss, treatment as above.    Procedure note: Patient placed spine.  Left ear was inspected.  A drop of phenol was placed on the posterior superior quadrant.  Dexamethasone a solution of 24 mg/cc was injected into the left middle ear using 25-gauge needle.  Total injection of 1 cc.  He remained supine for 20 minutes and was released to his own care.    Patient will follow-up in 2 weeks with repeat audio to reassess and possible further injection.    Thank you very much for the  opportunity to participate in the care of your patient.    Rick L Nissen MD

## 2023-10-31 ENCOUNTER — LAB REQUISITION (OUTPATIENT)
Dept: LAB | Facility: CLINIC | Age: 63
End: 2023-10-31
Payer: COMMERCIAL

## 2023-10-31 ENCOUNTER — TRANSFERRED RECORDS (OUTPATIENT)
Dept: HEALTH INFORMATION MANAGEMENT | Facility: CLINIC | Age: 63
End: 2023-10-31

## 2023-10-31 DIAGNOSIS — Z96.652 PRESENCE OF LEFT ARTIFICIAL KNEE JOINT: ICD-10-CM

## 2023-10-31 LAB
APPEARANCE FLD: ABNORMAL
CELL COUNT BODY FLUID SOURCE: ABNORMAL
COLOR FLD: YELLOW
CRYSTALS SNV MICRO: NORMAL
GRAM STAIN RESULT: NORMAL
GRAM STAIN RESULT: NORMAL
LYMPHOCYTES NFR FLD MANUAL: 46 %
MONOS+MACROS NFR FLD MANUAL: 34 %
NEUTS BAND NFR FLD MANUAL: 20 %
WBC # FLD AUTO: 702 /UL

## 2023-10-31 PROCEDURE — 89051 BODY FLUID CELL COUNT: CPT | Mod: ORL | Performed by: PHYSICIAN ASSISTANT

## 2023-10-31 PROCEDURE — 87205 SMEAR GRAM STAIN: CPT | Mod: ORL | Performed by: PHYSICIAN ASSISTANT

## 2023-10-31 PROCEDURE — 87075 CULTR BACTERIA EXCEPT BLOOD: CPT | Mod: ORL | Performed by: PHYSICIAN ASSISTANT

## 2023-10-31 PROCEDURE — 87077 CULTURE AEROBIC IDENTIFY: CPT | Mod: ORL | Performed by: PHYSICIAN ASSISTANT

## 2023-10-31 PROCEDURE — 89060 EXAM SYNOVIAL FLUID CRYSTALS: CPT | Mod: ORL | Performed by: PHYSICIAN ASSISTANT

## 2023-11-01 ENCOUNTER — MEDICAL CORRESPONDENCE (OUTPATIENT)
Dept: HEALTH INFORMATION MANAGEMENT | Facility: CLINIC | Age: 63
End: 2023-11-01
Payer: COMMERCIAL

## 2023-11-02 ENCOUNTER — ANESTHESIA EVENT (OUTPATIENT)
Dept: SURGERY | Facility: CLINIC | Age: 63
DRG: 467 | End: 2023-11-02
Payer: COMMERCIAL

## 2023-11-02 ENCOUNTER — ANCILLARY PROCEDURE (OUTPATIENT)
Dept: ULTRASOUND IMAGING | Facility: CLINIC | Age: 63
End: 2023-11-02
Attending: ANESTHESIOLOGY
Payer: COMMERCIAL

## 2023-11-02 ENCOUNTER — ANESTHESIA (OUTPATIENT)
Dept: SURGERY | Facility: CLINIC | Age: 63
DRG: 467 | End: 2023-11-02
Payer: COMMERCIAL

## 2023-11-02 ENCOUNTER — HOSPITAL ENCOUNTER (INPATIENT)
Facility: CLINIC | Age: 63
LOS: 4 days | Discharge: HOME HEALTH | DRG: 467 | End: 2023-11-06
Attending: ORTHOPAEDIC SURGERY | Admitting: ORTHOPAEDIC SURGERY
Payer: COMMERCIAL

## 2023-11-02 DIAGNOSIS — T84.53XD INFECTION ASSOCIATED WITH INTERNAL RIGHT KNEE PROSTHESIS, SUBSEQUENT ENCOUNTER: ICD-10-CM

## 2023-11-02 DIAGNOSIS — T84.53XA INFECTION ASSOCIATED WITH INTERNAL RIGHT KNEE PROSTHESIS, INITIAL ENCOUNTER (H): Primary | ICD-10-CM

## 2023-11-02 PROBLEM — Z96.659 INFECTION ASSOCIATED WITH INTERNAL KNEE PROSTHESIS (H): Status: ACTIVE | Noted: 2023-11-02

## 2023-11-02 PROBLEM — T84.59XA INFECTION ASSOCIATED WITH INTERNAL KNEE PROSTHESIS (H): Status: ACTIVE | Noted: 2023-11-02

## 2023-11-02 LAB
ALBUMIN SERPL BCG-MCNC: 3.8 G/DL (ref 3.5–5.2)
ALP SERPL-CCNC: 100 U/L (ref 40–129)
ALT SERPL W P-5'-P-CCNC: 15 U/L (ref 0–70)
ANION GAP SERPL CALCULATED.3IONS-SCNC: 10 MMOL/L (ref 7–15)
ANION GAP SERPL CALCULATED.3IONS-SCNC: 9 MMOL/L (ref 7–15)
AST SERPL W P-5'-P-CCNC: 12 U/L (ref 0–45)
BASE EXCESS BLDV CALC-SCNC: 4.3 MMOL/L
BILIRUB SERPL-MCNC: 1.1 MG/DL
BUN SERPL-MCNC: 19.4 MG/DL (ref 8–23)
BUN SERPL-MCNC: 20 MG/DL (ref 8–23)
CALCIUM SERPL-MCNC: 8.7 MG/DL (ref 8.8–10.2)
CALCIUM SERPL-MCNC: 8.8 MG/DL (ref 8.8–10.2)
CHLORIDE SERPL-SCNC: 96 MMOL/L (ref 98–107)
CHLORIDE SERPL-SCNC: 96 MMOL/L (ref 98–107)
CREAT SERPL-MCNC: 0.92 MG/DL (ref 0.67–1.17)
CREAT SERPL-MCNC: 1.06 MG/DL (ref 0.67–1.17)
CRP SERPL-MCNC: 236 MG/L
DEPRECATED HCO3 PLAS-SCNC: 25 MMOL/L (ref 22–29)
DEPRECATED HCO3 PLAS-SCNC: 28 MMOL/L (ref 22–29)
EGFRCR SERPLBLD CKD-EPI 2021: 79 ML/MIN/1.73M2
EGFRCR SERPLBLD CKD-EPI 2021: >90 ML/MIN/1.73M2
ERYTHROCYTE [DISTWIDTH] IN BLOOD BY AUTOMATED COUNT: 12.4 % (ref 10–15)
ERYTHROCYTE [SEDIMENTATION RATE] IN BLOOD BY WESTERGREN METHOD: 41 MM/HR (ref 0–20)
GLUCOSE BLDC GLUCOMTR-MCNC: 120 MG/DL (ref 70–99)
GLUCOSE SERPL-MCNC: 148 MG/DL (ref 70–99)
GLUCOSE SERPL-MCNC: 169 MG/DL (ref 70–99)
GRAM STAIN RESULT: ABNORMAL
GRAM STAIN RESULT: ABNORMAL
HCO3 BLDV-SCNC: 30 MMOL/L (ref 24–30)
HCT VFR BLD AUTO: 38 % (ref 40–53)
HGB BLD-MCNC: 13.1 G/DL (ref 13.3–17.7)
MAGNESIUM SERPL-MCNC: 2.1 MG/DL (ref 1.7–2.3)
MCH RBC QN AUTO: 29.2 PG (ref 26.5–33)
MCHC RBC AUTO-ENTMCNC: 34.5 G/DL (ref 31.5–36.5)
MCV RBC AUTO: 85 FL (ref 78–100)
OXYHGB MFR BLDV: 38.9 % (ref 70–75)
PCO2 BLDV: 53 MM HG (ref 35–50)
PH BLDV: 7.36 [PH] (ref 7.35–7.45)
PLATELET # BLD AUTO: 215 10E3/UL (ref 150–450)
PO2 BLDV: 24 MM HG (ref 25–47)
POTASSIUM SERPL-SCNC: 4 MMOL/L (ref 3.4–5.3)
POTASSIUM SERPL-SCNC: 4.2 MMOL/L (ref 3.4–5.3)
PROCALCITONIN SERPL IA-MCNC: 0.41 NG/ML
PROT SERPL-MCNC: 6.6 G/DL (ref 6.4–8.3)
RBC # BLD AUTO: 4.48 10E6/UL (ref 4.4–5.9)
SAO2 % BLDV: 39.7 % (ref 70–75)
SODIUM SERPL-SCNC: 131 MMOL/L (ref 135–145)
SODIUM SERPL-SCNC: 133 MMOL/L (ref 135–145)
WBC # BLD AUTO: 15.7 10E3/UL (ref 4–11)

## 2023-11-02 PROCEDURE — 36415 COLL VENOUS BLD VENIPUNCTURE: CPT | Performed by: ORTHOPAEDIC SURGERY

## 2023-11-02 PROCEDURE — 250N000011 HC RX IP 250 OP 636: Performed by: NURSE ANESTHETIST, CERTIFIED REGISTERED

## 2023-11-02 PROCEDURE — 250N000011 HC RX IP 250 OP 636: Performed by: ORTHOPAEDIC SURGERY

## 2023-11-02 PROCEDURE — 86140 C-REACTIVE PROTEIN: CPT | Performed by: ORTHOPAEDIC SURGERY

## 2023-11-02 PROCEDURE — 250N000013 HC RX MED GY IP 250 OP 250 PS 637: Performed by: ORTHOPAEDIC SURGERY

## 2023-11-02 PROCEDURE — 250N000011 HC RX IP 250 OP 636: Mod: JZ | Performed by: NURSE ANESTHETIST, CERTIFIED REGISTERED

## 2023-11-02 PROCEDURE — 82805 BLOOD GASES W/O2 SATURATION: CPT | Performed by: INTERNAL MEDICINE

## 2023-11-02 PROCEDURE — 258N000003 HC RX IP 258 OP 636: Performed by: ANESTHESIOLOGY

## 2023-11-02 PROCEDURE — 250N000009 HC RX 250: Performed by: NURSE ANESTHETIST, CERTIFIED REGISTERED

## 2023-11-02 PROCEDURE — 272N000001 HC OR GENERAL SUPPLY STERILE: Performed by: ORTHOPAEDIC SURGERY

## 2023-11-02 PROCEDURE — 120N000001 HC R&B MED SURG/OB

## 2023-11-02 PROCEDURE — 87205 SMEAR GRAM STAIN: CPT | Performed by: ORTHOPAEDIC SURGERY

## 2023-11-02 PROCEDURE — 84145 PROCALCITONIN (PCT): CPT | Performed by: INTERNAL MEDICINE

## 2023-11-02 PROCEDURE — 250N000011 HC RX IP 250 OP 636: Performed by: ANESTHESIOLOGY

## 2023-11-02 PROCEDURE — 999N000157 HC STATISTIC RCP TIME EA 10 MIN

## 2023-11-02 PROCEDURE — 36415 COLL VENOUS BLD VENIPUNCTURE: CPT | Performed by: INTERNAL MEDICINE

## 2023-11-02 PROCEDURE — 87075 CULTR BACTERIA EXCEPT BLOOD: CPT | Performed by: ORTHOPAEDIC SURGERY

## 2023-11-02 PROCEDURE — 80053 COMPREHEN METABOLIC PANEL: CPT | Performed by: INTERNAL MEDICINE

## 2023-11-02 PROCEDURE — 0SBC0ZZ EXCISION OF RIGHT KNEE JOINT, OPEN APPROACH: ICD-10-PCS | Performed by: ORTHOPAEDIC SURGERY

## 2023-11-02 PROCEDURE — P9045 ALBUMIN (HUMAN), 5%, 250 ML: HCPCS | Mod: JZ | Performed by: NURSE ANESTHETIST, CERTIFIED REGISTERED

## 2023-11-02 PROCEDURE — 85027 COMPLETE CBC AUTOMATED: CPT | Performed by: INTERNAL MEDICINE

## 2023-11-02 PROCEDURE — 710N000010 HC RECOVERY PHASE 1, LEVEL 2, PER MIN: Performed by: ORTHOPAEDIC SURGERY

## 2023-11-02 PROCEDURE — 85652 RBC SED RATE AUTOMATED: CPT | Performed by: ORTHOPAEDIC SURGERY

## 2023-11-02 PROCEDURE — 0SPV0JZ REMOVAL OF SYNTHETIC SUBSTITUTE FROM RIGHT KNEE JOINT, TIBIAL SURFACE, OPEN APPROACH: ICD-10-PCS | Performed by: ORTHOPAEDIC SURGERY

## 2023-11-02 PROCEDURE — 250N000025 HC SEVOFLURANE, PER MIN: Performed by: ORTHOPAEDIC SURGERY

## 2023-11-02 PROCEDURE — 250N000013 HC RX MED GY IP 250 OP 250 PS 637: Performed by: ANESTHESIOLOGY

## 2023-11-02 PROCEDURE — 250N000009 HC RX 250: Performed by: ORTHOPAEDIC SURGERY

## 2023-11-02 PROCEDURE — 370N000017 HC ANESTHESIA TECHNICAL FEE, PER MIN: Performed by: ORTHOPAEDIC SURGERY

## 2023-11-02 PROCEDURE — 87077 CULTURE AEROBIC IDENTIFY: CPT | Performed by: ORTHOPAEDIC SURGERY

## 2023-11-02 PROCEDURE — C1776 JOINT DEVICE (IMPLANTABLE): HCPCS | Performed by: ORTHOPAEDIC SURGERY

## 2023-11-02 PROCEDURE — 0SRV0JA REPLACEMENT OF RIGHT KNEE JOINT, TIBIAL SURFACE WITH SYNTHETIC SUBSTITUTE, UNCEMENTED, OPEN APPROACH: ICD-10-PCS | Performed by: ORTHOPAEDIC SURGERY

## 2023-11-02 PROCEDURE — 999N000141 HC STATISTIC PRE-PROCEDURE NURSING ASSESSMENT: Performed by: ORTHOPAEDIC SURGERY

## 2023-11-02 PROCEDURE — 83735 ASSAY OF MAGNESIUM: CPT | Performed by: INTERNAL MEDICINE

## 2023-11-02 PROCEDURE — 258N000003 HC RX IP 258 OP 636: Performed by: ORTHOPAEDIC SURGERY

## 2023-11-02 PROCEDURE — 87040 BLOOD CULTURE FOR BACTERIA: CPT | Performed by: INTERNAL MEDICINE

## 2023-11-02 PROCEDURE — 99222 1ST HOSP IP/OBS MODERATE 55: CPT | Mod: AI | Performed by: INTERNAL MEDICINE

## 2023-11-02 PROCEDURE — 360N000078 HC SURGERY LEVEL 5, PER MIN: Performed by: ORTHOPAEDIC SURGERY

## 2023-11-02 DEVICE — TIBIAL BEARING INSERT - CS
Type: IMPLANTABLE DEVICE | Site: KNEE | Status: FUNCTIONAL
Brand: TRIATHLON

## 2023-11-02 RX ORDER — LIDOCAINE 40 MG/G
CREAM TOPICAL
Status: DISCONTINUED | OUTPATIENT
Start: 2023-11-02 | End: 2023-11-06 | Stop reason: HOSPADM

## 2023-11-02 RX ORDER — SODIUM CHLORIDE, SODIUM LACTATE, POTASSIUM CHLORIDE, CALCIUM CHLORIDE 600; 310; 30; 20 MG/100ML; MG/100ML; MG/100ML; MG/100ML
INJECTION, SOLUTION INTRAVENOUS CONTINUOUS
Status: DISCONTINUED | OUTPATIENT
Start: 2023-11-02 | End: 2023-11-06 | Stop reason: HOSPADM

## 2023-11-02 RX ORDER — HYDROMORPHONE HCL IN WATER/PF 6 MG/30 ML
0.2 PATIENT CONTROLLED ANALGESIA SYRINGE INTRAVENOUS
Status: DISCONTINUED | OUTPATIENT
Start: 2023-11-02 | End: 2023-11-06 | Stop reason: HOSPADM

## 2023-11-02 RX ORDER — POLYETHYLENE GLYCOL 3350 17 G/17G
17 POWDER, FOR SOLUTION ORAL DAILY
Status: DISCONTINUED | OUTPATIENT
Start: 2023-11-03 | End: 2023-11-06 | Stop reason: HOSPADM

## 2023-11-02 RX ORDER — ACETAMINOPHEN 325 MG/1
975 TABLET ORAL EVERY 8 HOURS
Status: DISCONTINUED | OUTPATIENT
Start: 2023-11-02 | End: 2023-11-02

## 2023-11-02 RX ORDER — PROCHLORPERAZINE MALEATE 10 MG
10 TABLET ORAL EVERY 6 HOURS PRN
Status: DISCONTINUED | OUTPATIENT
Start: 2023-11-02 | End: 2023-11-06 | Stop reason: HOSPADM

## 2023-11-02 RX ORDER — ONDANSETRON 2 MG/ML
INJECTION INTRAMUSCULAR; INTRAVENOUS PRN
Status: DISCONTINUED | OUTPATIENT
Start: 2023-11-02 | End: 2023-11-02

## 2023-11-02 RX ORDER — MEPERIDINE HYDROCHLORIDE 25 MG/ML
12.5 INJECTION INTRAMUSCULAR; INTRAVENOUS; SUBCUTANEOUS ONCE
Status: COMPLETED | OUTPATIENT
Start: 2023-11-02 | End: 2023-11-02

## 2023-11-02 RX ORDER — OXYCODONE HYDROCHLORIDE 5 MG/1
10 TABLET ORAL EVERY 4 HOURS PRN
Status: DISCONTINUED | OUTPATIENT
Start: 2023-11-02 | End: 2023-11-06 | Stop reason: HOSPADM

## 2023-11-02 RX ORDER — HYDROMORPHONE HCL IN WATER/PF 6 MG/30 ML
0.2 PATIENT CONTROLLED ANALGESIA SYRINGE INTRAVENOUS EVERY 5 MIN PRN
Status: DISCONTINUED | OUTPATIENT
Start: 2023-11-02 | End: 2023-11-02 | Stop reason: HOSPADM

## 2023-11-02 RX ORDER — ASPIRIN 81 MG/1
81 TABLET ORAL 2 TIMES DAILY
Status: DISCONTINUED | OUTPATIENT
Start: 2023-11-02 | End: 2023-11-05

## 2023-11-02 RX ORDER — SODIUM CHLORIDE, SODIUM LACTATE, POTASSIUM CHLORIDE, CALCIUM CHLORIDE 600; 310; 30; 20 MG/100ML; MG/100ML; MG/100ML; MG/100ML
INJECTION, SOLUTION INTRAVENOUS CONTINUOUS
Status: DISCONTINUED | OUTPATIENT
Start: 2023-11-02 | End: 2023-11-02 | Stop reason: HOSPADM

## 2023-11-02 RX ORDER — VANCOMYCIN HYDROCHLORIDE 1 G/200ML
1000 INJECTION, SOLUTION INTRAVENOUS EVERY 12 HOURS
Status: DISCONTINUED | OUTPATIENT
Start: 2023-11-02 | End: 2023-11-02

## 2023-11-02 RX ORDER — ONDANSETRON 4 MG/1
4 TABLET, ORALLY DISINTEGRATING ORAL EVERY 30 MIN PRN
Status: DISCONTINUED | OUTPATIENT
Start: 2023-11-02 | End: 2023-11-02 | Stop reason: HOSPADM

## 2023-11-02 RX ORDER — FENTANYL CITRATE 50 UG/ML
25-100 INJECTION, SOLUTION INTRAMUSCULAR; INTRAVENOUS
Status: DISCONTINUED | OUTPATIENT
Start: 2023-11-02 | End: 2023-11-02 | Stop reason: HOSPADM

## 2023-11-02 RX ORDER — NALOXONE HYDROCHLORIDE 0.4 MG/ML
0.4 INJECTION, SOLUTION INTRAMUSCULAR; INTRAVENOUS; SUBCUTANEOUS
Status: DISCONTINUED | OUTPATIENT
Start: 2023-11-02 | End: 2023-11-06 | Stop reason: HOSPADM

## 2023-11-02 RX ORDER — BISACODYL 10 MG
10 SUPPOSITORY, RECTAL RECTAL DAILY PRN
Status: DISCONTINUED | OUTPATIENT
Start: 2023-11-02 | End: 2023-11-06 | Stop reason: HOSPADM

## 2023-11-02 RX ORDER — ONDANSETRON 2 MG/ML
4 INJECTION INTRAMUSCULAR; INTRAVENOUS EVERY 6 HOURS PRN
Status: DISCONTINUED | OUTPATIENT
Start: 2023-11-02 | End: 2023-11-06 | Stop reason: HOSPADM

## 2023-11-02 RX ORDER — OXYCODONE HYDROCHLORIDE 5 MG/1
5 TABLET ORAL EVERY 4 HOURS PRN
Status: DISCONTINUED | OUTPATIENT
Start: 2023-11-02 | End: 2023-11-06 | Stop reason: HOSPADM

## 2023-11-02 RX ORDER — ACETAMINOPHEN 325 MG/1
975 TABLET ORAL ONCE
Status: COMPLETED | OUTPATIENT
Start: 2023-11-02 | End: 2023-11-02

## 2023-11-02 RX ORDER — LIDOCAINE HYDROCHLORIDE 10 MG/ML
INJECTION, SOLUTION INFILTRATION; PERINEURAL PRN
Status: DISCONTINUED | OUTPATIENT
Start: 2023-11-02 | End: 2023-11-02

## 2023-11-02 RX ORDER — FENTANYL CITRATE 50 UG/ML
25 INJECTION, SOLUTION INTRAMUSCULAR; INTRAVENOUS EVERY 5 MIN PRN
Status: DISCONTINUED | OUTPATIENT
Start: 2023-11-02 | End: 2023-11-02 | Stop reason: HOSPADM

## 2023-11-02 RX ORDER — DEXAMETHASONE SODIUM PHOSPHATE 10 MG/ML
INJECTION, SOLUTION INTRAMUSCULAR; INTRAVENOUS PRN
Status: DISCONTINUED | OUTPATIENT
Start: 2023-11-02 | End: 2023-11-02

## 2023-11-02 RX ORDER — LIDOCAINE 40 MG/G
CREAM TOPICAL
Status: DISCONTINUED | OUTPATIENT
Start: 2023-11-02 | End: 2023-11-02 | Stop reason: HOSPADM

## 2023-11-02 RX ORDER — HYDROMORPHONE HCL IN WATER/PF 6 MG/30 ML
0.4 PATIENT CONTROLLED ANALGESIA SYRINGE INTRAVENOUS
Status: DISCONTINUED | OUTPATIENT
Start: 2023-11-02 | End: 2023-11-06 | Stop reason: HOSPADM

## 2023-11-02 RX ORDER — ACETAMINOPHEN 325 MG/1
975 TABLET ORAL EVERY 8 HOURS
Status: COMPLETED | OUTPATIENT
Start: 2023-11-03 | End: 2023-11-05

## 2023-11-02 RX ORDER — PROPOFOL 10 MG/ML
INJECTION, EMULSION INTRAVENOUS CONTINUOUS PRN
Status: DISCONTINUED | OUTPATIENT
Start: 2023-11-02 | End: 2023-11-02

## 2023-11-02 RX ORDER — NALOXONE HYDROCHLORIDE 0.4 MG/ML
0.2 INJECTION, SOLUTION INTRAMUSCULAR; INTRAVENOUS; SUBCUTANEOUS
Status: DISCONTINUED | OUTPATIENT
Start: 2023-11-02 | End: 2023-11-06 | Stop reason: HOSPADM

## 2023-11-02 RX ORDER — ONDANSETRON 4 MG/1
4 TABLET, ORALLY DISINTEGRATING ORAL EVERY 6 HOURS PRN
Status: DISCONTINUED | OUTPATIENT
Start: 2023-11-02 | End: 2023-11-06 | Stop reason: HOSPADM

## 2023-11-02 RX ORDER — AMOXICILLIN 250 MG
1 CAPSULE ORAL 2 TIMES DAILY
Status: DISCONTINUED | OUTPATIENT
Start: 2023-11-02 | End: 2023-11-06 | Stop reason: HOSPADM

## 2023-11-02 RX ORDER — ACETAMINOPHEN 325 MG/1
650 TABLET ORAL EVERY 4 HOURS PRN
Status: DISCONTINUED | OUTPATIENT
Start: 2023-11-05 | End: 2023-11-06 | Stop reason: HOSPADM

## 2023-11-02 RX ORDER — HYDROXYZINE HYDROCHLORIDE 25 MG/1
25 TABLET, FILM COATED ORAL EVERY 6 HOURS PRN
Status: DISCONTINUED | OUTPATIENT
Start: 2023-11-02 | End: 2023-11-06 | Stop reason: HOSPADM

## 2023-11-02 RX ORDER — CEFAZOLIN SODIUM/WATER 2 G/20 ML
SYRINGE (ML) INTRAVENOUS PRN
Status: DISCONTINUED | OUTPATIENT
Start: 2023-11-02 | End: 2023-11-02

## 2023-11-02 RX ORDER — KETAMINE HYDROCHLORIDE 10 MG/ML
INJECTION INTRAMUSCULAR; INTRAVENOUS PRN
Status: DISCONTINUED | OUTPATIENT
Start: 2023-11-02 | End: 2023-11-02

## 2023-11-02 RX ORDER — HYDROMORPHONE HCL IN WATER/PF 6 MG/30 ML
0.4 PATIENT CONTROLLED ANALGESIA SYRINGE INTRAVENOUS EVERY 5 MIN PRN
Status: DISCONTINUED | OUTPATIENT
Start: 2023-11-02 | End: 2023-11-02 | Stop reason: HOSPADM

## 2023-11-02 RX ORDER — CEFAZOLIN SODIUM/WATER 2 G/20 ML
SYRINGE (ML) INTRAVENOUS
Status: DISCONTINUED
Start: 2023-11-02 | End: 2023-11-02 | Stop reason: HOSPADM

## 2023-11-02 RX ORDER — ONDANSETRON 2 MG/ML
4 INJECTION INTRAMUSCULAR; INTRAVENOUS EVERY 30 MIN PRN
Status: DISCONTINUED | OUTPATIENT
Start: 2023-11-02 | End: 2023-11-02 | Stop reason: HOSPADM

## 2023-11-02 RX ORDER — FENTANYL CITRATE 50 UG/ML
INJECTION, SOLUTION INTRAMUSCULAR; INTRAVENOUS PRN
Status: DISCONTINUED | OUTPATIENT
Start: 2023-11-02 | End: 2023-11-02

## 2023-11-02 RX ORDER — PROPOFOL 10 MG/ML
INJECTION, EMULSION INTRAVENOUS PRN
Status: DISCONTINUED | OUTPATIENT
Start: 2023-11-02 | End: 2023-11-02

## 2023-11-02 RX ORDER — CEFAZOLIN SODIUM 2 G/100ML
2 INJECTION, SOLUTION INTRAVENOUS EVERY 8 HOURS
Qty: 200 ML | Refills: 0 | Status: COMPLETED | OUTPATIENT
Start: 2023-11-03 | End: 2023-11-03

## 2023-11-02 RX ORDER — FENTANYL CITRATE 50 UG/ML
50 INJECTION, SOLUTION INTRAMUSCULAR; INTRAVENOUS EVERY 5 MIN PRN
Status: DISCONTINUED | OUTPATIENT
Start: 2023-11-02 | End: 2023-11-02 | Stop reason: HOSPADM

## 2023-11-02 RX ADMIN — SODIUM CHLORIDE, POTASSIUM CHLORIDE, SODIUM LACTATE AND CALCIUM CHLORIDE: 600; 310; 30; 20 INJECTION, SOLUTION INTRAVENOUS at 23:30

## 2023-11-02 RX ADMIN — SENNOSIDES AND DOCUSATE SODIUM 1 TABLET: 8.6; 5 TABLET ORAL at 23:31

## 2023-11-02 RX ADMIN — FENTANYL CITRATE 100 MCG: 50 INJECTION, SOLUTION INTRAMUSCULAR; INTRAVENOUS at 16:32

## 2023-11-02 RX ADMIN — SODIUM CHLORIDE, POTASSIUM CHLORIDE, SODIUM LACTATE AND CALCIUM CHLORIDE 100 ML/HR: 600; 310; 30; 20 INJECTION, SOLUTION INTRAVENOUS at 20:48

## 2023-11-02 RX ADMIN — LIDOCAINE HYDROCHLORIDE 5 ML: 10 INJECTION, SOLUTION INFILTRATION; PERINEURAL at 18:32

## 2023-11-02 RX ADMIN — OXYCODONE HYDROCHLORIDE 10 MG: 5 TABLET ORAL at 22:21

## 2023-11-02 RX ADMIN — Medication 2 G: at 18:27

## 2023-11-02 RX ADMIN — HYDROMORPHONE HYDROCHLORIDE 1 MG: 1 INJECTION, SOLUTION INTRAMUSCULAR; INTRAVENOUS; SUBCUTANEOUS at 18:43

## 2023-11-02 RX ADMIN — PROPOFOL 200 MG: 10 INJECTION, EMULSION INTRAVENOUS at 18:32

## 2023-11-02 RX ADMIN — SODIUM CHLORIDE, POTASSIUM CHLORIDE, SODIUM LACTATE AND CALCIUM CHLORIDE 100 ML/HR: 600; 310; 30; 20 INJECTION, SOLUTION INTRAVENOUS at 16:19

## 2023-11-02 RX ADMIN — ASPIRIN 81 MG: 81 TABLET, COATED ORAL at 23:30

## 2023-11-02 RX ADMIN — VANCOMYCIN HYDROCHLORIDE 1250 MG: 5 INJECTION, POWDER, LYOPHILIZED, FOR SOLUTION INTRAVENOUS at 23:30

## 2023-11-02 RX ADMIN — PROPOFOL 100 MCG/KG/MIN: 10 INJECTION, EMULSION INTRAVENOUS at 18:36

## 2023-11-02 RX ADMIN — ACETAMINOPHEN 975 MG: 325 TABLET ORAL at 21:07

## 2023-11-02 RX ADMIN — KETAMINE HYDROCHLORIDE 50 MG: 10 INJECTION INTRAMUSCULAR; INTRAVENOUS at 18:42

## 2023-11-02 RX ADMIN — FENTANYL CITRATE 25 MCG: 50 INJECTION, SOLUTION INTRAMUSCULAR; INTRAVENOUS at 20:49

## 2023-11-02 RX ADMIN — MEPERIDINE HYDROCHLORIDE 12.5 MG: 25 INJECTION INTRAMUSCULAR; INTRAVENOUS; SUBCUTANEOUS at 21:05

## 2023-11-02 RX ADMIN — FENTANYL CITRATE 50 MCG: 50 INJECTION, SOLUTION INTRAMUSCULAR; INTRAVENOUS at 20:54

## 2023-11-02 RX ADMIN — FENTANYL CITRATE 100 MCG: 50 INJECTION INTRAMUSCULAR; INTRAVENOUS at 18:32

## 2023-11-02 RX ADMIN — ONDANSETRON 4 MG: 2 INJECTION INTRAMUSCULAR; INTRAVENOUS at 19:39

## 2023-11-02 RX ADMIN — MIDAZOLAM 2 MG: 1 INJECTION INTRAMUSCULAR; INTRAVENOUS at 18:30

## 2023-11-02 RX ADMIN — FENTANYL CITRATE 100 MCG: 50 INJECTION, SOLUTION INTRAMUSCULAR; INTRAVENOUS at 17:43

## 2023-11-02 RX ADMIN — ALBUMIN (HUMAN): 12.5 SOLUTION INTRAVENOUS at 19:53

## 2023-11-02 ASSESSMENT — ACTIVITIES OF DAILY LIVING (ADL)
ADLS_ACUITY_SCORE: 26
ADLS_ACUITY_SCORE: 30
ADLS_ACUITY_SCORE: 30
ADLS_ACUITY_SCORE: 24
ADLS_ACUITY_SCORE: 27

## 2023-11-02 NOTE — ANESTHESIA PROCEDURE NOTES
Airway       Patient location during procedure: OR  Staff -        CRNA: Ariadna Chris APRN CRNA       Performed By: CRNA  Consent for Airway        Urgency: elective  Indications and Patient Condition       Indications for airway management: dakotah-procedural         Mask difficulty assessment: 1 - vent by mask    Final Airway Details       Final airway type: supraglottic airway    Supraglottic Airway Details        Type: LMA       Brand: Ambu AuraGain       LMA size: 5    Post intubation assessment        Placement verified by: capnometry, equal breath sounds and chest rise        Number of attempts at approach: 1       Number of other approaches attempted: 0       Secured with: silk tape       Ease of procedure: easy       Dentition: Intact

## 2023-11-02 NOTE — H&P
[facility] History and Physical    Gonzalez Morton MRN# [id number]   Age: 63 year old YOB: 1960     Date of Admission:  11/2/2023      Primary care provider: Dylon Shafer     Assessment:  Infected right total knee arthroplasty    Plan:  Lengthy discussion with Steven and his family.  He has been having pain in his knee since the weekend.  Thinks he had a temperature over the weekend as well.  The knee has gotten considerably worse even since Tuesday when he was seen in the clinic.  The knee started to ache and swell over the weekend.  Prior to that was doing well.  Aspirate done in the clinic on Tuesday was very unreactive in terms of the number of white blood cells and the paucity of neutrophils, however, there was staph which grew on the culture.  Therefore suspicion is that this is an early or acute infection, possibly from a resolving stitch abscess which seeded his joint.  Lengthy discussion with he and his family regarding treatment options: Including resection arthroplasty versus irrigation and debridement with polyethylene exchange.  Discussed the fact that if we retain components and washout his knee, even with postoperative IV antibiotics there is a chance that that plan could fail.  They understand that.  Plan will be to assess intraoperatively and move forward with the best of these options as dictated by what is seen Intra-Op.  They are comfortable with that plan.            Chief Complaint:   Right knee pain              History of Present Illness:   This patient is a 63 year old male who presents with less than a week of pain and swelling in his right total knee.  Right knee was done in August.  He had some issue with a stitch abscess postoperatively but then went on to be asymptomatic for the next 6 weeks or longer.  Unfortunately he did have a little bit of stitch material or small abscess that persisted and was resolving on the proximalmost portion of his wound.   Over the weekend he developed onset of pain and swelling in his knee which has become severe over the past 3 to 4 days.  It acts like an acute infection of his knee.  Aspirate of the knee shows very benign fluid however it is growing Staph aureus.             Past Medical History:     Past Medical History:   Diagnosis Date    Abnormal cardiovascular stress test 04/03/2022    CAD (coronary artery disease)     Elevated coronary artery calcium score     Hypertension     Knee injury     right    Knee osteoarthritis 08/16/2023    Obese     Rosacea     Sleep apnea     CPAP            Past Surgical History:     Past Surgical History:   Procedure Laterality Date    APPENDECTOMY      ARTHROPLASTY KNEE Right 8/16/2023    Procedure: RIGHT TOTAL KNEE ARTHROPLASTY;  Surgeon: Bradley Redd MD;  Location: [facility] Main OR    ARTHROSCOPY KNEE Bilateral     BYPASS GRAFT ARTERY CORONARY N/A 6/6/2022    Procedure: CORONARY ARTERY BYPASS GRAFT X 5, ENDOSCOPIC VESSEL PROCUREMENT LEFT LEG, INTERNAL MAMMARY ARTERY HARVEST, LEFT RADIAL ARTERY HARVEST, ANESTHESIA TRANSESOPHAGEAL ECHOCARDIOGRAM, EPIAORTIC ULTRASOUND;  Surgeon: Mayank Rocha MD;  Location: [facility] Main OR    CV CORONARY ANGIOGRAM N/A 4/7/2022    Procedure: Coronary Angiogram;  Surgeon: Mery Lyons MD;  Location: [facility] CATH LAB CV    PROSTATECTOMY      TOTAL KNEE ARTHROPLASTY Left     WRIST SURGERY              Social History:     Social History     Tobacco Use    Smoking status: Never    Smokeless tobacco: Never   Substance Use Topics    Alcohol use: Yes     Alcohol/week: 1.0 standard drink of alcohol     Types: 1 Standard drinks or equivalent per week            Family History:     Family History   Problem Relation Age of Onset    Diabetes Mother     Heart Disease Mother     Heart Disease Father     Atrophic kidney Sister     Diabetes Sister     Diabetes Type 1 Brother             Immunizations:   Immunizations are up to date         Allergies:      Allergies   Allergen Reactions    Metoprolol Tartrate Hives    Penicillins Hives     Tolerated CTX 06/2020, Ancef 6/2022            Medications:     Current Facility-Administered Medications   Medication    ceFAZolin Sodium (ANCEF) 2 G/20ML injection    fentaNYL (PF) (SUBLIMAZE) injection  mcg    lactated ringers infusion    lidocaine (LMX4) cream    lidocaine 1 % 0.1-1 mL    midazolam (VERSED) injection 0.5-2 mg    sodium chloride (PF) 0.9% PF flush 3 mL    sodium chloride (PF) 0.9% PF flush 3 mL             Review of Systems:   Review of systems is negative other than recent sudden onset of acute hearing loss in his left ear.  Mild feeling of malaise with possible fever over this past weekend.  Right knee pain.  Otherwise unremarkable.       Physical Exam:   Temp: (!) 103.1  F (39.5  C) Temp src: Oral BP: 107/51 Pulse: 78   Resp: 23 SpO2: 95 % O2 Device: Nasal cannula Oxygen Delivery: 2 LPM  All vitals have been reviewed  HEENT: Extraocular muscles intact.  Sclera nonicteric.  Nasal and oral mucosa pink without exudate.  No sinus congestion.  No neck lymphadenopathy.  Hearing loss in the left ear as described in review of systems.    Lungs: Clear to auscultation bilaterally.  No rales or wheezes  Heart: Regular with normal S1 and S2.  No appreciable murmur  Abdomen: Soft with positive bowel sounds.  Extremities: Distal pulses intact in the lower extremities.  Feet with all toes pink and warm.  Right knee with large effusion.  Global tenderness.  No surrounding erythema.  Healed scar with tiny scab at the superiormost aspect.  No drainage.         Data:   All laboratory and imaging data in the past 24 hours reviewed  -     Attestation:  I have reviewed today's vital signs, notes, medications, labs and imaging.    Bradley Redd MD

## 2023-11-02 NOTE — ANESTHESIA PREPROCEDURE EVALUATION
Anesthesia Pre-Procedure Evaluation    Patient: Gonzalez Morton   MRN: [id number] : 1960        Procedure : Procedure(s):  RIGHT KNEE IRRIGATION AND DEBRIDEMENT  POSSIBLE RESECTION ARTHROPLASTY          Past Medical History:   Diagnosis Date    Abnormal cardiovascular stress test 2022    CAD (coronary artery disease)     Elevated coronary artery calcium score     Hypertension     Knee injury     right    Knee osteoarthritis 2023    Obese     Rosacea     Sleep apnea     CPAP      Past Surgical History:   Procedure Laterality Date    APPENDECTOMY      ARTHROPLASTY KNEE Right 2023    Procedure: RIGHT TOTAL KNEE ARTHROPLASTY;  Surgeon: Bradley Redd MD;  Location: [facility] Main OR    ARTHROSCOPY KNEE Bilateral     BYPASS GRAFT ARTERY CORONARY N/A 2022    Procedure: CORONARY ARTERY BYPASS GRAFT X 5, ENDOSCOPIC VESSEL PROCUREMENT LEFT LEG, INTERNAL MAMMARY ARTERY HARVEST, LEFT RADIAL ARTERY HARVEST, ANESTHESIA TRANSESOPHAGEAL ECHOCARDIOGRAM, EPIAORTIC ULTRASOUND;  Surgeon: Mayank Rocha MD;  Location: [facility] Main OR    CV CORONARY ANGIOGRAM N/A 2022    Procedure: Coronary Angiogram;  Surgeon: Mery Lyons MD;  Location: [facility] CATH LAB CV    PROSTATECTOMY      TOTAL KNEE ARTHROPLASTY Left     WRIST SURGERY        Allergies   Allergen Reactions    Metoprolol Tartrate Hives    Penicillins Hives     Tolerated CTX 2020, Ancef 2022      Social History     Tobacco Use    Smoking status: Never    Smokeless tobacco: Never   Substance Use Topics    Alcohol use: Yes     Alcohol/week: 1.0 standard drink of alcohol     Types: 1 Standard drinks or equivalent per week      Wt Readings from Last 1 Encounters:   10/24/23 119 kg (262 lb 5 oz)        Anesthesia Evaluation   Pt has had prior anesthetic.         ROS/MED HX  ENT/Pulmonary:     (+) sleep apnea, uses CPAP,                                     Neurologic:  - neg neurologic ROS     Cardiovascular:     (+)  hypertension- -   "CAD -  CABG- -                                      METS/Exercise Tolerance:     Hematologic:  - neg hematologic  ROS     Musculoskeletal:   (+)  arthritis,             GI/Hepatic:  - neg GI/hepatic ROS     Renal/Genitourinary:  - neg Renal ROS     Endo:     (+)               Obesity,       Psychiatric/Substance Use:  - neg psychiatric ROS     Infectious Disease:       Malignancy:   (+) Malignancy, History of Prostate.Prostate CA Remission status post Surgery.      Other:            Physical Exam    Airway  airway exam normal      Mallampati: II   TM distance: > 3 FB   Neck ROM: full   Mouth opening: > 3 cm    Respiratory Devices and Support         Dental       (+) Modest Abnormalities - crowns, retainers, 1 or 2 missing teeth      Cardiovascular   cardiovascular exam normal       Rhythm and rate: regular and normal     Pulmonary   pulmonary exam normal        breath sounds clear to auscultation           OUTSIDE LABS:  CBC:   Lab Results   Component Value Date    WBC 10.8 09/14/2023    WBC 6.9 08/01/2023    HGB 14.2 09/14/2023    HGB 16.5 08/01/2023    HCT 41.7 09/14/2023    HCT 46.3 08/01/2023     09/14/2023     08/01/2023     BMP:   Lab Results   Component Value Date     08/01/2023     05/17/2023    POTASSIUM 3.6 08/01/2023    POTASSIUM 4.0 05/17/2023    CHLORIDE 98 08/01/2023    CHLORIDE 101 05/17/2023    CO2 28 08/01/2023    CO2 26 05/17/2023    BUN 16.3 08/01/2023    BUN 19.4 05/17/2023    CR 0.85 08/01/2023    CR 0.90 05/17/2023     (H) 08/01/2023     (H) 05/17/2023     COAGS:   Lab Results   Component Value Date    PTT 30 06/06/2022    INR 1.28 (H) 06/06/2022    FIBR 354 06/06/2022     POC: No results found for: \"BGM\", \"HCG\", \"HCGS\"  HEPATIC:   Lab Results   Component Value Date    ALBUMIN 4.6 08/01/2023    PROTTOTAL 7.4 08/01/2023    ALT 42 08/01/2023    AST 28 08/01/2023    ALKPHOS 122 08/01/2023    BILITOTAL 0.5 08/01/2023     OTHER:   Lab Results   Component Value " Date    PH 7.38 06/09/2022    LACT 1.7 09/14/2023    A1C 5.6 08/16/2023    KEATON 9.6 08/01/2023    PHOS 2.7 06/10/2022    MAG 2.0 06/10/2022    CRP 25.6 (H) 06/19/2020    SED 42 (H) 09/14/2023       Anesthesia Plan    ASA Status:  3       Anesthesia Type: General.     - Airway: LMA   Induction: Intravenous, Propofol.   Maintenance: Inhalation.   Techniques and Equipment:       - Drips/Meds: Dexmed. bolus, Ketamine     Consents    Anesthesia Plan(s) and associated risks, benefits, and realistic alternatives discussed. Questions answered and patient/representative(s) expressed understanding.     - Discussed:     - Discussed with:  Patient      - Extended Intubation/Ventilatory Support Discussed: No.           Postoperative Care    Pain management: IV analgesics, Oral pain medications, Multi-modal analgesia.   PONV prophylaxis: Ondansetron (or other 5HT-3), Dexamethasone or Solumedrol, Background Propofol Infusion     Comments:                Gonzalez Paul MD

## 2023-11-03 ENCOUNTER — HOME INFUSION (PRE-WILLOW HOME INFUSION) (OUTPATIENT)
Dept: PHARMACY | Facility: CLINIC | Age: 63
End: 2023-11-03
Payer: COMMERCIAL

## 2023-11-03 ENCOUNTER — APPOINTMENT (OUTPATIENT)
Dept: OCCUPATIONAL THERAPY | Facility: CLINIC | Age: 63
DRG: 467 | End: 2023-11-03
Attending: ORTHOPAEDIC SURGERY
Payer: COMMERCIAL

## 2023-11-03 ENCOUNTER — APPOINTMENT (OUTPATIENT)
Dept: PHYSICAL THERAPY | Facility: CLINIC | Age: 63
DRG: 467 | End: 2023-11-03
Attending: ORTHOPAEDIC SURGERY
Payer: COMMERCIAL

## 2023-11-03 LAB
ALBUMIN SERPL BCG-MCNC: 3.3 G/DL (ref 3.5–5.2)
ALBUMIN UR-MCNC: 30 MG/DL
ALP SERPL-CCNC: 94 U/L (ref 40–129)
ALT SERPL W P-5'-P-CCNC: 12 U/L (ref 0–70)
ANION GAP SERPL CALCULATED.3IONS-SCNC: 10 MMOL/L (ref 7–15)
APPEARANCE UR: CLEAR
AST SERPL W P-5'-P-CCNC: 10 U/L (ref 0–45)
BACTERIA SNV CULT: ABNORMAL
BASOPHILS # BLD AUTO: 0.1 10E3/UL (ref 0–0.2)
BASOPHILS NFR BLD AUTO: 0 %
BILIRUB SERPL-MCNC: 1.1 MG/DL
BILIRUB UR QL STRIP: NEGATIVE
BUN SERPL-MCNC: 16.7 MG/DL (ref 8–23)
CALCIUM SERPL-MCNC: 8.3 MG/DL (ref 8.8–10.2)
CHLORIDE SERPL-SCNC: 97 MMOL/L (ref 98–107)
COLOR UR AUTO: YELLOW
CREAT SERPL-MCNC: 0.89 MG/DL (ref 0.67–1.17)
CREAT SERPL-MCNC: 0.91 MG/DL (ref 0.67–1.17)
DEPRECATED HCO3 PLAS-SCNC: 25 MMOL/L (ref 22–29)
EGFRCR SERPLBLD CKD-EPI 2021: >90 ML/MIN/1.73M2
EGFRCR SERPLBLD CKD-EPI 2021: >90 ML/MIN/1.73M2
EOSINOPHIL # BLD AUTO: 0.3 10E3/UL (ref 0–0.7)
EOSINOPHIL NFR BLD AUTO: 3 %
ERYTHROCYTE [DISTWIDTH] IN BLOOD BY AUTOMATED COUNT: 12.3 % (ref 10–15)
GLUCOSE SERPL-MCNC: 127 MG/DL (ref 70–99)
GLUCOSE SERPL-MCNC: 127 MG/DL (ref 70–99)
GLUCOSE UR STRIP-MCNC: NEGATIVE MG/DL
GRAM STAIN RESULT: NORMAL
HCT VFR BLD AUTO: 36.6 % (ref 40–53)
HGB BLD-MCNC: 12.5 G/DL (ref 13.3–17.7)
HGB BLD-MCNC: 12.5 G/DL (ref 13.3–17.7)
HGB UR QL STRIP: NEGATIVE
IMM GRANULOCYTES # BLD: 0.1 10E3/UL
IMM GRANULOCYTES NFR BLD: 1 %
KETONES UR STRIP-MCNC: NEGATIVE MG/DL
LEUKOCYTE ESTERASE UR QL STRIP: NEGATIVE
LYMPHOCYTES # BLD AUTO: 0.6 10E3/UL (ref 0.8–5.3)
LYMPHOCYTES NFR BLD AUTO: 5 %
MCH RBC QN AUTO: 29.2 PG (ref 26.5–33)
MCHC RBC AUTO-ENTMCNC: 34.1 G/DL (ref 31.5–36.5)
MCV RBC AUTO: 85 FL (ref 78–100)
MONOCYTES # BLD AUTO: 1.2 10E3/UL (ref 0–1.3)
MONOCYTES NFR BLD AUTO: 10 %
MUCOUS THREADS #/AREA URNS LPF: PRESENT /LPF
NEUTROPHILS # BLD AUTO: 9.5 10E3/UL (ref 1.6–8.3)
NEUTROPHILS NFR BLD AUTO: 81 %
NITRATE UR QL: NEGATIVE
NRBC # BLD AUTO: 0 10E3/UL
NRBC BLD AUTO-RTO: 0 /100
PH UR STRIP: 5.5 [PH] (ref 5–7)
PLATELET # BLD AUTO: 232 10E3/UL (ref 150–450)
POTASSIUM SERPL-SCNC: 3.8 MMOL/L (ref 3.4–5.3)
PROCALCITONIN SERPL IA-MCNC: 0.47 NG/ML
PROT SERPL-MCNC: 6.4 G/DL (ref 6.4–8.3)
RBC # BLD AUTO: 4.32 10E6/UL (ref 4.4–5.9)
RBC URINE: <1 /HPF
SODIUM SERPL-SCNC: 132 MMOL/L (ref 135–145)
SP GR UR STRIP: 1.02 (ref 1–1.03)
UROBILINOGEN UR STRIP-MCNC: <2 MG/DL
WBC # BLD AUTO: 11.6 10E3/UL (ref 4–11)
WBC URINE: 7 /HPF

## 2023-11-03 PROCEDURE — 97530 THERAPEUTIC ACTIVITIES: CPT | Mod: GP

## 2023-11-03 PROCEDURE — 36415 COLL VENOUS BLD VENIPUNCTURE: CPT | Performed by: ORTHOPAEDIC SURGERY

## 2023-11-03 PROCEDURE — 84145 PROCALCITONIN (PCT): CPT | Performed by: INTERNAL MEDICINE

## 2023-11-03 PROCEDURE — 85018 HEMOGLOBIN: CPT | Performed by: ORTHOPAEDIC SURGERY

## 2023-11-03 PROCEDURE — 258N000003 HC RX IP 258 OP 636: Performed by: ORTHOPAEDIC SURGERY

## 2023-11-03 PROCEDURE — 80053 COMPREHEN METABOLIC PANEL: CPT | Performed by: INTERNAL MEDICINE

## 2023-11-03 PROCEDURE — 250N000011 HC RX IP 250 OP 636: Mod: JZ | Performed by: ORTHOPAEDIC SURGERY

## 2023-11-03 PROCEDURE — 250N000013 HC RX MED GY IP 250 OP 250 PS 637: Performed by: FAMILY MEDICINE

## 2023-11-03 PROCEDURE — 85025 COMPLETE CBC W/AUTO DIFF WBC: CPT | Performed by: INTERNAL MEDICINE

## 2023-11-03 PROCEDURE — 81001 URINALYSIS AUTO W/SCOPE: CPT | Performed by: INTERNAL MEDICINE

## 2023-11-03 PROCEDURE — 120N000001 HC R&B MED SURG/OB

## 2023-11-03 PROCEDURE — 97535 SELF CARE MNGMENT TRAINING: CPT | Mod: GO

## 2023-11-03 PROCEDURE — 97161 PT EVAL LOW COMPLEX 20 MIN: CPT | Mod: GP

## 2023-11-03 PROCEDURE — 97165 OT EVAL LOW COMPLEX 30 MIN: CPT | Mod: GO

## 2023-11-03 PROCEDURE — 80053 COMPREHEN METABOLIC PANEL: CPT | Performed by: ORTHOPAEDIC SURGERY

## 2023-11-03 PROCEDURE — 97116 GAIT TRAINING THERAPY: CPT | Mod: GP

## 2023-11-03 PROCEDURE — 99222 1ST HOSP IP/OBS MODERATE 55: CPT | Performed by: INTERNAL MEDICINE

## 2023-11-03 PROCEDURE — 250N000011 HC RX IP 250 OP 636: Mod: JZ | Performed by: INTERNAL MEDICINE

## 2023-11-03 PROCEDURE — 250N000013 HC RX MED GY IP 250 OP 250 PS 637: Performed by: ORTHOPAEDIC SURGERY

## 2023-11-03 PROCEDURE — 99232 SBSQ HOSP IP/OBS MODERATE 35: CPT | Performed by: FAMILY MEDICINE

## 2023-11-03 PROCEDURE — 82947 ASSAY GLUCOSE BLOOD QUANT: CPT | Performed by: ORTHOPAEDIC SURGERY

## 2023-11-03 RX ORDER — CEPHALEXIN 500 MG/1
500 CAPSULE ORAL 4 TIMES DAILY
Status: ON HOLD | COMMUNITY
End: 2023-11-06

## 2023-11-03 RX ORDER — TRAZODONE HYDROCHLORIDE 50 MG/1
50-100 TABLET, FILM COATED ORAL AT BEDTIME
Status: DISCONTINUED | OUTPATIENT
Start: 2023-11-03 | End: 2023-11-06 | Stop reason: HOSPADM

## 2023-11-03 RX ORDER — METRONIDAZOLE 7.5 MG/G
GEL TOPICAL 2 TIMES DAILY
Status: DISCONTINUED | OUTPATIENT
Start: 2023-11-03 | End: 2023-11-03 | Stop reason: ALTCHOICE

## 2023-11-03 RX ORDER — PRAMIPEXOLE DIHYDROCHLORIDE 0.25 MG/1
0.25 TABLET ORAL AT BEDTIME
Status: DISCONTINUED | OUTPATIENT
Start: 2023-11-03 | End: 2023-11-06 | Stop reason: HOSPADM

## 2023-11-03 RX ORDER — NITROGLYCERIN 0.4 MG/1
0.4 TABLET SUBLINGUAL EVERY 5 MIN PRN
Status: DISCONTINUED | OUTPATIENT
Start: 2023-11-03 | End: 2023-11-06 | Stop reason: HOSPADM

## 2023-11-03 RX ORDER — HYDROCHLOROTHIAZIDE 25 MG/1
25 TABLET ORAL DAILY
Status: DISCONTINUED | OUTPATIENT
Start: 2023-11-03 | End: 2023-11-06 | Stop reason: HOSPADM

## 2023-11-03 RX ORDER — LISINOPRIL 20 MG/1
20 TABLET ORAL DAILY
Status: DISCONTINUED | OUTPATIENT
Start: 2023-11-03 | End: 2023-11-06 | Stop reason: HOSPADM

## 2023-11-03 RX ORDER — ASPIRIN 81 MG/1
162 TABLET ORAL 2 TIMES DAILY
COMMUNITY

## 2023-11-03 RX ORDER — CARVEDILOL 3.12 MG/1
3.12 TABLET ORAL 2 TIMES DAILY WITH MEALS
Status: DISCONTINUED | OUTPATIENT
Start: 2023-11-03 | End: 2023-11-06 | Stop reason: HOSPADM

## 2023-11-03 RX ORDER — ATORVASTATIN CALCIUM 40 MG/1
80 TABLET, FILM COATED ORAL EVERY EVENING
Status: DISCONTINUED | OUTPATIENT
Start: 2023-11-03 | End: 2023-11-06 | Stop reason: HOSPADM

## 2023-11-03 RX ORDER — OXYCODONE HYDROCHLORIDE 5 MG/1
5 TABLET ORAL EVERY 4 HOURS PRN
Status: ON HOLD | COMMUNITY
End: 2023-11-06

## 2023-11-03 RX ORDER — ACETAMINOPHEN 325 MG/1
975 TABLET ORAL EVERY 8 HOURS PRN
COMMUNITY

## 2023-11-03 RX ORDER — CEFAZOLIN SODIUM 2 G/100ML
2 INJECTION, SOLUTION INTRAVENOUS EVERY 8 HOURS
Status: DISCONTINUED | OUTPATIENT
Start: 2023-11-03 | End: 2023-11-06 | Stop reason: HOSPADM

## 2023-11-03 RX ORDER — AMLODIPINE BESYLATE 5 MG/1
5 TABLET ORAL DAILY
Status: DISCONTINUED | OUTPATIENT
Start: 2023-11-03 | End: 2023-11-06 | Stop reason: HOSPADM

## 2023-11-03 RX ADMIN — OXYCODONE HYDROCHLORIDE 10 MG: 5 TABLET ORAL at 14:41

## 2023-11-03 RX ADMIN — HYDROCHLOROTHIAZIDE 25 MG: 25 TABLET ORAL at 09:31

## 2023-11-03 RX ADMIN — TRAZODONE HYDROCHLORIDE 50 MG: 50 TABLET ORAL at 21:00

## 2023-11-03 RX ADMIN — AMLODIPINE BESYLATE 5 MG: 5 TABLET ORAL at 09:32

## 2023-11-03 RX ADMIN — HYDROXYZINE HYDROCHLORIDE 25 MG: 25 TABLET, FILM COATED ORAL at 09:31

## 2023-11-03 RX ADMIN — HYDROMORPHONE HYDROCHLORIDE 0.4 MG: 0.2 INJECTION, SOLUTION INTRAMUSCULAR; INTRAVENOUS; SUBCUTANEOUS at 23:48

## 2023-11-03 RX ADMIN — SENNOSIDES AND DOCUSATE SODIUM 1 TABLET: 8.6; 5 TABLET ORAL at 09:31

## 2023-11-03 RX ADMIN — SODIUM CHLORIDE, POTASSIUM CHLORIDE, SODIUM LACTATE AND CALCIUM CHLORIDE: 600; 310; 30; 20 INJECTION, SOLUTION INTRAVENOUS at 15:53

## 2023-11-03 RX ADMIN — HYDROMORPHONE HYDROCHLORIDE 0.4 MG: 0.2 INJECTION, SOLUTION INTRAMUSCULAR; INTRAVENOUS; SUBCUTANEOUS at 09:23

## 2023-11-03 RX ADMIN — METRONIDAZOLE: 7.5 CREAM TOPICAL at 21:05

## 2023-11-03 RX ADMIN — OXYCODONE HYDROCHLORIDE 10 MG: 5 TABLET ORAL at 21:03

## 2023-11-03 RX ADMIN — CEFAZOLIN SODIUM 2 G: 2 INJECTION, SOLUTION INTRAVENOUS at 14:38

## 2023-11-03 RX ADMIN — HYDROMORPHONE HYDROCHLORIDE 0.2 MG: 0.2 INJECTION, SOLUTION INTRAMUSCULAR; INTRAVENOUS; SUBCUTANEOUS at 01:58

## 2023-11-03 RX ADMIN — OXYCODONE HYDROCHLORIDE 10 MG: 5 TABLET ORAL at 09:31

## 2023-11-03 RX ADMIN — PRAMIPEXOLE DIHYDROCHLORIDE 0.25 MG: 0.25 TABLET ORAL at 21:00

## 2023-11-03 RX ADMIN — ACETAMINOPHEN 975 MG: 325 TABLET ORAL at 14:36

## 2023-11-03 RX ADMIN — ACETAMINOPHEN 975 MG: 325 TABLET ORAL at 21:02

## 2023-11-03 RX ADMIN — CEFAZOLIN SODIUM 2 G: 2 INJECTION, SOLUTION INTRAVENOUS at 09:34

## 2023-11-03 RX ADMIN — CEFAZOLIN SODIUM 2 G: 2 INJECTION, SOLUTION INTRAVENOUS at 01:57

## 2023-11-03 RX ADMIN — LISINOPRIL 20 MG: 20 TABLET ORAL at 09:32

## 2023-11-03 RX ADMIN — CEFAZOLIN SODIUM 2 G: 2 INJECTION, SOLUTION INTRAVENOUS at 19:40

## 2023-11-03 RX ADMIN — CARVEDILOL 3.12 MG: 3.12 TABLET, FILM COATED ORAL at 09:46

## 2023-11-03 RX ADMIN — OXYCODONE HYDROCHLORIDE 10 MG: 5 TABLET ORAL at 04:34

## 2023-11-03 RX ADMIN — ATORVASTATIN CALCIUM 80 MG: 40 TABLET, FILM COATED ORAL at 21:00

## 2023-11-03 RX ADMIN — SENNOSIDES AND DOCUSATE SODIUM 1 TABLET: 8.6; 5 TABLET ORAL at 21:00

## 2023-11-03 RX ADMIN — ASPIRIN 81 MG: 81 TABLET, COATED ORAL at 09:31

## 2023-11-03 RX ADMIN — ACETAMINOPHEN 975 MG: 325 TABLET ORAL at 04:33

## 2023-11-03 RX ADMIN — ASPIRIN 81 MG: 81 TABLET, COATED ORAL at 21:00

## 2023-11-03 ASSESSMENT — ACTIVITIES OF DAILY LIVING (ADL)
ADLS_ACUITY_SCORE: 27
DEPENDENT_IADLS:: INDEPENDENT
ADLS_ACUITY_SCORE: 27

## 2023-11-03 NOTE — PROGRESS NOTES
"Orthopedic Progress Note      Assessment: 1 Day Post-Op  S/P Procedure(s):  RIGHT KNEE IRRIGATION AND DEBRIDEMENT  POLYETHELYNE EXCHANGE @    Plan:   - Continue PT/OT  - Weightbearing status: as tolerated  - Anticoagulation: ASA in addition to SCDs, marie stockings and early ambulation.  - Discharge planning: pending culture results and ID recommendations.   Keep hemovac drain in until tomorrow per Dr. Redd reocmmendations.      Subjective:  Pain: controlled  Chest pain, SOB: non  Nausea, Vomiting:  no  Lightheadedness, Dizziness:  no  Neuro:  Patient denies new onset numbness or paresthesias    Patient is doing better with pain into his knee and leg after surgery yesterday. Still sore and painful but better. No thigh pain, has been up and ambulating. Voiding and tolerating diet.     Objective:  /67 (BP Location: Right arm, Patient Position: Chair, Cuff Size: Adult Regular)   Pulse 69   Temp 97.8  F (36.6  C) (Oral)   Resp 18   Ht 1.753 m (5' 9\")   Wt 116.5 kg (256 lb 14.4 oz)   SpO2 94%   BMI 37.94 kg/m    The patient is A&Ox3. Appears comfortable.   Sensation is intact.  Dorsiflexion and plantar flexion is intact.  Dorsalis pedis pulse intact.  Calves are soft and non-tender. Negative Connie's.  The incision is covered. Dressing C/D/I.    Hemovac drain from right knee.    Pertinent Labs   Lab Results: personally reviewed.   Lab Results   Component Value Date    INR 1.28 (H) 06/06/2022    INR 1.39 (H) 06/06/2022    INR 1.05 05/31/2022     Lab Results   Component Value Date    WBC 11.6 (H) 11/03/2023    HGB 12.5 (L) 11/03/2023    HGB 12.5 (L) 11/03/2023    HCT 36.6 (L) 11/03/2023    MCV 85 11/03/2023     11/03/2023     Lab Results   Component Value Date     (L) 11/03/2023    CO2 25 11/03/2023         Report completed by:  Germán Clinton PA-C/Dr. Redd  [facility]    Date: 11/3/2023  Time: 1:43 PM    "

## 2023-11-03 NOTE — CONSULTS
Consultation - INFECTIOUS DISEASE CONSULTATION  Gonzalez Morton ,  1960, MRN [id number]      Infection of prosthesis, initial encounter (H24) [T85.79XA]  Infection associated with internal knee prosthesis  [T84.59XA, Z96.659]    PCP: Dylon Shafer, [phone]   Code status:  Full Code               Assessment:  MSSA R TKA PJI: s/p I&D, poly exchange . Aspiration 10/31 with MSSA. OR cultures are pending. High fever post operatively concerning for bacteremia.  CAD s/p CABG    Principal Problem:    Infection associated with internal knee prosthesis   Active Problems:    Benign Essential Hypertension    History of prostate cancer    Coronary artery disease involving native coronary artery of native heart, unspecified whether angina present        Recommendations:   - cefazolin IV  - follow BC  - PICC if BC negative at ~48 hours  - will need course IV cefazolin at discharge  - if BC positive then may need additional workup  - further recommendations to follow clinical course  - ID will follow, thanks    Laverne Alcazar MD  [facility]  [phone]       HPI:    Gonzalez Morton is a 63 year old male. History is provided by patient and chart.   R TKA in 2023, did ok for a bit then increased pain, swelling, couldn't bear weight on leg. Given cephalexin without improvement. S/p aspiration 10/31 which is growing staff. S/p below procedure :  RIGHT KNEE IRRIGATION AND DEBRIDEMENT, Right - Knee  POLYETHELYNE EXCHANGE, Right - Knee    He developed high fever to 104 overnight although doesn't think that he felt that warm. Was checking temps at home and usually in 99s, doesn't think had fever at home. Otherwise doing ok today, knee pain is different. Tolerating antibiotics.     Chief complaint: Principal Problem:    Infection associated with internal knee prosthesis   Active Problems:    Benign Essential Hypertension    History of prostate cancer    Coronary artery disease  involving native coronary artery of native heart, unspecified whether angina present      Medical History  Active Ambulatory Problems     Diagnosis Date Noted    Benign Essential Hypertension     Mixed hyperlipidemia     Rosacea     History of prostate cancer 05/29/2019    Morbid obesity (H) 08/20/2021    Erectile dysfunction following radical prostatectomy 08/29/2019    History of malignant neoplasm of prostate 09/05/2019    Family history of ischemic heart disease 04/03/2022    Obstructive sleep apnea syndrome 05/10/2022    Coronary artery disease involving native coronary artery of native heart, unspecified whether angina present 06/06/2022    Status post coronary artery bypass graft 06/23/2022    Prediabetes 05/18/2023    Knee osteoarthritis 08/16/2023    High prostate specific antigen (PSA) 03/05/2019    Malignant tumor of prostate (H) 04/04/2019    Neoplastic disease 06/06/2019    Stress incontinence 08/29/2019    Abdominal pain 06/18/2020     Resolved Ambulatory Problems     Diagnosis Date Noted    Back pain 06/08/2015    Pelvic abscess in male (H) 06/19/2020    Abnormal electrocardiogram 08/19/2020    Pre-operative cardiovascular examination 08/19/2020    Hypertension, unspecified type     Abnormal cardiovascular stress test 04/03/2022    Elevated coronary artery calcium score      Past Medical History:   Diagnosis Date    CAD (coronary artery disease)     Hypertension     Knee injury     Obese     Prostate cancer (H)     Sleep apnea          Surgical History  He  has a past surgical history that includes Wrist surgery; Arthroscopy knee (Bilateral); appendectomy; Total Knee Arthroplasty (Left); Prostatectomy; Coronary Angiogram (N/A, 4/7/2022); Bypass graft artery coronary (N/A, 6/6/2022); Arthroplasty knee (Right, 8/16/2023); Exchange poly component arthroplasty knee (Right, 11/2/2023); and Remove Hardware Arthroplasty Knee, I&D, (Right, 11/2/2023).       Social History  Reviewed, and he  reports that he  has never smoked. He has never used smokeless tobacco. He reports current alcohol use of about 1.0 standard drink of alcohol per week. He reports that he does not use drugs.        Family History  Reviewed and noncontributory to present problem, and family history includes Atrophic kidney in his sister; Diabetes in his mother and sister; Diabetes Type 1 in his brother; Heart Disease in his father and mother. No FH frequent infections   Psychosocial Needs  Social History     Social History Narrative    Not on file     Additional psychosocial needs reviewed per nursing assessment.       Allergies   Allergen Reactions    Metoprolol Tartrate Hives    Penicillins Hives     Tolerated CTX 06/2020, Ancef 6/2022      Medications Prior to Admission   Medication Sig Dispense Refill Last Dose    acetaminophen (TYLENOL) 325 MG tablet Take 975 mg by mouth every 8 hours as needed for mild pain   Past Month    amLODIPine (NORVASC) 5 MG tablet Take 1 tablet (5 mg) by mouth daily 90 tablet 3 11/2/2023 at 0800    aspirin 81 MG EC tablet Take 162 mg by mouth 2 times daily   11/1/2023 at AM    atorvastatin (LIPITOR) 80 MG tablet Take 1 tablet (80 mg) by mouth every evening 90 tablet 3 11/1/2023 at 2000    carvedilol (COREG) 3.125 MG tablet Take 1 tablet (3.125 mg) by mouth 2 times daily (with meals) To replace metoprolol 180 tablet 3 11/2/2023 at 0800    cephALEXin (KEFLEX) 500 MG capsule Take 500 mg by mouth 4 times daily   11/2/2023 at x 2 doses    hydrochlorothiazide (HYDRODIURIL) 25 MG tablet TAKE 1 TABLET BY MOUTH EVERY DAY 90 tablet 2 11/1/2023 at 0800    lisinopril (ZESTRIL) 20 MG tablet [LISINOPRIL (PRINIVIL,ZESTRIL) 20 MG TABLET] TAKE 1 TABLET BY MOUTH EVERY DAY 90 tablet 3 11/1/2023 at 0800    metroNIDAZOLE (METROGEL) 0.75 % external gel Apply topically 2 times daily (Patient taking differently: Apply topically 2 times daily as needed (.)) 45 g 3 10/30/2023 at 2000    nitroGLYcerin (NITROSTAT) 0.4 MG sublingual tablet One  "tablet under the tongue every 5 minutes if needed for chest pain. May repeat every 5 minutes for a maximum of 3 doses in 15 minutes\" 25 tablet 3 More than a month    oxyCODONE (ROXICODONE) 5 MG tablet Take 5 mg by mouth every 4 hours as needed for severe pain   11/1/2023 at PM    pramipexole (MIRAPEX) 0.25 MG tablet TAKE 1 TABLET BY MOUTH EVERYDAY AT BEDTIME 90 tablet 3 11/1/2023 at 2000    traZODone (DESYREL) 50 MG tablet Take 1-2 tablets ( mg) by mouth At Bedtime 180 tablet 0 11/1/2023 at 2000        Review of Systems:  A 12 point comprehensive review of systems was negative except as noted. Physical Exam:  Temp:  [97.7  F (36.5  C)-104.2  F (40.1  C)] 97.8  F (36.6  C)  Pulse:  [64-83] 69  Resp:  [18-29] 18  BP: (107-176)/(51-77) 115/67  SpO2:  [91 %-98 %] 94 %    GEN: alert and oriented x3, NAD  HEAD: atraumatic  ENT: moist membranes, no thrush, anicteric sclera   NECK: supple, no nuchal rigidity  CARDIOVASCULAR: regular rate and rhythm, no murmurs, rubs, or gallops  PULMONARY: lungs clear to ausculation bilaterally  ABDOMEN: soft, nontender, nondistended. Normal bowel sounds  SKIN: no rashes or lesions. No stigma of endocarditis  PSYCH: grossly intact  MUSCULOSKELETAL: knee post operative wraps.                Pertinent Labs  personally reviewed.   CBC RESULTS:   Recent Labs   Lab Test 11/03/23  0605   WBC 11.6*   RBC 4.32*   HGB 12.5*  12.5*   HCT 36.6*   MCV 85   MCH 29.2   MCHC 34.1   RDW 12.3           Last Comprehensive Metabolic Panel:  Sodium   Date Value Ref Range Status   11/03/2023 132 (L) 135 - 145 mmol/L Final     Comment:     Reference intervals for this test were updated on 09/26/2023 to more accurately reflect our healthy population. There may be differences in the flagging of prior results with similar values performed with this method. Interpretation of those prior results can be made in the context of the updated reference intervals.      Potassium   Date Value Ref Range Status " "  11/03/2023 3.8 3.4 - 5.3 mmol/L Final   06/22/2022 4.7 3.5 - 5.0 mmol/L Final     Chloride   Date Value Ref Range Status   11/03/2023 97 (L) 98 - 107 mmol/L Final   06/07/2022 107 98 - 107 mmol/L Final     Carbon Dioxide (CO2)   Date Value Ref Range Status   11/03/2023 25 22 - 29 mmol/L Final   06/07/2022 24 22 - 31 mmol/L Final     Anion Gap   Date Value Ref Range Status   11/03/2023 10 7 - 15 mmol/L Final   06/07/2022 10 5 - 18 mmol/L Final     Glucose   Date Value Ref Range Status   11/03/2023 127 (H) 70 - 99 mg/dL Final   11/03/2023 127 (H) 70 - 99 mg/dL Final   06/07/2022 142 (H) 70 - 125 mg/dL Final     GLUCOSE BY METER POCT   Date Value Ref Range Status   11/02/2023 120 (H) 70 - 99 mg/dL Final     Urea Nitrogen   Date Value Ref Range Status   11/03/2023 16.7 8.0 - 23.0 mg/dL Final   06/07/2022 19 8 - 22 mg/dL Final     Creatinine   Date Value Ref Range Status   11/03/2023 0.89 0.67 - 1.17 mg/dL Final   11/03/2023 0.91 0.67 - 1.17 mg/dL Final     GFR Estimate   Date Value Ref Range Status   11/03/2023 >90 >60 mL/min/1.73m2 Final   11/03/2023 >90 >60 mL/min/1.73m2 Final   07/31/2020 >60 >60 mL/min/1.73m2 Final     Calcium   Date Value Ref Range Status   11/03/2023 8.3 (L) 8.8 - 10.2 mg/dL Final       CRP   Date Value Ref Range Status   06/19/2020 25.6 (H) 0.0 - 0.8 mg/dL Final        The following microbiology studies were personally reviewed:  No results found for: \"CULT\"    Urine Studies    Recent Labs   Lab Test 11/03/23  0435 05/31/22  0816   LEUKEST Negative Negative   WBCU 7* 0       Vancomycin Levels  No lab results found.    Invalid input(s): \"VANCO\"    MICROBIOLOGY DATA:    All cultures:  7-Day Micro Results       Collected Updated Procedure Result Status      11/02/2023 2147 11/03/2023 1116 Blood Culture Arm, Right [[id number]]   Blood from Arm, Right    Preliminary result Component Value   Culture No growth after 12 hours  [P]                11/02/2023 2147 11/03/2023 1116 Blood Culture Arm, Left " [[id number]]   Blood from Arm, Left    Preliminary result Component Value   Culture No growth after 12 hours  [P]                11/02/2023 1919 11/02/2023 2133 Anaerobic Bacterial Culture Routine [[id number]]   Tissue from Knee, Right    In process Component Value   No component results               11/02/2023 1918 11/02/2023 2134 Anaerobic Bacterial Culture Routine [[id number]]   Tissue from Knee, Right    In process Component Value   No component results               11/02/2023 1918 11/03/2023 0005 Gram Stain [[id number]]   Tissue from Knee, Right    Final result Component Value   Gram Stain Result No organisms seen   Gram Stain Result 2+ WBC seen            11/02/2023 1918 11/02/2023 2134 Tissue Aerobic Bacterial Culture Routine [[id number]]   Tissue from Knee, Right    In process Component Value   No component results               11/02/2023 1917 11/03/2023 0015 Gram Stain [[id number]]   Tissue from Knee, Right    Final result Component Value   Gram Stain Result No organisms seen   Gram Stain Result 4+ WBC seen   Predominantly PMNs            11/02/2023 1917 11/02/2023 2134 Tissue Aerobic Bacterial Culture Routine [[id number]]   Tissue from Knee, Right    In process Component Value   No component results               11/02/2023 1908 11/02/2023 2134 Anaerobic Bacterial Culture Routine [[id number]]   Synovial fluid from Knee, Right    In process Component Value   No component results               11/02/2023 1908 11/02/2023 2349 Gram Stain [[id number]]   (Abnormal)   Synovial fluid from Knee, Right    Final result Component Value   Gram Stain Result 1+ Gram positive cocci   Gram Stain Result 4+ WBC seen   Predominantly PMNs            11/02/2023 1908 11/02/2023 2134 Synovial fluid Aerobic Bacterial Culture Routine [[id number]]   Synovial fluid from Knee, Right    In process Component Value   No component results               10/31/2023 1300 11/03/2023 1012 Synovial fluid Aerobic Bacterial  Culture Routine [[id number]]    (Abnormal)   Synovial fluid from Knee, Right    Final result Component Value   Culture 1+ Staphylococcus aureus        Susceptibility        Staphylococcus aureus      WADE      Clindamycin 0.25 ug/mL Susceptible      Daptomycin 0.25 ug/mL Susceptible      Doxycycline <=0.5 ug/mL Susceptible      Erythromycin <=0.25 ug/mL Susceptible      Gentamicin <=0.5 ug/mL Susceptible      Oxacillin <=0.25 ug/mL Susceptible  [1]       Tetracycline <=1 ug/mL Susceptible      Trimethoprim/Sulfamethoxazole <=0.5/9.5 ug/mL Susceptible      Vancomycin 1 ug/mL Susceptible                   [1]  Oxacillin susceptible isolates are susceptible to cephalosporins (example: cefazolin and cephalexin) and beta lactam combination agents. Oxacillin resistant isolates are resistant to these agents.                   10/31/2023 1300 10/31/2023 1732 Gram Stain [[id number]]   Synovial fluid from Knee, Right    Final result Component Value   Gram Stain Result No organisms seen   Gram Stain Result 1+ WBC seen            10/31/2023 1300 11/02/2023 1701 Anaerobic Bacterial Culture Routine [[id number]]    Synovial fluid from Knee, Right    Preliminary result Component Value   Culture No anaerobic organisms isolated after 2 days  [P]                10/31/2023 1300 10/31/2023 1928 Crystal ID Synovial Fluid [[id number]]   Synovial fluid from Knee, Right    Final result Component Value   Crystals Analysis No clinically significant crystals seen.            10/31/2023 1300 10/31/2023 1926 Cell count with differential fluid [[id number]]    (Abnormal)   Synovial fluid from Knee, Right    Final result Component Value   No component results            10/31/2023 1300 10/31/2023 1924 Cell Count Body Fluid [[id number]]    (Abnormal)   Synovial fluid from Knee, Right    Final result Component Value Units   Color Yellow    Clarity Hazy    Cell Count Fluid Source Knee, Right    Total Nucleated Cells 702 /uL             "10/31/2023 1300 10/31/2023 1926 Differential Body Fluid [[id number]]    Synovial fluid from Knee, Right    Final result Component Value Units   % Neutrophils 20 %   % Lymphocytes 46 %   % Monocyte/Macrophages 34 %                     Pertinent Radiology  personally reviewed.     POC US Guidance Needle Placement    Result Date: 11/2/2023  Ultrasound was performed as guidance to an anesthesia procedure.  Click \"PACS images\" hyperlink below to view any stored images.  For specific procedure details, view procedure note authored by anesthesia.         "

## 2023-11-03 NOTE — PLAN OF CARE
Problem: Adult Inpatient Plan of Care  Goal: Optimal Comfort and Wellbeing  Outcome: Progressing  Intervention: Monitor Pain and Promote Comfort  Recent Flowsheet Documentation  Taken 11/2/2023 2221 by Leeanna Man, RN  Pain Management Interventions: medication (see MAR)   Goal Outcome Evaluation:       Pt A&Ox4, VSS on 3L NC. Able to call appropriatly and make needs known. Voiding well. CMS intact. Rating pain 10/10, controlled with 10mg oxycodone but needed one dose of IV dilaudid overnight for breakthrough pain. Wearing CPAP with 3L oxygen, sats in mid 90s. Bed alarm on for safety. Drain in place.

## 2023-11-03 NOTE — PROGRESS NOTES
[facility] MEDICINE  PROGRESS NOTE     Code Status: Full Code  Procedure(s):  RIGHT KNEE IRRIGATION AND DEBRIDEMENT  POLYETHELYNE EXCHANGE  1 Day Post-Op  Identification/Summary:   Gonzalez Morton is a 63 year old male with a PMH of HTN, CAD, CABG, ERIN on CPAP, hyperlipidemia, prostate cancer.  11/2/2023 admitted for right knee I&D and polyethylene exchange.  Postoperatively doing well.  Infectious disease consulted.     Assessment and Plan:    Status post right knee I&D and polyethylene exchange 11/2/2023  Postoperative management per orthopedics.  Infectious disease consulted.  Follow cultures.  Prior to surgery had been on Keflex.  Received cefazolin x2 doses and vancomycin.  Acute blood loss anemia  Preoperative hemoglobin 14.2.  Postoperatively down to 13.1--> 12.5.  No indication for transfusion at this time.  Follow per protocols.  Leukocytosis  Immediate postop white count 15.7 then down to 11.6.  No further checks indicated.  CAD.  CABG.  Ordered home Norvasc, Coreg, HCTZ and lisinopril with hold parameters.  Obstructive sleep apnea  Utilize home CPAP.  Hyperlipidemia  Ordered home Lipitor.  Rosacea  Continue home MetroGel.  History of prostate cancer  History of prostatectomy  Noted.  Follow PVRs per standard protocols.    Anticoagulation   Aspirin 81 mg twice daily ordered by orthopedics.  Routine postoperative risk  COVID-19 PCR not tested    Fluids: Per surgery  Pain meds: Per surgery  Therapy: Per surgery  Strong:Not present  Lines: None       Current Diet  Orders Placed This Encounter      Advance Diet as Tolerated: Regular Diet Adult    Supplements  None    Barriers to Discharge: Surgical cultures, intravenous antibiotics    Disposition: To be determined    Clinically Significant Risk Factors Present on Admission          # Hypocalcemia: Lowest Ca = 8.3 mg/dL in last 2 days, will monitor and replace as appropriate     # Hypoalbuminemia: Lowest albumin = 3.3 g/dL  "at 11/3/2023  6:05 AM, will monitor as appropriate   # Drug Induced Platelet Defect: home medication list includes an antiplatelet medication   # Hypertension: Noted on problem list      # Obesity: Estimated body mass index is 37.94 kg/m  as calculated from the following:    Height as of this encounter: 1.753 m (5' 9\").    Weight as of this encounter: 116.5 kg (256 lb 14.4 oz).        # History of CABG: noted on surgical history       Interval History/Subjective:  Patient doing well.  Pain under good control.  No chest pain.  No shortness of breath.  No nausea or vomiting.  No lightheadedness or dizziness.  Questions answered to verbalized satisfaction.      Last 24H PRN:     HYDROmorphone (DILAUDID) injection 0.2 mg, 0.2 mg at 11/03/23 0158 **OR** HYDROmorphone (DILAUDID) injection 0.4 mg    oxyCODONE (ROXICODONE) tablet 5 mg **OR** oxyCODONE (ROXICODONE) tablet 10 mg, 10 mg at 11/03/23 0434    Physical Exam/Objective:  Temp:  [97.7  F (36.5  C)-104.2  F (40.1  C)] 97.8  F (36.6  C)  Pulse:  [64-83] 64  Resp:  [18-29] 18  BP: (107-176)/(51-77) 109/66  SpO2:  [91 %-98 %] 94 %  Wt Readings from Last 4 Encounters:   11/02/23 116.5 kg (256 lb 14.4 oz)   10/24/23 119 kg (262 lb 5 oz)   10/13/23 118.3 kg (260 lb 12.8 oz)   09/14/23 111.1 kg (245 lb)     Body mass index is 37.94 kg/m .    Constitutional: awake, alert, cooperative, no apparent distress, and appears stated age  ENT: Normocephalic, without obvious abnormality, atraumatic, external ears without lesions, oral pharynx with moist mucous membranes, tonsils without erythema or exudates, gums normal and good dentition.  Respiratory: No increased work of breathing, good air exchange, clear to auscultation bilaterally, no crackles or wheezing  Cardiovascular: Normal apical impulse, regular rate and rhythm, normal S1 and S2, no S3 or S4, and no murmur noted  GI: No scars, normal bowel sounds, soft, non-distended, non-tender, no masses palpated, no " "hepatosplenomegally  Skin: normal skin color, texture, turgor, no redness, warmth, or swelling, and no rashes  Musculoskeletal: Surgical dressing left in place.  Limited exam secondary to recent surgery.  Otherwise no redness, warmth, or swelling of the joints.  Motor strength  Tone is normal. no lower extremity pitting edema present  Neurologic: Cranial nerves II-XII are grossly intact. Sensory:  Sensory intact  Neuropsychiatric: General: normal, calm, and normal eye contact Level of consciousness: alert / normal Affect: normal Orientation: oriented to self, place, time and situation Memory and insight: normal, memory for past and recent events intact, and thought process normal      Medications:   Personally Reviewed.  Medications    lactated ringers 75 mL/hr at 11/02/23 2330      acetaminophen  975 mg Oral Q8H    amLODIPine  5 mg Oral Daily    aspirin  81 mg Oral BID    atorvastatin  80 mg Oral QPM    carvedilol  3.125 mg Oral BID w/meals    ceFAZolin  2 g Intravenous Q8H    hydrochlorothiazide  25 mg Oral Daily    lisinopril  20 mg Oral Daily    metroNIDAZOLE   Topical BID    polyethylene glycol  17 g Oral Daily    pramipexole  0.25 mg Oral At Bedtime    senna-docusate  1 tablet Oral BID    sodium chloride (PF)  3 mL Intracatheter Q8H    traZODone   mg Oral At Bedtime    vancomycin  1,250 mg Intravenous Q12H       Data reviewed today: I personally reviewed all new medications, labs, imaging/diagnostics reports over the past 24 hours. Pertinent findings include:    Imaging:   Recent Results (from the past 24 hour(s))   POC US Guidance Needle Placement    Narrative    Ultrasound was performed as guidance to an anesthesia procedure.  Click   \"PACS images\" hyperlink below to view any stored images.  For specific   procedure details, view procedure note authored by anesthesia.       Labs:  POC US Guidance Needle Placement   Final Result        Recent Results (from the past 24 hour(s))   Glucose by meter    " Collection Time: 11/02/23  3:51 PM   Result Value Ref Range    GLUCOSE BY METER POCT 120 (H) 70 - 99 mg/dL   CRP inflammation    Collection Time: 11/02/23  4:32 PM   Result Value Ref Range    CRP Inflammation 236.00 (H) <5.00 mg/L   Erythrocyte sedimentation rate auto    Collection Time: 11/02/23  4:32 PM   Result Value Ref Range    Erythrocyte Sedimentation Rate 41 (H) 0 - 20 mm/hr   Gram Stain    Collection Time: 11/02/23  7:08 PM    Specimen: Knee, Right; Synovial fluid   Result Value Ref Range    Gram Stain Result 1+ Gram positive cocci (A)     Gram Stain Result 4+ WBC seen (A)    Gram Stain    Collection Time: 11/02/23  7:17 PM    Specimen: Knee, Right; Tissue   Result Value Ref Range    Gram Stain Result No organisms seen     Gram Stain Result 4+ WBC seen    Gram Stain    Collection Time: 11/02/23  7:18 PM    Specimen: Knee, Right; Tissue   Result Value Ref Range    Gram Stain Result No organisms seen     Gram Stain Result 2+ WBC seen    CBC with platelets    Collection Time: 11/02/23  9:47 PM   Result Value Ref Range    WBC Count 15.7 (H) 4.0 - 11.0 10e3/uL    RBC Count 4.48 4.40 - 5.90 10e6/uL    Hemoglobin 13.1 (L) 13.3 - 17.7 g/dL    Hematocrit 38.0 (L) 40.0 - 53.0 %    MCV 85 78 - 100 fL    MCH 29.2 26.5 - 33.0 pg    MCHC 34.5 31.5 - 36.5 g/dL    RDW 12.4 10.0 - 15.0 %    Platelet Count 215 150 - 450 10e3/uL   Comprehensive metabolic panel    Collection Time: 11/02/23  9:47 PM   Result Value Ref Range    Sodium 133 (L) 135 - 145 mmol/L    Potassium 4.2 3.4 - 5.3 mmol/L    Carbon Dioxide (CO2) 28 22 - 29 mmol/L    Anion Gap 9 7 - 15 mmol/L    Urea Nitrogen 20.0 8.0 - 23.0 mg/dL    Creatinine 1.06 0.67 - 1.17 mg/dL    GFR Estimate 79 >60 mL/min/1.73m2    Calcium 8.8 8.8 - 10.2 mg/dL    Chloride 96 (L) 98 - 107 mmol/L    Glucose 148 (H) 70 - 99 mg/dL    Alkaline Phosphatase 100 40 - 129 U/L    AST 12 0 - 45 U/L    ALT 15 0 - 70 U/L    Protein Total 6.6 6.4 - 8.3 g/dL    Albumin 3.8 3.5 - 5.2 g/dL     Bilirubin Total 1.1 <=1.2 mg/dL   Magnesium    Collection Time: 11/02/23  9:47 PM   Result Value Ref Range    Magnesium 2.1 1.7 - 2.3 mg/dL   Procalcitonin    Collection Time: 11/02/23  9:47 PM   Result Value Ref Range    Procalcitonin 0.41 (H) <0.05 ng/mL   Blood gas venous    Collection Time: 11/02/23  9:48 PM   Result Value Ref Range    pH Venous 7.36 7.35 - 7.45    pCO2 Venous 53 (H) 35 - 50 mm Hg    pO2 Venous 24 (L) 25 - 47 mm Hg    Bicarbonate Venous 30 24 - 30 mmol/L    Base Excess/Deficit 4.3   mmol/L    Oxyhemoglobin Venous 38.9 (L) 70.0 - 75.0 %    O2 Sat, Venous 39.7 (L) 70.0 - 75.0 %   Basic metabolic panel    Collection Time: 11/02/23 10:34 PM   Result Value Ref Range    Sodium 131 (L) 135 - 145 mmol/L    Potassium 4.0 3.4 - 5.3 mmol/L    Chloride 96 (L) 98 - 107 mmol/L    Carbon Dioxide (CO2) 25 22 - 29 mmol/L    Anion Gap 10 7 - 15 mmol/L    Urea Nitrogen 19.4 8.0 - 23.0 mg/dL    Creatinine 0.92 0.67 - 1.17 mg/dL    GFR Estimate >90 >60 mL/min/1.73m2    Calcium 8.7 (L) 8.8 - 10.2 mg/dL    Glucose 169 (H) 70 - 99 mg/dL   UA with Microscopic reflex to Culture    Collection Time: 11/03/23  4:35 AM    Specimen: Urine, Midstream   Result Value Ref Range    Color Urine Yellow Colorless, Straw, Light Yellow, Yellow    Appearance Urine Clear Clear    Glucose Urine Negative Negative mg/dL    Bilirubin Urine Negative Negative    Ketones Urine Negative Negative mg/dL    Specific Gravity Urine 1.017 1.001 - 1.030    Blood Urine Negative Negative    pH Urine 5.5 5.0 - 7.0    Protein Albumin Urine 30 (A) Negative mg/dL    Urobilinogen Urine <2.0 <2.0 mg/dL    Nitrite Urine Negative Negative    Leukocyte Esterase Urine Negative Negative    Mucus Urine Present (A) None Seen /LPF    RBC Urine <1 <=2 /HPF    WBC Urine 7 (H) <=5 /HPF   Hemoglobin    Collection Time: 11/03/23  6:05 AM   Result Value Ref Range    Hemoglobin 12.5 (L) 13.3 - 17.7 g/dL   Creatinine    Collection Time: 11/03/23  6:05 AM   Result Value Ref  Range    Creatinine 0.89 0.67 - 1.17 mg/dL    GFR Estimate >90 >60 mL/min/1.73m2   Procalcitonin    Collection Time: 11/03/23  6:05 AM   Result Value Ref Range    Procalcitonin 0.47 (H) <0.05 ng/mL   Glucose    Collection Time: 11/03/23  6:05 AM   Result Value Ref Range    Glucose 127 (H) 70 - 99 mg/dL   Comprehensive metabolic panel    Collection Time: 11/03/23  6:05 AM   Result Value Ref Range    Sodium 132 (L) 135 - 145 mmol/L    Potassium 3.8 3.4 - 5.3 mmol/L    Carbon Dioxide (CO2) 25 22 - 29 mmol/L    Anion Gap 10 7 - 15 mmol/L    Urea Nitrogen 16.7 8.0 - 23.0 mg/dL    Creatinine 0.91 0.67 - 1.17 mg/dL    GFR Estimate >90 >60 mL/min/1.73m2    Calcium 8.3 (L) 8.8 - 10.2 mg/dL    Chloride 97 (L) 98 - 107 mmol/L    Glucose 127 (H) 70 - 99 mg/dL    Alkaline Phosphatase 94 40 - 129 U/L    AST 10 0 - 45 U/L    ALT 12 0 - 70 U/L    Protein Total 6.4 6.4 - 8.3 g/dL    Albumin 3.3 (L) 3.5 - 5.2 g/dL    Bilirubin Total 1.1 <=1.2 mg/dL   CBC with platelets and differential    Collection Time: 11/03/23  6:05 AM   Result Value Ref Range    WBC Count 11.6 (H) 4.0 - 11.0 10e3/uL    RBC Count 4.32 (L) 4.40 - 5.90 10e6/uL    Hemoglobin 12.5 (L) 13.3 - 17.7 g/dL    Hematocrit 36.6 (L) 40.0 - 53.0 %    MCV 85 78 - 100 fL    MCH 29.2 26.5 - 33.0 pg    MCHC 34.1 31.5 - 36.5 g/dL    RDW 12.3 10.0 - 15.0 %    Platelet Count 232 150 - 450 10e3/uL    % Neutrophils 81 %    % Lymphocytes 5 %    % Monocytes 10 %    % Eosinophils 3 %    % Basophils 0 %    % Immature Granulocytes 1 %    NRBCs per 100 WBC 0 <1 /100    Absolute Neutrophils 9.5 (H) 1.6 - 8.3 10e3/uL    Absolute Lymphocytes 0.6 (L) 0.8 - 5.3 10e3/uL    Absolute Monocytes 1.2 0.0 - 1.3 10e3/uL    Absolute Eosinophils 0.3 0.0 - 0.7 10e3/uL    Absolute Basophils 0.1 0.0 - 0.2 10e3/uL    Absolute Immature Granulocytes 0.1 <=0.4 10e3/uL    Absolute NRBCs 0.0 10e3/uL       Pending Labs:  Unresulted Labs Ordered in the Past 30 Days of this Admission       Date and Time Order Name  Status Description    11/2/2023  9:30 PM Blood Culture Arm, Left In process     11/2/2023  9:30 PM Blood Culture Arm, Right In process     11/2/2023  7:19 PM Anaerobic Bacterial Culture Routine In process     11/2/2023  7:18 PM Tissue Aerobic Bacterial Culture Routine In process     11/2/2023  7:18 PM Tissue Aerobic Bacterial Culture Routine In process     11/2/2023  7:18 PM Anaerobic Bacterial Culture Routine In process     11/2/2023  7:09 PM Synovial fluid Aerobic Bacterial Culture Routine In process     11/2/2023  7:09 PM Anaerobic Bacterial Culture Routine In process     10/31/2023  3:04 PM ANAEROBIC BACTERIAL CULTURE ROUTINE Preliminary     10/31/2023  3:04 PM AEROBIC BACTERIAL CULTURE ROUTINE Preliminary               Jono Meyer MD  [facility] Medicine  [facility]  Phone: #[phone]

## 2023-11-03 NOTE — ANESTHESIA CARE TRANSFER NOTE
Patient: Gonzalez Morton    Procedure: Procedure(s):  RIGHT KNEE IRRIGATION AND DEBRIDEMENT  POLYETHELYNE EXCHANGE       Diagnosis: Infection of prosthesis, initial encounter (H24) [T85.79XA]  Diagnosis Additional Information: No value filed.    Anesthesia Type:   General     Note:    Oropharynx: oropharynx clear of all foreign objects  Level of Consciousness: awake  Oxygen Supplementation: face mask  Level of Supplemental Oxygen (L/min / FiO2): 6  Independent Airway: airway patency satisfactory and stable  Dentition: dentition unchanged  Vital Signs Stable: post-procedure vital signs reviewed and stable  Report to RN Given: handoff report given  Patient transferred to: PACU    Handoff Report: Identifed the Patient, Identified the Reponsible Provider, Reviewed the pertinent medical history, Discussed the surgical course, Reviewed Intra-OP anesthesia mangement and issues during anesthesia, Set expectations for post-procedure period and Allowed opportunity for questions and acknowledgement of understanding      Vitals:  Vitals Value Taken Time   /70 11/02/23 2024   Temp 37.3  C (99.1  F) 11/02/23 2024   Pulse 83 11/02/23 2024   Resp 19 11/02/23 2024   SpO2 95 % 11/02/23 2024       Electronically Signed By: ANGELA Malave CRNA  November 2, 2023  8:25 PM

## 2023-11-03 NOTE — PROGRESS NOTES
Therapy: IV abx   Insurance: [facility]  Ded: $750  Met: $750    Co-Insurance: 90/10  Max Out of Pocket: $4500  Met: $4500      In reference to admission on 11/2/23 to check IV abx coverage      Please contact Intake with any questions, [phone] or In Basket pool, [facility] ([phone]).

## 2023-11-03 NOTE — CONSULTS
Care Management Initial Consult    General Information  Assessment completed with: Patient,    Type of CM/SW Visit: Initial Assessment    Primary Care Provider verified and updated as needed:     Readmission within the last 30 days:        Reason for Consult: discharge planning  Advance Care Planning:            Communication Assessment  Patient's communication style: spoken language (English or Bilingual)    Hearing Difficulty or Deaf: yes   Wear Glasses or Blind: yes    Cognitive  Cognitive/Neuro/Behavioral: WDL  Level of Consciousness: (P) lethargic  Arousal Level: (P) opens eyes spontaneously           Speech: (P) clear    Living Environment:   People in home: spouse, child(jazzmine), adult     Current living Arrangements: house      Able to return to prior arrangements: yes       Family/Social Support:  Care provided by: self  Provides care for: no one  Marital Status:   Wife, Children          Description of Support System: Supportive, Involved    Support Assessment: Adequate family and caregiver support, Adequate social supports    Current Resources:   Patient receiving home care services: No     Community Resources: None  Equipment currently used at home:  (reacher)  Supplies currently used at home:      Employment/Financial:  Employment Status:          Financial Concerns: none   Referral to Financial Worker: No       Does the patient's insurance plan have a 3 day qualifying hospital stay waiver?  No    Lifestyle & Psychosocial Needs:  Social Determinants of Health     Food Insecurity: Unknown (10/10/2023)    Food Insecurity     Within the past 12 months, did you worry that your food would run out before you got money to buy more?: Patient refused     Within the past 12 months, did the food you bought just not last and you didn t have money to get more?: Patient refused   Depression: Not at risk (5/17/2023)    PHQ-2     PHQ-2 Score: 0   Housing Stability: Low Risk  (10/10/2023)    Housing Stability     Do  you have housing? : Yes     Are you worried about losing your housing?: No   Tobacco Use: Low Risk  (11/3/2023)    Patient History     Smoking Tobacco Use: Never     Smokeless Tobacco Use: Never     Passive Exposure: Not on file   Financial Resource Strain: Low Risk  (10/10/2023)    Financial Resource Strain     Within the past 12 months, have you or your family members you live with been unable to get utilities (heat, electricity) when it was really needed?: No   Alcohol Use: Not on file   Transportation Needs: Low Risk  (10/10/2023)    Transportation Needs     Within the past 12 months, has lack of transportation kept you from medical appointments, getting your medicines, non-medical meetings or appointments, work, or from getting things that you need?: No   Physical Activity: Not on file   Interpersonal Safety: Low Risk  (10/13/2023)    Interpersonal Safety     Do you feel physically and emotionally safe where you currently live?: Yes     Within the past 12 months, have you been hit, slapped, kicked or otherwise physically hurt by someone?: No     Within the past 12 months, have you been humiliated or emotionally abused in other ways by your partner or ex-partner?: No   Stress: Not on file   Social Connections: Not on file       Functional Status:  Prior to admission patient needed assistance:   Dependent ADLs:: Independent  Dependent IADLs:: Independent            Additional Information:  SW met with pt for initial assessment. Pt notes he is not feeling great. Assessment kept short. Pt reports living with his wife and adult son in a house. He reports he is normally independent with no services at home. Pt reports understanding of plan for home IV abx. SW informed him of the process including a referral to [facility] who will review his benefits for coverage. Discussed that he would be updated on potential costs and that once things are arranged that he and his wife if he wants her included will get  teaching on how to do home infusion. Discussed that sometimes pts have home nursing visits as well. Pt reports understanding. He denies other questions right now.    Previous SW sent home infusion referral. CM will follow and assist with discharge planning.    YASEMIN Sparrow

## 2023-11-03 NOTE — PLAN OF CARE
Goal Outcome Evaluation:  Pt is alert & pleasant & able to make needs known. He had his right knee replaced approximately 6 weeks ago, and began noticing symptoms of a possible infection about a day ago. Pt came back into the hospital yesterday, and an incision & drainage procedure was done yesterday evening. Pt has a hemovac in place that's putting out bloody output. Pt has ambulated with therapy, but his pain significantly increases with activity. The cultures from his procedure have already produced a preliminary result. He will likely require long term antibiotics upon discharge.

## 2023-11-03 NOTE — PROGRESS NOTES
11/03/23 0840   Appointment Info   Signing Clinician's Name / Credentials (OT) Catherine Mendoza OTR/L   Rehab Comments (OT) OT Evaluation   Living Environment   People in Home child(jazzmine), adult;spouse   Current Living Arrangements house   Living Environment Comments Can stay on one level, RTS, WIS and tub/shower   Self-Care   Usual Activity Tolerance fair   Current Activity Tolerance good   Equipment Currently Used at Home   (reacher)   Instrumental Activities of Daily Living (IADL)   IADL Comments Independent with all ADLs and IADLs at baseline   General Information   Onset of Illness/Injury or Date of Surgery 11/03/23   Referring Physician Bradley Redd   Patient/Family Therapy Goal Statement (OT) To get up and move   Existing Precautions/Restrictions   (ROM tolerated, 0 degrees to flexion as tolerated)   Right Lower Extremity (Weight-bearing Status) weight-bearing as tolerated (WBAT)   General Observations and Info Pt reports no hearing in left ear   Cognitive Status Examination   Cognitive Status Comments Pt following directions, advocating for self I'ly and answering questions appropriately.   Posture   Posture forward head position   Range of Motion Comprehensive   General Range of Motion bilateral upper extremity ROM WFL   Strength Comprehensive (MMT)   General Manual Muscle Testing (MMT) Assessment upper extremity strength deficits identified   Coordination   Upper Extremity Coordination No deficits were identified   Bed Mobility   Bed Mobility supine-sit;sit-supine   Supine-Sit [place] (Bed Mobility) minimum assist (75% patient effort)   Transfers   Transfers sit-stand transfer;toilet transfer;shower transfer   Sit-Stand Transfer   Sit-Stand [place] (Transfers) minimum assist (75% patient effort)   Shower Transfer   [place] Level (Shower Transfer) minimum assist (75% patient effort)   Toilet Transfer   Type (Toilet Transfer) sit-stand;stand-sit   [place] Level (Toilet Transfer)  minimum assist (75% patient effort)   Balance   Balance Assessment standing dynamic balance   Balance Comments SBA   Activities of Daily Living   BADL Assessment/Intervention lower body dressing;toileting   Lower Body Dressing Assessment/Training   [place] Level (Lower Body Dressing) pants/bottoms;minimum assist (75% patient effort)   Toileting   [place] Level (Toileting) contact guard assist   Clinical Impression   Criteria for Skilled Therapeutic Interventions Met (OT) Yes, treatment indicated   OT Diagnosis Decreased ADL independence   Influenced by the following impairments R TKA completed in August 2023, developed infection now s/p I&D and poly exchange   OT Problem List-Impairments impacting ADL flexibility;mobility;pain   Assessment of Occupational Performance 1-3 Performance Deficits   Planned Therapy Interventions (OT) ADL retraining;transfer training   Clinical Decision Making Complexity (OT) problem focused assessment/low complexity   Risk & Benefits of therapy have been explained evaluation/treatment results reviewed   OT Total Evaluation Time   OT Eval, Low Complexity Minutes (09139) 10   OT Goals   Therapy Frequency (OT) Daily   OT Predicted Duration/Target Date for Goal Attainment 11/06/23   OT Goals Lower Body Dressing;Bed Mobility;Transfers;Toilet Transfer/Toileting   OT: Lower Body Dressing Modified independent   OT: Bed Mobility Modified independent   OT: Transfer Modified independent   OT: Toilet Transfer/Toileting Modified independent;Goal Met   Interventions   Interventions Quick Adds Self-Care/Home Management   Self-Care/Home Management   Self-Care/Home Mgmt/ADL, Compensatory, Meal Prep Minutes (29682) 23   Treatment Detail/Skilled Intervention Pt is up in chair upon arrival and agreeable to OT evaluation. Ed pt on precautions and safe tech w/fx mobility. Verbal cues and min as to manage drain w/donning underware and short. Pt CGA w/STS w/vc for safety. Ambulated to bathroom w/FWW  and vc for safe tech. Followed vc for toilet transfer, mod a to doff underware shorts. Completed oral cares in standing w/SBA. Following vc turned around w/FWW and stood for toileting. Returned to chair w/CGA and min a to elevate legs of recliner.   OT Discharge Planning   OT Plan Bed mobility, shower transfer. le dressing w/AE?   OT Discharge Recommendation (DC Rec) home with assist   OT Rationale for DC Rec Pt has good family support and demonstrating SBA/Mod I w/fx transfers and ADLs. Most limiting factor is increased pain.   OT Brief overview of current status Mod I/SBA fx mod & ADLs   Total Session Time   Timed Code Treatment Minutes 23   Total Session Time (sum of timed and untimed services) 33

## 2023-11-03 NOTE — PHARMACY-ADMISSION MEDICATION HISTORY
"Pharmacist Admission Medication History    Admission medication history is complete. The information provided in this note is only as accurate as the sources available at the time of the update.    Information Source(s): Patient and CareEverywhere/SureScripts via in-person    Pertinent Information: Patient said he takes aspirin 162mg bid due to his heart issues    Changes made to PTA medication list:  Added: cephalexin, oxycodone  Deleted: None  Changed: aspirin, tylenol to prn         Allergies reviewed with patient and updates made in EHR: yes    Medication History Completed By: Tracy Coronel RPH 11/3/2023 8:20 AM    PTA Med List   Medication Sig Note Last Dose    acetaminophen (TYLENOL) 325 MG tablet Take 975 mg by mouth every 8 hours as needed for mild pain  Past Month    amLODIPine (NORVASC) 5 MG tablet Take 1 tablet (5 mg) by mouth daily  11/2/2023 at 0800    aspirin 81 MG EC tablet Take 162 mg by mouth 2 times daily  11/1/2023 at AM    atorvastatin (LIPITOR) 80 MG tablet Take 1 tablet (80 mg) by mouth every evening  11/1/2023 at 2000    carvedilol (COREG) 3.125 MG tablet Take 1 tablet (3.125 mg) by mouth 2 times daily (with meals) To replace metoprolol  11/2/2023 at 0800    cephALEXin (KEFLEX) 500 MG capsule Take 500 mg by mouth 4 times daily 11/3/2023: 7 days from 11/1/23 11/2/2023 at x 2 doses    hydrochlorothiazide (HYDRODIURIL) 25 MG tablet TAKE 1 TABLET BY MOUTH EVERY DAY  11/1/2023 at 0800    lisinopril (ZESTRIL) 20 MG tablet [LISINOPRIL (PRINIVIL,ZESTRIL) 20 MG TABLET] TAKE 1 TABLET BY MOUTH EVERY DAY  11/1/2023 at 0800    metroNIDAZOLE (METROGEL) 0.75 % external gel Apply topically 2 times daily (Patient taking differently: Apply topically 2 times daily as needed (.))  10/30/2023 at 2000    nitroGLYcerin (NITROSTAT) 0.4 MG sublingual tablet One tablet under the tongue every 5 minutes if needed for chest pain. May repeat every 5 minutes for a maximum of 3 doses in 15 minutes\" 11/2/2023: Never used   " More than a month    oxyCODONE (ROXICODONE) 5 MG tablet Take 5 mg by mouth every 4 hours as needed for severe pain  11/1/2023 at PM    pramipexole (MIRAPEX) 0.25 MG tablet TAKE 1 TABLET BY MOUTH EVERYDAY AT BEDTIME  11/1/2023 at 2000    traZODone (DESYREL) 50 MG tablet Take 1-2 tablets ( mg) by mouth At Bedtime  11/1/2023 at 2000

## 2023-11-03 NOTE — ANESTHESIA POSTPROCEDURE EVALUATION
Patient: Gonzalez Morton    Procedure: Procedure(s):  RIGHT KNEE IRRIGATION AND DEBRIDEMENT  POLYETHELYNE EXCHANGE       Anesthesia Type:  General    Note:  Disposition: Inpatient   Postop Pain Control:             Sign Out: Well controlled pain   PONV: No   Neuro/Psych:             Sign Out: Acceptable/Baseline neuro status   Airway/Respiratory:             Sign Out: Acceptable/Baseline resp. status   CV/Hemodynamics:             Sign Out: Acceptable CV status   Other NRE: NONE   DID A NON-ROUTINE EVENT OCCUR?            Last vitals:  Vitals Value Taken Time   /77 11/02/23 2110   Temp 39  C (102.2  F) 11/02/23 2109   Pulse 79 11/02/23 2109   Resp 18 11/02/23 2109   SpO2 97 % 11/02/23 2109   Vitals shown include unfiled device data.    Electronically Signed By: Brice Mai MD  November 2, 2023  9:11 PM

## 2023-11-03 NOTE — PHARMACY-VANCOMYCIN DOSING SERVICE
Pharmacy Vancomycin Initial Note  Date of Service 2023  Patient's  1960  63 year old, male    Indication: Bone and Joint Infection    Current estimated CrCl = Estimated Creatinine Clearance: 89.8 mL/min (based on SCr of 1.06 mg/dL).    Creatinine for last 3 days  2023:  9:47 PM Creatinine 1.06 mg/dL    Recent Vancomycin Level(s) for last 3 days  No results found for requested labs within last 3 days.      Vancomycin IV Administrations (past 72 hours)        No vancomycin orders with administrations in past 72 hours.                    Nephrotoxins and other renal medications (From now, onward)      Start     Dose/Rate Route Frequency Ordered Stop    23 0000  vancomycin (VANCOCIN) 1,250 mg in 0.9% NaCl 250 mL intermittent infusion         1,250 mg  over 90 Minutes Intravenous EVERY 12 HOURS 23 2242 23 2359            Contrast Orders - past 72 hours (72h ago, onward)      None            [employer] Prediction of Planned Initial Vancomycin Regimen  Regimen: 1250 mg IV every 12 hours.  Start time: 22:46 on 2023  Exposure target: AUC24 (range)400-600 mg/L.hr   AUC24,ss: 537 mg/L.hr  Probability of AUC24 > 400: 81 %  Ctrough,ss: 17.9 mg/L  Probability of Ctrough,ss > 20: 39 %  Probability of nephrotoxicity (Lodise LI ): 14 %          Plan:  Start vancomycin  1250 mg IV q12h.   Vancomycin monitoring method: AUC  Vancomycin therapeutic monitoring goal: 400-600 mg*h/L  Pharmacy will check vancomycin levels as appropriate in 1-3 Days.    Serum creatinine levels will be ordered daily for the first week of therapy and at least twice weekly for subsequent weeks.      Jose Braun, [facility]

## 2023-11-03 NOTE — PROGRESS NOTES
11/03/23 1500   Appointment Info   Signing Clinician's Name / Credentials (PT) Janey Shoemaker, PT, DPT   Living Environment   People in Home child(jazzmine), adult;spouse   Current Living Arrangements house   Home Accessibility stairs to enter home;stairs within home   Number of Stairs, Main Entrance 3   Stair Railings, Main Entrance railings on both sides of stairs   Number of Stairs, Within Home, Primary greater than 10 stairs   Stair Railings, Within Home, Primary railing on right side (ascending)   Living Environment Comments can stay on main level   Self-Care   Equipment Currently Used at Home walker, rolling   General Information   Onset of Illness/Injury or Date of Surgery 11/02/23   Referring Physician Bradley Redd MD   Patient/Family Therapy Goals Statement (PT) to get better   Pertinent History of Current Problem (include personal factors and/or comorbidities that impact the POC) Infection associated with internal knee prosthesis   Existing Precautions/Restrictions weight bearing   Weight-Bearing Status - LLE full weight-bearing   Weight-Bearing Status - RLE weight-bearing as tolerated   Bed Mobility   Bed Mobility supine-sit;sit-supine   Supine-Sit [place] (Bed Mobility) verbal cues;minimum assist (75% patient effort)   Sit-Supine [place] (Bed Mobility) verbal cues;minimum assist (75% patient effort)   Comment, (Bed Mobility) Min assist x 1 with supine<>sit transfers. Verbal cues for safety. Assist with RLE.   Transfers   Transfers sit-stand transfer   Comment, (Transfers) SBA with FWW for sit<>stand transfers. Verbal cues for safety and safe hand placement.   Sit-Stand Transfer   Sit-Stand [place] (Transfers) supervision;verbal cues   Assistive Device (Sit-Stand Transfers) walker, front-wheeled   Comment, (Sit-Stand Transfer) SBA with FWW for sit<>stand transfers. Verbal cues for safety and safe hand placement.   Gait/Stairs (Locomotion)   [place] Level (Gait) supervision;verbal  cues   Assistive Device (Gait) walker, front-wheeled   Distance in Feet (Gait) 10'   Pattern (Gait) step-through   Deviations/Abnormal Patterns (Gait) antalgic;salinas decreased;gait speed decreased   Comment, (Gait/Stairs) SBA with FWW when ambulating. Verbal cues for safety and posture.   Clinical Impression   Criteria for Skilled Therapeutic Intervention Yes, treatment indicated   PT Diagnosis (PT) impaired functional mobility   Influenced by the following impairments weakness, pain   Functional limitations due to impairments transfers, ambulation, stairs   Clinical Presentation (PT Evaluation Complexity) stable   Clinical Presentation Rationale Pt presents as medically diagnosed   Clinical Decision Making (Complexity) low complexity   Planned Therapy Interventions (PT) balance training;bed mobility training;gait training;home exercise program;patient/family education;ROM (range of motion);stair training;strengthening;stretching;transfer training   Risk & Benefits of therapy have been explained evaluation/treatment results reviewed;patient;care plan/treatment goals reviewed   PT Total Evaluation Time   PT Eval, Low Complexity Minutes (20011) 10   Physical Therapy Goals   PT Frequency Daily   PT Predicted Duration/Target Date for Goal Attainment 11/08/23   PT Goals Transfers;Gait;Stairs   PT: Transfers Modified independent;Sit to/from stand;Assistive device  (FWW)   PT: Gait Supervision/stand-by assist;Assistive device;Rolling walker;Greater than 200 feet  (FWW)   PT: Stairs Supervision/stand-by assist;3 stairs;Rail on both sides   Interventions   Interventions Quick Adds Gait Training;Therapeutic Activity   Therapeutic Activity   Therapeutic Activities: dynamic activities to improve functional performance Minutes (96178) 10   Symptoms Noted During/After Treatment Fatigue;Increased pain   Treatment Detail/Skilled Intervention Pt supine in bed. Min assist x 1 with supine<>sit transfers. Verbal cues for safety.  Assist with RLE. Pt requires breaks due to pain. SBA with FWW for sit<>stand transfers. Verbal cues for safety and safe hand placement. Pt sitting up in bed at end of session with bed alarm on and call light within reach.   Gait Training   Gait Training Minutes (60413) 15   Symptoms Noted During/After Treatment (Gait Training) fatigue;increased pain   Treatment Detail/Skilled Intervention Pt ambulates with SBA and FWW. Verbal cues for safety and posture. Pt reports painful when ambulating and does not want to complete stairs at this time.   Distance in Feet 150'   [place] Level (Gait Training) stand-by assist   Physical Assistance Level (Gait Training) supervision;verbal cues   Weight Bearing (Gait Training) weight-bearing as tolerated   Assistive Device (Gait Training) rolling walker   Pattern Analysis (Gait Training) swing-to gait   Gait Analysis Deviations decreased [place];decreased step length;decreased toe-to-floor clearance   Impairments (Gait Analysis/Training) pain;ROM decreased;strength decreased   PT Discharge Planning   PT Plan progress transfers and ambulation, trial stairs, therex   PT Discharge Recommendation (DC Rec) (S)  home with assist;home with outpatient physical therapy   PT Rationale for DC Rec Pt reports good home support and good home setup. Pt would benefit from continued PT to improve functional mobility. Pt reports has FWW at home.   PT Brief overview of current status SBA with mobility, min assist x 1 with supine<>sit transfers   Total Session Time   Timed Code Treatment Minutes 25   Total Session Time (sum of timed and untimed services) 35     Janey Shoemaker, PT, DPT

## 2023-11-03 NOTE — CONSULTS
"[facility] MEDICINE CONSULTATION NOTE       Assessment & Plan      1. Fever of 102 -- s/p right knee irrigation and dedridement for prosthesis infection. Back in Aug 2023 - had knee replacement for \"bone on bone\" arthritis    - Blood cultures, CBC/CMP/VBG/procalcitonin  - cares with ortho    2. CAD s/p CABG  - PTA meds    3. Hypertension - controlled with meds and low sodium diet  4. ERIN - using CPAP, pulse ox   5. Hyperlipidemia    - PTA meds        Tony Lerma MD, MPH, FACP, [facility]  Internal Medicine - Hospitalist        Chief Complaint Right knee pain      HISTORY     - Patient was seen in PACU -- He has a temp of 102. He was awake and alert. No complaints other than post-op pain    - He is s/p right knee irrigation and dedridement for prosthesis infection  - Back in Aug2023 - had knee replacement for \"bone on bone\" arthritis  - In the last 4-5 days - swelling of knee and pain - was given cephalexin and did not work.  - Per records, had knee aspiration and grew Staph aureus.     - ROS --- No headache. No dizziness. No weakness. No CP or SOB. No palpitations. No abdominal pain. No nausea or vomiting. No urinary symptoms. No bleeding symptoms. No weight loss. Rest of 12 point ROS was reviewed and negative.       Past Medical History     Past Medical History:   Diagnosis Date    Abnormal cardiovascular stress test 04/03/2022    CAD (coronary artery disease)     Elevated coronary artery calcium score     Hypertension     Knee injury     right    Knee osteoarthritis 08/16/2023    Obese     Rosacea     Sleep apnea     CPAP         Surgical History     Past Surgical History:   Procedure Laterality Date    APPENDECTOMY      ARTHROPLASTY KNEE Right 8/16/2023    Procedure: RIGHT TOTAL KNEE ARTHROPLASTY;  Surgeon: Bradley Redd MD;  Location: [facility] Main OR    ARTHROSCOPY KNEE Bilateral     BYPASS GRAFT ARTERY CORONARY N/A 6/6/2022    Procedure: CORONARY ARTERY BYPASS GRAFT X 5, " ENDOSCOPIC VESSEL PROCUREMENT LEFT LEG, INTERNAL MAMMARY ARTERY HARVEST, LEFT RADIAL ARTERY HARVEST, ANESTHESIA TRANSESOPHAGEAL ECHOCARDIOGRAM, EPIAORTIC ULTRASOUND;  Surgeon: Mayank Rocha MD;  Location: [facility] Main OR    CV CORONARY ANGIOGRAM N/A 4/7/2022    Procedure: Coronary Angiogram;  Surgeon: Mery Lyons MD;  Location: [facility] CATH LAB CV    PROSTATECTOMY      TOTAL KNEE ARTHROPLASTY Left     WRIST SURGERY          Family History      Family History   Problem Relation Age of Onset    Diabetes Mother     Heart Disease Mother     Heart Disease Father     Atrophic kidney Sister     Diabetes Sister     Diabetes Type 1 Brother          Social History      .  Social History     Socioeconomic History    Marital status:      Spouse name: Not on file    Number of children: Not on file    Years of education: Not on file    Highest education level: Not on file   Occupational History    Not on file   Tobacco Use    Smoking status: Never    Smokeless tobacco: Never   Vaping Use    Vaping Use: Never used   Substance and Sexual Activity    Alcohol use: Yes     Alcohol/week: 1.0 standard drink of alcohol     Types: 1 Standard drinks or equivalent per week    Drug use: Never    Sexual activity: Not on file   Other Topics Concern    Not on file   Social History Narrative    Not on file     Social Determinants of Health     Financial Resource Strain: Low Risk  (10/10/2023)    Financial Resource Strain     Within the past 12 months, have you or your family members you live with been unable to get utilities (heat, electricity) when it was really needed?: No   Food Insecurity: Unknown (10/10/2023)    Food Insecurity     Within the past 12 months, did you worry that your food would run out before you got money to buy more?: Patient refused     Within the past 12 months, did the food you bought just not last and you didn t have money to get more?: Patient refused   Transportation Needs: Low Risk  (10/10/2023)     Transportation Needs     Within the past 12 months, has lack of transportation kept you from medical appointments, getting your medicines, non-medical meetings or appointments, work, or from getting things that you need?: No   Physical Activity: Not on file   Stress: Not on file   Social Connections: Not on file   Interpersonal Safety: Low Risk  (10/13/2023)    Interpersonal Safety     Do you feel physically and emotionally safe where you currently live?: Yes     Within the past 12 months, have you been hit, slapped, kicked or otherwise physically hurt by someone?: No     Within the past 12 months, have you been humiliated or emotionally abused in other ways by your partner or ex-partner?: No   Housing Stability: Low Risk  (10/10/2023)    Housing Stability     Do you have housing? : Yes     Are you worried about losing your housing?: No          Allergies        Allergies   Allergen Reactions    Metoprolol Tartrate Hives    Penicillins Hives     Tolerated CTX 06/2020, Ancef 6/2022         Prior to Admission Medications      No current facility-administered medications on file prior to encounter.  acetaminophen (TYLENOL) 325 MG tablet, Take 3 tablets (975 mg) by mouth every 8 hours  amLODIPine (NORVASC) 5 MG tablet, Take 1 tablet (5 mg) by mouth daily  aspirin 81 MG EC tablet, Take 1 tablet (81 mg) by mouth 2 times daily  atorvastatin (LIPITOR) 80 MG tablet, Take 1 tablet (80 mg) by mouth every evening  carvedilol (COREG) 3.125 MG tablet, Take 1 tablet (3.125 mg) by mouth 2 times daily (with meals) To replace metoprolol  hydrochlorothiazide (HYDRODIURIL) 25 MG tablet, TAKE 1 TABLET BY MOUTH EVERY DAY  lisinopril (ZESTRIL) 20 MG tablet, [LISINOPRIL (PRINIVIL,ZESTRIL) 20 MG TABLET] TAKE 1 TABLET BY MOUTH EVERY DAY  metroNIDAZOLE (METROGEL) 0.75 % external gel, Apply topically 2 times daily  nitroGLYcerin (NITROSTAT) 0.4 MG sublingual tablet, One tablet under the tongue every 5 minutes if needed for chest pain. May  "repeat every 5 minutes for a maximum of 3 doses in 15 minutes\"  pramipexole (MIRAPEX) 0.25 MG tablet, TAKE 1 TABLET BY MOUTH EVERYDAY AT BEDTIME  traZODone (DESYREL) 50 MG tablet, Take 1-2 tablets ( mg) by mouth At Bedtime            Review of Systems     A 12 point comprehensive review of systems was negative except as noted above in HPI.    PHYSICAL EXAMINATION       Vitals      Vitals: BP (!) 143/77   Pulse 79   Temp (!) 102  F (38.9  C)   Resp 19   Ht 1.753 m (5' 9\")   Wt 116.5 kg (256 lb 14.4 oz)   SpO2 98%   BMI 37.94 kg/m    BMI= Body mass index is 37.94 kg/m .      Examination     General Appearance:  Alert, cooperative, no distress  Head:    Normocephalic, without obvious abnormality, atraumatic  EENT:  PERRL, conjunctiva/corneas clear, EOM's intact.   Neck:   Supple, symmetrical, trachea midline, no adenopathy; no NVE  Back:  Symmetric, no curvature, no CVA tenderness  Chest/Lungs: Clear to auscultation bilaterally, respirations unlabored, No tenderness or deformity. No abdominal breathing or use of accessory muscles.   Heart:    Regular rate and rhythm, S1 and S2 normal, no murmur, rub   or gallop  Abdomen: Soft, non-tender, bowel sounds active all four quadrants, not peritoneal on palpation. Not distended  Extremities:  Extremities normal, atraumatic, no swelling  - right knee s/p surgery  Skin:  Skin color, texture, turgor normal, no rashes or lesion  Neurologic:  Awake and alert,  No lateralizing or localizing signs     Pertinent Lab     Results for orders placed or performed during the hospital encounter of 11/02/23   Glucose by meter   Result Value Ref Range    GLUCOSE BY METER POCT 120 (H) 70 - 99 mg/dL   Result Value Ref Range    CRP Inflammation 236.00 (H) <5.00 mg/L   Erythrocyte sedimentation rate auto   Result Value Ref Range    Erythrocyte Sedimentation Rate 41 (H) 0 - 20 mm/hr           Pertinent Radiology         "

## 2023-11-03 NOTE — BRIEF OP NOTE
[facility]    Brief Operative Note    Pre-operative diagnosis: Infection of prosthesis, initial encounter (H24) [T85.79XA]  Post-operative diagnosis Same as pre-operative diagnosis    Procedure: RIGHT KNEE IRRIGATION AND DEBRIDEMENT, Right - Knee  POLYETHELYNE EXCHANGE, Right - Knee    Surgeon: Surgeon(s) and Role:     * Bradley Redd MD - Primary     * Cuba Smith PA-C - Assisting  Anesthesia: Spinal   Estimated Blood Loss: 400 ml    Drains: Hemovac  Specimens:   ID Type Source Tests Collected by Time Destination   A : RIGHT KNEE FLUID FOR CULTURE Body fluid, unsp Knee, Right ANAEROBIC BACTERIAL CULTURE ROUTINE, GRAM STAIN, AEROBIC BACTERIAL CULTURE ROUTINE Bradley Redd MD 11/2/2023  7:08 PM    B : RIGHT KNEE Tissue Knee, Right GRAM STAIN, AEROBIC BACTERIAL CULTURE ROUTINE Bradley Redd MD 11/2/2023  7:17 PM    C : RIGHT KNEE SWAB Swab Knee, Right ANAEROBIC BACTERIAL CULTURE ROUTINE, GRAM STAIN, AEROBIC BACTERIAL CULTURE ROUTINE Bradley Redd MD 11/2/2023  7:18 PM    D : RIGHT KNEE Tissue Knee, Right ANAEROBIC BACTERIAL CULTURE ROUTINE Bradley Redd MD 11/2/2023  7:19 PM      Findings:   Evidence of infection: deep space infection.  .  Implants:   Implant Name Type Inv. Item Serial No.  Lot No. LRB No. Used Action   IMP INSERT TIBIAL STRK TRI 5X14MM [id number] - [id number] Total Joint Component/Insert IMP INSERT TIBIAL STRK TRI 5X14MM [id number]  [employer] [id number] Right 1 Explanted   IMP INSERT TIBIAL STRK TRI 5X16MM [id number] - [id number] Total Joint Component/Insert IMP INSERT TIBIAL STRK TRI 5X16MM [id number]  [employer] [id number] Right 1 Implanted

## 2023-11-04 ENCOUNTER — APPOINTMENT (OUTPATIENT)
Dept: OCCUPATIONAL THERAPY | Facility: CLINIC | Age: 63
DRG: 467 | End: 2023-11-04
Attending: ORTHOPAEDIC SURGERY
Payer: COMMERCIAL

## 2023-11-04 ENCOUNTER — APPOINTMENT (OUTPATIENT)
Dept: PHYSICAL THERAPY | Facility: CLINIC | Age: 63
DRG: 467 | End: 2023-11-04
Attending: ORTHOPAEDIC SURGERY
Payer: COMMERCIAL

## 2023-11-04 PROBLEM — A49.01 MSSA (METHICILLIN SUSCEPTIBLE STAPHYLOCOCCUS AUREUS) INFECTION: Status: ACTIVE | Noted: 2023-11-04

## 2023-11-04 LAB
CREAT SERPL-MCNC: 1.06 MG/DL (ref 0.67–1.17)
EGFRCR SERPLBLD CKD-EPI 2021: 79 ML/MIN/1.73M2
HGB BLD-MCNC: 11.4 G/DL (ref 13.3–17.7)
MAGNESIUM SERPL-MCNC: 2.1 MG/DL (ref 1.7–2.3)
POTASSIUM SERPL-SCNC: 3.8 MMOL/L (ref 3.4–5.3)

## 2023-11-04 PROCEDURE — 85018 HEMOGLOBIN: CPT | Performed by: ORTHOPAEDIC SURGERY

## 2023-11-04 PROCEDURE — 83735 ASSAY OF MAGNESIUM: CPT | Performed by: FAMILY MEDICINE

## 2023-11-04 PROCEDURE — 84132 ASSAY OF SERUM POTASSIUM: CPT | Performed by: FAMILY MEDICINE

## 2023-11-04 PROCEDURE — 82565 ASSAY OF CREATININE: CPT | Performed by: ORTHOPAEDIC SURGERY

## 2023-11-04 PROCEDURE — 250N000013 HC RX MED GY IP 250 OP 250 PS 637: Performed by: ORTHOPAEDIC SURGERY

## 2023-11-04 PROCEDURE — 250N000011 HC RX IP 250 OP 636: Mod: JZ | Performed by: ORTHOPAEDIC SURGERY

## 2023-11-04 PROCEDURE — 120N000001 HC R&B MED SURG/OB

## 2023-11-04 PROCEDURE — 97535 SELF CARE MNGMENT TRAINING: CPT | Mod: GO

## 2023-11-04 PROCEDURE — 97110 THERAPEUTIC EXERCISES: CPT | Mod: GP

## 2023-11-04 PROCEDURE — 97116 GAIT TRAINING THERAPY: CPT | Mod: GP

## 2023-11-04 PROCEDURE — 999N000157 HC STATISTIC RCP TIME EA 10 MIN

## 2023-11-04 PROCEDURE — 250N000011 HC RX IP 250 OP 636: Mod: JZ | Performed by: INTERNAL MEDICINE

## 2023-11-04 PROCEDURE — 36415 COLL VENOUS BLD VENIPUNCTURE: CPT | Performed by: ORTHOPAEDIC SURGERY

## 2023-11-04 PROCEDURE — 250N000013 HC RX MED GY IP 250 OP 250 PS 637: Performed by: FAMILY MEDICINE

## 2023-11-04 PROCEDURE — 99232 SBSQ HOSP IP/OBS MODERATE 35: CPT | Performed by: INTERNAL MEDICINE

## 2023-11-04 PROCEDURE — 99233 SBSQ HOSP IP/OBS HIGH 50: CPT | Performed by: FAMILY MEDICINE

## 2023-11-04 RX ORDER — POTASSIUM CHLORIDE 1500 MG/1
20 TABLET, EXTENDED RELEASE ORAL ONCE
Status: COMPLETED | OUTPATIENT
Start: 2023-11-04 | End: 2023-11-04

## 2023-11-04 RX ADMIN — ATORVASTATIN CALCIUM 80 MG: 40 TABLET, FILM COATED ORAL at 21:16

## 2023-11-04 RX ADMIN — SENNOSIDES AND DOCUSATE SODIUM 1 TABLET: 8.6; 5 TABLET ORAL at 21:16

## 2023-11-04 RX ADMIN — PRAMIPEXOLE DIHYDROCHLORIDE 0.25 MG: 0.25 TABLET ORAL at 21:15

## 2023-11-04 RX ADMIN — OXYCODONE HYDROCHLORIDE 10 MG: 5 TABLET ORAL at 14:25

## 2023-11-04 RX ADMIN — OXYCODONE HYDROCHLORIDE 10 MG: 5 TABLET ORAL at 10:32

## 2023-11-04 RX ADMIN — OXYCODONE HYDROCHLORIDE 10 MG: 5 TABLET ORAL at 02:17

## 2023-11-04 RX ADMIN — LISINOPRIL 20 MG: 20 TABLET ORAL at 08:00

## 2023-11-04 RX ADMIN — HYDROXYZINE HYDROCHLORIDE 25 MG: 25 TABLET, FILM COATED ORAL at 02:17

## 2023-11-04 RX ADMIN — HYDROXYZINE HYDROCHLORIDE 25 MG: 25 TABLET, FILM COATED ORAL at 14:24

## 2023-11-04 RX ADMIN — POLYETHYLENE GLYCOL 3350 17 G: 17 POWDER, FOR SOLUTION ORAL at 08:00

## 2023-11-04 RX ADMIN — CEFAZOLIN SODIUM 2 G: 2 INJECTION, SOLUTION INTRAVENOUS at 19:36

## 2023-11-04 RX ADMIN — ACETAMINOPHEN 975 MG: 325 TABLET ORAL at 05:34

## 2023-11-04 RX ADMIN — ASPIRIN 81 MG: 81 TABLET, COATED ORAL at 08:00

## 2023-11-04 RX ADMIN — CARVEDILOL 3.12 MG: 3.12 TABLET, FILM COATED ORAL at 16:47

## 2023-11-04 RX ADMIN — CEFAZOLIN SODIUM 2 G: 2 INJECTION, SOLUTION INTRAVENOUS at 12:23

## 2023-11-04 RX ADMIN — ASPIRIN 81 MG: 81 TABLET, COATED ORAL at 21:16

## 2023-11-04 RX ADMIN — HYDROCHLOROTHIAZIDE 25 MG: 25 TABLET ORAL at 08:00

## 2023-11-04 RX ADMIN — TRAZODONE HYDROCHLORIDE 50 MG: 50 TABLET ORAL at 21:16

## 2023-11-04 RX ADMIN — ACETAMINOPHEN 975 MG: 325 TABLET ORAL at 21:14

## 2023-11-04 RX ADMIN — CARVEDILOL 3.12 MG: 3.12 TABLET, FILM COATED ORAL at 07:53

## 2023-11-04 RX ADMIN — AMLODIPINE BESYLATE 5 MG: 5 TABLET ORAL at 08:00

## 2023-11-04 RX ADMIN — ACETAMINOPHEN 975 MG: 325 TABLET ORAL at 12:23

## 2023-11-04 RX ADMIN — OXYCODONE HYDROCHLORIDE 10 MG: 5 TABLET ORAL at 19:31

## 2023-11-04 RX ADMIN — CEFAZOLIN SODIUM 2 G: 2 INJECTION, SOLUTION INTRAVENOUS at 05:33

## 2023-11-04 RX ADMIN — POTASSIUM CHLORIDE 20 MEQ: 1500 TABLET, EXTENDED RELEASE ORAL at 10:32

## 2023-11-04 RX ADMIN — HYDROMORPHONE HYDROCHLORIDE 0.4 MG: 0.2 INJECTION, SOLUTION INTRAMUSCULAR; INTRAVENOUS; SUBCUTANEOUS at 21:13

## 2023-11-04 RX ADMIN — SENNOSIDES AND DOCUSATE SODIUM 1 TABLET: 8.6; 5 TABLET ORAL at 08:00

## 2023-11-04 RX ADMIN — OXYCODONE HYDROCHLORIDE 10 MG: 5 TABLET ORAL at 06:27

## 2023-11-04 ASSESSMENT — ACTIVITIES OF DAILY LIVING (ADL)
ADLS_ACUITY_SCORE: 27
ADLS_ACUITY_SCORE: 29
ADLS_ACUITY_SCORE: 27
ADLS_ACUITY_SCORE: 27
ADLS_ACUITY_SCORE: 29
ADLS_ACUITY_SCORE: 27
ADLS_ACUITY_SCORE: 29
ADLS_ACUITY_SCORE: 27
ADLS_ACUITY_SCORE: 29
ADLS_ACUITY_SCORE: 27

## 2023-11-04 NOTE — PROGRESS NOTES
Occupational Therapy Discharge Summary    Reason for therapy discharge:    Discharged to home.    Progress towards therapy goal(s). See goals on Care Plan in [facility] electronic health record for goal details.  Goals met    Therapy recommendation(s):    No further therapy is recommended.Family to assist with ADLs as needed.

## 2023-11-04 NOTE — PROGRESS NOTES
[facility] MEDICINE  PROGRESS NOTE     Code Status: Full Code  Procedure(s):  RIGHT KNEE IRRIGATION AND DEBRIDEMENT  POLYETHELYNE EXCHANGE  2 Days Post-Op  Identification/Summary:   Gonzalez Morton is a 63 year old male with a PMH of HTN, CAD, CABG, ERIN on CPAP, hyperlipidemia, prostate cancer.  11/2/2023 admitted for right knee I&D and polyethylene exchange.  Postoperatively doing well.  Infectious disease consulted.  Surgical cultures showing MSSA.  Blood cultures pending.     Assessment and Plan:     Status post right knee I&D and polyethylene exchange 11/2/2023  Septic arthritis with MSSA  Postoperative management per orthopedics and ID.  Prior to surgery had been on Keflex.  Given cefazolin x2 doses and vancomycin.  Surgical culture showing MSSA.  Blood cultures NGTD x2.  Antibiotics transitioned to cefazolin.  Further recommendations per infectious disease.  Acute blood loss anemia  Preoperative hemoglobin 14.2.  Postoperatively down to 13.1--> 12.5.  No indication for transfusion at this time.  Follow per protocols.  Leukocytosis  Immediate postop white count 15.7 then down to 11.6.  No further checks indicated.  CAD.  CABG.  Continue home Norvasc, Coreg, HCTZ and lisinopril with hold parameters.  Obstructive sleep apnea  Utilize home CPAP.  Hyperlipidemia  Continue home Lipitor.  Rosacea  Continue home MetroGel.  History of prostate cancer  History of prostatectomy  Noted.  Follow PVRs per standard protocols.     Anticoagulation   Aspirin 81 mg twice daily ordered by orthopedics.  Routine postoperative risk  COVID-19 PCR not tested     Fluids: Per surgery  Pain meds: Per surgery  Therapy: Per surgery   Strong:Not present  Lines: None       Current Diet  Orders Placed This Encounter      Advance Diet as Tolerated: Regular Diet Adult    Supplements  None    Barriers to Discharge: Intravenous antibiotics, blood cultures, ID recommendations    Disposition: To be  "determined    Clinically Significant Risk Factors          # Hypocalcemia: Lowest Ca = 8.3 mg/dL in last 2 days, will monitor and replace as appropriate     # Hypoalbuminemia: Lowest albumin = 3.3 g/dL at 11/3/2023  6:05 AM, will monitor as appropriate     # Hypertension: Noted on problem list        # Obesity: Estimated body mass index is 37.94 kg/m  as calculated from the following:    Height as of this encounter: 1.753 m (5' 9\").    Weight as of this encounter: 116.5 kg (256 lb 14.4 oz)., PRESENT ON ADMISSION     # Financial/Environmental Concerns: none   # History of CABG: noted on surgical history       Interval History/Subjective:  Patient doing well.  Pain under reasonable control.  No chest pain.  No shortness of breath.  No nausea or vomiting.  No lightheadedness or dizziness.  Questions answered to verbalized satisfaction.      Last 24H PRN:     HYDROmorphone (DILAUDID) injection 0.2 mg, 0.2 mg at 11/03/23 0158 **OR** HYDROmorphone (DILAUDID) injection 0.4 mg, 0.4 mg at 11/03/23 2348    hydrOXYzine (ATARAX) tablet 25 mg, 25 mg at 11/04/23 0217    oxyCODONE (ROXICODONE) tablet 5 mg **OR** oxyCODONE (ROXICODONE) tablet 10 mg, 10 mg at 11/04/23 0627    Physical Exam/Objective:  Temp:  [98.4  F (36.9  C)-99.6  F (37.6  C)] 98.5  F (36.9  C)  Pulse:  [54-78] 70  Resp:  [16-20] 16  BP: ()/(51-71) 114/71  SpO2:  [91 %-97 %] 91 %  Wt Readings from Last 4 Encounters:   11/02/23 116.5 kg (256 lb 14.4 oz)   10/24/23 119 kg (262 lb 5 oz)   10/13/23 118.3 kg (260 lb 12.8 oz)   09/14/23 111.1 kg (245 lb)     Body mass index is 37.94 kg/m .    Constitutional: awake, alert, cooperative, no apparent distress, and appears stated age  ENT: Normocephalic, without obvious abnormality, atraumatic, external ears without lesions, oral pharynx with moist mucous membranes, tonsils without erythema or exudates, gums normal and good dentition.  Respiratory: No increased work of breathing, good air exchange, clear to " auscultation bilaterally, no crackles or wheezing  Cardiovascular: Normal apical impulse, regular rate and rhythm, normal S1 and S2, no S3 or S4, and no murmur noted  GI: No scars, normal bowel sounds, soft, non-distended, non-tender, no masses palpated, no hepatosplenomegally  Skin: normal skin color, texture, turgor, no redness, warmth, or swelling, and no rashes  Musculoskeletal: Right knee surgical dressing and Ace wrap left intact.  Otherwise no redness, warmth, or swelling of the joints.  Motor strength is 5 out of 5 all extremities bilaterally.  Tone is normal. no lower extremity pitting edema present  Neurologic: Cranial nerves II-XII are grossly intact. Sensory:  Sensory intact  Neuropsychiatric: General: normal, calm, and normal eye contact Level of consciousness: alert / normal Affect: normal Orientation: oriented to self, place, time and situation Memory and insight: normal, memory for past and recent events intact, and thought process normal      Medications:   Personally Reviewed.  Medications    lactated ringers 25 mL/hr at 11/03/23 2350      acetaminophen  975 mg Oral Q8H    amLODIPine  5 mg Oral Daily    aspirin  81 mg Oral BID    atorvastatin  80 mg Oral QPM    carvedilol  3.125 mg Oral BID w/meals    ceFAZolin  2 g Intravenous Q8H    hydrochlorothiazide  25 mg Oral Daily    lisinopril  20 mg Oral Daily    polyethylene glycol  17 g Oral Daily    pramipexole  0.25 mg Oral At Bedtime    senna-docusate  1 tablet Oral BID    sodium chloride (PF)  3 mL Intracatheter Q8H    traZODone   mg Oral At Bedtime       Data reviewed today: I personally reviewed all new medications, labs, imaging/diagnostics reports over the past 24 hours. Pertinent findings include:    Imaging:   No results found for this or any previous visit (from the past 24 hour(s)).    Labs:  POC US Guidance Needle Placement   Final Result        Recent Results (from the past 24 hour(s))   Hemoglobin    Collection Time: 11/04/23  7:08  AM   Result Value Ref Range    Hemoglobin 11.4 (L) 13.3 - 17.7 g/dL   Creatinine    Collection Time: 11/04/23  7:08 AM   Result Value Ref Range    Creatinine 1.06 0.67 - 1.17 mg/dL    GFR Estimate 79 >60 mL/min/1.73m2   Magnesium    Collection Time: 11/04/23  7:08 AM   Result Value Ref Range    Magnesium 2.1 1.7 - 2.3 mg/dL   Potassium    Collection Time: 11/04/23  7:08 AM   Result Value Ref Range    Potassium 3.8 3.4 - 5.3 mmol/L       Pending Labs:  Unresulted Labs Ordered in the Past 30 Days of this Admission       Date and Time Order Name Status Description    11/2/2023  9:30 PM Blood Culture Arm, Left Preliminary     11/2/2023  9:30 PM Blood Culture Arm, Right Preliminary     11/2/2023  7:19 PM Anaerobic Bacterial Culture Routine Preliminary     11/2/2023  7:18 PM Tissue Aerobic Bacterial Culture Routine Preliminary     11/2/2023  7:18 PM Tissue Aerobic Bacterial Culture Routine Preliminary     11/2/2023  7:18 PM Anaerobic Bacterial Culture Routine Preliminary     11/2/2023  7:09 PM Synovial fluid Aerobic Bacterial Culture Routine Preliminary     11/2/2023  7:09 PM Anaerobic Bacterial Culture Routine Preliminary     10/31/2023  3:04 PM ANAEROBIC BACTERIAL CULTURE ROUTINE Preliminary               Jono Meyer MD  [facility]  Phone: #[phone]

## 2023-11-04 NOTE — PROGRESS NOTES
Pt needed Dilaudid IV at beginning of shift as toes externally rotated to RLE. Pillow propped alongside RLE for support added relief & pt able to sleep. Given Oxycodone & Atarax PO to maintain pain control and he has been able to sleep well with home cpap in place. Voiding in good amounts and PO intake adequate. IV to TKO. Cms intact to RLE & ace wrap dry. Hemovac to suction with dark red drainage. Afebrile & VSS.

## 2023-11-04 NOTE — PROGRESS NOTES
"INFECTIOUS DISEASE FOLLOW UP NOTE      ASSESSMENT:  MSSA R TKA PJI: s/p I&D, poly exchange . Aspiration 10/31 with MSSA. OR cultures are pending. High fever post operatively concerning for bacteremia but cultures negative of blood.  CAD s/p CABG    PLAN:  Cefazolin  PICC either tomorrow or Monday, single lumen  Set up home infusions  Likely add rifampin if no major drug interactions    Tarun Nolasco MD  [facility]  [phone] clinic  Am[employer] paging    ______________________________________________________________________    SUBJECTIVE / INTERVAL HISTORY: pain in knee. Drain removed. Temps better, sweats resolved. Reviewed results.     ROS: All other systems negative except as listed above.        OBJECTIVE:  /69 (BP Location: Right arm, Patient Position: Chair, Cuff Size: Adult Regular)   Pulse 78   Temp 98.1  F (36.7  C) (Oral)   Resp 16   Ht 1.753 m (5' 9\")   Wt 116.5 kg (256 lb 14.4 oz)   SpO2 98%   BMI 37.94 kg/m         Vital Signs  Temp: 98.1  F (36.7  C)  Temp src: Oral  Resp: 16  Pulse: 78  Pulse Rate Source: Monitor  BP: 112/69  BP Location: Right arm    Temp (24hrs), Av.6  F (37  C), Min:98.1  F (36.7  C), Max:99.6  F (37.6  C)      GEN: No acute distress.    RESPIRATORY:  Normal breathing pattern.   CARDIOVASCULAR:  Regular rate and rhythm. Normal S1 and S2.   ABDOMEN:  deferred  EXTREMITIES: knee dressing in place  SKIN/HAIR/NAILS:  No rashes  IV: peripheral        Antibiotics:  Cefazolin  Vanc     Pertinent labs:    Recent Labs   Lab 23  0708 23  0605 23  2147   WBC  --  11.6* 15.7*   HGB 11.4* 12.5*  12.5* 13.1*   HCT  --  36.6* 38.0*   PLT  --  232 215        Recent Labs   Lab 23  0605 23  2234 23  2147   * 131* 133*   CO2 25 25 28   BUN 16.7 19.4 20.0        Lab Results   Component Value Date    CRP 25.6 (H) 2020     Lab Results   Component Value Date    ALT 12 2023    AST 10 2023    " "ALKPHOS 94 11/03/2023         MICROBIOLOGY DATA:  MSSA  Blood cultures negative to date    RADIOLOGY:  POC US Guidance Needle Placement    Result Date: 11/2/2023  Ultrasound was performed as guidance to an anesthesia procedure.  Click \"PACS images\" hyperlink below to view any stored images.  For specific procedure details, view procedure note authored by anesthesia.          "

## 2023-11-04 NOTE — PLAN OF CARE
Goal Outcome Evaluation:  Pt is alert & pleasant & able to make needs known. He continues to have significant pain in his right knee. I did remove his hemovac today at the direction of the [place] christiano STALLINGS). Pt participated in therapy this afternoon and was able to do a few stairs. His pain was noticeably worse afterward and I gave him Oxy 10 mg PO at that time. Pt is also awaiting cultures to determine whether or not there will be long term antibiotics in his future.  Pt's IV was getting loose at the dressing so I applied some additional tape to promote security. He may get a PICC tomorrow as well

## 2023-11-04 NOTE — PLAN OF CARE
Patient alert and oriented. VSS. Ra Has LR infusing at 75ml/hr.  Able to make needs known. Tolerating Regulart diet. Ambulating and voiding adequately. A1 with walker and gaitbelt. PRN Oxycodone given for pain. Hemovac output of 50ml. Resting in bed comfortably, call light within reach.No other concerns at this time. Plan of care ongoing.

## 2023-11-04 NOTE — PROGRESS NOTES
"Orthopedic Progress Note      Assessment: 2 Days Post-Op  S/P Procedure(s):  RIGHT KNEE IRRIGATION AND DEBRIDEMENT  POLYETHELYNE EXCHANGE     Plan:   - Continue PT/OT  - Weightbearing status: WBAT  - Anticoagulation:  ASA  in addition to SCDs, marie stockings and early ambulation.  - Surgical cultures MSSA, plan for Ancef IV via PICC once blood cultures negative. Appreciate ID input  - Plan for removal of hemovac drain today.  - Discharge planning: home pending outpatient IV abx plan and PICC placement.      Subjective:  Pain: moderate  Nausea, Vomiting:  No  Lightheadedness, Dizziness:  No  Neuro:  Patient denies new onset numbness or paresthesias    Patient reports he is doing well today. Pain well controlled and doing well with mobilization. No fevers/chills/SOB overnight.    Objective:  /71 (BP Location: Left arm, Patient Position: Supine, Cuff Size: Adult Regular)   Pulse 70   Temp 98.5  F (36.9  C) (Oral)   Resp 16   Ht 1.753 m (5' 9\")   Wt 116.5 kg (256 lb 14.4 oz)   SpO2 91%   BMI 37.94 kg/m    The patient is A&Ox3. Appears comfortable.   Sensation is intact.  Dorsiflexion and plantar flexion is intact.  Dorsalis pedis pulse intact.  Calves are soft and non-tender. Negative Connie's.  The incision is covered. Dressing C/D/I.    Drain patent with small amount of bloody output. 30 ml out overnight    Pertinent Labs   Lab Results: personally reviewed.   Lab Results   Component Value Date    INR 1.28 (H) 06/06/2022    INR 1.39 (H) 06/06/2022    INR 1.05 05/31/2022     Lab Results   Component Value Date    WBC 11.6 (H) 11/03/2023    HGB 11.4 (L) 11/04/2023    HCT 36.6 (L) 11/03/2023    MCV 85 11/03/2023     11/03/2023     Lab Results   Component Value Date     (L) 11/03/2023    CO2 25 11/03/2023         Report completed by:  Cuba Smith PA-C, NINA  Date: 11/4/2023  Time: 9:38 AM    "

## 2023-11-04 NOTE — PROGRESS NOTES
[facility]    Referral received  from CLAUDIA Gonzalez, for IV antibiotics.    Benefits verified.  Pt has coverage for IV antibiotics under his [facility] plan.  Pt's deductible of $750 has been met in full.  Pt's out of pocket of $4500 has been met in full.  Coverage is at 100%.    Should pt require IV antibiotics at home, writer will speak with pt to review benefits, home infusion and to offer choice of agency/home infusion provider.    Thank you for the referral.    Leonila Rios RN, BSN  [facility] Liaison  [phone] (Mon through Fri, 8:00 am-5:00 pm)  [phone] (Office)

## 2023-11-05 ENCOUNTER — APPOINTMENT (OUTPATIENT)
Dept: PHYSICAL THERAPY | Facility: CLINIC | Age: 63
DRG: 467 | End: 2023-11-05
Attending: ORTHOPAEDIC SURGERY
Payer: COMMERCIAL

## 2023-11-05 LAB
BACTERIA TISS BX CULT: ABNORMAL
CREAT SERPL-MCNC: 0.91 MG/DL (ref 0.67–1.17)
EGFRCR SERPLBLD CKD-EPI 2021: >90 ML/MIN/1.73M2
MAGNESIUM SERPL-MCNC: 2.1 MG/DL (ref 1.7–2.3)
POTASSIUM SERPL-SCNC: 4.2 MMOL/L (ref 3.4–5.3)

## 2023-11-05 PROCEDURE — 250N000013 HC RX MED GY IP 250 OP 250 PS 637: Performed by: ORTHOPAEDIC SURGERY

## 2023-11-05 PROCEDURE — 36415 COLL VENOUS BLD VENIPUNCTURE: CPT | Performed by: ORTHOPAEDIC SURGERY

## 2023-11-05 PROCEDURE — 250N000009 HC RX 250: Performed by: INTERNAL MEDICINE

## 2023-11-05 PROCEDURE — 250N000011 HC RX IP 250 OP 636: Mod: JZ | Performed by: INTERNAL MEDICINE

## 2023-11-05 PROCEDURE — 97110 THERAPEUTIC EXERCISES: CPT | Mod: GP

## 2023-11-05 PROCEDURE — 250N000013 HC RX MED GY IP 250 OP 250 PS 637

## 2023-11-05 PROCEDURE — 83735 ASSAY OF MAGNESIUM: CPT | Performed by: FAMILY MEDICINE

## 2023-11-05 PROCEDURE — 272N000450 HC KIT 4FR POWER PICC SINGLE LUMEN

## 2023-11-05 PROCEDURE — 120N000001 HC R&B MED SURG/OB

## 2023-11-05 PROCEDURE — 97530 THERAPEUTIC ACTIVITIES: CPT | Mod: GP

## 2023-11-05 PROCEDURE — 99232 SBSQ HOSP IP/OBS MODERATE 35: CPT | Performed by: FAMILY MEDICINE

## 2023-11-05 PROCEDURE — 84132 ASSAY OF SERUM POTASSIUM: CPT | Performed by: FAMILY MEDICINE

## 2023-11-05 PROCEDURE — 97116 GAIT TRAINING THERAPY: CPT | Mod: GP

## 2023-11-05 PROCEDURE — 999N000157 HC STATISTIC RCP TIME EA 10 MIN

## 2023-11-05 PROCEDURE — 82565 ASSAY OF CREATININE: CPT | Performed by: ORTHOPAEDIC SURGERY

## 2023-11-05 PROCEDURE — 250N000013 HC RX MED GY IP 250 OP 250 PS 637: Performed by: FAMILY MEDICINE

## 2023-11-05 PROCEDURE — 36569 INSJ PICC 5 YR+ W/O IMAGING: CPT

## 2023-11-05 RX ORDER — ASPIRIN 81 MG/1
162 TABLET ORAL 2 TIMES DAILY
Status: DISCONTINUED | OUTPATIENT
Start: 2023-11-05 | End: 2023-11-06 | Stop reason: HOSPADM

## 2023-11-05 RX ORDER — LIDOCAINE 40 MG/G
CREAM TOPICAL
Status: DISCONTINUED | OUTPATIENT
Start: 2023-11-05 | End: 2023-11-06 | Stop reason: HOSPADM

## 2023-11-05 RX ADMIN — ATORVASTATIN CALCIUM 80 MG: 40 TABLET, FILM COATED ORAL at 21:09

## 2023-11-05 RX ADMIN — CEFAZOLIN SODIUM 2 G: 2 INJECTION, SOLUTION INTRAVENOUS at 12:43

## 2023-11-05 RX ADMIN — CARVEDILOL 3.12 MG: 3.12 TABLET, FILM COATED ORAL at 16:54

## 2023-11-05 RX ADMIN — OXYCODONE HYDROCHLORIDE 10 MG: 5 TABLET ORAL at 12:47

## 2023-11-05 RX ADMIN — ACETAMINOPHEN 975 MG: 325 TABLET ORAL at 04:48

## 2023-11-05 RX ADMIN — AMLODIPINE BESYLATE 5 MG: 5 TABLET ORAL at 08:01

## 2023-11-05 RX ADMIN — HYDROCHLOROTHIAZIDE 25 MG: 25 TABLET ORAL at 08:01

## 2023-11-05 RX ADMIN — PRAMIPEXOLE DIHYDROCHLORIDE 0.25 MG: 0.25 TABLET ORAL at 21:08

## 2023-11-05 RX ADMIN — CEFAZOLIN SODIUM 2 G: 2 INJECTION, SOLUTION INTRAVENOUS at 19:37

## 2023-11-05 RX ADMIN — HYDROXYZINE HYDROCHLORIDE 25 MG: 25 TABLET, FILM COATED ORAL at 00:16

## 2023-11-05 RX ADMIN — OXYCODONE HYDROCHLORIDE 10 MG: 5 TABLET ORAL at 21:10

## 2023-11-05 RX ADMIN — OXYCODONE HYDROCHLORIDE 10 MG: 5 TABLET ORAL at 04:25

## 2023-11-05 RX ADMIN — POLYETHYLENE GLYCOL 3350 17 G: 17 POWDER, FOR SOLUTION ORAL at 08:01

## 2023-11-05 RX ADMIN — LISINOPRIL 20 MG: 20 TABLET ORAL at 08:01

## 2023-11-05 RX ADMIN — SENNOSIDES AND DOCUSATE SODIUM 1 TABLET: 8.6; 5 TABLET ORAL at 21:08

## 2023-11-05 RX ADMIN — HYDROXYZINE HYDROCHLORIDE 25 MG: 25 TABLET, FILM COATED ORAL at 06:08

## 2023-11-05 RX ADMIN — CARVEDILOL 3.12 MG: 3.12 TABLET, FILM COATED ORAL at 07:58

## 2023-11-05 RX ADMIN — CEFAZOLIN SODIUM 2 G: 2 INJECTION, SOLUTION INTRAVENOUS at 04:48

## 2023-11-05 RX ADMIN — OXYCODONE HYDROCHLORIDE 10 MG: 5 TABLET ORAL at 16:55

## 2023-11-05 RX ADMIN — ASPIRIN 162 MG: 81 TABLET, COATED ORAL at 21:08

## 2023-11-05 RX ADMIN — SENNOSIDES AND DOCUSATE SODIUM 1 TABLET: 8.6; 5 TABLET ORAL at 08:01

## 2023-11-05 RX ADMIN — ASPIRIN 81 MG: 81 TABLET, COATED ORAL at 08:01

## 2023-11-05 RX ADMIN — ACETAMINOPHEN 975 MG: 325 TABLET ORAL at 12:43

## 2023-11-05 RX ADMIN — TRAZODONE HYDROCHLORIDE 50 MG: 50 TABLET ORAL at 21:09

## 2023-11-05 RX ADMIN — OXYCODONE HYDROCHLORIDE 10 MG: 5 TABLET ORAL at 08:45

## 2023-11-05 RX ADMIN — LIDOCAINE HYDROCHLORIDE 2.5 ML: 10 INJECTION, SOLUTION EPIDURAL; INFILTRATION; INTRACAUDAL; PERINEURAL at 16:00

## 2023-11-05 RX ADMIN — ACETAMINOPHEN 975 MG: 325 TABLET ORAL at 21:09

## 2023-11-05 RX ADMIN — OXYCODONE HYDROCHLORIDE 10 MG: 5 TABLET ORAL at 00:16

## 2023-11-05 ASSESSMENT — ACTIVITIES OF DAILY LIVING (ADL)
ADLS_ACUITY_SCORE: 27

## 2023-11-05 NOTE — PROGRESS NOTES
[facility] MEDICINE  PROGRESS NOTE     Code Status: Full Code  Procedure(s):  RIGHT KNEE IRRIGATION AND DEBRIDEMENT  POLYETHELYNE EXCHANGE  3 Days Post-Op  Identification/Summary:   Gonzalez Morton is a 63 year old male with a PMH of HTN, CAD, CABG, ERIN on CPAP, hyperlipidemia, prostate cancer.  11/2/2023 admitted for right knee I&D and polyethylene exchange.  Postoperatively doing well.  Infectious disease consulted.  Surgical cultures showing MSSA.  Blood cultures pending.  Medically stable.  Discharge disposition per orthopedics and ID.     Assessment and Plan:     Status post right knee I&D and polyethylene exchange 11/2/2023  Septic arthritis with MSSA  Postoperative management per orthopedics and ID.  Prior to surgery had been on Keflex.  Given cefazolin x2 doses and vancomycin.  Surgical culture showing MSSA.  Blood cultures: NGTD x2.  Antibiotics transitioned to cefazolin.  Further recommendations per infectious disease.  Acute blood loss anemia  Preoperative hemoglobin 14.2.  Postoperatively down to 13.1--> 12.5.  No indication for transfusion at this time.  Follow per protocols.  Leukocytosis  Immediate postop white count 15.7 then down to 11.6.  No further checks indicated.  CAD.  CABG.  Continue home Norvasc, Coreg, HCTZ and lisinopril with hold parameters.  Obstructive sleep apnea  Utilize home CPAP.  Hyperlipidemia  Continue home Lipitor.  Rosacea  Continue home MetroGel.  History of prostate cancer  History of prostatectomy  Noted.  Follow PVRs per standard protocols.     Anticoagulation   Aspirin 81 mg twice daily ordered by orthopedics.  Routine postoperative risk  COVID-19 PCR not tested     Fluids: Per surgery  Pain meds: Per surgery  Therapy: Per surgery    Strong:Not present  Lines: None       Current Diet  Orders Placed This Encounter      Advance Diet as Tolerated: Regular Diet Adult    Supplements  None    Barriers to Discharge: Blood cultures,  "intravenous antibiotics, ID recommendations otherwise medically stable    Disposition: To be determined    Clinically Significant Risk Factors              # Hypoalbuminemia: Lowest albumin = 3.3 g/dL at 11/3/2023  6:05 AM, will monitor as appropriate     # Hypertension: Noted on problem list        # Obesity: Estimated body mass index is 37.94 kg/m  as calculated from the following:    Height as of this encounter: 1.753 m (5' 9\").    Weight as of this encounter: 116.5 kg (256 lb 14.4 oz)., PRESENT ON ADMISSION     # Financial/Environmental Concerns: none   # History of CABG: noted on surgical history       Interval History/Subjective:  Patient doing well.  Pain under improved control.  No chest pain.  No shortness of breath.  No nausea or vomiting.  Questions answered to verbalized satisfaction.      Last 24H PRN:     HYDROmorphone (DILAUDID) injection 0.2 mg, 0.2 mg at 11/03/23 0158 **OR** HYDROmorphone (DILAUDID) injection 0.4 mg, 0.4 mg at 11/04/23 2113    hydrOXYzine (ATARAX) tablet 25 mg, 25 mg at 11/05/23 0608    oxyCODONE (ROXICODONE) tablet 5 mg **OR** oxyCODONE (ROXICODONE) tablet 10 mg, 10 mg at 11/05/23 0845    Physical Exam/Objective:  Temp:  [98  F (36.7  C)-99.2  F (37.3  C)] 98  F (36.7  C)  Pulse:  [66-78] 70  Resp:  [16-20] 20  BP: (102-151)/(62-79) 125/68  SpO2:  [93 %-98 %] 98 %  Wt Readings from Last 4 Encounters:   11/02/23 116.5 kg (256 lb 14.4 oz)   10/24/23 119 kg (262 lb 5 oz)   10/13/23 118.3 kg (260 lb 12.8 oz)   09/14/23 111.1 kg (245 lb)     Body mass index is 37.94 kg/m .    Constitutional: awake, alert, cooperative, no apparent distress, and appears stated age  ENT: Normocephalic, without obvious abnormality, atraumatic, external ears without lesions, oral pharynx with moist mucous membranes, tonsils without erythema or exudates, gums normal and good dentition.  Respiratory: No increased work of breathing, good air exchange, clear to auscultation bilaterally, no crackles or " wheezing  Cardiovascular: Normal apical impulse, regular rate and rhythm, normal S1 and S2, no S3 or S4, and no murmur noted  GI: No scars, normal bowel sounds, soft, non-distended, non-tender, no masses palpated, no hepatosplenomegally  Skin: normal skin color, texture, turgor, no redness, warmth, or swelling, and no rashes  Musculoskeletal: Surgical dressing remained intact to right knee.  Otherwise no redness, warmth, or swelling of the joints.  Motor strength is 5 out of 5 all extremities bilaterally.  Tone is normal. no lower extremity pitting edema present  Neurologic: Cranial nerves II-XII are grossly intact. Sensory:  Sensory intact  Neuropsychiatric: General: normal, calm, and normal eye contact Level of consciousness: alert / normal Affect: normal Orientation: oriented to self, place, time and situation Memory and insight: normal, memory for past and recent events intact, and thought process normal      Medications:   Personally Reviewed.  Medications    lactated ringers 25 mL/hr at 11/03/23 2350      acetaminophen  975 mg Oral Q8H    amLODIPine  5 mg Oral Daily    aspirin  162 mg Oral BID    atorvastatin  80 mg Oral QPM    carvedilol  3.125 mg Oral BID w/meals    ceFAZolin  2 g Intravenous Q8H    hydrochlorothiazide  25 mg Oral Daily    lisinopril  20 mg Oral Daily    polyethylene glycol  17 g Oral Daily    pramipexole  0.25 mg Oral At Bedtime    senna-docusate  1 tablet Oral BID    sodium chloride (PF)  3 mL Intracatheter Q8H    traZODone   mg Oral At Bedtime       Data reviewed today: I personally reviewed all new medications, labs, imaging/diagnostics reports over the past 24 hours. Pertinent findings include:    Imaging:   No results found for this or any previous visit (from the past 24 hour(s)).    Labs:  POC US Guidance Needle Placement   Final Result        Recent Results (from the past 24 hour(s))   Creatinine    Collection Time: 11/05/23  6:25 AM   Result Value Ref Range    Creatinine 0.91  0.67 - 1.17 mg/dL    GFR Estimate >90 >60 mL/min/1.73m2   Magnesium    Collection Time: 11/05/23  6:25 AM   Result Value Ref Range    Magnesium 2.1 1.7 - 2.3 mg/dL   Potassium    Collection Time: 11/05/23  6:25 AM   Result Value Ref Range    Potassium 4.2 3.4 - 5.3 mmol/L       Pending Labs:  Unresulted Labs Ordered in the Past 30 Days of this Admission       Date and Time Order Name Status Description    11/2/2023  9:30 PM Blood Culture Arm, Left Preliminary     11/2/2023  9:30 PM Blood Culture Arm, Right Preliminary     11/2/2023  7:19 PM Anaerobic Bacterial Culture Routine Preliminary     11/2/2023  7:18 PM Tissue Aerobic Bacterial Culture Routine Preliminary     11/2/2023  7:18 PM Anaerobic Bacterial Culture Routine Preliminary     11/2/2023  7:09 PM Synovial fluid Aerobic Bacterial Culture Routine Preliminary     11/2/2023  7:09 PM Anaerobic Bacterial Culture Routine Preliminary     10/31/2023  3:04 PM ANAEROBIC BACTERIAL CULTURE ROUTINE Preliminary               Jono Meyer MD  [facility]  Phone: #[phone]

## 2023-11-05 NOTE — PROGRESS NOTES
"Orthopedic Progress Note      Assessment: POD#3  S/P Procedure(s):  RIGHT KNEE IRRIGATION AND DEBRIDEMENT  POLYETHELYNE EXCHANGE     Plan:   -PICC placement today or tomorrow  - Continue PT/OT  - Weightbearing status: WBAT  - Anticoagulation:  ASA  162 mg BID in addition to SCDs, marie stockings and early ambulation.  - Surgical cultures MSSA, plan for Ancef IV via PICC once blood cultures negative. Appreciate ID input  - HV drain removed.   - Discharge planning: home pending outpatient IV abx plan and PICC placement. 1-2 days      Subjective:  Pain: moderate  Nausea, Vomiting:  No  Lightheadedness, Dizziness:  No  Neuro:  Patient denies new onset numbness or paresthesias    Pain is better this morning. Mild at rest. Encouraged premedication prior to PT/OT this afternoon. doing well with mobilization. States he is sweaty/feverish overnight. Voiding spontaneously. Good appetite.     Objective:  /68 (BP Location: Right arm)   Pulse 70   Temp 98  F (36.7  C) (Oral)   Resp 20   Ht 1.753 m (5' 9\")   Wt 116.5 kg (256 lb 14.4 oz)   SpO2 98%   BMI 37.94 kg/m    The patient is A&Ox3. Appears comfortable.   Sensation is intact.  Dorsiflexion and plantar flexion is intact.  Dorsalis pedis pulse intact.  Calves are soft and non-tender. Negative Connie's.  The incision is covered. Dressing C/D/I.        Pertinent Labs   Lab Results: personally reviewed.   Lab Results   Component Value Date    INR 1.28 (H) 06/06/2022    INR 1.39 (H) 06/06/2022    INR 1.05 05/31/2022     Lab Results   Component Value Date    WBC 11.6 (H) 11/03/2023    HGB 11.4 (L) 11/04/2023    HCT 36.6 (L) 11/03/2023    MCV 85 11/03/2023     11/03/2023     Lab Results   Component Value Date     (L) 11/03/2023    CO2 25 11/03/2023       Raegan Bridges PA-C   Date: 11/5/2023  Time: 8:25 AM  [facility]  [phone]      "

## 2023-11-05 NOTE — PROGRESS NOTES
Pt awake at MN and assisted to bathroom with a walker to void & wash up from being sweaty. No fever VSS. Upset that pain wasn't managed well throughout day & he had to have IV Dilaudid to get his pain under control. Discussed pain Plan of care for the night. CMS intact with baseline n/t present. Incisional drsg with few spots of old drainage. Wearing CPAP for sleep. Call light in reach to make needs known.

## 2023-11-05 NOTE — PROGRESS NOTES
ID chart check    Will order PICC    Set up outpatient infusions.    Rifampin interactions -- can reduce efficacy of amlodipine, carvedilol, oxycodone, can increase or decrease atorvastatin levels depending on whether it is given at same time or separately with rifampin.     Tarun Nolasco MD

## 2023-11-05 NOTE — PLAN OF CARE
Patient alert and oriented. VSS. RA. Saline locked in between antibiotics. Tolerating Regular diet. A1 with walker and gaitbelt. Ambulating and voiding spontaneously. PRN Oxycodone and IV Dilaudid given for pain. Ice pack also utilized. Sleeping in bed. Call light within reach. Plan of care ongoing.

## 2023-11-05 NOTE — PLAN OF CARE
Goal Outcome Evaluation:  Pt is alert & pleasant & able to make needs known. He graduated from PT this afternoon. As such he has requested to be able to get get out of bed unassisted. Given how well he's doing with his exercises, and that he's compliant in every other way, I would be inclined to authorize independent movement if Ortho would OK the order. Pt continues to have significant pain following any activity. He would appreciate it if he could get his Q4 prn 10 mg Oxy on a routine basis. He is inclined to shy away from the harder drugs  d

## 2023-11-05 NOTE — PROCEDURES
"PICC Line Insertion Procedure Note    Pt. Name:   Gonzalez Morton  MRN:          [id number]    Procedure:     Insertion of a  SINGLE Lumen  4 fr  Bard SOLO (valved) Power PICC, Lot number [id number]    Indications: Antibiotic (Home Infusion)    Contraindications : None    Procedure Details:    Patient identified with 2 identifiers and \"Time Out\" conducted.    Central line insertion bundle followed: Hand hygiene performed prior to procedure, site cleansed with Cholraprep (CHG), hat, mask, sterile gloves, sterile gown worn, patient draped with maximum barrier head to toe drape, sterile field maintained.    The right upper arm BASILIC vein was assessed by ultrasound and found to be anechoic, compressible, patent, and of adequate size.     Lidocaine 1% 2.5 ml administered SQ to the insertion site.     Modified Seldinger Technique (MST) used for insertion, ONE attempt(s) required to access vein. Imaging during access shows the needle within the vein.     PICC was advanced through peel-away sheath.    Catheter threaded without difficulty. The tip of the catheter was positioned in the SVC. Good blood return noted.    Catheter was flushed with 20 cc normal saline.     Catheter secured with Statlock, tissue adhesive (on insertion site only), Biopatch (CHG), and Tegaderm dressing applied.    The sharps that are included in the PICC insertion kit were accounted for and disposed of in the sharps container prior to breakdown of the sterile field.    CLABSI prevention brochure left at bedside.    Patient  tolerated procedure well.     Patient's primary RN notified PICC is ready for use.      Findings:    Total catheter length  47 cm, with 0 cm exposed. Mid upper arm circumference is 35 cm.       Tip placement verified in the distal SVC by [employer]:        Comments:  None        Tarun Parra, MSN, RN, [facility]   Vascular Access - [facility]      "

## 2023-11-06 VITALS
HEART RATE: 64 BPM | HEIGHT: 69 IN | TEMPERATURE: 98.4 F | OXYGEN SATURATION: 93 % | SYSTOLIC BLOOD PRESSURE: 114 MMHG | DIASTOLIC BLOOD PRESSURE: 62 MMHG | WEIGHT: 256.9 LBS | RESPIRATION RATE: 16 BRPM | BODY MASS INDEX: 38.05 KG/M2

## 2023-11-06 LAB
CREAT SERPL-MCNC: 0.85 MG/DL (ref 0.67–1.17)
EGFRCR SERPLBLD CKD-EPI 2021: >90 ML/MIN/1.73M2
GLUCOSE BLDC GLUCOMTR-MCNC: 125 MG/DL (ref 70–99)
MAGNESIUM SERPL-MCNC: 2 MG/DL (ref 1.7–2.3)
POTASSIUM SERPL-SCNC: 4 MMOL/L (ref 3.4–5.3)

## 2023-11-06 PROCEDURE — 250N000009 HC RX 250

## 2023-11-06 PROCEDURE — 99232 SBSQ HOSP IP/OBS MODERATE 35: CPT | Mod: 24 | Performed by: STUDENT IN AN ORGANIZED HEALTH CARE EDUCATION/TRAINING PROGRAM

## 2023-11-06 PROCEDURE — 83735 ASSAY OF MAGNESIUM: CPT | Performed by: FAMILY MEDICINE

## 2023-11-06 PROCEDURE — 250N000013 HC RX MED GY IP 250 OP 250 PS 637

## 2023-11-06 PROCEDURE — 250N000013 HC RX MED GY IP 250 OP 250 PS 637: Performed by: INTERNAL MEDICINE

## 2023-11-06 PROCEDURE — 250N000011 HC RX IP 250 OP 636: Mod: JZ | Performed by: INTERNAL MEDICINE

## 2023-11-06 PROCEDURE — 82565 ASSAY OF CREATININE: CPT | Performed by: ORTHOPAEDIC SURGERY

## 2023-11-06 PROCEDURE — 99232 SBSQ HOSP IP/OBS MODERATE 35: CPT | Performed by: INTERNAL MEDICINE

## 2023-11-06 PROCEDURE — 250N000013 HC RX MED GY IP 250 OP 250 PS 637: Performed by: FAMILY MEDICINE

## 2023-11-06 PROCEDURE — 84132 ASSAY OF SERUM POTASSIUM: CPT | Performed by: FAMILY MEDICINE

## 2023-11-06 PROCEDURE — 250N000013 HC RX MED GY IP 250 OP 250 PS 637: Performed by: ORTHOPAEDIC SURGERY

## 2023-11-06 RX ORDER — LIDOCAINE 50 MG/G
OINTMENT TOPICAL EVERY 4 HOURS PRN
Qty: 50 G | Refills: 0 | Status: SHIPPED | OUTPATIENT
Start: 2023-11-06 | End: 2024-05-28

## 2023-11-06 RX ORDER — LIDOCAINE 50 MG/G
OINTMENT TOPICAL EVERY 4 HOURS PRN
Status: DISCONTINUED | OUTPATIENT
Start: 2023-11-06 | End: 2023-11-06 | Stop reason: HOSPADM

## 2023-11-06 RX ORDER — CEFAZOLIN SODIUM 2 G/100ML
2 INJECTION, SOLUTION INTRAVENOUS EVERY 8 HOURS
Start: 2023-11-06 | End: 2023-12-14

## 2023-11-06 RX ORDER — HYDROXYZINE HYDROCHLORIDE 25 MG/1
25 TABLET, FILM COATED ORAL EVERY 6 HOURS PRN
Qty: 30 TABLET | Refills: 0 | Status: SHIPPED | OUTPATIENT
Start: 2023-11-06 | End: 2024-05-28

## 2023-11-06 RX ORDER — MAGNESIUM OXIDE 400 MG/1
400 TABLET ORAL EVERY 4 HOURS
Status: COMPLETED | OUTPATIENT
Start: 2023-11-06 | End: 2023-11-06

## 2023-11-06 RX ORDER — OXYCODONE HYDROCHLORIDE 5 MG/1
5 TABLET ORAL EVERY 4 HOURS PRN
Qty: 30 TABLET | Refills: 0 | Status: SHIPPED | OUTPATIENT
Start: 2023-11-06 | End: 2024-01-11

## 2023-11-06 RX ORDER — RIFAMPIN 300 MG/1
300 CAPSULE ORAL 2 TIMES DAILY
Qty: 76 CAPSULE | Refills: 0 | Status: SHIPPED | OUTPATIENT
Start: 2023-11-06 | End: 2023-12-14

## 2023-11-06 RX ADMIN — HYDROXYZINE HYDROCHLORIDE 25 MG: 25 TABLET, FILM COATED ORAL at 08:00

## 2023-11-06 RX ADMIN — CEFAZOLIN SODIUM 2 G: 2 INJECTION, SOLUTION INTRAVENOUS at 12:12

## 2023-11-06 RX ADMIN — CARVEDILOL 3.12 MG: 3.12 TABLET, FILM COATED ORAL at 16:03

## 2023-11-06 RX ADMIN — POLYETHYLENE GLYCOL 3350 17 G: 17 POWDER, FOR SOLUTION ORAL at 07:59

## 2023-11-06 RX ADMIN — LIDOCAINE: 50 OINTMENT TOPICAL at 12:12

## 2023-11-06 RX ADMIN — HYDROCHLOROTHIAZIDE 25 MG: 25 TABLET ORAL at 08:00

## 2023-11-06 RX ADMIN — Medication 400 MG: at 08:00

## 2023-11-06 RX ADMIN — AMLODIPINE BESYLATE 5 MG: 5 TABLET ORAL at 08:01

## 2023-11-06 RX ADMIN — OXYCODONE HYDROCHLORIDE 10 MG: 5 TABLET ORAL at 13:03

## 2023-11-06 RX ADMIN — SENNOSIDES AND DOCUSATE SODIUM 1 TABLET: 8.6; 5 TABLET ORAL at 08:00

## 2023-11-06 RX ADMIN — Medication: at 12:12

## 2023-11-06 RX ADMIN — OXYCODONE HYDROCHLORIDE 10 MG: 5 TABLET ORAL at 09:02

## 2023-11-06 RX ADMIN — OXYCODONE HYDROCHLORIDE 10 MG: 5 TABLET ORAL at 05:01

## 2023-11-06 RX ADMIN — OXYCODONE HYDROCHLORIDE 10 MG: 5 TABLET ORAL at 01:03

## 2023-11-06 RX ADMIN — ACETAMINOPHEN 650 MG: 325 TABLET ORAL at 08:00

## 2023-11-06 RX ADMIN — ASPIRIN 162 MG: 81 TABLET, COATED ORAL at 08:00

## 2023-11-06 RX ADMIN — Medication 400 MG: at 12:12

## 2023-11-06 RX ADMIN — CARVEDILOL 3.12 MG: 3.12 TABLET, FILM COATED ORAL at 08:00

## 2023-11-06 RX ADMIN — CEFAZOLIN SODIUM 2 G: 2 INJECTION, SOLUTION INTRAVENOUS at 05:02

## 2023-11-06 RX ADMIN — LISINOPRIL 20 MG: 20 TABLET ORAL at 08:00

## 2023-11-06 ASSESSMENT — ACTIVITIES OF DAILY LIVING (ADL)
ADLS_ACUITY_SCORE: 25
ADLS_ACUITY_SCORE: 27
ADLS_ACUITY_SCORE: 25
ADLS_ACUITY_SCORE: 27
ADLS_ACUITY_SCORE: 25
ADLS_ACUITY_SCORE: 27

## 2023-11-06 NOTE — PROGRESS NOTES
"Orthopedic Progress Note      Assessment: 4 Days Post-Op  S/P Procedure(s):  RIGHT KNEE IRRIGATION AND DEBRIDEMENT  POLYETHELYNE EXCHANGE @    Plan:   -PICC in place  - Continue PT/OT  - Weightbearing status: WBAT  - Anticoagulation:  ASA  162 mg BID in addition to SCDs, marie stockings and early ambulation.  - Surgical cultures MSSA, plan for Ancef IV via PICC once blood cultures negative. Appreciate ID input  - Discharge planning: home pending outpatient IV abx plan. hopefully today       Subjective:  Pain: minimal   Chest pain, SOB: no  Nausea, Vomiting:  no  Lightheadedness, Dizziness:  no  Neuro:  Patient denies new onset numbness or paresthesias    Patient is doing well this morning. Drain removed yesterday and doing well today. Pain well controlled. Hopeful to go home today pending ID    Objective:  BP (!) 144/99 (BP Location: Left arm, Patient Position: Sitting, Cuff Size: Adult Regular)   Pulse 71   Temp 97.8  F (36.6  C) (Oral)   Resp 16   Ht 1.753 m (5' 9\")   Wt 116.5 kg (256 lb 14.4 oz)   SpO2 97%   BMI 37.94 kg/m    The patient is A&Ox3. Appears comfortable.   Sensation is intact.  Dorsiflexion and plantar flexion is intact.  Dorsalis pedis pulse intact.  Calves are soft and non-tender. Negative Connie's.  The incision is covered. Dressing C/D/I.      Pertinent Labs   Lab Results: personally reviewed.   Lab Results   Component Value Date    INR 1.28 (H) 06/06/2022    INR 1.39 (H) 06/06/2022    INR 1.05 05/31/2022     Lab Results   Component Value Date    WBC 11.6 (H) 11/03/2023    HGB 11.4 (L) 11/04/2023    HCT 36.6 (L) 11/03/2023    MCV 85 11/03/2023     11/03/2023     Lab Results   Component Value Date     (L) 11/03/2023    CO2 25 11/03/2023         Report completed by:  Rossi Fox PA-C/[facility]    Date: 11/6/2023  Time: 9:14 AM    "

## 2023-11-06 NOTE — PROGRESS NOTES
Care Management Discharge Note    Discharge Date: 11/06/2023       Discharge Disposition: Home Infusion, Home Care, Home    Discharge Services: Other (see comment)    Discharge DME: None    Discharge Transportation: family or friend will provide.      Education Provided on the Discharge Plan: Yes  Persons Notified of Discharge Plans: yes  Patient/Family in Agreement with the Plan: yes      Additional Information:  Writer discharge home with [facility] and [facility] for PICC Cares/lab work.  Leonila Rios provided IV abx teach, family provide transport.    Rebecca Mallory, CM

## 2023-11-06 NOTE — PROGRESS NOTES
Care Management Follow Up    Length of Stay (days): 4    Expected Discharge Date: 11/06/2023     Concerns to be Addressed: discharge planning     Patient plan of care discussed at interdisciplinary rounds: Yes    Anticipated Discharge Disposition: Home, Home Care, Home Infusion     Anticipated Discharge Services: None  Anticipated Discharge DME: None    Patient/family educated on Medicare website which has current facility and service quality ratings: no  Education Provided on the Discharge Plan: Yes  Patient/Family in Agreement with the Plan: yes    Referrals Placed by CM/SW: Homecare, Home Infusion  Private pay costs discussed: Not applicable    Additional Information:  PICC placed last night-Leonila GARZAHI following along and awaiting final I/D rec's.  Patient currently on Ancef Q8 hours-but no final discharge orders.  Leonila Rios plans to provide teaching today 2pm patient room .   [facility] will provide PICC dressing cares/lab draws when patient discharge's.     Rebecca Mallory CM

## 2023-11-06 NOTE — PLAN OF CARE
Problem: Adult Inpatient Plan of Care  Goal: Absence of Hospital-Acquired Illness or Injury  Intervention: Prevent Skin Injury  Recent Flowsheet Documentation  Taken 11/5/2023 2355 by Bianka Stinson RN  Body Position: position changed independently     Problem: Adult Inpatient Plan of Care  Goal: Absence of Hospital-Acquired Illness or Injury  Intervention: Prevent Infection  Recent Flowsheet Documentation  Taken 11/5/2023 2355 by Bianka Stinson, RN  Infection Prevention:   hand hygiene promoted   equipment surfaces disinfected   Goal Outcome Evaluation:       Received PRN pain meds Q 4hrs, c-pap, slept on and off in between cares. R knee swelled and worm to touch, on IV AB, PICC line to the R arm.

## 2023-11-06 NOTE — PLAN OF CARE
Goal Outcome Evaluation:  Pt discharging, AVS reviewed, PIV removed, PICC to go home with pt for home infusion, IV antibiotic education provided with Leonila, pt understand and comprehend, all belongings send home with, spouse transporting.

## 2023-11-06 NOTE — PLAN OF CARE
Patient alert and oriented. VSS. RA. Saline locked.  CMS intact. Independent in room, cleared by PT. Tolerating Regular diet. Voiding adequately. PRN Oxycodone utilized for pain. CHG bath given. CPAP on at bedtime. Resting in bed. Call light within reach. Plan of care ongoing.

## 2023-11-06 NOTE — PROGRESS NOTES
"INFECTIOUS DISEASE FOLLOW UP NOTE      ASSESSMENT:  MSSA R TKA PJI: s/p I&D, poly exchange 11/2. Aspiration 10/31 with MSSA. OR cultures with S.aureus. High fever post operatively concerning for bacteremia but cultures negative of blood.  CAD s/p CABG    PLAN:  -continue Cefazolin x 6 weeks, until 12/14  -start rifampin 300mg po bid. Patient aware of interactions with carvedilol, amlodipine, and pain meds. Patient to follow-up with PCP/cardiology to ensure BP controlled  -weekly labs: creatinine, cbc with diff, LFT, CRP  -follow-up ID clinic in 2-4 weeks       Kirt Bonilla MD  [facility]  Direct messaging: [employer] Paging   On-Call ID provider: [phone], option: 9     ______________________________________________________________________    SUBJECTIVE / INTERVAL HISTORY:   First visit by me. No acute issues. Ambulating around unit. Tolerating antibiotics.         OBJECTIVE:  BP (!) 140/81 (BP Location: Left arm, Patient Position: Sitting, Cuff Size: Adult Regular)   Pulse 65   Temp 98  F (36.7  C) (Oral)   Resp 16   Ht 1.753 m (5' 9\")   Wt 116.5 kg (256 lb 14.4 oz)   SpO2 98%   BMI 37.94 kg/m       GEN: No acute distress.    RESPIRATORY:  Normal breathing pattern.   EXTREMITIES: knee dressing in place, nontender  SKIN/HAIR/NAILS:  No rashes  IV: right arm PICC line       Antibiotics:  Cefazolin  Vanc 11/2    Pertinent labs:    Recent Labs   Lab 11/04/23  0708 11/03/23  0605 11/02/23  2147   WBC  --  11.6* 15.7*   HGB 11.4* 12.5*  12.5* 13.1*   HCT  --  36.6* 38.0*   PLT  --  232 215        Recent Labs   Lab 11/03/23  0605 11/02/23  2234 11/02/23  2147   * 131* 133*   CO2 25 25 28   BUN 16.7 19.4 20.0        Lab Results   Component Value Date    CRP 25.6 (H) 06/19/2020     Lab Results   Component Value Date    ALT 12 11/03/2023    AST 10 11/03/2023    ALKPHOS 94 11/03/2023         MICROBIOLOGY DATA:  MSSA  Blood cultures negative to date    RADIOLOGY:  POC US Guidance " "Needle Placement    Result Date: 11/2/2023  Ultrasound was performed as guidance to an anesthesia procedure.  Click \"PACS images\" hyperlink below to view any stored images.  For specific procedure details, view procedure note authored by anesthesia.          "

## 2023-11-06 NOTE — DISCHARGE SUMMARY
ORTHOPEDIC DISCHARGE SUMMARY       Gonzalez Morton,  1960, MRN [id number]    Admission Date: 2023      Admission Diagnoses: Infection of prosthesis, initial encounter (H24) [T85.79XA]  Infection associated with internal knee prosthesis  [T84.59XA, Z96.659]     Discharge Date:  23     Post-operative Day:  4 Days Post-Op    Reason for Admission: The patient was admitted for the following: Procedure(s):  RIGHT KNEE IRRIGATION AND DEBRIDEMENT  POLYETHELYNE EXCHANGE    BRIEF HOSPITAL COURSE   Gonzalez Morton is a pleasant 63 year old male who underwent the aforementioned procedure with Dr. Redd on 23. There were no intraoperative complications and the patient was transferred to the recovery room and later the orthopedic unit in stable condition. Once the patient reached the orthopedic floor our orthopedic pain protocol was implemented along with the following:    Anticoagulation Medications: ASA  Therapy: PT and OT  Activity: WBAT  Bracing: None    Consultations during Admission: Hospitalist service for medical management     COMPLICATIONS/SIGNIFICANT FINDINGS    NONE    DISCHARGE INFORMATION   Condition at discharge: Good  Discharge destination: Home with home cares  Patient was seen by myself on the date of discharge.    FOLLOW UP CARE   Follow up with orthopedics in 2 weeks or sooner should the need arise. Ortho will continue to manage pain control, post op anticoagulation and incision care.     Follow up with your PCP for management of chronic medical problems and to evaluate for post op medical complications including constipation, nausea/vomiting, DVT/PE, anemia, changes in blood pressure, fevers/chills, urinary retention and atelectasis/pneumonia.     Subjective   Patient is doing well on POD #1. Pain is well controlled with oral medications. Ambulating. Tolerating oral intake.     Physical Exam   BP (!) 140/81 (BP Location: Left arm, Patient Position: Sitting, Cuff Size: Adult  "Regular)   Pulse 65   Temp 98  F (36.7  C) (Oral)   Resp 16   Ht 1.753 m (5' 9\")   Wt 116.5 kg (256 lb 14.4 oz)   SpO2 98%   BMI 37.94 kg/m    The patient is A&Ox3. Appears comfortable.   Sensation is intact.  Dorsiflexion and plantar flexion is intact.  Dorsalis pedis pulse intact.  Calves are soft and non-tender. Negative Connie's.  The incision is covered. Dressing C/D/I.    Pertinent Results at Discharge     Hemoglobin   Date/Time Value Ref Range Status   11/04/2023 07:08 AM 11.4 (L) 13.3 - 17.7 g/dL Final   11/03/2023 06:05 AM 12.5 (L) 13.3 - 17.7 g/dL Final   11/03/2023 06:05 AM 12.5 (L) 13.3 - 17.7 g/dL Final     INR   Date/Time Value Ref Range Status   06/06/2022 01:33 PM 1.28 (H) 0.85 - 1.15 Final   06/06/2022 10:25 AM 1.39 (H) 0.85 - 1.15 Final   05/31/2022 08:23 AM 1.05 0.85 - 1.15 Final     Platelet Count   Date/Time Value Ref Range Status   11/03/2023 06:05  150 - 450 10e3/uL Final   11/02/2023 09:47  150 - 450 10e3/uL Final   09/14/2023 03:12  150 - 450 10e3/uL Final       Problem List   Principal Problem:    Infection associated with internal knee prosthesis   Active Problems:    Benign Essential Hypertension    History of prostate cancer    Coronary artery disease involving native coronary artery of native heart, unspecified whether angina present    MSSA (methicillin susceptible Staphylococcus aureus) infection      Rossi Fox PA-C/[facility]  [phone]  Date: 11/6/2023  Time: 2:55 PM   "

## 2023-11-06 NOTE — PLAN OF CARE
Patient vital signs are at baseline: Yes  Patient able to ambulate as they were prior to admission or with assist devices provided by therapies during their stay:  Yes  Patient MUST void prior to discharge:  Yes  Patient able to tolerate oral intake:  Yes  Pain has adequate pain control using Oral analgesics:  Yes  Does patient have an identified :  Yes  Has goal D/C date and time been discussed with patient:  Yes   -------------  Infectious Disease ok to discharge home with PICC line and IV Cefazolin infusions. Updated Hospitalist and Orthopedics for discharge orders.

## 2023-11-06 NOTE — PROGRESS NOTES
[facility] MEDICINE  PROGRESS NOTE     Code Status: Full Code  Procedure(s):  RIGHT KNEE IRRIGATION AND DEBRIDEMENT  POLYETHELYNE EXCHANGE  3 Days Post-Op  Identification/Summary:   Gonzalez Morton is a 63 year old male with a PMH of HTN, CAD, CABG, ERIN on CPAP, hyperlipidemia, prostate cancer.  11/2/2023 admitted for right knee I&D and polyethylene exchange.  Postoperatively doing well.  Infectious disease consulted.  Surgical cultures showing MSSA.  Blood cultures pending.  Medically stable.  Discharge disposition per orthopedics and ID.     Assessment and Plan:    Changes today:  -Continue medical management, safe for discharge from a medical standpoint  -Rest of cares per ortho     Status post right knee I&D and polyethylene exchange 11/2/2023  Septic arthritis with MSSA  Postoperative management per orthopedics and ID.  Prior to surgery had been on Keflex.  Given cefazolin x2 doses and vancomycin.  Surgical culture showing MSSA.  Blood cultures: NGTD x2.  Antibiotics transitioned to cefazolin.  Further recommendations per infectious disease.  Acute blood loss anemia  Preoperative hemoglobin 14.2.  Postoperatively down to 13.1--> 12.5.  No indication for transfusion at this time.  Follow per protocols.  Leukocytosis  Immediate postop white count 15.7 then down to 11.6.  No further checks indicated.  CAD.  CABG.  Continue home Norvasc, Coreg, HCTZ and lisinopril with hold parameters.  Obstructive sleep apnea  Utilize home CPAP.  Hyperlipidemia  Continue home Lipitor.  Rosacea  Continue home MetroGel.  History of prostate cancer  History of prostatectomy  Noted.  Follow PVRs per standard protocols.     Anticoagulation   Aspirin 81 mg twice daily ordered by orthopedics.  Routine postoperative risk  COVID-19 PCR not tested     Fluids: Per surgery  Pain meds: Per surgery  Therapy: Per surgery    Strong:Not present  Lines: PRESENT      PICC 11/05/23 Single Lumen Right Basilic  "Antibiotic-Site Assessment: WDL      Current Diet  Orders Placed This Encounter      Advance Diet as Tolerated: Regular Diet Adult      Discharge Instruction - Regular Diet Adult    Supplements  None    Barriers to Discharge: Blood cultures, intravenous antibiotics, ID recommendations otherwise medically stable    Disposition: To be determined    Clinically Significant Risk Factors              # Hypoalbuminemia: Lowest albumin = 3.3 g/dL at 11/3/2023  6:05 AM, will monitor as appropriate     # Hypertension: Noted on problem list        # Obesity: Estimated body mass index is 37.94 kg/m  as calculated from the following:    Height as of this encounter: 1.753 m (5' 9\").    Weight as of this encounter: 116.5 kg (256 lb 14.4 oz)., PRESENT ON ADMISSION       # Financial/Environmental Concerns: none   # History of CABG: noted on surgical history       Interval History/Subjective:  No acute events overnight patient reports he is doing well tolerating p.o. intake without difficulty.  Denies any other concerns or questions at this time.      Last 24H PRN:      acetaminophen (TYLENOL) tablet 650 mg, 650 mg at 11/06/23 0800     hydrOXYzine (ATARAX) tablet 25 mg, 25 mg at 11/06/23 0800     lidocaine (XYLOCAINE) 5 % ointment, Given at 11/06/23 1212     lidocaine 1 % 0.1-5 mL, 2.5 mL at 11/05/23 1600     metroNIDAZOLE (METROCREAM) 0.75 % cream, Given at 11/06/23 1212     oxyCODONE (ROXICODONE) tablet 5 mg **OR** oxyCODONE (ROXICODONE) tablet 10 mg, 10 mg at 11/06/23 1303    Physical Exam/Objective:  Temp:  [97.8  F (36.6  C)-99.6  F (37.6  C)] 98  F (36.7  C)  Pulse:  [61-96] 65  Resp:  [16-18] 16  BP: (122-147)/(77-99) 140/81  SpO2:  [93 %-98 %] 98 %  Wt Readings from Last 4 Encounters:   11/02/23 116.5 kg (256 lb 14.4 oz)   10/24/23 119 kg (262 lb 5 oz)   10/13/23 118.3 kg (260 lb 12.8 oz)   09/14/23 111.1 kg (245 lb)     Body mass index is 37.94 kg/m .    Constitutional: awake, alert, cooperative, no apparent distress, and " appears stated age  ENT: Normocephalic, without obvious abnormality, atraumatic, external ears without lesions, oral pharynx with moist mucous membranes, tonsils without erythema or exudates, gums normal and good dentition.  Respiratory: No increased work of breathing, good air exchange, clear to auscultation bilaterally, no crackles or wheezing  Cardiovascular: Normal apical impulse, regular rate and rhythm, normal S1 and S2, no S3 or S4, and no murmur noted  GI: No scars, normal bowel sounds, soft, non-distended, non-tender, no masses palpated, no hepatosplenomegally  Skin: normal skin color, texture, turgor, no redness, warmth, or swelling, and no rashes  Musculoskeletal: Surgical dressing remained intact to right knee.  Otherwise no redness, warmth, or swelling of the joints.  Motor strength is 5 out of 5 all extremities bilaterally.  Tone is normal. no lower extremity pitting edema present  Neurologic: Cranial nerves II-XII are grossly intact. Sensory:  Sensory intact  Neuropsychiatric: General: normal, calm, and normal eye contact Level of consciousness: alert / normal Affect: normal Orientation: oriented to self, place, time and situation Memory and insight: normal, memory for past and recent events intact, and thought process normal      Medications:   Personally Reviewed.  Medications     lactated ringers Stopped (11/06/23 0700)       amLODIPine  5 mg Oral Daily     aspirin  162 mg Oral BID     atorvastatin  80 mg Oral QPM     carvedilol  3.125 mg Oral BID w/meals     ceFAZolin  2 g Intravenous Q8H     hydrochlorothiazide  25 mg Oral Daily     lisinopril  20 mg Oral Daily     polyethylene glycol  17 g Oral Daily     pramipexole  0.25 mg Oral At Bedtime     senna-docusate  1 tablet Oral BID     sodium chloride (PF)  3 mL Intracatheter Q8H     traZODone   mg Oral At Bedtime       Data reviewed today: I personally reviewed all new medications, labs, imaging/diagnostics reports over the past 24 hours.  Pertinent findings include:    Imaging:   No results found for this or any previous visit (from the past 24 hour(s)).    Labs:  POC US Guidance Needle Placement   Final Result        Recent Results (from the past 24 hour(s))   Creatinine    Collection Time: 11/06/23  6:18 AM   Result Value Ref Range    Creatinine 0.85 0.67 - 1.17 mg/dL    GFR Estimate >90 >60 mL/min/1.73m2   Potassium    Collection Time: 11/06/23  6:18 AM   Result Value Ref Range    Potassium 4.0 3.4 - 5.3 mmol/L   Magnesium    Collection Time: 11/06/23  6:18 AM   Result Value Ref Range    Magnesium 2.0 1.7 - 2.3 mg/dL   Glucose by meter    Collection Time: 11/06/23  6:37 AM   Result Value Ref Range    GLUCOSE BY METER POCT 125 (H) 70 - 99 mg/dL       Pending Labs:  Unresulted Labs Ordered in the Past 30 Days of this Admission       Date and Time Order Name Status Description    11/2/2023  9:30 PM Blood Culture Arm, Left Preliminary     11/2/2023  9:30 PM Blood Culture Arm, Right Preliminary     11/2/2023  7:19 PM Anaerobic Bacterial Culture Routine Preliminary     11/2/2023  7:18 PM Tissue Aerobic Bacterial Culture Routine Preliminary     11/2/2023  7:18 PM Anaerobic Bacterial Culture Routine Preliminary     11/2/2023  7:09 PM Synovial fluid Aerobic Bacterial Culture Routine Preliminary     11/2/2023  7:09 PM Anaerobic Bacterial Culture Routine Preliminary     10/31/2023  3:04 PM ANAEROBIC BACTERIAL CULTURE ROUTINE Preliminary             Kenroy Bardales MD  Hospitalist     Medical Decision Making       35 MINUTES SPENT BY ME on the date of service doing chart review, history, exam, documentation & further activities per the note.

## 2023-11-07 ENCOUNTER — LAB REQUISITION (OUTPATIENT)
Dept: LAB | Facility: CLINIC | Age: 63
End: 2023-11-07
Payer: COMMERCIAL

## 2023-11-07 DIAGNOSIS — T84.59XA INFECTION AND INFLAMMATORY REACTION DUE TO OTHER INTERNAL JOINT PROSTHESIS, INITIAL ENCOUNTER (H): ICD-10-CM

## 2023-11-07 LAB
ALBUMIN SERPL BCG-MCNC: 3.7 G/DL (ref 3.5–5.2)
ALP SERPL-CCNC: 126 U/L (ref 40–129)
ALT SERPL W P-5'-P-CCNC: 21 U/L (ref 0–70)
AST SERPL W P-5'-P-CCNC: 21 U/L (ref 0–45)
BACTERIA BLD CULT: NO GROWTH
BACTERIA BLD CULT: NO GROWTH
BACTERIA SNV CULT: ABNORMAL
BACTERIA TISS BX CULT: ABNORMAL
BASOPHILS # BLD AUTO: 0.1 10E3/UL (ref 0–0.2)
BASOPHILS NFR BLD AUTO: 1 %
BILIRUB DIRECT SERPL-MCNC: <0.2 MG/DL (ref 0–0.3)
BILIRUB SERPL-MCNC: 0.3 MG/DL
CREAT SERPL-MCNC: 0.74 MG/DL (ref 0.67–1.17)
CRP SERPL-MCNC: 72.9 MG/L
EGFRCR SERPLBLD CKD-EPI 2021: >90 ML/MIN/1.73M2
EOSINOPHIL # BLD AUTO: 1.2 10E3/UL (ref 0–0.7)
EOSINOPHIL NFR BLD AUTO: 10 %
ERYTHROCYTE [DISTWIDTH] IN BLOOD BY AUTOMATED COUNT: 12.1 % (ref 10–15)
HCT VFR BLD AUTO: 37.3 % (ref 40–53)
HGB BLD-MCNC: 12.8 G/DL (ref 13.3–17.7)
IMM GRANULOCYTES # BLD: 0.1 10E3/UL
IMM GRANULOCYTES NFR BLD: 1 %
LYMPHOCYTES # BLD AUTO: 1.1 10E3/UL (ref 0.8–5.3)
LYMPHOCYTES NFR BLD AUTO: 9 %
MCH RBC QN AUTO: 28.6 PG (ref 26.5–33)
MCHC RBC AUTO-ENTMCNC: 34.3 G/DL (ref 31.5–36.5)
MCV RBC AUTO: 83 FL (ref 78–100)
MONOCYTES # BLD AUTO: 0.6 10E3/UL (ref 0–1.3)
MONOCYTES NFR BLD AUTO: 5 %
NEUTROPHILS # BLD AUTO: 8.7 10E3/UL (ref 1.6–8.3)
NEUTROPHILS NFR BLD AUTO: 74 %
NRBC # BLD AUTO: 0 10E3/UL
NRBC BLD AUTO-RTO: 0 /100
PLATELET # BLD AUTO: 406 10E3/UL (ref 150–450)
PROT SERPL-MCNC: 7.2 G/DL (ref 6.4–8.3)
RBC # BLD AUTO: 4.47 10E6/UL (ref 4.4–5.9)
WBC # BLD AUTO: 11.8 10E3/UL (ref 4–11)

## 2023-11-07 PROCEDURE — 82040 ASSAY OF SERUM ALBUMIN: CPT | Performed by: INTERNAL MEDICINE

## 2023-11-07 PROCEDURE — 85025 COMPLETE CBC W/AUTO DIFF WBC: CPT | Performed by: INTERNAL MEDICINE

## 2023-11-07 PROCEDURE — 82565 ASSAY OF CREATININE: CPT | Performed by: INTERNAL MEDICINE

## 2023-11-07 PROCEDURE — 36592 COLLECT BLOOD FROM PICC: CPT | Performed by: INTERNAL MEDICINE

## 2023-11-07 PROCEDURE — 86140 C-REACTIVE PROTEIN: CPT | Performed by: INTERNAL MEDICINE

## 2023-11-07 NOTE — OP NOTE
Operative Report    PATIENT:  Gonzalez Morton  DATE OF SURGERY:  11/2/2023    SURGEON  Bradley Redd MD    ASSISTANT  [employer] PA-C  (Expert NINA assist was required throughout for patient positioning, soft tissue retraction, appropriate use of hip instrumentation, and patient safety)    PREOPERATIVE DIAGNOSES  Infected right total knee arthroplasty    POSTOPERATIVE DIAGNOSES  Same    PROCEDURE    1.  Irrigation and debridement right total knee arthroplasty  2.  Polyethylene exchange, right total knee arthroplasty    ANESTHESIA  Spinal    ESTIMATED BLOOD LOSS   400 cc    SPECIMENS: none    INDICATION  Mr. Gonzalez Morton is a very pleasant 63 year old-year-old male.  He underwent total knee arthroplasty in August of this year.  Has been doing well up until this past weekend when he started having increasing pain and swelling regards to his right knee.  He had an aspirate of the knee done 2 days ago which showed clear fluid with a paucity of neutrophils and low white blood cell count of 700.  That culture however is growing Staph aureus..   Consequently, the risks and benefits of irrigation debridement and polyethylene exchange were discussed with patient and the patient elected to proceed.     FINDINGS:  Obvious knee joint infection    IMPLANTS  1.  [place], X.3 polyethylene insert, 16mm thickness, size 5    PROCEDURE    Once consent was obtained and operative site marked in the preop holding area, patient was brought to the operating room.  Anesthesia was established without difficulty.  The patient was then placed in the supine position.    All bony prominences and superficial nerves were padded appropriately.  The right lower extremity was sterilely prepped and draped in the usual fashion.    Longitudinal incision made over the knee following previous surgical scar.  Dissection carried down through subcutaneous tissue.  Medial parapatellar arthrotomy was created.  A large amount of  obvious purulence was then found in the knee.  This was significantly more impressive than it had been on aspiration 2 days ago.  Consequently proceeded with irrigation and debridement.  Over 6000 cc of saline as well as Irrisept and Betadine solution were utilized.  A complete synovectomy was performed using knife and electrocautery.  Polyethylene component was removed and a sterile scrub brush was opened and all components scrubbed and irrigated.  The new polyethylene insert was then placed and the locking mechanism ensured.  Final irrigation of the knee was performed.  Arthrotomy was then closed with #1 PDS sutures and #1 strata fix over a drain placed out through the proximal lateral skin.  Deep dermis closed with 0 PDS sutures.  Final staples close the skin.    Dressings were applied.  Patient tolerated procedure well, was returned to postop recovery in stable condition.        BRADLEY PRINCE MD     @C(1)@  Bradley Prince MD    @C(2)@  Dylon Shafer

## 2023-11-08 ENCOUNTER — PATIENT OUTREACH (OUTPATIENT)
Dept: CARE COORDINATION | Facility: CLINIC | Age: 63
End: 2023-11-08
Payer: COMMERCIAL

## 2023-11-08 NOTE — PROGRESS NOTES
Clinic Care Coordination Contact  [facility]: Post-Discharge Note  SITUATION                                                      Admission:    Admission Date: 11/02/23   Reason for Admission: Infection of prosthesis, initial encounter (H24) (T85.79XA)  Infection associated with internal knee prosthesis  (T84.59XA, Z96.659)  Discharge:   Discharge Date: 11/06/23  Discharge Diagnosis: RIGHT KNEE IRRIGATION AND DEBRIDEMENT  POLYETHELYNE EXCHANGE    BACKGROUND                                                      Per hospital discharge summary and inpatient provider notes:    RIGHT KNEE IRRIGATION AND DEBRIDEMENT  POLYETHELYNE EXCHANGE    ASSESSMENT           Discharge Assessment  How are you doing now that you are home?: I'm doing OK. My knee is a little sore but I'm doing OK with Tylenol. I took one oxycodone last night before bed.  How are your symptoms? (Red Flag symptoms escalate to triage hotline per guidelines): Improved  Do you feel your condition is stable enough to be safe at home until your provider visit?: Yes  Does the patient have their discharge instructions? : Yes (Hasn't had a chance to look at them yet and is outside right now. Did review appointments needed as recommended by provider at discharge.)  Does the patient have questions regarding their discharge instructions? : No  Were you started on any new medications or were there changes to any of your previous medications? : Yes  Does the patient have all of their medications?: Yes  Do you have questions regarding any of your medications? : No  Do you have all of your needed medical supplies or equipment (DME)?  (i.e. oxygen tank, CPAP, cane, etc.): Yes (IV supplies for antibiotic infusions)  Discharge follow-up appointment scheduled within 14 calendar days? : No  Is patient agreeable to assistance with scheduling? : No (has appointment [facility] ID provider 11/30 and will call to schedule with surgeon)         Post-op (Clinicians Only)  Did the  patient have surgery or a procedure: Yes (RIGHT KNEE IRRIGATION AND DEBRIDEMENT  POLYETHELYNE EXCHANGE)  Incision: closed;healing (covered with dressing)  Drainage: No  Bleeding: none  Fever: No  Chills: No  Redness: No  Warmth: No  Swelling: No  Incision site pain: Yes (mild pain only and managed well with Tylenol. Does have oxycodone to use as needed)  Closure: suture;staple  Eating & Drinking: eating and drinking without complaints/concerns  PO Intake: regular diet    Patient doing well. Pain well controlled. Home care nurse came yesterday and he is comfortable doing home IV antibiotics infusions. Currently outside and is unable to record 24/7 nurse triage line but has no current concerns.     PLAN                                                      Outpatient Plan:  Please follow-up with Dr. Redd's team in 2 weeks at [facility].     Future Appointments   Date Time Provider Department Center   11/30/2023 10:40 AM Kirt Bonilla MD MDINDS [facility]   5/22/2024  8:30 AM Dylon Shafer NP CTFMOB [facility]         For any urgent concerns, please contact our 24 hour nurse triage line: [phone])         Willow Blakely RN

## 2023-11-09 LAB — BACTERIA TISS BX CULT: NORMAL

## 2023-11-10 LAB — BACTERIA TISS BX CULT: NORMAL

## 2023-11-14 ENCOUNTER — LAB REQUISITION (OUTPATIENT)
Dept: LAB | Facility: CLINIC | Age: 63
End: 2023-11-14
Payer: COMMERCIAL

## 2023-11-14 DIAGNOSIS — T84.59XA INFECTION AND INFLAMMATORY REACTION DUE TO OTHER INTERNAL JOINT PROSTHESIS, INITIAL ENCOUNTER (H): ICD-10-CM

## 2023-11-14 LAB
ALBUMIN SERPL BCG-MCNC: 3.8 G/DL (ref 3.5–5.2)
ALP SERPL-CCNC: 147 U/L (ref 40–150)
ALT SERPL W P-5'-P-CCNC: 26 U/L (ref 0–70)
AST SERPL W P-5'-P-CCNC: 24 U/L (ref 0–45)
BACTERIA SNV CULT: NORMAL
BASOPHILS # BLD AUTO: 0.3 10E3/UL (ref 0–0.2)
BASOPHILS NFR BLD AUTO: 2 %
BILIRUB DIRECT SERPL-MCNC: <0.2 MG/DL (ref 0–0.3)
BILIRUB SERPL-MCNC: 0.3 MG/DL
CRP SERPL-MCNC: 37.73 MG/L
EOSINOPHIL # BLD AUTO: 2 10E3/UL (ref 0–0.7)
EOSINOPHIL NFR BLD AUTO: 15 %
ERYTHROCYTE [DISTWIDTH] IN BLOOD BY AUTOMATED COUNT: 12.1 % (ref 10–15)
HCT VFR BLD AUTO: 40.5 % (ref 40–53)
HGB BLD-MCNC: 13.8 G/DL (ref 13.3–17.7)
IMM GRANULOCYTES # BLD: 0 10E3/UL
IMM GRANULOCYTES NFR BLD: 0 %
LYMPHOCYTES # BLD AUTO: 1.7 10E3/UL (ref 0.8–5.3)
LYMPHOCYTES NFR BLD AUTO: 13 %
MCH RBC QN AUTO: 28.5 PG (ref 26.5–33)
MCHC RBC AUTO-ENTMCNC: 34.1 G/DL (ref 31.5–36.5)
MCV RBC AUTO: 84 FL (ref 78–100)
MONOCYTES # BLD AUTO: 0.8 10E3/UL (ref 0–1.3)
MONOCYTES NFR BLD AUTO: 6 %
NEUTROPHILS # BLD AUTO: 8.2 10E3/UL (ref 1.6–8.3)
NEUTROPHILS NFR BLD AUTO: 64 %
NRBC # BLD AUTO: 0 10E3/UL
NRBC BLD AUTO-RTO: 0 /100
PLATELET # BLD AUTO: 406 10E3/UL (ref 150–450)
PROT SERPL-MCNC: 7.7 G/DL (ref 6.4–8.3)
RBC # BLD AUTO: 4.84 10E6/UL (ref 4.4–5.9)
WBC # BLD AUTO: 13 10E3/UL (ref 4–11)

## 2023-11-14 PROCEDURE — 85025 COMPLETE CBC W/AUTO DIFF WBC: CPT | Performed by: INTERNAL MEDICINE

## 2023-11-14 PROCEDURE — 86140 C-REACTIVE PROTEIN: CPT | Performed by: INTERNAL MEDICINE

## 2023-11-14 PROCEDURE — 80076 HEPATIC FUNCTION PANEL: CPT | Performed by: INTERNAL MEDICINE

## 2023-11-14 PROCEDURE — 82565 ASSAY OF CREATININE: CPT | Performed by: INTERNAL MEDICINE

## 2023-11-15 ENCOUNTER — TRANSFERRED RECORDS (OUTPATIENT)
Dept: HEALTH INFORMATION MANAGEMENT | Facility: CLINIC | Age: 63
End: 2023-11-15
Payer: COMMERCIAL

## 2023-11-15 DIAGNOSIS — T84.53XS INFECTION ASSOCIATED WITH INTERNAL RIGHT KNEE PROSTHESIS, SEQUELA: Primary | ICD-10-CM

## 2023-11-15 LAB
CREAT SERPL-MCNC: 0.8 MG/DL (ref 0.67–1.17)
EGFRCR SERPLBLD CKD-EPI 2021: >90 ML/MIN/1.73M2

## 2023-11-17 LAB — BACTERIA SNV CULT: NORMAL

## 2023-11-21 ENCOUNTER — LAB REQUISITION (OUTPATIENT)
Dept: LAB | Facility: CLINIC | Age: 63
End: 2023-11-21
Payer: COMMERCIAL

## 2023-11-21 DIAGNOSIS — T84.59XA INFECTION AND INFLAMMATORY REACTION DUE TO OTHER INTERNAL JOINT PROSTHESIS, INITIAL ENCOUNTER (H): ICD-10-CM

## 2023-11-21 LAB
ALBUMIN SERPL BCG-MCNC: 4.1 G/DL (ref 3.5–5.2)
ALP SERPL-CCNC: 140 U/L (ref 40–150)
ALT SERPL W P-5'-P-CCNC: 22 U/L (ref 0–70)
AST SERPL W P-5'-P-CCNC: 19 U/L (ref 0–45)
BASOPHILS # BLD AUTO: 0.2 10E3/UL (ref 0–0.2)
BASOPHILS NFR BLD AUTO: 2 %
BILIRUB DIRECT SERPL-MCNC: <0.2 MG/DL (ref 0–0.3)
BILIRUB SERPL-MCNC: 0.3 MG/DL
CREAT SERPL-MCNC: 0.78 MG/DL (ref 0.67–1.17)
CRP SERPL-MCNC: 18.2 MG/L
EGFRCR SERPLBLD CKD-EPI 2021: >90 ML/MIN/1.73M2
EOSINOPHIL # BLD AUTO: 1.2 10E3/UL (ref 0–0.7)
EOSINOPHIL NFR BLD AUTO: 14 %
ERYTHROCYTE [DISTWIDTH] IN BLOOD BY AUTOMATED COUNT: 12.4 % (ref 10–15)
HCT VFR BLD AUTO: 40.1 % (ref 40–53)
HGB BLD-MCNC: 13.8 G/DL (ref 13.3–17.7)
HOLD SPECIMEN: NORMAL
IMM GRANULOCYTES # BLD: 0 10E3/UL
IMM GRANULOCYTES NFR BLD: 0 %
LYMPHOCYTES # BLD AUTO: 1.4 10E3/UL (ref 0.8–5.3)
LYMPHOCYTES NFR BLD AUTO: 17 %
MCH RBC QN AUTO: 28.5 PG (ref 26.5–33)
MCHC RBC AUTO-ENTMCNC: 34.4 G/DL (ref 31.5–36.5)
MCV RBC AUTO: 83 FL (ref 78–100)
MONOCYTES # BLD AUTO: 0.6 10E3/UL (ref 0–1.3)
MONOCYTES NFR BLD AUTO: 8 %
NEUTROPHILS # BLD AUTO: 4.9 10E3/UL (ref 1.6–8.3)
NEUTROPHILS NFR BLD AUTO: 59 %
NRBC # BLD AUTO: 0 10E3/UL
NRBC BLD AUTO-RTO: 0 /100
PLATELET # BLD AUTO: 391 10E3/UL (ref 150–450)
PROT SERPL-MCNC: 7.6 G/DL (ref 6.4–8.3)
RBC # BLD AUTO: 4.85 10E6/UL (ref 4.4–5.9)
WBC # BLD AUTO: 8.3 10E3/UL (ref 4–11)

## 2023-11-21 PROCEDURE — 86140 C-REACTIVE PROTEIN: CPT | Performed by: INTERNAL MEDICINE

## 2023-11-21 PROCEDURE — 85025 COMPLETE CBC W/AUTO DIFF WBC: CPT | Performed by: INTERNAL MEDICINE

## 2023-11-21 PROCEDURE — 36592 COLLECT BLOOD FROM PICC: CPT | Performed by: INTERNAL MEDICINE

## 2023-11-21 PROCEDURE — 80076 HEPATIC FUNCTION PANEL: CPT | Performed by: INTERNAL MEDICINE

## 2023-11-21 PROCEDURE — 82565 ASSAY OF CREATININE: CPT | Performed by: INTERNAL MEDICINE

## 2023-11-28 ENCOUNTER — LAB REQUISITION (OUTPATIENT)
Dept: LAB | Facility: CLINIC | Age: 63
End: 2023-11-28
Payer: COMMERCIAL

## 2023-11-28 DIAGNOSIS — T84.59XA INFECTION AND INFLAMMATORY REACTION DUE TO OTHER INTERNAL JOINT PROSTHESIS, INITIAL ENCOUNTER (H): ICD-10-CM

## 2023-11-28 LAB
ALBUMIN SERPL BCG-MCNC: 4.1 G/DL (ref 3.5–5.2)
ALP SERPL-CCNC: 147 U/L (ref 40–150)
ALT SERPL W P-5'-P-CCNC: 12 U/L (ref 0–70)
AST SERPL W P-5'-P-CCNC: 21 U/L (ref 0–45)
BASOPHILS # BLD AUTO: 0.1 10E3/UL (ref 0–0.2)
BASOPHILS NFR BLD AUTO: 1 %
BILIRUB DIRECT SERPL-MCNC: <0.2 MG/DL (ref 0–0.3)
BILIRUB SERPL-MCNC: 0.3 MG/DL
CREAT SERPL-MCNC: 0.74 MG/DL (ref 0.67–1.17)
CRP SERPL-MCNC: 15.3 MG/L
EGFRCR SERPLBLD CKD-EPI 2021: >90 ML/MIN/1.73M2
EOSINOPHIL # BLD AUTO: 1.3 10E3/UL (ref 0–0.7)
EOSINOPHIL NFR BLD AUTO: 17 %
ERYTHROCYTE [DISTWIDTH] IN BLOOD BY AUTOMATED COUNT: 12.5 % (ref 10–15)
HCT VFR BLD AUTO: 41 % (ref 40–53)
HGB BLD-MCNC: 14 G/DL (ref 13.3–17.7)
IMM GRANULOCYTES # BLD: 0 10E3/UL
IMM GRANULOCYTES NFR BLD: 0 %
LYMPHOCYTES # BLD AUTO: 1.3 10E3/UL (ref 0.8–5.3)
LYMPHOCYTES NFR BLD AUTO: 17 %
MCH RBC QN AUTO: 28.2 PG (ref 26.5–33)
MCHC RBC AUTO-ENTMCNC: 34.1 G/DL (ref 31.5–36.5)
MCV RBC AUTO: 83 FL (ref 78–100)
MONOCYTES # BLD AUTO: 0.5 10E3/UL (ref 0–1.3)
MONOCYTES NFR BLD AUTO: 7 %
NEUTROPHILS # BLD AUTO: 4.6 10E3/UL (ref 1.6–8.3)
NEUTROPHILS NFR BLD AUTO: 58 %
NRBC # BLD AUTO: 0 10E3/UL
NRBC BLD AUTO-RTO: 0 /100
PLATELET # BLD AUTO: 292 10E3/UL (ref 150–450)
PROT SERPL-MCNC: 7.5 G/DL (ref 6.4–8.3)
RBC # BLD AUTO: 4.97 10E6/UL (ref 4.4–5.9)
WBC # BLD AUTO: 7.8 10E3/UL (ref 4–11)

## 2023-11-28 PROCEDURE — 86140 C-REACTIVE PROTEIN: CPT | Performed by: INTERNAL MEDICINE

## 2023-11-28 PROCEDURE — 36592 COLLECT BLOOD FROM PICC: CPT | Performed by: INTERNAL MEDICINE

## 2023-11-28 PROCEDURE — 80076 HEPATIC FUNCTION PANEL: CPT | Performed by: INTERNAL MEDICINE

## 2023-11-28 PROCEDURE — 85004 AUTOMATED DIFF WBC COUNT: CPT | Performed by: INTERNAL MEDICINE

## 2023-11-28 PROCEDURE — 82565 ASSAY OF CREATININE: CPT | Performed by: INTERNAL MEDICINE

## 2023-11-30 ENCOUNTER — OFFICE VISIT (OUTPATIENT)
Dept: INFECTIOUS DISEASES | Facility: CLINIC | Age: 63
End: 2023-11-30
Attending: INTERNAL MEDICINE
Payer: COMMERCIAL

## 2023-11-30 VITALS
OXYGEN SATURATION: 97 % | WEIGHT: 241 LBS | HEIGHT: 69 IN | SYSTOLIC BLOOD PRESSURE: 130 MMHG | DIASTOLIC BLOOD PRESSURE: 84 MMHG | BODY MASS INDEX: 35.7 KG/M2 | HEART RATE: 65 BPM

## 2023-11-30 DIAGNOSIS — T84.53XD INFECTION ASSOCIATED WITH INTERNAL RIGHT KNEE PROSTHESIS, SUBSEQUENT ENCOUNTER: Primary | ICD-10-CM

## 2023-11-30 PROCEDURE — 99214 OFFICE O/P EST MOD 30 MIN: CPT | Performed by: INTERNAL MEDICINE

## 2023-11-30 RX ORDER — CEFADROXIL 500 MG/1
500 CAPSULE ORAL 2 TIMES DAILY
Qty: 60 CAPSULE | Refills: 5 | Status: SHIPPED | OUTPATIENT
Start: 2023-11-30

## 2023-11-30 ASSESSMENT — PAIN SCALES - GENERAL: PAINLEVEL: NO PAIN (0)

## 2023-11-30 NOTE — PROGRESS NOTES
[facility] INFECTIOUS DISEASE CLINIC [facility]   FOLLOW UP NOTE    Date: 11/30/2023   Patient Name: Gonzalez Morton   YOB: 1960  MRN: [id number]      ASSESSMENT:  63-year-old man with right prosthetic knee infection, here for follow-up.    MSSA R TKA PJI: s/p I&D, poly exchange 11/2. Aspiration 10/31 with MSSA. OR cultures with S.aureus.  Discharged on IV cefazolin and oral rifampin x 6 weeks.  CRP elevated, but trending down.  Clinically improving.  CAD s/p CABG  History of penicillin allergy.  Reports that he tolerates Augmentin.  No issues with cephalosporins.    PLAN:  -Continue cefazolin until 12/14  -Continue rifampin until 12/14  -Remove PICC line at completion of therapy  -After completion of IV antibiotics, start cefadroxil 500 mg p.o. twice daily x 6 months    Return to clinic in 6 weeks.    Kirt Bonilla MD  [facility]   Clinic phone: [phone]  Clinic fax: [phone]    ______________________________________________________________________    HISTORY OF PRESENT ILLNESS:   From inpatient ID consult on 11/3/2023:    Gonzalez Morton is a 63 year old male. History is provided by patient and chart.   R TKA in 11/2023, did ok for a bit then increased pain, swelling, couldn't bear weight on leg. Given cephalexin without improvement. S/p aspiration 10/31 which is growing staff. S/p below procedure 11/2:  RIGHT KNEE IRRIGATION AND DEBRIDEMENT, Right - Knee  POLYETHELYNE EXCHANGE, Right - Knee     He developed high fever to 104 overnight although doesn't think that he felt that warm. Was checking temps at home and usually in 99s, doesn't think had fever at home. Otherwise doing ok today, knee pain is different. Tolerating antibiotics.       SUBJECTIVE / INTERVAL HISTORY:   Gonzaelz Morton returns for follow up.  Hospitalized from 11/211/6 at [facility].  Discharged on IV cefazolin and oral rifampin.  He is tolerating this well.  He was having irritation  around the PICC line site, but had a change in the dressing and now has a silver impregnated patch instead of chlorhexidine, which has helped.  He has increasing range of motion in his right knee.  He is walking without assistance.  He still has some stiffness.  He has follow-up with his orthopedic doctor in about 2 weeks.  He is hoping to return to work afterwards.      ROS:   No fevers or diarrhea       Current Outpatient Medications:     acetaminophen (TYLENOL) 325 MG tablet, Take 975 mg by mouth every 8 hours as needed for mild pain, Disp: , Rfl:     amLODIPine (NORVASC) 5 MG tablet, Take 1 tablet (5 mg) by mouth daily, Disp: 90 tablet, Rfl: 3    aspirin 81 MG EC tablet, Take 162 mg by mouth 2 times daily, Disp: , Rfl:     atorvastatin (LIPITOR) 80 MG tablet, Take 1 tablet (80 mg) by mouth every evening, Disp: 90 tablet, Rfl: 3    carvedilol (COREG) 3.125 MG tablet, Take 1 tablet (3.125 mg) by mouth 2 times daily (with meals) To replace metoprolol, Disp: 180 tablet, Rfl: 3    ceFAZolin (ANCEF) intermittent infusion 2 g in 100 mL dextrose PRE-MIX, Inject 100 mLs (2 g) into the vein every 8 hours for 38 days Until 12/14/23, Disp: , Rfl:     hydrochlorothiazide (HYDRODIURIL) 25 MG tablet, TAKE 1 TABLET BY MOUTH EVERY DAY, Disp: 90 tablet, Rfl: 2    hydrOXYzine (ATARAX) 25 MG tablet, Take 1 tablet (25 mg) by mouth every 6 hours as needed for other (adjuvant pain), Disp: 30 tablet, Rfl: 0    lidocaine (XYLOCAINE) 5 % external ointment, Apply topically every 4 hours as needed for moderate pain, Disp: 50 g, Rfl: 0    lisinopril (ZESTRIL) 20 MG tablet, [LISINOPRIL (PRINIVIL,ZESTRIL) 20 MG TABLET] TAKE 1 TABLET BY MOUTH EVERY DAY, Disp: 90 tablet, Rfl: 3    metroNIDAZOLE (METROGEL) 0.75 % external gel, Apply topically 2 times daily (Patient taking differently: Apply topically 2 times daily as needed (.)), Disp: 45 g, Rfl: 3    nitroGLYcerin (NITROSTAT) 0.4 MG sublingual tablet, One tablet under the tongue every 5 minutes  "if needed for chest pain. May repeat every 5 minutes for a maximum of 3 doses in 15 minutes\", Disp: 25 tablet, Rfl: 3    oxyCODONE (ROXICODONE) 5 MG tablet, Take 1 tablet (5 mg) by mouth every 4 hours as needed for moderate pain, Disp: 30 tablet, Rfl: 0    pramipexole (MIRAPEX) 0.25 MG tablet, TAKE 1 TABLET BY MOUTH EVERYDAY AT BEDTIME, Disp: 90 tablet, Rfl: 3    rifampin (RIFADIN) 300 MG capsule, Take 1 capsule (300 mg) by mouth 2 times daily for 38 days, Disp: 76 capsule, Rfl: 0    traZODone (DESYREL) 50 MG tablet, Take 1-2 tablets ( mg) by mouth At Bedtime, Disp: 180 tablet, Rfl: 0      OBJECTIVE:  /84   Pulse 65   Ht 1.753 m (5' 9\")   Wt 109.3 kg (241 lb)   SpO2 97%   BMI 35.59 kg/m        GEN: No acute distress.    HENT: Head is normocephalic, atraumatic.   EYES: Eyes have anicteric sclerae without conjunctival injection    RESPIRATORY:  Normal breathing pattern.   EXTREMITIES: No edema.  Right knee with small effusion noted.  Incision is intact without any drainage.  No erythema.  SKIN/HAIR/NAILS:  No rashes. right upper extremity PICC line is in place without any surrounding erythema. No stigmata of endocarditis.  NEUROLOGIC: Grossly nonfocal. Normal gait and station      Pertinent labs:    Lab Results   Component Value Date    CRPI 15.30 (H) 11/28/2023         Lab Results   Component Value Date    ALT 12 11/28/2023    AST 21 11/28/2023    ALKPHOS 147 11/28/2023         MICROBIOLOGY DATA:  Reviewed    Susceptibility data from last 90 days.  Collected Specimen Info Organism Clindamycin Daptomycin Doxycycline Erythromycin Gentamicin Oxacillin Tetracycline Trimethoprim/Sulfamethoxazole  Vancomycin   11/02/23 Tissue from Knee, Right Staphylococcus aureus            11/02/23 Tissue from Knee, Right Staphylococcus aureus            11/02/23 Synovial fluid from Knee, Right Staphylococcus aureus            10/31/23 Synovial fluid from Knee, Right Staphylococcus aureus  S  S  S  S  S  S  S  S  S    "     RADIOLOGY:  Reviewed    Attestation:  Total time preparing to see this patient, face-to-face time, and coordinating care time on the same calendar date: 31 minutes.  Face-face time: 19 minutes.  Over 50% of face-to-face time was spent in counseling/coordination of care.

## 2023-11-30 NOTE — PATIENT INSTRUCTIONS
Iv antibiotic and rifampin until 12/14/23. We will arrange for the PICC line to be removed after that.    Start taking cefadroxil twice daily x 6 months on 12/15/23    Return in about 6 weeks

## 2023-12-05 ENCOUNTER — LAB REQUISITION (OUTPATIENT)
Dept: LAB | Facility: CLINIC | Age: 63
End: 2023-12-05
Payer: COMMERCIAL

## 2023-12-05 DIAGNOSIS — T84.59XA INFECTION AND INFLAMMATORY REACTION DUE TO OTHER INTERNAL JOINT PROSTHESIS, INITIAL ENCOUNTER (H): ICD-10-CM

## 2023-12-05 LAB
ALBUMIN SERPL BCG-MCNC: 4.3 G/DL (ref 3.5–5.2)
ALP SERPL-CCNC: 138 U/L (ref 40–150)
ALT SERPL W P-5'-P-CCNC: 8 U/L (ref 0–70)
AST SERPL W P-5'-P-CCNC: 18 U/L (ref 0–45)
BASOPHILS # BLD AUTO: 0.1 10E3/UL (ref 0–0.2)
BASOPHILS NFR BLD AUTO: 1 %
BILIRUB DIRECT SERPL-MCNC: <0.2 MG/DL (ref 0–0.3)
BILIRUB SERPL-MCNC: 0.3 MG/DL
CRP SERPL-MCNC: 21.7 MG/L
EOSINOPHIL # BLD AUTO: 0.8 10E3/UL (ref 0–0.7)
EOSINOPHIL NFR BLD AUTO: 12 %
ERYTHROCYTE [DISTWIDTH] IN BLOOD BY AUTOMATED COUNT: 12.6 % (ref 10–15)
HCT VFR BLD AUTO: 40.9 % (ref 40–53)
HGB BLD-MCNC: 14.3 G/DL (ref 13.3–17.7)
IMM GRANULOCYTES # BLD: 0 10E3/UL
IMM GRANULOCYTES NFR BLD: 0 %
LYMPHOCYTES # BLD AUTO: 1.2 10E3/UL (ref 0.8–5.3)
LYMPHOCYTES NFR BLD AUTO: 18 %
MCH RBC QN AUTO: 28.5 PG (ref 26.5–33)
MCHC RBC AUTO-ENTMCNC: 35 G/DL (ref 31.5–36.5)
MCV RBC AUTO: 82 FL (ref 78–100)
MONOCYTES # BLD AUTO: 0.6 10E3/UL (ref 0–1.3)
MONOCYTES NFR BLD AUTO: 8 %
NEUTROPHILS # BLD AUTO: 4 10E3/UL (ref 1.6–8.3)
NEUTROPHILS NFR BLD AUTO: 61 %
NRBC # BLD AUTO: 0 10E3/UL
NRBC BLD AUTO-RTO: 0 /100
PLATELET # BLD AUTO: 243 10E3/UL (ref 150–450)
PROT SERPL-MCNC: 7.7 G/DL (ref 6.4–8.3)
RBC # BLD AUTO: 5.01 10E6/UL (ref 4.4–5.9)
WBC # BLD AUTO: 6.6 10E3/UL (ref 4–11)

## 2023-12-05 PROCEDURE — 86140 C-REACTIVE PROTEIN: CPT | Performed by: INTERNAL MEDICINE

## 2023-12-05 PROCEDURE — 80076 HEPATIC FUNCTION PANEL: CPT | Performed by: INTERNAL MEDICINE

## 2023-12-05 PROCEDURE — 85025 COMPLETE CBC W/AUTO DIFF WBC: CPT | Performed by: INTERNAL MEDICINE

## 2023-12-05 PROCEDURE — 82565 ASSAY OF CREATININE: CPT | Performed by: INTERNAL MEDICINE

## 2023-12-05 PROCEDURE — 36592 COLLECT BLOOD FROM PICC: CPT | Performed by: INTERNAL MEDICINE

## 2023-12-06 LAB
CREAT SERPL-MCNC: 0.66 MG/DL (ref 0.67–1.17)
EGFRCR SERPLBLD CKD-EPI 2021: >90 ML/MIN/1.73M2

## 2023-12-12 ENCOUNTER — TRANSFERRED RECORDS (OUTPATIENT)
Dept: HEALTH INFORMATION MANAGEMENT | Facility: CLINIC | Age: 63
End: 2023-12-12

## 2023-12-12 ENCOUNTER — LAB REQUISITION (OUTPATIENT)
Dept: LAB | Facility: CLINIC | Age: 63
End: 2023-12-12
Payer: COMMERCIAL

## 2023-12-12 DIAGNOSIS — T84.59XA INFECTION AND INFLAMMATORY REACTION DUE TO OTHER INTERNAL JOINT PROSTHESIS, INITIAL ENCOUNTER (H): ICD-10-CM

## 2023-12-12 LAB
ALBUMIN SERPL BCG-MCNC: 4.1 G/DL (ref 3.5–5.2)
ALP SERPL-CCNC: 131 U/L (ref 40–150)
ALT SERPL W P-5'-P-CCNC: 7 U/L (ref 0–70)
AST SERPL W P-5'-P-CCNC: 18 U/L (ref 0–45)
BASOPHILS # BLD AUTO: 0.1 10E3/UL (ref 0–0.2)
BASOPHILS NFR BLD AUTO: 2 %
BILIRUB DIRECT SERPL-MCNC: <0.2 MG/DL (ref 0–0.3)
BILIRUB SERPL-MCNC: 0.4 MG/DL
CREAT SERPL-MCNC: 0.72 MG/DL (ref 0.67–1.17)
CRP SERPL-MCNC: 25.4 MG/L
EGFRCR SERPLBLD CKD-EPI 2021: >90 ML/MIN/1.73M2
EOSINOPHIL # BLD AUTO: 0.6 10E3/UL (ref 0–0.7)
EOSINOPHIL NFR BLD AUTO: 8 %
ERYTHROCYTE [DISTWIDTH] IN BLOOD BY AUTOMATED COUNT: 12.5 % (ref 10–15)
HCT VFR BLD AUTO: 39.9 % (ref 40–53)
HGB BLD-MCNC: 13.9 G/DL (ref 13.3–17.7)
IMM GRANULOCYTES # BLD: 0 10E3/UL
IMM GRANULOCYTES NFR BLD: 0 %
LYMPHOCYTES # BLD AUTO: 1.2 10E3/UL (ref 0.8–5.3)
LYMPHOCYTES NFR BLD AUTO: 18 %
MCH RBC QN AUTO: 28.3 PG (ref 26.5–33)
MCHC RBC AUTO-ENTMCNC: 34.8 G/DL (ref 31.5–36.5)
MCV RBC AUTO: 81 FL (ref 78–100)
MONOCYTES # BLD AUTO: 0.5 10E3/UL (ref 0–1.3)
MONOCYTES NFR BLD AUTO: 7 %
NEUTROPHILS # BLD AUTO: 4.6 10E3/UL (ref 1.6–8.3)
NEUTROPHILS NFR BLD AUTO: 65 %
NRBC # BLD AUTO: 0 10E3/UL
NRBC BLD AUTO-RTO: 0 /100
PLATELET # BLD AUTO: 258 10E3/UL (ref 150–450)
PROT SERPL-MCNC: 7.5 G/DL (ref 6.4–8.3)
RBC # BLD AUTO: 4.91 10E6/UL (ref 4.4–5.9)
WBC # BLD AUTO: 7 10E3/UL (ref 4–11)

## 2023-12-12 PROCEDURE — 86140 C-REACTIVE PROTEIN: CPT | Performed by: INTERNAL MEDICINE

## 2023-12-12 PROCEDURE — 82565 ASSAY OF CREATININE: CPT | Performed by: INTERNAL MEDICINE

## 2023-12-12 PROCEDURE — 80076 HEPATIC FUNCTION PANEL: CPT | Performed by: INTERNAL MEDICINE

## 2023-12-12 PROCEDURE — 85025 COMPLETE CBC W/AUTO DIFF WBC: CPT | Performed by: INTERNAL MEDICINE

## 2023-12-20 DIAGNOSIS — G47.00 INSOMNIA, UNSPECIFIED TYPE: ICD-10-CM

## 2023-12-20 RX ORDER — TRAZODONE HYDROCHLORIDE 50 MG/1
50-100 TABLET, FILM COATED ORAL AT BEDTIME
Qty: 180 TABLET | Refills: 1 | Status: SHIPPED | OUTPATIENT
Start: 2023-12-20 | End: 2024-05-28

## 2024-01-10 ENCOUNTER — TRANSFERRED RECORDS (OUTPATIENT)
Dept: HEALTH INFORMATION MANAGEMENT | Facility: CLINIC | Age: 64
End: 2024-01-10
Payer: COMMERCIAL

## 2024-01-11 ENCOUNTER — OFFICE VISIT (OUTPATIENT)
Dept: INFECTIOUS DISEASES | Facility: CLINIC | Age: 64
End: 2024-01-11
Attending: INTERNAL MEDICINE
Payer: COMMERCIAL

## 2024-01-11 VITALS
DIASTOLIC BLOOD PRESSURE: 72 MMHG | OXYGEN SATURATION: 97 % | HEART RATE: 76 BPM | TEMPERATURE: 98.1 F | BODY MASS INDEX: 38.11 KG/M2 | WEIGHT: 258.1 LBS | SYSTOLIC BLOOD PRESSURE: 108 MMHG

## 2024-01-11 DIAGNOSIS — T84.53XD INFECTION ASSOCIATED WITH INTERNAL RIGHT KNEE PROSTHESIS, SUBSEQUENT ENCOUNTER: Primary | ICD-10-CM

## 2024-01-11 PROCEDURE — 99214 OFFICE O/P EST MOD 30 MIN: CPT | Performed by: INTERNAL MEDICINE

## 2024-01-11 PROCEDURE — G2211 COMPLEX E/M VISIT ADD ON: HCPCS | Performed by: INTERNAL MEDICINE

## 2024-01-11 NOTE — PROGRESS NOTES
[facility] INFECTIOUS DISEASE CLINIC [facility]   FOLLOW UP NOTE    Date: 01/11/2024   Patient Name: Gonzalez Morton   YOB: 1960  MRN: [id number]      ASSESSMENT:  63-year-old man with right prosthetic knee infection, here for follow-up.    MSSA R TKA PJI: s/p I&D, poly exchange 11/2. Aspiration 10/31 with MSSA. OR cultures with S.aureus.  completed IV cefazolin and oral rifampin x 6 weeks.  CRP elevated, but trending down.  Clinically improving. Now on cefadroxil x 6 months   CAD s/p CABG  History of penicillin allergy.  Reports that he tolerates Augmentin.  No issues with cephalosporins.    PLAN:  -Continue cefadroxil until 6/14/24  -check cbc and CRP in 2 weeks    I will follow-up on lab results with patient    Kirt Bonilla MD  [facility]   Clinic phone: [phone]  Clinic fax: [phone]    ______________________________________________________________________    HISTORY OF PRESENT ILLNESS:   From inpatient ID consult on 11/3/2023:    Gonzalez Morton is a 63 year old male. History is provided by patient and chart.   R TKA in 11/2023, did ok for a bit then increased pain, swelling, couldn't bear weight on leg. Given cephalexin without improvement. S/p aspiration 10/31 which is growing staff. S/p below procedure 11/2:  RIGHT KNEE IRRIGATION AND DEBRIDEMENT, Right - Knee  POLYETHELYNE EXCHANGE, Right - Knee     He developed high fever to 104 overnight although doesn't think that he felt that warm. Was checking temps at home and usually in 99s, doesn't think had fever at home. Otherwise doing ok today, knee pain is different. Tolerating antibiotics.       SUBJECTIVE / INTERVAL HISTORY:   Gonzalez Morton returns for follow up.  Transitioned to po antibiotics. No issues with this. He had a fall this weekend on ice. He went to ortho clinic and had xrays. His knee was swollen, but getting better.      ROS:   No fevers or diarrhea       Current Outpatient  "Medications:     acetaminophen (TYLENOL) 325 MG tablet, Take 975 mg by mouth every 8 hours as needed for mild pain, Disp: , Rfl:     amLODIPine (NORVASC) 5 MG tablet, Take 1 tablet (5 mg) by mouth daily, Disp: 90 tablet, Rfl: 3    aspirin 81 MG EC tablet, Take 162 mg by mouth 2 times daily, Disp: , Rfl:     atorvastatin (LIPITOR) 80 MG tablet, Take 1 tablet (80 mg) by mouth every evening, Disp: 90 tablet, Rfl: 3    carvedilol (COREG) 3.125 MG tablet, Take 1 tablet (3.125 mg) by mouth 2 times daily (with meals) To replace metoprolol, Disp: 180 tablet, Rfl: 3    cefadroxil (DURICEF) 500 MG capsule, Take 1 capsule (500 mg) by mouth 2 times daily, Disp: 60 capsule, Rfl: 5    hydrochlorothiazide (HYDRODIURIL) 25 MG tablet, TAKE 1 TABLET BY MOUTH EVERY DAY, Disp: 90 tablet, Rfl: 2    hydrOXYzine (ATARAX) 25 MG tablet, Take 1 tablet (25 mg) by mouth every 6 hours as needed for other (adjuvant pain), Disp: 30 tablet, Rfl: 0    lisinopril (ZESTRIL) 20 MG tablet, [LISINOPRIL (PRINIVIL,ZESTRIL) 20 MG TABLET] TAKE 1 TABLET BY MOUTH EVERY DAY, Disp: 90 tablet, Rfl: 3    pramipexole (MIRAPEX) 0.25 MG tablet, TAKE 1 TABLET BY MOUTH EVERYDAY AT BEDTIME, Disp: 90 tablet, Rfl: 3    traZODone (DESYREL) 50 MG tablet, TAKE 1-2 TABLETS BY MOUTH AT BEDTIME., Disp: 180 tablet, Rfl: 1    lidocaine (XYLOCAINE) 5 % external ointment, Apply topically every 4 hours as needed for moderate pain (Patient not taking: Reported on 1/11/2024), Disp: 50 g, Rfl: 0    metroNIDAZOLE (METROGEL) 0.75 % external gel, Apply topically 2 times daily (Patient not taking: Reported on 1/11/2024), Disp: 45 g, Rfl: 3    nitroGLYcerin (NITROSTAT) 0.4 MG sublingual tablet, One tablet under the tongue every 5 minutes if needed for chest pain. May repeat every 5 minutes for a maximum of 3 doses in 15 minutes\" (Patient not taking: Reported on 1/11/2024), Disp: 25 tablet, Rfl: 3      OBJECTIVE:  /72 (BP Location: Right arm, Cuff Size: Adult Regular)   Pulse 76   " Temp 98.1  F (36.7  C) (Oral)   Wt 117.1 kg (258 lb 1.6 oz)   SpO2 97%   BMI 38.11 kg/m        GEN: No acute distress.    HENT: Head is normocephalic, atraumatic.   EYES: Eyes have anicteric sclerae without conjunctival injection    RESPIRATORY:  Normal breathing pattern.   EXTREMITIES: No edema.  Right knee with swelling and warmth, nontender. Incision intact  SKIN/HAIR/NAILS:  No rashes  NEUROLOGIC: Grossly nonfocal. Normal gait and station      Pertinent labs:    Lab Results   Component Value Date    CRPI 25.40 (H) 12/12/2023         Lab Results   Component Value Date    ALT 7 12/12/2023    AST 18 12/12/2023    ALKPHOS 131 12/12/2023         MICROBIOLOGY DATA:  Reviewed    Susceptibility data from last 90 days.  Collected Specimen Info Organism Clindamycin Daptomycin Doxycycline Erythromycin Gentamicin Oxacillin Tetracycline Trimethoprim/Sulfamethoxazole  Vancomycin   11/02/23 Tissue from Knee, Right Staphylococcus aureus            11/02/23 Tissue from Knee, Right Staphylococcus aureus            11/02/23 Synovial fluid from Knee, Right Staphylococcus aureus            10/31/23 Synovial fluid from Knee, Right Staphylococcus aureus  S  S  S  S  S  S  S  S  S        RADIOLOGY:  Reviewed    Attestation:  Total time preparing to see this patient, face-to-face time, and coordinating care time on the same calendar date: 11 minutes.  Face-face time: 7 minutes.  Over 50% of face-to-face time was spent in counseling/coordination of care.    The longitudinal plan of care for right prosthetic knee infection was addressed during this visit. Due to the added complexity in care, I will continue to support Steven in the subsequent management of this condition(s) and with the ongoing continuity of care of this condition(s).

## 2024-01-25 ENCOUNTER — LAB (OUTPATIENT)
Dept: LAB | Facility: CLINIC | Age: 64
End: 2024-01-25
Payer: COMMERCIAL

## 2024-01-25 DIAGNOSIS — T84.53XD INFECTION ASSOCIATED WITH INTERNAL RIGHT KNEE PROSTHESIS, SUBSEQUENT ENCOUNTER: ICD-10-CM

## 2024-01-25 DIAGNOSIS — T84.53XS INFECTION ASSOCIATED WITH INTERNAL RIGHT KNEE PROSTHESIS, SEQUELA: ICD-10-CM

## 2024-01-25 LAB
BASOPHILS # BLD AUTO: 0.1 10E3/UL (ref 0–0.2)
BASOPHILS NFR BLD AUTO: 1 %
CREAT SERPL-MCNC: 0.93 MG/DL (ref 0.67–1.17)
CRP SERPL-MCNC: <3 MG/L
EGFRCR SERPLBLD CKD-EPI 2021: >90 ML/MIN/1.73M2
EOSINOPHIL # BLD AUTO: 0.5 10E3/UL (ref 0–0.7)
EOSINOPHIL NFR BLD AUTO: 8 %
ERYTHROCYTE [DISTWIDTH] IN BLOOD BY AUTOMATED COUNT: 12.7 % (ref 10–15)
HCT VFR BLD AUTO: 44.5 % (ref 40–53)
HGB BLD-MCNC: 15.1 G/DL (ref 13.3–17.7)
IMM GRANULOCYTES # BLD: 0 10E3/UL
IMM GRANULOCYTES NFR BLD: 0 %
LYMPHOCYTES # BLD AUTO: 1.3 10E3/UL (ref 0.8–5.3)
LYMPHOCYTES NFR BLD AUTO: 22 %
MCH RBC QN AUTO: 28.3 PG (ref 26.5–33)
MCHC RBC AUTO-ENTMCNC: 33.9 G/DL (ref 31.5–36.5)
MCV RBC AUTO: 83 FL (ref 78–100)
MONOCYTES # BLD AUTO: 0.5 10E3/UL (ref 0–1.3)
MONOCYTES NFR BLD AUTO: 8 %
NEUTROPHILS # BLD AUTO: 3.8 10E3/UL (ref 1.6–8.3)
NEUTROPHILS NFR BLD AUTO: 61 %
PLATELET # BLD AUTO: 239 10E3/UL (ref 150–450)
RBC # BLD AUTO: 5.34 10E6/UL (ref 4.4–5.9)
WBC # BLD AUTO: 6.2 10E3/UL (ref 4–11)

## 2024-01-25 PROCEDURE — 86140 C-REACTIVE PROTEIN: CPT

## 2024-01-25 PROCEDURE — 85025 COMPLETE CBC W/AUTO DIFF WBC: CPT

## 2024-01-25 PROCEDURE — 36415 COLL VENOUS BLD VENIPUNCTURE: CPT

## 2024-01-25 PROCEDURE — 82565 ASSAY OF CREATININE: CPT

## 2024-02-15 DIAGNOSIS — I10 BENIGN ESSENTIAL HYPERTENSION: ICD-10-CM

## 2024-02-15 RX ORDER — HYDROCHLOROTHIAZIDE 25 MG/1
TABLET ORAL
Qty: 90 TABLET | Refills: 1 | Status: SHIPPED | OUTPATIENT
Start: 2024-02-15 | End: 2024-05-28

## 2024-04-08 DIAGNOSIS — Z95.1 S/P CABG (CORONARY ARTERY BYPASS GRAFT): ICD-10-CM

## 2024-04-08 RX ORDER — AMLODIPINE BESYLATE 5 MG/1
5 TABLET ORAL DAILY
Qty: 90 TABLET | Refills: 3 | OUTPATIENT
Start: 2024-04-08

## 2024-04-23 ENCOUNTER — TRANSFERRED RECORDS (OUTPATIENT)
Dept: HEALTH INFORMATION MANAGEMENT | Facility: CLINIC | Age: 64
End: 2024-04-23
Payer: COMMERCIAL

## 2024-05-03 ENCOUNTER — LAB (OUTPATIENT)
Dept: LAB | Facility: CLINIC | Age: 64
End: 2024-05-03
Payer: COMMERCIAL

## 2024-05-03 DIAGNOSIS — M25.561 RIGHT KNEE PAIN: Primary | ICD-10-CM

## 2024-05-03 LAB — ERYTHROCYTE [SEDIMENTATION RATE] IN BLOOD BY WESTERGREN METHOD: 8 MM/HR (ref 0–20)

## 2024-05-03 PROCEDURE — 85652 RBC SED RATE AUTOMATED: CPT

## 2024-05-03 PROCEDURE — 86140 C-REACTIVE PROTEIN: CPT

## 2024-05-03 PROCEDURE — 36415 COLL VENOUS BLD VENIPUNCTURE: CPT

## 2024-05-04 LAB — CRP SERPL-MCNC: <3 MG/L

## 2024-05-13 DIAGNOSIS — G25.81 RESTLESS LEGS SYNDROME: ICD-10-CM

## 2024-05-14 RX ORDER — PRAMIPEXOLE DIHYDROCHLORIDE 0.25 MG/1
TABLET ORAL
Qty: 90 TABLET | Refills: 0 | Status: SHIPPED | OUTPATIENT
Start: 2024-05-14 | End: 2024-05-28

## 2024-05-27 SDOH — HEALTH STABILITY: PHYSICAL HEALTH
ON AVERAGE, HOW MANY DAYS PER WEEK DO YOU ENGAGE IN MODERATE TO STRENUOUS EXERCISE (LIKE A BRISK WALK)?: PATIENT DECLINED

## 2024-05-27 SDOH — HEALTH STABILITY: PHYSICAL HEALTH: ON AVERAGE, HOW MANY MINUTES DO YOU ENGAGE IN EXERCISE AT THIS LEVEL?: 0 MIN

## 2024-05-27 ASSESSMENT — SOCIAL DETERMINANTS OF HEALTH (SDOH): HOW OFTEN DO YOU GET TOGETHER WITH FRIENDS OR RELATIVES?: ONCE A WEEK

## 2024-05-27 NOTE — COMMUNITY RESOURCES LIST (ENGLISH)
May 27, 2024           YOUR PERSONALIZED LIST OF SERVICES & PROGRAMS               Bill Payment Assistance      [employer] (CAP) [facility] - Energy Assistance Program (EAP)  [address] [facility] [address] (Distance: 10.0 miles)  Phone: [phone] [phone]  Language: English, [place]  Fee: Free      [address] [place] [address] payment assistance  [address] [place] [address] (Distance: 10.6 miles)  Phone: (371) 127-1244  Website: https://Right90/  Language: English  Fee: Free      - Dislocated Worker/Adult [employer] Employment Program  Phone: (691) 864-2262  Email: sandra@[employer]  Website: https://[employer]/services/employment-services/dislocated-worker-program/  Language: English, Malaysian  Hours: Mon 8:00 AM - 4:30 PM Tue 8:00 AM - 4:30 PM Wed 8:00 AM - 4:30 PM Thu 8:00 AM - 4:30 PM Fri 8:00 AM - 4:30 PM  Fee: Free  Accessibility: Ada accessible               IMPORTANT NUMBERS & WEBSITES        Emergency Services  911  .   [facility]  211 http://211unitedway.org  .   Poison Control  (999) 719-5947 http://mnpoison.org http://wisconsinpoison.org  .     Suicide and Crisis Lifeline  988 http://988lifeline.org  .   Childhelp National Child Abuse Hotline  649.634.8801 http://Childhelphotline.org   .   National Sexual Assault Hotline  (315) 884-8805 (HOPE) http://Rainn.org   .     National Runaway Safeline  (982) 461-1905 (RUNAWAY) http://1800runaway.org  .   Pregnancy & Postpartum Support  Call/text 514-087-9762  [place]: http://ppsupportmn.org  [place]: http://[employer].com/wi  .   Substance Abuse National Helpline ([facility])  729-031-HELP (4002) http://Findtreatment.gov   .                DISCLAIMER: These resources have been generated via the [employer] Platform. [employer] does not endorse any service providers mentioned in this resource list. Brianna Carroll does not guarantee that the services mentioned in this resource list will be available to you or will  improve your health or wellness.    [facility]

## 2024-05-28 ENCOUNTER — OFFICE VISIT (OUTPATIENT)
Dept: FAMILY MEDICINE | Facility: CLINIC | Age: 64
End: 2024-05-28
Attending: NURSE PRACTITIONER
Payer: COMMERCIAL

## 2024-05-28 VITALS
HEART RATE: 59 BPM | DIASTOLIC BLOOD PRESSURE: 70 MMHG | HEIGHT: 69 IN | SYSTOLIC BLOOD PRESSURE: 108 MMHG | OXYGEN SATURATION: 94 % | BODY MASS INDEX: 39.09 KG/M2 | WEIGHT: 263.9 LBS | TEMPERATURE: 98 F | RESPIRATION RATE: 16 BRPM

## 2024-05-28 DIAGNOSIS — I10 BENIGN ESSENTIAL HYPERTENSION: ICD-10-CM

## 2024-05-28 DIAGNOSIS — G47.00 INSOMNIA, UNSPECIFIED TYPE: ICD-10-CM

## 2024-05-28 DIAGNOSIS — I25.10 CORONARY ARTERY DISEASE INVOLVING NATIVE CORONARY ARTERY OF NATIVE HEART, UNSPECIFIED WHETHER ANGINA PRESENT: ICD-10-CM

## 2024-05-28 DIAGNOSIS — G25.81 RESTLESS LEGS SYNDROME: ICD-10-CM

## 2024-05-28 DIAGNOSIS — R73.03 PREDIABETES: ICD-10-CM

## 2024-05-28 DIAGNOSIS — Z00.00 ROUTINE GENERAL MEDICAL EXAMINATION AT A HEALTH CARE FACILITY: Primary | ICD-10-CM

## 2024-05-28 DIAGNOSIS — E66.01 MORBID OBESITY (H): ICD-10-CM

## 2024-05-28 DIAGNOSIS — Z95.1 S/P CABG (CORONARY ARTERY BYPASS GRAFT): ICD-10-CM

## 2024-05-28 DIAGNOSIS — I10 ESSENTIAL HYPERTENSION, BENIGN: ICD-10-CM

## 2024-05-28 DIAGNOSIS — Z85.46 HISTORY OF MALIGNANT NEOPLASM OF PROSTATE: ICD-10-CM

## 2024-05-28 DIAGNOSIS — E78.2 MIXED HYPERLIPIDEMIA: ICD-10-CM

## 2024-05-28 DIAGNOSIS — Z95.1 STATUS POST CORONARY ARTERY BYPASS GRAFT: ICD-10-CM

## 2024-05-28 PROBLEM — R10.9 ABDOMINAL PAIN: Status: RESOLVED | Noted: 2020-06-18 | Resolved: 2024-05-28

## 2024-05-28 PROBLEM — R97.20 HIGH PROSTATE SPECIFIC ANTIGEN (PSA): Status: RESOLVED | Noted: 2019-03-05 | Resolved: 2024-05-28

## 2024-05-28 PROBLEM — C61 MALIGNANT TUMOR OF PROSTATE (H): Status: RESOLVED | Noted: 2019-04-04 | Resolved: 2024-05-28

## 2024-05-28 PROBLEM — D49.9 NEOPLASTIC DISEASE: Status: RESOLVED | Noted: 2019-06-06 | Resolved: 2024-05-28

## 2024-05-28 LAB
ALBUMIN SERPL BCG-MCNC: 4.6 G/DL (ref 3.5–5.2)
ALP SERPL-CCNC: 124 U/L (ref 40–150)
ALT SERPL W P-5'-P-CCNC: 36 U/L (ref 0–70)
ANION GAP SERPL CALCULATED.3IONS-SCNC: 11 MMOL/L (ref 7–15)
AST SERPL W P-5'-P-CCNC: 21 U/L (ref 0–45)
BILIRUB SERPL-MCNC: 0.4 MG/DL
BUN SERPL-MCNC: 15.6 MG/DL (ref 8–23)
CALCIUM SERPL-MCNC: 9.7 MG/DL (ref 8.8–10.2)
CHLORIDE SERPL-SCNC: 100 MMOL/L (ref 98–107)
CREAT SERPL-MCNC: 0.97 MG/DL (ref 0.67–1.17)
DEPRECATED HCO3 PLAS-SCNC: 27 MMOL/L (ref 22–29)
EGFRCR SERPLBLD CKD-EPI 2021: 88 ML/MIN/1.73M2
GLUCOSE SERPL-MCNC: 112 MG/DL (ref 70–99)
HBA1C MFR BLD: 5.4 % (ref 0–5.6)
POTASSIUM SERPL-SCNC: 4.6 MMOL/L (ref 3.4–5.3)
PROT SERPL-MCNC: 7.3 G/DL (ref 6.4–8.3)
SODIUM SERPL-SCNC: 138 MMOL/L (ref 135–145)

## 2024-05-28 PROCEDURE — 83036 HEMOGLOBIN GLYCOSYLATED A1C: CPT | Performed by: NURSE PRACTITIONER

## 2024-05-28 PROCEDURE — 80053 COMPREHEN METABOLIC PANEL: CPT | Performed by: NURSE PRACTITIONER

## 2024-05-28 PROCEDURE — 36415 COLL VENOUS BLD VENIPUNCTURE: CPT | Performed by: NURSE PRACTITIONER

## 2024-05-28 PROCEDURE — 99396 PREV VISIT EST AGE 40-64: CPT | Performed by: NURSE PRACTITIONER

## 2024-05-28 RX ORDER — LISINOPRIL 20 MG/1
TABLET ORAL
Qty: 90 TABLET | Refills: 3 | Status: SHIPPED | OUTPATIENT
Start: 2024-05-28

## 2024-05-28 RX ORDER — AMLODIPINE BESYLATE 5 MG/1
5 TABLET ORAL DAILY
Qty: 90 TABLET | Refills: 3 | Status: SHIPPED | OUTPATIENT
Start: 2024-05-28

## 2024-05-28 RX ORDER — TRAZODONE HYDROCHLORIDE 50 MG/1
50-100 TABLET, FILM COATED ORAL AT BEDTIME
Qty: 180 TABLET | Refills: 3 | Status: SHIPPED | OUTPATIENT
Start: 2024-05-28

## 2024-05-28 RX ORDER — HYDROCHLOROTHIAZIDE 25 MG/1
TABLET ORAL
Qty: 90 TABLET | Refills: 3 | Status: SHIPPED | OUTPATIENT
Start: 2024-05-28

## 2024-05-28 RX ORDER — CARVEDILOL 3.12 MG/1
3.12 TABLET ORAL 2 TIMES DAILY WITH MEALS
Qty: 180 TABLET | Refills: 3 | Status: SHIPPED | OUTPATIENT
Start: 2024-05-28

## 2024-05-28 RX ORDER — PRAMIPEXOLE DIHYDROCHLORIDE 0.25 MG/1
0.25 TABLET ORAL AT BEDTIME
Qty: 90 TABLET | Refills: 3 | Status: SHIPPED | OUTPATIENT
Start: 2024-05-28

## 2024-05-28 ASSESSMENT — PAIN SCALES - GENERAL: PAINLEVEL: NO PAIN (0)

## 2024-05-28 NOTE — PATIENT INSTRUCTIONS
"Updating labs.    I will send a message to the ENT doctor regarding the note mentioning possibly getting brain MRI imaging done.  When I hear back I will let you know.    Preventive Care Advice   This is general advice we often give to help people stay healthy. Your care team may have specific advice just for you. Please talk to your care team about your own preventive care needs.  Lifestyle  Exercise at least 150 minutes each week (30 minutes a day, 5 days a week).  Do muscle strengthening activities 2 days a week. These help control your weight and prevent disease.  No smoking.  Wear sunscreen to prevent skin cancer.  Have your home tested for radon every 2 to 5 years. Radon is a colorless, odorless gas that can harm your lungs. To learn more, go to www.health.[facility].[place].us and search for \"Radon in Homes.\"  Keep guns unloaded and locked up in a safe place like a safe or gun vault, or, use a gun lock and hide the keys. Always lock away bullets separately. To learn more, visit [place].gov and search for \"safe gun storage.\"  Nutrition  Eat 5 or more servings of fruits and vegetables each day.  Try wheat bread, brown rice and whole grain pasta (instead of white bread, rice, and pasta).  Get enough calcium and vitamin D. Check the label on foods and aim for 100% of the RDA (recommended daily allowance).  Regular exams  Have a dental exam and cleaning every 6 months.  See your health care team every year to talk about:  Any changes in your health.  Any medicines your care team has prescribed.  Preventive care, family planning, and ways to prevent chronic diseases.  Shots (vaccines)   HPV shots (up to age 26), if you've never had them before.  Hepatitis B shots (up to age 59), if you've never had them before.  COVID-19 shot: Get this shot when it's due.  Flu shot: Get a flu shot every year.  Tetanus shot: Get a tetanus shot every 10 years.  Pneumococcal, hepatitis A, and RSV shots: Ask your care team if you need these based " on your risk.  Shingles shot (for age 50 and up).  General health tests  Diabetes screening:  Starting at age 35, Get screened for diabetes at least every 3 years.  If you are younger than age 35, ask your care team if you should be screened for diabetes.  Cholesterol test: At age 39, start having a cholesterol test every 5 years, or more often if advised.  Bone density scan (DEXA): At age 50, ask your care team if you should have this scan for osteoporosis (brittle bones).  Hepatitis C: Get tested at least once in your life.  Abdominal aortic aneurysm screening: Talk to your doctor about having this screening if you:  Have ever smoked; and  Are biologically male; and  Are between the ages of 65 and 75.  STIs (sexually transmitted infections)  Before age 24: Ask your care team if you should be screened for STIs.  After age 24: Get screened for STIs if you're at risk. You are at risk for STIs (including HIV) if:  You are sexually active with more than one person.  You don't use condoms every time.  You or a partner was diagnosed with a sexually transmitted infection.  If you are at risk for HIV, ask about PrEP medicine to prevent HIV.  Get tested for HIV at least once in your life, whether you are at risk for HIV or not.  Cancer screening tests  Cervical cancer screening: If you have a cervix, begin getting regular cervical cancer screening tests at age 21. Most people who have regular screenings with normal results can stop after age 65. Talk about this with your provider.  Breast cancer scan (mammogram): If you've ever had breasts, begin having regular mammograms starting at age 40. This is a scan to check for breast cancer.  Colon cancer screening: It is important to start screening for colon cancer at age 45.  Have a colonoscopy test every 10 years (or more often if you're at risk) Or, ask your provider about stool tests like a FIT test every year or Cologuard test every 3 years.  To learn more about your testing  options, visit: www.fvfiles.com/223297.pdf.  For help making a decision, visit: yamila/qo49459.  Prostate cancer screening test: If you have a prostate and are age 55 to 69, ask your provider if you would benefit from a yearly prostate cancer screening test.  Lung cancer screening: If you are a current or former smoker age 50 to 80, ask your care team if ongoing lung cancer screenings are right for you.  For informational purposes only. Not to replace the advice of your health care provider. Copyright   2023 [facility] [employer]. All rights reserved. Clinically reviewed by the [facility]. Asktourism 739222 - REV 04/24.    Learning About Stress  What is stress?     Stress is your body's response to a hard situation. Your body can have a physical, emotional, or mental response. Stress is a fact of life for most people, and it affects everyone differently. What causes stress for you may not be stressful for someone else.  A lot of things can cause stress. You may feel stress when you go on a job interview, take a test, or run a race. This kind of short-term stress is normal and even useful. It can help you if you need to work hard or react quickly. For example, stress can help you finish an important job on time.  Long-term stress is caused by ongoing stressful situations or events. Examples of long-term stress include long-term health problems, ongoing problems at work, or conflicts in your family. Long-term stress can harm your health.  How does stress affect your health?  When you are stressed, your body responds as though you are in danger. It makes hormones that speed up your heart, make you breathe faster, and give you a burst of energy. This is called the fight-or-flight stress response. If the stress is over quickly, your body goes back to normal and no harm is done.  But if stress happens too often or lasts too long, it can have bad effects. Long-term stress can make you more  likely to get sick, and it can make symptoms of some diseases worse. If you tense up when you are stressed, you may develop neck, shoulder, or low back pain. Stress is linked to high blood pressure and heart disease.  Stress also harms your emotional health. It can make you mandujano, tense, or depressed. Your relationships may suffer, and you may not do well at work or school.  What can you do to manage stress?  You can try these things to help manage stress:   Do something active. Exercise or activity can help reduce stress. Walking is a great way to get started. Even everyday activities such as housecleaning or yard work can help.  Try yoga or nancy chi. These techniques combine exercise and meditation. You may need some training at first to learn them.  Do something you enjoy. For example, listen to music or go to a movie. Practice your hobby or do volunteer work.  Meditate. This can help you relax, because you are not worrying about what happened before or what may happen in the future.  Do guided imagery. Imagine yourself in any setting that helps you feel calm. You can use online videos, books, or a teacher to guide you.  Do breathing exercises. For example:  From a standing position, bend forward from the waist with your knees slightly bent. Let your arms dangle close to the floor.  Breathe in slowly and deeply as you return to a standing position. Roll up slowly and lift your head last.  Hold your breath for just a few seconds in the standing position.  Breathe out slowly and bend forward from the waist.  Let your feelings out. Talk, laugh, cry, and express anger when you need to. Talking with supportive friends or family, a counselor, or a curtis leader about your feelings is a healthy way to relieve stress. Avoid discussing your feelings with people who make you feel worse.  Write. It may help to write about things that are bothering you. This helps you find out how much stress you feel and what is causing it.  "When you know this, you can find better ways to cope.  What can you do to prevent stress?  You might try some of these things to help prevent stress:  Manage your time. This helps you find time to do the things you want and need to do.  Get enough sleep. Your body recovers from the stresses of the day while you are sleeping.  Get support. Your family, friends, and community can make a difference in how you experience stress.  Limit your news feed. Avoid or limit time on social media or news that may make you feel stressed.  Do something active. Exercise or activity can help reduce stress. Walking is a great way to get started.  Where can you learn more?  Go to https://www.[employer].net/patiented  Enter N032 in the search box to learn more about \"Learning About Stress.\"  Current as of: October 24, 2023               Content Version: 14.0    7687-2797 [employer].   Care instructions adapted under license by your healthcare professional. If you have questions about a medical condition or this instruction, always ask your healthcare professional. [employer] disclaims any warranty or liability for your use of this information.      "

## 2024-05-28 NOTE — PROGRESS NOTES
Preventive Care Visit  [facility]  Dylon Shafer NP,    May 28, 2024      Assessment & Plan       ICD-10-CM    1. Routine general medical examination at a health care facility  Z00.00       2. S/P CABG (coronary artery bypass graft)  Z95.1 amLODIPine (NORVASC) 5 MG tablet      3. Coronary artery disease involving native coronary artery of native heart, unspecified whether angina present  I25.10 carvedilol (COREG) 3.125 MG tablet      4. Benign essential hypertension  I10 lisinopril (ZESTRIL) 20 MG tablet     Comprehensive metabolic panel (BMP + Alb, Alk Phos, ALT, AST, Total. Bili, TP)     Comprehensive metabolic panel (BMP + Alb, Alk Phos, ALT, AST, Total. Bili, TP)     hydrochlorothiazide (HYDRODIURIL) 25 MG tablet      5. Restless legs syndrome  G25.81 pramipexole (MIRAPEX) 0.25 MG tablet      6. Insomnia, unspecified type  G47.00 traZODone (DESYREL) 50 MG tablet      7. Prediabetes  R73.03 Hemoglobin A1c     Comprehensive metabolic panel (BMP + Alb, Alk Phos, ALT, AST, Total. Bili, TP)     Hemoglobin A1c     Comprehensive metabolic panel (BMP + Alb, Alk Phos, ALT, AST, Total. Bili, TP)      8. Mixed hyperlipidemia  E78.2 Comprehensive metabolic panel (BMP + Alb, Alk Phos, ALT, AST, Total. Bili, TP)     Comprehensive metabolic panel (BMP + Alb, Alk Phos, ALT, AST, Total. Bili, TP)      9. Benign Essential Hypertension  I10       10. History of malignant neoplasm of prostate  Z85.46       11. Status post coronary artery bypass graft  Z95.1       12. Morbid obesity (H)  E66.01         Now that that knee has healed, try to increase activity and work on weight loss.  Keep an eye on prediabetes labs.  Keep annual follow-up with cardiology later this September/October.  Message sent to ENT regarding note mentioning getting MRI of the brain.  Will follow-up with patient when we hear back from them.  Continue same antihypertensives.  Shots declined.  PSA managed by urology status post  "prostatectomy for prostate cancer.          BMI  Estimated body mass index is 38.97 kg/m  as calculated from the following:    Height as of this encounter: 1.753 m (5' 9\").    Weight as of this encounter: 119.7 kg (263 lb 14.4 oz).   Weight management plan: Discussed healthy diet and exercise guidelines    Counseling  Appropriate preventive services were discussed with this patient, including applicable screening as appropriate for fall prevention, nutrition, physical activity, Tobacco-use cessation, weight loss and cognition.  Checklist reviewing preventive services available has been given to the patient.  Reviewed patient's diet, addressing concerns and/or questions.   He is at risk for psychosocial distress and has been provided with information to reduce risk.     Subjective   Steven is a 63 year old, presenting for the following:  Physical (Fasting)        5/28/2024     8:30 AM   Additional Questions   Roomed by Sayra Regalado [facility]        Health Care Directive  Patient does not have a Health Care Directive or Living Will: previously reviewed    HPI    Doing well. Continues with PSAs through urology which continue to be undetectable.  Did have his TKA and unfortunately had a postoperative infection.  Finishing up antibiotics.  Did have sudden hearing loss out of the left ear and saw ENT.  Steroid injections were reviewed.  There was discussion of an MRI as well but patient's postop infection took precedence.  He is now interested in addressing.          5/27/2024   General Health   How would you rate your overall physical health? Good   Feel stress (tense, anxious, or unable to sleep) Only a little   (!) STRESS CONCERN      5/27/2024   Nutrition   Three or more servings of calcium each day? Yes   Diet: Low salt   How many servings of fruit and vegetables per day? (!) 2-3   How many sweetened beverages each day? 0-1         5/27/2024   Exercise   Days per week of moderate/strenous exercise Patient declined   Average " minutes spent exercising at this level 0 min         5/27/2024   Social Factors   Frequency of gathering with friends or relatives Once a week   Worry food won't last until get money to buy more No   Food not last or not have enough money for food? No   Do you have housing?  Yes   Are you worried about losing your housing? No   Lack of transportation? No   Unable to get utilities (heat,electricity)? Yes   Want help with housing or utility concern? No   (!) FINANCIAL RESOURCE STRAIN CONCERN      5/27/2024   Fall Risk   Fallen 2 or more times in the past year? No   Trouble with walking or balance? No          5/27/2024   Dental   Dentist two times every year? Yes         5/27/2024   TB Screening   Were you born outside of the [place]? No         Today's PHQ-2 Score:       5/27/2024     9:52 AM   PHQ-2 ( 1999 [employer])   Q1: Little interest or pleasure in doing things 0   Q2: Feeling down, depressed or hopeless 0   PHQ-2 Score 0   Q1: Little interest or pleasure in doing things Not at all   Q2: Feeling down, depressed or hopeless Not at all   PHQ-2 Score 0           5/27/2024   Substance Use   Alcohol more than 3/day or more than 7/wk No   Do you use any other substances recreationally? No     Social History     Tobacco Use    Smoking status: Never    Smokeless tobacco: Never   Vaping Use    Vaping status: Never Used   Substance Use Topics    Alcohol use: Yes     Alcohol/week: 1.0 standard drink of alcohol     Types: 1 Standard drinks or equivalent per week    Drug use: Never         5/27/2024   STI Screening   New sexual partner(s) since last STI/HIV test? No   Last PSA:   Prostate Specific Antigen Screen   Date Value Ref Range Status   02/27/2019 11.0 (H) 0.0 - 3.5 ng/mL Final     ASCVD Risk   The ASCVD Risk score (Echo WARNER, et al., 2019) failed to calculate for the following reasons:    The valid total cholesterol range is 130 to 320 mg/dL      Reviewed and updated as needed this visit by Provider                     Past Medical History:   Diagnosis Date    Abnormal cardiovascular stress test 04/03/2022    CAD (coronary artery disease)     Elevated coronary artery calcium score     Hypertension     Knee injury     right    Knee osteoarthritis 08/16/2023    Malignant tumor of prostate (H) 04/04/2019    C61 : Malignant tumor of prostate      Obese     Prostate cancer (H)     Rosacea     Sleep apnea     CPAP     Past Surgical History:   Procedure Laterality Date    ABDOMEN SURGERY      Appendix    APPENDECTOMY      ARTHROPLASTY KNEE Right 8/16/2023    Procedure: RIGHT TOTAL KNEE ARTHROPLASTY;  Surgeon: Bradley Redd MD;  Location: [facility] Main OR    ARTHROSCOPY KNEE Bilateral     BYPASS GRAFT ARTERY CORONARY N/A 6/6/2022    Procedure: CORONARY ARTERY BYPASS GRAFT X 5, ENDOSCOPIC VESSEL PROCUREMENT LEFT LEG, INTERNAL MAMMARY ARTERY HARVEST, LEFT RADIAL ARTERY HARVEST, ANESTHESIA TRANSESOPHAGEAL ECHOCARDIOGRAM, EPIAORTIC ULTRASOUND;  Surgeon: Mayank Rocha MD;  Location: [facility] OR    CV CORONARY ANGIOGRAM N/A 4/7/2022    Procedure: Coronary Angiogram;  Surgeon: Mery Lyons MD;  Location: [facility] CATH LAB CV    EXCHANGE POLY COMPONENT ARTHROPLASTY KNEE Right 11/2/2023    Procedure: RIGHT KNEE IRRIGATION AND DEBRIDEMENT;  Surgeon: Bradley Redd MD;  Location: [facility] OR    PICC SINGLE LUMEN PLACEMENT  11/05/2023    PROSTATECTOMY      REMOVE HARDWARE ARTHROPLASTY KNEE, IRRIG & TAVON, PLACE ANTIBIOTIC CEMENT SPACER, COMBINED Right 11/2/2023    Procedure: POLYETHELYNE EXCHANGE;  Surgeon: Bradley Redd MD;  Location: [facility] Main OR    TOTAL KNEE ARTHROPLASTY Left     WRIST SURGERY           Review of Systems  Constitutional, HEENT, cardiovascular, pulmonary, gi and gu systems are negative, except as otherwise noted.     Objective    Exam  /70 (BP Location: Right arm, Patient Position: Sitting, Cuff Size: Adult Large)   Pulse 59   Temp 98  F (36.7  C) (Oral)   Resp 16   Ht 1.753 m  "(5' 9\")   Wt 119.7 kg (263 lb 14.4 oz)   SpO2 (!) 9%   BMI 38.97 kg/m     Estimated body mass index is 38.97 kg/m  as calculated from the following:    Height as of this encounter: 1.753 m (5' 9\").    Weight as of this encounter: 119.7 kg (263 lb 14.4 oz).    Physical Exam  GENERAL: alert and no distress  EYES: Eyes grossly normal to inspection, PERRL and conjunctivae and sclerae normal  HENT: ear canals and TM's normal, nose and mouth without ulcers or lesions  NECK: no adenopathy, no asymmetry, masses, or scars  RESP: lungs clear to auscultation - no rales, rhonchi or wheezes  CV: regular rate and rhythm, normal S1 S2, no S3 or S4, no murmur, click or rub, no peripheral edema  ABDOMEN: soft, nontender, no hepatosplenomegaly, no masses and bowel sounds normal  MS: no gross musculoskeletal defects noted, no edema  SKIN: no suspicious lesions or rashes  NEURO: Normal strength and tone, mentation intact and speech normal  PSYCH: mentation appears normal, affect normal/bright    Signed Electronically by: Dylon Shafer NP  "

## 2024-06-25 DIAGNOSIS — H91.20 SUDDEN-ONSET SENSORINEURAL HEARING LOSS: Primary | ICD-10-CM

## 2024-07-10 ENCOUNTER — HOSPITAL ENCOUNTER (OUTPATIENT)
Dept: MRI IMAGING | Facility: CLINIC | Age: 64
Discharge: HOME | End: 2024-07-10
Attending: REGISTERED NURSE | Admitting: REGISTERED NURSE
Payer: COMMERCIAL

## 2024-07-10 DIAGNOSIS — H91.20 SUDDEN-ONSET SENSORINEURAL HEARING LOSS: ICD-10-CM

## 2024-07-10 PROCEDURE — 70553 MRI BRAIN STEM W/O & W/DYE: CPT

## 2024-07-10 PROCEDURE — A9585 GADOBUTROL INJECTION: HCPCS | Performed by: REGISTERED NURSE

## 2024-07-10 PROCEDURE — 255N000002 HC RX 255 OP 636: Performed by: REGISTERED NURSE

## 2024-07-10 RX ORDER — GADOBUTROL 604.72 MG/ML
10 INJECTION INTRAVENOUS ONCE
Status: COMPLETED | OUTPATIENT
Start: 2024-07-10 | End: 2024-07-10

## 2024-07-10 RX ADMIN — GADOBUTROL 10 ML: 604.72 INJECTION INTRAVENOUS at 12:29

## 2024-07-16 ENCOUNTER — TRANSFERRED RECORDS (OUTPATIENT)
Dept: HEALTH INFORMATION MANAGEMENT | Facility: CLINIC | Age: 64
End: 2024-07-16
Payer: COMMERCIAL

## 2024-07-29 ENCOUNTER — TRANSFERRED RECORDS (OUTPATIENT)
Dept: HEALTH INFORMATION MANAGEMENT | Facility: CLINIC | Age: 64
End: 2024-07-29
Payer: COMMERCIAL

## 2024-08-05 ENCOUNTER — TRANSFERRED RECORDS (OUTPATIENT)
Dept: HEALTH INFORMATION MANAGEMENT | Facility: CLINIC | Age: 64
End: 2024-08-05
Payer: COMMERCIAL

## 2024-08-14 DIAGNOSIS — R93.1 ELEVATED CORONARY ARTERY CALCIUM SCORE: ICD-10-CM

## 2024-08-14 DIAGNOSIS — E78.2 MIXED HYPERLIPIDEMIA: ICD-10-CM

## 2024-08-14 RX ORDER — ATORVASTATIN CALCIUM 80 MG/1
80 TABLET, FILM COATED ORAL EVERY EVENING
Qty: 90 TABLET | Refills: 0 | Status: SHIPPED | OUTPATIENT
Start: 2024-08-14

## 2024-08-14 NOTE — TELEPHONE ENCOUNTER
Previous Dr. Lyons pt. Last seen 8/10/23 with recommendation to follow-up in 1 year with new General Cardiologist. Refilled for 90 days with instructions to set up follow-up appt for further refills. Msg sent to sched to set up next available. LMS

## 2024-09-07 NOTE — PROGRESS NOTES
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 Due to the added complexity in care, I will continue to support Steven in the subsequent management and with ongoing continuity of care.          History of Present Illness/Subjective    Indication for visit:  Gonzalez Morton returns for follow up of CAD and was last seen on 8/9/2023 by Dr Lyons.      HPI: Gonzalez Morton is a 63 year old male with a history of CAD s/p CABG, HTN, hyperlipidemia who returns for follow up.    He reports abnormal pre-op ECG that triggered cardiac evaluation and led to CABG.  He has pinched nerve in back, had shot but still in pain.  Chest incision sore if he lays on it, no exertional chest pain, dyspnea, orthopnea, or PND.  No palpitations.  Does cardio 30 minutes 3+ times per week.      I reviewed notes from PCP prior to this visit.         Physical Examination  Past Cardiac History   Vitals: /78 (BP Location: Left arm, Patient Position: Sitting, Cuff Size: Adult Large)   Pulse 56   Wt 121.6 kg (268 lb 1 oz)   SpO2 97%   BMI 39.59 kg/m    BMI= Body mass index is 39.59 kg/m .  Wt Readings from Last 3 Encounters:   09/12/24 121.6 kg (268 lb 1 oz)   05/28/24 119.7 kg (263 lb 14.4 oz)   01/11/24 117.1 kg (258 lb 1.6 oz)       General Appearance:   no distress, normal body habitus   ENT/Mouth: membranes moist, no oral lesions or bleeding gums.      EYES:  no  scleral icterus, normal conjunctivae   Neck: no carotid bruits or thyromegaly   Chest/Lungs:   lungs are clear to auscultation, no rales or wheezing,  sternal scar, equal chest wall expansion    Cardiovascular:   Regular. Normal first and second heart sounds with no murmurs, rubs, or gallops; the carotid, radial and posterior tibial pulses are intact, Jugular venous pressure normal, No edema bilaterally    Abdomen:  no organomegaly, masses, bruits, or tenderness; bowel sounds are present   Extremities: no cyanosis or clubbing   Skin: no xanthelasma, warm.    Neurologic: normal  bilateral, no tremors           CAD: s/p 5 vessel CABG 6/2022 (LIMA-D2, SVG-D2, SVG-OM2, SVG-OM1, radial-rPDA      Most Recent Echocardiogram: 4/7/2022  Left ventricular size, wall motion and function are normal. The ejection  fraction is 60-65%.  Normal right ventricle size and systolic function.  No hemodynamically significant valvular abnormalities on 2D or color flow  imaging. The study was technically difficult.    Most Recent Stress Test: 3/30/2022    The nuclear stress test is abnormal.  Nuclear images demonstrate a medium size area of nontransmural infarction involving the mid to basal inferior wall, although specificity reduced in the setting of diaphragmatic attenuation.    Stress to rest cavity ratio is 1.38.  TID is present visually and quantitatively.  This is a nonspecific finding but may be a marker of diffuse subendocardial ischemia.    The left ventricular ejection fraction at stress is greater than 70%.    The patient is at an intermediate risk of future cardiac ischemic events.    There is no prior study for comparison.     Most Recent Angiogram: 4/7/2022  Very high calcium score and abnormal stress test in an obese man with a family history of early coronary disease.  Severe, diffuse coronary calcification.  Moderate distal left main disease.  Severe mid and distal LAD disease with an occluded apical LAD.  Critical  ostial circumflex disease limiting flow into both the large branching OM-1 and OM-3.   of the mid RCA with collateral filling piecmeal of the distal RCA branches.             Medical History  Family History Social History   Past Medical History:   Diagnosis Date    Abnormal cardiovascular stress test 04/03/2022    CAD (coronary artery disease)     Elevated coronary artery calcium score     Hypertension     Knee injury     right    Knee osteoarthritis 08/16/2023    Malignant tumor of prostate (H) 04/04/2019    C61 : Malignant tumor of prostate      Obese     Prostate cancer (H)     Rosacea     Sleep apnea     CPAP     Family History   Problem Relation Age of Onset    Diabetes Mother     Heart Disease Mother     Heart Disease Father     Atrophic kidney Sister     Diabetes Sister     Diabetes Type 1 Brother         Social History     Socioeconomic History    Marital status:      Spouse name: Not on file    Number of children: Not on file    Years of education: Not on file    Highest education level: Not on file   Occupational History    Not on file   Tobacco Use    Smoking status: Never    Smokeless tobacco: Never   Vaping Use    Vaping status: Never Used   Substance and Sexual Activity    Alcohol use: Yes     Alcohol/week: 1.0 standard drink of alcohol     Types: 1 Standard drinks or equivalent per week    Drug use: Never    Sexual activity: Yes     Partners: Female     Birth control/protection: None   Other Topics Concern    Parent/sibling w/ CABG, MI or angioplasty before 65F 55M? No   Social History Narrative    Not on file     Social Determinants of Health     Financial Resource Strain: High Risk (5/27/2024)    Financial Resource Strain     Within the past 12 months, have you or your family members you live with been unable to get utilities (heat, electricity) when it was really needed?: Yes   Food Insecurity: Low Risk  (5/27/2024)    Food Insecurity     Within the past 12 months, did you worry that your  food would run out before you got money to buy more?: No     Within the past 12 months, did the food you bought just not last and you didn t have money to get more?: No   Transportation Needs: Low Risk  (5/27/2024)    Transportation Needs     Within the past 12 months, has lack of transportation kept you from medical appointments, getting your medicines, non-medical meetings or appointments, work, or from getting things that you need?: No   Physical Activity: Unknown (5/27/2024)    Exercise Vital Sign     Days of Exercise per Week: Patient declined     Minutes of Exercise per Session: 0 min   Stress: No Stress Concern Present (5/27/2024)    [employer] [place] [employer] - Occupational Stress Questionnaire     Feeling of Stress : Only a little   Social Connections: Unknown (5/27/2024)    Social Connection and Isolation Panel [NHANES]     Frequency of Communication with Friends and Family: Not on file     Frequency of Social Gatherings with Friends and Family: Once a week     Attends Religion Services: Not on file     Active Member of Clubs or Organizations: Not on file     Attends Club or Organization Meetings: Not on file     Marital Status: Not on file   Interpersonal Safety: Low Risk  (5/28/2024)    Interpersonal Safety     Do you feel physically and emotionally safe where you currently live?: Yes     Within the past 12 months, have you been hit, slapped, kicked or otherwise physically hurt by someone?: No     Within the past 12 months, have you been humiliated or emotionally abused in other ways by your partner or ex-partner?: No   Housing Stability: Low Risk  (5/27/2024)    Housing Stability     Do you have housing? : Yes     Are you worried about losing your housing?: No           Medications  Allergies   Current Outpatient Medications   Medication Sig Dispense Refill    acetaminophen (TYLENOL) 325 MG tablet Take 975 mg by mouth every 8 hours as needed for mild pain      amLODIPine (NORVASC) 5 MG  "tablet Take 1 tablet (5 mg) by mouth daily 90 tablet 3    aspirin 81 MG EC tablet Take 162 mg by mouth 2 times daily      atorvastatin (LIPITOR) 80 MG tablet TAKE 1 TABLET BY MOUTH EVERY EVENING 90 tablet 0    carvedilol (COREG) 3.125 MG tablet Take 1 tablet (3.125 mg) by mouth 2 times daily (with meals) To replace metoprolol 180 tablet 3    cefadroxil (DURICEF) 500 MG capsule Take 1 capsule (500 mg) by mouth 2 times daily 60 capsule 5    hydrochlorothiazide (HYDRODIURIL) 25 MG tablet TAKE 1 TABLET BY MOUTH EVERY DAY 90 tablet 3    lisinopril (ZESTRIL) 20 MG tablet [LISINOPRIL (PRINIVIL,ZESTRIL) 20 MG TABLET] TAKE 1 TABLET BY MOUTH EVERY DAY 90 tablet 3    nitroGLYcerin (NITROSTAT) 0.4 MG sublingual tablet One tablet under the tongue every 5 minutes if needed for chest pain. May repeat every 5 minutes for a maximum of 3 doses in 15 minutes\" 25 tablet 3    pramipexole (MIRAPEX) 0.25 MG tablet Take 1 tablet (0.25 mg) by mouth at bedtime 90 tablet 3    traZODone (DESYREL) 50 MG tablet Take 1-2 tablets ( mg) by mouth at bedtime 180 tablet 3       Allergies   Allergen Reactions    Metoprolol Tartrate Hives    Penicillins Hives     Tolerated CTX 06/2020, Ancef 6/2022. Tolerated augmentin          Lab Results    Chemistry/lipid CBC Cardiac Enzymes/BNP/TSH/INR   Recent Labs   Lab Test 10/12/23  0803   CHOL 125   HDL 35*   LDL 65   TRIG 125     Recent Labs   Lab Test 10/12/23  0803 08/09/23  0905 05/17/23  0900   LDL 65 78 75     Recent Labs   Lab Test 05/28/24  0908      POTASSIUM 4.6   CHLORIDE 100   CO2 27   *   BUN 15.6   CR 0.97   GFRESTIMATED 88   KEATON 9.7     Recent Labs   Lab Test 05/28/24  0908 01/25/24  0734 12/12/23  1025   CR 0.97 0.93 0.72     Recent Labs   Lab Test 05/28/24  0908 08/16/23  0940 05/31/22  0823   A1C 5.4 5.6 5.8*          Recent Labs   Lab Test 01/25/24  0734   WBC 6.2   HGB 15.1   HCT 44.5   MCV 83        Recent Labs   Lab Test 01/25/24  0734 12/12/23  1025 " "12/05/23  0915   HGB 15.1 13.9 14.3    No results for input(s): \"TROPONINI\" in the last 28935 hours.  No results for input(s): \"BNP\", \"NTBNPI\", \"NTBNP\" in the last 32696 hours.  No results for input(s): \"TSH\" in the last 86597 hours.  Recent Labs   Lab Test 06/06/22  1333 06/06/22  1025 05/31/22  0823   INR 1.28* 1.39* 1.05        Anneliese Barber MD  Noninvasive Cardiologist   [facility]                                    "

## 2024-09-09 NOTE — TELEPHONE ENCOUNTER
FUTURE VISIT INFORMATION      FUTURE VISIT INFORMATION:  Date: 10/17/2024  Time: 9 AM; AUDIO 8 AM  Location: [facility]  REFERRAL INFORMATION:  Referring provider: Self-Referred  Referring providers clinic: N/A  Reason for visit/diagnosis: Ear Pressure, Prior Dr. Nissen Patient    RECORDS REQUESTED FROM:       Clinic name Comments Records Status Imaging Status   [place] 5/28/24 - [facility] OV with Dylon Shafer NP [facility] 10/24/23 - ENT OV with Dr. Nissen [facility]    [place] Jarad Ahn 10/13/23 - [facility] OV with Dr. Thomas [facility] - Procedure 10/24/23 - Audiogram [facility]    [facility] - Imaging 7/10/24 - MRI Brain [facility] PACs

## 2024-09-12 ENCOUNTER — OFFICE VISIT (OUTPATIENT)
Dept: CARDIOLOGY | Facility: CLINIC | Age: 64
End: 2024-09-12
Payer: COMMERCIAL

## 2024-09-12 VITALS
HEART RATE: 56 BPM | SYSTOLIC BLOOD PRESSURE: 122 MMHG | OXYGEN SATURATION: 97 % | DIASTOLIC BLOOD PRESSURE: 78 MMHG | BODY MASS INDEX: 39.59 KG/M2 | WEIGHT: 268.06 LBS

## 2024-09-12 DIAGNOSIS — I25.10 CORONARY ARTERY DISEASE INVOLVING NATIVE CORONARY ARTERY OF NATIVE HEART WITHOUT ANGINA PECTORIS: Primary | ICD-10-CM

## 2024-09-12 DIAGNOSIS — I10 PRIMARY HYPERTENSION: ICD-10-CM

## 2024-09-12 DIAGNOSIS — E78.5 HYPERLIPIDEMIA LDL GOAL <70: ICD-10-CM

## 2024-09-12 PROCEDURE — G2211 COMPLEX E/M VISIT ADD ON: HCPCS | Performed by: INTERNAL MEDICINE

## 2024-09-12 PROCEDURE — 99214 OFFICE O/P EST MOD 30 MIN: CPT | Performed by: INTERNAL MEDICINE

## 2024-09-12 NOTE — PATIENT INSTRUCTIONS
I am glad you are doing well.    No medication changes or testing needed right now.   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

## 2024-09-12 NOTE — LETTER
9/12/2024    Dylon Shafer, NP  [address] [place] [address]    RE: Gonzalez Morton       Dear Colleague,     I had the pleasure of seeing Gonzalez Morton in the [facility].    HEART CARE FOLLOW UP NOTE      [facility]  [phone]      Assessment/Recommendations   Assessment: 63 year old male with CAD    Plan:  CAD  Doing well s/p CABG 2022      -Continue ASA 81mg, Coreg 3.125mg BID, Lipitor 80mg    HTN  Well controlled      -Continue Coreg 3.125mg BID, Norvasc 5mg, Lisinopril 20mg, hydrochlorothiazide 25mg     Hyperlipidemia   LDL = 65      -Continue Lipitor 80mg          History of Present Illness/Subjective    Indication for visit:  Gonzalez Morton returns for follow up of CAD and was last seen on 8/9/2023 by Dr Lyons.      HPI: Gonzalez Morton is a 63 year old male with a history of CAD s/p CABG, HTN, hyperlipidemia who returns for follow up.    He reports abnormal pre-op ECG that triggered cardiac evaluation and led to CABG.  He has pinched nerve in back, had shot but still in pain.  Chest incision sore if he lays on it, no exertional chest pain, dyspnea, orthopnea, or PND.  No palpitations.  Does cardio 30 minutes 3+ times per week.      I reviewed notes from PCP prior to this visit.         Physical Examination  Past Cardiac History   Vitals: /78 (BP Location: Left arm, Patient Position: Sitting, Cuff Size: Adult Large)   Pulse 56   Wt 121.6 kg (268 lb 1 oz)   SpO2 97%   BMI 39.59 kg/m    BMI= Body mass index is 39.59 kg/m .  Wt Readings from Last 3 Encounters:   09/12/24 121.6 kg (268 lb 1 oz)   05/28/24 119.7 kg (263 lb 14.4 oz)   01/11/24 117.1 kg (258 lb 1.6 oz)       General Appearance:   no distress, normal body habitus   ENT/Mouth: membranes moist, no oral lesions or bleeding gums.      EYES:  no scleral icterus, normal conjunctivae   Neck: no carotid bruits or thyromegaly   Chest/Lungs:   lungs are clear to  auscultation, no rales or wheezing,  sternal scar, equal chest wall expansion    Cardiovascular:   Regular. Normal first and second heart sounds with no murmurs, rubs, or gallops; the carotid, radial and posterior tibial pulses are intact, Jugular venous pressure normal, No edema bilaterally    Abdomen:  no organomegaly, masses, bruits, or tenderness; bowel sounds are present   Extremities: no cyanosis or clubbing   Skin: no xanthelasma, warm.    Neurologic: normal  bilateral, no tremors           CAD: s/p 5 vessel CABG 6/2022 (LIMA-D2, SVG-D2, SVG-OM2, SVG-OM1, radial-rPDA      Most Recent Echocardiogram: 4/7/2022  Left ventricular size, wall motion and function are normal. The ejection  fraction is 60-65%.  Normal right ventricle size and systolic function.  No hemodynamically significant valvular abnormalities on 2D or color flow  imaging. The study was technically difficult.    Most Recent Stress Test: 3/30/2022     The nuclear stress test is abnormal.  Nuclear images demonstrate a medium size area of nontransmural infarction involving the mid to basal inferior wall, although specificity reduced in the setting of diaphragmatic attenuation.     Stress to rest cavity ratio is 1.38.  TID is present visually and quantitatively.  This is a nonspecific finding but may be a marker of diffuse subendocardial ischemia.     The left ventricular ejection fraction at stress is greater than 70%.     The patient is at an intermediate risk of future cardiac ischemic events.     There is no prior study for comparison.     Most Recent Angiogram: 4/7/2022  Very high calcium score and abnormal stress test in an obese man with a family history of early coronary disease.  Severe, diffuse coronary calcification.  Moderate distal left main disease.  Severe mid and distal LAD disease with an occluded apical LAD.  Critical ostial circumflex disease limiting flow into both the large branching OM-1 and OM-3.   of the mid RCA with  collateral filling piecmeal of the distal RCA branches.             Medical History  Family History Social History   Past Medical History:   Diagnosis Date     Abnormal cardiovascular stress test 04/03/2022     CAD (coronary artery disease)      Elevated coronary artery calcium score      Hypertension      Knee injury     right     Knee osteoarthritis 08/16/2023     Malignant tumor of prostate (H) 04/04/2019    C61 : Malignant tumor of prostate       Obese      Prostate cancer (H)      Rosacea      Sleep apnea     CPAP     Family History   Problem Relation Age of Onset     Diabetes Mother      Heart Disease Mother      Heart Disease Father      Atrophic kidney Sister      Diabetes Sister      Diabetes Type 1 Brother         Social History     Socioeconomic History     Marital status:      Spouse name: Not on file     Number of children: Not on file     Years of education: Not on file     Highest education level: Not on file   Occupational History     Not on file   Tobacco Use     Smoking status: Never     Smokeless tobacco: Never   Vaping Use     Vaping status: Never Used   Substance and Sexual Activity     Alcohol use: Yes     Alcohol/week: 1.0 standard drink of alcohol     Types: 1 Standard drinks or equivalent per week     Drug use: Never     Sexual activity: Yes     Partners: Female     Birth control/protection: None   Other Topics Concern     Parent/sibling w/ CABG, MI or angioplasty before 65F 55M? No   Social History Narrative     Not on file     Social Determinants of Health     Financial Resource Strain: High Risk (5/27/2024)    Financial Resource Strain      Within the past 12 months, have you or your family members you live with been unable to get utilities (heat, electricity) when it was really needed?: Yes   Food Insecurity: Low Risk  (5/27/2024)    Food Insecurity      Within the past 12 months, did you worry that your food would run out before you got money to buy more?: No      Within the past  12 months, did the food you bought just not last and you didn t have money to get more?: No   Transportation Needs: Low Risk  (5/27/2024)    Transportation Needs      Within the past 12 months, has lack of transportation kept you from medical appointments, getting your medicines, non-medical meetings or appointments, work, or from getting things that you need?: No   Physical Activity: Unknown (5/27/2024)    Exercise Vital Sign      Days of Exercise per Week: Patient declined      Minutes of Exercise per Session: 0 min   Stress: No Stress Concern Present (5/27/2024)    [employer] [place] [employer] - Occupational Stress Questionnaire      Feeling of Stress : Only a little   Social Connections: Unknown (5/27/2024)    Social Connection and Isolation Panel [NHANES]      Frequency of Communication with Friends and Family: Not on file      Frequency of Social Gatherings with Friends and Family: Once a week      Attends Congregational Services: Not on file      Active Member of Clubs or Organizations: Not on file      Attends Club or Organization Meetings: Not on file      Marital Status: Not on file   Interpersonal Safety: Low Risk  (5/28/2024)    Interpersonal Safety      Do you feel physically and emotionally safe where you currently live?: Yes      Within the past 12 months, have you been hit, slapped, kicked or otherwise physically hurt by someone?: No      Within the past 12 months, have you been humiliated or emotionally abused in other ways by your partner or ex-partner?: No   Housing Stability: Low Risk  (5/27/2024)    Housing Stability      Do you have housing? : Yes      Are you worried about losing your housing?: No           Medications  Allergies   Current Outpatient Medications   Medication Sig Dispense Refill     acetaminophen (TYLENOL) 325 MG tablet Take 975 mg by mouth every 8 hours as needed for mild pain       amLODIPine (NORVASC) 5 MG tablet Take 1 tablet (5 mg) by mouth daily 90 tablet 3      "aspirin 81 MG EC tablet Take 162 mg by mouth 2 times daily       atorvastatin (LIPITOR) 80 MG tablet TAKE 1 TABLET BY MOUTH EVERY EVENING 90 tablet 0     carvedilol (COREG) 3.125 MG tablet Take 1 tablet (3.125 mg) by mouth 2 times daily (with meals) To replace metoprolol 180 tablet 3     cefadroxil (DURICEF) 500 MG capsule Take 1 capsule (500 mg) by mouth 2 times daily 60 capsule 5     hydrochlorothiazide (HYDRODIURIL) 25 MG tablet TAKE 1 TABLET BY MOUTH EVERY DAY 90 tablet 3     lisinopril (ZESTRIL) 20 MG tablet [LISINOPRIL (PRINIVIL,ZESTRIL) 20 MG TABLET] TAKE 1 TABLET BY MOUTH EVERY DAY 90 tablet 3     nitroGLYcerin (NITROSTAT) 0.4 MG sublingual tablet One tablet under the tongue every 5 minutes if needed for chest pain. May repeat every 5 minutes for a maximum of 3 doses in 15 minutes\" 25 tablet 3     pramipexole (MIRAPEX) 0.25 MG tablet Take 1 tablet (0.25 mg) by mouth at bedtime 90 tablet 3     traZODone (DESYREL) 50 MG tablet Take 1-2 tablets ( mg) by mouth at bedtime 180 tablet 3       Allergies   Allergen Reactions     Metoprolol Tartrate Hives     Penicillins Hives     Tolerated CTX 06/2020, Ancef 6/2022. Tolerated augmentin          Lab Results    Chemistry/lipid CBC Cardiac Enzymes/BNP/TSH/INR   Recent Labs   Lab Test 10/12/23  0803   CHOL 125   HDL 35*   LDL 65   TRIG 125     Recent Labs   Lab Test 10/12/23  0803 08/09/23  0905 05/17/23  0900   LDL 65 78 75     Recent Labs   Lab Test 05/28/24  0908      POTASSIUM 4.6   CHLORIDE 100   CO2 27   *   BUN 15.6   CR 0.97   GFRESTIMATED 88   KEATON 9.7     Recent Labs   Lab Test 05/28/24  0908 01/25/24  0734 12/12/23  1025   CR 0.97 0.93 0.72     Recent Labs   Lab Test 05/28/24  0908 08/16/23  0940 05/31/22  0823   A1C 5.4 5.6 5.8*          Recent Labs   Lab Test 01/25/24  0734   WBC 6.2   HGB 15.1   HCT 44.5   MCV 83        Recent Labs   Lab Test 01/25/24  0734 12/12/23  1025 12/05/23  0915   HGB 15.1 13.9 14.3    No results for " "input(s): \"TROPONINI\" in the last 75860 hours.  No results for input(s): \"BNP\", \"NTBNPI\", \"NTBNP\" in the last 61640 hours.  No results for input(s): \"TSH\" in the last 07951 hours.  Recent Labs   Lab Test 06/06/22  1333 06/06/22  1025 05/31/22  0823   INR 1.28* 1.39* 1.05        Anneliese Barber MD  Noninvasive Cardiologist   [facility]                                        Thank you for allowing me to participate in the care of your patient.      Sincerely,     Anneliese Barber MD     [facility]  cc:   Mery Lyons MD  [address]  [place] [address]      "

## 2024-09-26 NOTE — PROGRESS NOTES
Bariatric Progress Report     The patient was seen from 0828 to 0903 for bariatric nutrition initial evaluation (#  2 of  3). The patient was referred by Kari Frederick PA-C, MPAS      Total time today: 35 minutes  Carryover from previous visit: 1 minute  Total time billed: 30 minutes  Carryover: 6 minute    The supervising physician for services performed today is Dr Santosh Bullock.    Referral Diagnosis:   Class 3 severe obesity with serious comorbidity and body mass index (BMI) of 40.0 to 44.9 in adult, unspecified obesity type  (CMD) [E66.01, Z68.41]     Assessment:  Subjective/Pertinent Information: Patient reports today for medical nutrition therapy regarding bariatric follow up. 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 Has 5, 13, 15 yo.     Obesity/Nutrition Related Client History:   Past Medical History:   Diagnosis Date    Essential (primary) hypertension     Obstructive sleep apnea (adult) (pediatric)     RAD (reactive airway disease) (CMD)        Obesity/Nutrition Related Medications / Supplements  Weight-promoting medications: None Current  Current Outpatient Medications   Medication Sig Dispense Refill    sildenafil (VIAGRA) 50 MG tablet Take 1 tablet by mouth daily as needed for Erectile Dysfunction. 10 tablet 11    MULTIPLE VITAMIN PO Take by mouth daily.       No current facility-administered medications for this visit.     Nutrition Related Labs:   Hemoglobin A1C (%)   Date Value   02/16/2024 5.6     Anthropometric measurements:  Obtained 09/26/24:    Estimated body mass index is 43.7 kg/m² as calculated from the following:    Height as of 8/20/24: 6' 1.5\" (1.867 m).    Weight as of 8/29/24: 152.3 kg (335 lb 12.2 oz).    Initial Weight: 338.6 pounds at initial bariatric consult on 8/20/24  5% Weight Loss Goal:  16.9 pounds, to goal weight of 321.7 pounds     Today's weight: 325.3 lbs  Weight changes: Down 13.3 pounds since initial bariatric consult.      Patient is aware that their  [facility]  [address]  [place] [facility] [address]  Phone: [phone]  Fax: [phone]  Primary Provider: Jose Shafer  Pre-op Performing Provider: JOSE SHAFER    PREOPERATIVE EVALUATION:  Today's date: 5/10/2022    Gonzalez Morton is a 61 year old male who presents for a preoperative evaluation.    Surgical Information:  Surgery/Procedure: Coronary artery bypass with LIMA, radial artery and saphenous vein as conduit  Surgery Location: [facility]  Surgeon: Dr. Mayank Rocha  Surgery Date: 6/6/2022  Time of Surgery: AM  Where patient plans to recover: At home with family  Fax number for surgical facility: Note does not need to be faxed, will be available electronically in Epic.    Type of Anesthesia Anticipated: General    Assessment & Plan     The proposed surgical procedure is considered HIGH risk.    Preop general physical exam  Medically optimized for surgery.  Patient does have planned lab work and EKG already ordered and scheduled to be done at the end of this month.  COVID testing scheduled.    CAD  Planned CABG    Elevated coronary artery calcium score  Planned CABG    Abnormal cardiovascular stress test    Obstructive sleep apnea syndrome  Uses CPAP religiously    Morbid obesity (H)  Increased risk of obesity related hypoventilation    Mixed hyperlipidemia  On atorvastatin 40 mg    Benign Essential Hypertension  Well-controlled           Risks and Recommendations:  The patient has the following additional risks and recommendations for perioperative complications:   - No identified additional risk factors other than previously addressed    Medication Instructions:  Hold NSAIDs a week prior.  Hold ACE inhibitor and hydrochlorothiazide the morning of procedure    RECOMMENDATION:  APPROVAL GIVEN to proceed with proposed procedure, without further diagnostic evaluation.    Reviewed cardiology note x1, cardiothoracic surgery note  weight needs to be at or below weight at the initial bariatric consult in order to be considered an appropriate candidate for bariatric surgery.    Type of food / Amount of Food / Food Frequency:    - Patient is tracking daily using [employer] bessy  - He reports going to [place] and gaining 5 lbs but lost it in 3 days (anniversary trip)  - He is now snacking on fruit, veggies, protein bars/shakes  - He is drinking low chelsea/no sugar beverages, no carbonation  - He is eating 3 meals/day  - He goes to [employer] about 3 times/week (single cheeseburger, ketchup, most times bring fruit from home for side) - discussed with patient looking at the menu and choosing lower fat items, patient agreeable  - Patient no longer consumes summer sausage   - No viewed desserts or sweets on food log  - Appears to be cooking more at home - chicken sausage, low fat ham  - Drinking Premiere Protein, Muscle Milk, Boost Shakes  - He has not been exercising regularly - is looking for dance fitness class  - He has not yet attended support group - provided flyer with dates    - Provided handouts on vitamin mineral supplements, bariatric support group schedule and portion sizes    Current diet recall: 77 g protein, 1904 kcal (per bessy)  Breakfast  Bagel thin with 97% ff honey ham, 3 slices thin cheese, grapes, banana   Lunch  Leftover [employer] - chicken nuggets with BBQ sauce, mozzarella stick Dinner  Chicken Cordon Sanju with broccoli and cauliflower Au Gratin Bake, grapes   Snack (time not specified)  - cherry tomatoes  - cheese       Snack   Snack         Vegetables:   -Types: tomatoes, cauliflower       -Frequency most days/week    Fruits:       -Types: variety, fresh ( bananas, berries, grapes)       -Frequency multiple daily most days    Protein:       -Types: chicken sausage, low fat ham, beef, brat       -Choosing at each meal: 1+ serving    Starch/Grain:       -Types: pasta (mac and cheese), bagel thins, tortillas, buns       -Choosing at  x1, echocardiogram x1, cardiac catheterization procedure x1, stress test x1, CT coronary calcium scan x1, basic metabolic panel x1, CBC x1.    42 minutes spent on the date of the encounter doing chart review, history and exam, documentation and further activities per the note    Subjective     HPI related to upcoming procedure: Patient has been working with cardiology.  Elevated CT coronary calcium scan.  He did also have an abnormal stress test.  The hope was to be able to do an angiogram which showed severe diffuse coronary calcification along the mid and distal LAD with an occluded apical LAD along with critical ostial circumflex disease and chronic total occlusion of the mid RCA with collateral filling of the distal RCA branches    Preop Questions 5/6/2022   1. Have you ever had a heart attack or stroke? No   2. Have you ever had surgery on your heart or blood vessels, such as a stent placement, a coronary artery bypass, or surgery on an artery in your head, neck, heart, or legs? No   3. Do you have chest pain with activity? No   4. Do you have a history of  heart failure? No   5. Do you currently have a cold, bronchitis or symptoms of other infection? No   6. Do you have a cough, shortness of breath, or wheezing? No   7. Do you or anyone in your family have previous history of blood clots? No   8. Do you or does anyone in your family have a serious bleeding problem such as prolonged bleeding following surgeries or cuts? No   9. Have you ever had problems with anemia or been told to take iron pills? No   10. Have you had any abnormal blood loss such as black, tarry or bloody stools? No   11. Have you ever had a blood transfusion? No   12. Are you willing to have a blood transfusion if it is medically needed before, during, or after your surgery? Yes   13. Have you or any of your relatives ever had problems with anesthesia? No   14. Do you have sleep apnea, excessive snoring or daytime drowsiness? YES - nighttime  each meal: more than 1 cup    High Calorie/High Fat/Fried Foods:   -Types: some high fat meats when eating out  -Frequency: multiple times/week but decrease from initial visit    Fast food/Eating Out:   -Type of restaurant: Fast Food - [employer], [employer]   -Frequency: multiple times/week    High Sugar Foods / Foods with Added Sugar:   -Types: cut out     Nutrition Diagnosis:   Overweight/Obesity related to history of excessive calorie intake and physical inactivity as evidenced by BMI.    Food-and-nutrition-related knowledge deficit related to diet recommendations for healthy weight loss and preparation for bariatric surgery as evidenced by large portions, consuming unbalanced meals, consuming high fat meats, lack of adequate physical activity, and no planned physical activity    Intervention:   See Nutrition Goal Grid.      Print/Written Resources Provided:   AVS    Monitoring and Evaluation:   The patient agrees they will comply with interventions in order to be an appropriate candidate for bariatric surgery.    Nutrition Goals     Consuming adequate fruits and vegetables  - Fruits: 1-2 cups per day   - Vegetables: Aim for 2.5 cups or more per day   Progressing towards goal   No carbonated beverage consumption  - Work towards goal of eliminating carbonated beverages (diet soda, regular soda, bubbly/sparkling)   Goal met   Exercising adequately  - Aim for minimum of 3 days of planned exercise per week, for a total of 30 minutes each day.   Progressing towards goal   Limited high calorie foods and beverages   - Choose lean meats   - Decrease fast food/take out foods  - Avoid/limit fried foods   Progressing towards goal   Low sugar choices  - Look for less than 10 grams of sugar per serving with the exception of fruit and milk    Goal met   Portion sizes are appropriate (per meal)  - Protein: 3-4 oz or (~1/2 cup)  - Whole Grain/Starches: Limit to 1 cup or less per meal  - Fruit: 1 cup  - Vegetables: 1 cup or more (2  snoring. Uses cpap   14a. Do you have a CPAP machine? Yes   15. Do you have any artifical heart valves or other implanted medical devices like a pacemaker, defibrillator, or continuous glucose monitor? No   16. Do you have artificial joints? YES - left knee   17. Are you allergic to latex? No       Health Care Directive:  Patient does not have a Health Care Directive or Living Will: previously discussed    Preoperative Review of :   reviewed - no record of controlled substances prescribed.    Status of Chronic Conditions:  See problem list for active medical problems.  Problems all longstanding and stable, except as noted/documented.  See ROS for pertinent symptoms related to these conditions.      Review of Systems  CONSTITUTIONAL: NEGATIVE for fever, chills, change in weight  INTEGUMENTARY/SKIN: NEGATIVE for worrisome rashes, moles or lesions  EYES: NEGATIVE for vision changes or irritation  ENT/MOUTH: NEGATIVE for ear, mouth and throat problems  RESP: NEGATIVE for significant cough or SOB  CV: NEGATIVE for chest pain, palpitations or peripheral edema  GI: NEGATIVE for nausea, abdominal pain, heartburn, or change in bowel habits  : NEGATIVE for frequency, dysuria, or hematuria  MUSCULOSKELETAL: NEGATIVE for significant arthralgias or myalgia  NEURO: NEGATIVE for weakness, dizziness or paresthesias  ENDOCRINE: NEGATIVE for temperature intolerance, skin/hair changes  HEME: NEGATIVE for bleeding problems  PSYCHIATRIC: NEGATIVE for changes in mood or affect    Patient Active Problem List    Diagnosis Date Noted     Elevated coronary artery calcium score      Priority: Medium     Abnormal cardiovascular stress test 04/03/2022     Priority: Medium     Family history of ischemic heart disease 04/03/2022     Priority: Medium     Morbid obesity (H) 08/20/2021     Priority: Medium     Mixed hyperlipidemia      Priority: Medium     Created by Conversion         History of malignant neoplasm of prostate 09/05/2019      "Priority: Medium     Erectile dysfunction following radical prostatectomy 08/29/2019     Priority: Medium     History of prostate cancer 05/29/2019     Priority: Medium     Back pain 06/08/2015     Priority: Medium     Benign Essential Hypertension      Priority: Medium     Created by Conversion         Rosacea      Priority: Medium     Created by Conversion          Past Medical History:   Diagnosis Date     Hypertension      Rosacea      Sleep apnea     CPAP     Past Surgical History:   Procedure Laterality Date     APPENDECTOMY       ARTHROSCOPY KNEE Bilateral      CV CORONARY ANGIOGRAM N/A 4/7/2022    Procedure: Coronary Angiogram;  Surgeon: Mery Lyons MD;  Location: [place] [facility] [facility]     PROSTATECTOMY       TOTAL KNEE ARTHROPLASTY Left      WRIST SURGERY       Current Outpatient Medications   Medication Sig Dispense Refill     acetaminophen (TYLENOL) 650 MG CR tablet Take 1,300 mg by mouth every 8 hours as needed        aspirin 81 MG EC tablet Take 81 mg by mouth daily       atorvastatin (LIPITOR) 40 MG tablet TAKE 1 TABLET BY MOUTH EVERY DAY 90 tablet 1     hydrochlorothiazide (HYDRODIURIL) 25 MG tablet [HYDROCHLOROTHIAZIDE (HYDRODIURIL) 25 MG TABLET] TAKE 1 TABLET BY MOUTH EVERY DAY 90 tablet 3     ibuprofen (ADVIL/MOTRIN) 200 MG capsule Take 800 mg by mouth continuous prn        lisinopril (ZESTRIL) 20 MG tablet [LISINOPRIL (PRINIVIL,ZESTRIL) 20 MG TABLET] TAKE 1 TABLET BY MOUTH EVERY DAY 90 tablet 3     nitroGLYcerin (NITROSTAT) 0.4 MG sublingual tablet One tablet under the tongue every 5 minutes if needed for chest pain. May repeat every 5 minutes for a maximum of 3 doses in 15 minutes\" 25 tablet 3     rOPINIRole (REQUIP) 0.25 MG tablet Take 2 tablets (0.5 mg) by mouth At Bedtime 180 tablet 2     clindamycin (CLEOCIN) 300 MG capsule TAKE 2 CAPSULES BY MOUTH 1 HOUR PRIOR TO DENTAL APPOINTMENT. (Patient not taking: Reported on 5/10/2022)       doxycycline hyclate (VIBRAMYCIN) 100 MG capsule TAKE 1 " cups lettuce/salad = 1 cup)   Progressing towards goal   Keeping detailed food records daily  - [employer] bessy   - Record meal time, type of food, portion size (using measuring tools), fluid and exercise    Goal met   Eating 3 meals per day at regular times  - Avoid going longer than 4-5 hours without having something   Goal met   Meals are well balanced   - At minimum: lean protein source and fruit or vegetables     Progressing towards goal   Consuming adequate protein at meals  - Aim for at least 20-30 grams of protein per meal to reach goal of 60-80 grams per day   Goal met   5% weight loss goal  -To 321.7 pounds or less  Progressing towards goal     Insurance Requirements-   Patient has not met insurance requirements    Writer reminded patient that clearance is based not only upon meeting insurance requirements, but also on meeting clinical goals (nutritional and psychological).    Recommended Follow-up:   At this time, the patient is not yet considered an appropriate candidate for surgery from a nutrition perspective. They will follow up in 1 month for further nutrition evaluation.        "CAPSULE BY MOUTH TWICE A DAY (Patient not taking: Reported on 5/10/2022) 180 capsule 1       Allergies   Allergen Reactions     Penicillins Hives     Tolerated CTX 06/2020        Social History     Tobacco Use     Smoking status: Never Smoker     Smokeless tobacco: Never Used   Substance Use Topics     Alcohol use: Yes     Alcohol/week: 1.0 standard drink     Types: 1 Standard drinks or equivalent per week     Family History   Problem Relation Age of Onset     Diabetes Mother      Heart Disease Mother      Heart Disease Father      Atrophic kidney Sister      Diabetes Sister      Diabetes Type 1 Brother      History   Drug Use Unknown         Objective     /60 (BP Location: Right arm, Patient Position: Sitting, Cuff Size: Adult Regular)   Pulse 69   Temp 98.1  F (36.7  C)   Resp 14   Ht 1.759 m (5' 9.25\")   Wt 121.1 kg (267 lb)   SpO2 95%   BMI 39.14 kg/m      Physical Exam    GENERAL APPEARANCE: healthy, alert and no distress     EYES: EOMI,  PERRL     HENT: ear canals and TM's normal and nose and mouth without ulcers or lesions     NECK: no adenopathy, no asymmetry, masses, or scars and thyroid normal to palpation     RESP: lungs clear to auscultation - no rales, rhonchi or wheezes     CV: regular rates and rhythm, normal S1 S2, no S3 or S4 and no murmur, click or rub     ABDOMEN:  soft, nontender, no HSM or masses and bowel sounds normal     MS: extremities normal- no gross deformities noted, no evidence of inflammation in joints, FROM in all extremities.     SKIN: no suspicious lesions or rashes     NEURO: Normal strength and tone, sensory exam grossly normal, mentation intact and speech normal     PSYCH: mentation appears normal. and affect normal/bright     LYMPHATICS: No cervical adenopathy    Recent Labs   Lab Test 04/04/22  0840 02/01/22  0857 08/20/21  0932 07/31/20  1124 06/20/20  0615 06/19/20  2130   HGB 16.9  --   --  15.6   < > 13.4*     --   --  274   < > 387   INR  --   --   --   " --   --  1.23*    138 139 138   < > 136   POTASSIUM 4.0 4.3 4.2 4.0   < > 3.8   CR 0.93  --  0.88 0.87   < > 1.14   A1C  --   --  5.9*  --   --   --     < > = values in this interval not displayed.        Diagnostics:  Last Comprehensive Metabolic Panel:  Sodium   Date Value Ref Range Status   04/04/2022 138 136 - 145 mmol/L Final     Potassium   Date Value Ref Range Status   04/04/2022 4.0 3.5 - 5.0 mmol/L Final     Chloride   Date Value Ref Range Status   04/04/2022 100 98 - 107 mmol/L Final     Carbon Dioxide (CO2)   Date Value Ref Range Status   04/04/2022 27 22 - 31 mmol/L Final     Anion Gap   Date Value Ref Range Status   04/04/2022 11 5 - 18 mmol/L Final     Glucose   Date Value Ref Range Status   04/04/2022 108 70 - 125 mg/dL Final     Urea Nitrogen   Date Value Ref Range Status   04/04/2022 16 8 - 22 mg/dL Final     Creatinine   Date Value Ref Range Status   04/04/2022 0.93 0.70 - 1.30 mg/dL Final     GFR Estimate   Date Value Ref Range Status   04/04/2022 >90 >60 mL/min/1.73m2 Final     Comment:     Effective December 21, 2021 eGFRcr in adults is calculated using the 2021 CKD-EPI creatinine equation which includes age and gender (Gracia et al., NEJ, DOI: 10.1056/JSDGzv0070227)   07/31/2020 >60 >60 mL/min/1.73m2 Final     Calcium   Date Value Ref Range Status   04/04/2022 9.4 8.5 - 10.5 mg/dL Final       EKG planned prior to surgery.    Revised Cardiac Risk Index (RCRI):  The patient has the following serious cardiovascular risks for perioperative complications:   - Coronary Artery Disease (MI, positive stress test, angina, Qs on EKG) = 1 point     RCRI Interpretation: 1 point: Class II (low risk - 0.9% complication rate)     Signed Electronically by: Dylon Shafer NP  Copy of this evaluation report is provided to requesting physician.

## 2024-09-27 ENCOUNTER — TRANSFERRED RECORDS (OUTPATIENT)
Dept: HEALTH INFORMATION MANAGEMENT | Facility: CLINIC | Age: 64
End: 2024-09-27
Payer: COMMERCIAL

## 2024-10-17 ENCOUNTER — OFFICE VISIT (OUTPATIENT)
Dept: AUDIOLOGY | Facility: CLINIC | Age: 64
End: 2024-10-17
Payer: COMMERCIAL

## 2024-10-17 ENCOUNTER — OFFICE VISIT (OUTPATIENT)
Dept: OTOLARYNGOLOGY | Facility: CLINIC | Age: 64
End: 2024-10-17
Payer: COMMERCIAL

## 2024-10-17 ENCOUNTER — PRE VISIT (OUTPATIENT)
Dept: OTOLARYNGOLOGY | Facility: CLINIC | Age: 64
End: 2024-10-17

## 2024-10-17 VITALS
HEIGHT: 69 IN | SYSTOLIC BLOOD PRESSURE: 145 MMHG | DIASTOLIC BLOOD PRESSURE: 87 MMHG | BODY MASS INDEX: 39.59 KG/M2 | HEART RATE: 60 BPM | OXYGEN SATURATION: 92 %

## 2024-10-17 DIAGNOSIS — H61.21 IMPACTED CERUMEN OF RIGHT EAR: ICD-10-CM

## 2024-10-17 DIAGNOSIS — H90.3 ASYMMETRICAL SENSORINEURAL HEARING LOSS: Primary | ICD-10-CM

## 2024-10-17 PROCEDURE — 69210 REMOVE IMPACTED EAR WAX UNI: CPT | Mod: RT | Performed by: REGISTERED NURSE

## 2024-10-17 PROCEDURE — 92557 COMPREHENSIVE HEARING TEST: CPT | Performed by: AUDIOLOGIST

## 2024-10-17 PROCEDURE — 99214 OFFICE O/P EST MOD 30 MIN: CPT | Mod: 25 | Performed by: REGISTERED NURSE

## 2024-10-17 PROCEDURE — 92550 TYMPANOMETRY & REFLEX THRESH: CPT | Performed by: AUDIOLOGIST

## 2024-10-17 ASSESSMENT — PAIN SCALES - GENERAL: PAINLEVEL: NO PAIN (0)

## 2024-10-17 NOTE — PROGRESS NOTES
AUDIOLOGY REPORT    SUMMARY: Audiology visit completed. See audiogram for results.      RECOMMENDATIONS: Follow-up with ENT.    Obinna Ogden, [facility]  Licensed Audiologist  [place] License #[id number]

## 2024-10-17 NOTE — PROGRESS NOTES
Otolaryngology Clinic  October 17, 2024    Chief Complaint:   Left sudden hearing loss     History of Present Illness:   Gonzalez Morton is a 64 year old male who presents today for follow-up after receiving 1 intratympanic steroid injection by Dr. Nissen on 10/24/2023 after experiencing a sudden left hearing loss.  Patient was recommended to follow-up in 2 weeks with repeat audiogram and possible injection.  Unfortunately, patient was unable to complete this follow-up due to an acute knee infection.    Since patient's original injection, patient reports a slight increase in left hearing with some increase in word recognition score.  Overall, hearing continues to be decreased and words sound garbled in the left ear.  Patient has a difficult time localizing sound in conversation.  Also reports bothersome left tinnitus particularly when in a quiet environment.  Patient reports that they are not bothered by tinnitus during the day when distracted.  Tinnitus is not affecting patient's ability to sleep.  Patient denies any vertigo, otalgia or otorrhea.  Patient did not have any vertigo at the time of sudden loss.  No hearing concerns in the right ear.    Past Medical History:  Past Medical History:   Diagnosis Date    Abnormal cardiovascular stress test 04/03/2022    CAD (coronary artery disease)     Elevated coronary artery calcium score     Hypertension     Knee injury     right    Knee osteoarthritis 08/16/2023    Malignant tumor of prostate (H) 04/04/2019    C61 : Malignant tumor of prostate      Obese     Prostate cancer (H)     Rosacea     Sleep apnea     CPAP       Past Surgical History:  Past Surgical History:   Procedure Laterality Date    ABDOMEN SURGERY      Appendix    APPENDECTOMY      ARTHROPLASTY KNEE Right 8/16/2023    Procedure: RIGHT TOTAL KNEE ARTHROPLASTY;  Surgeon: Bradley Redd MD;  Location: [facility] OR    ARTHROSCOPY KNEE Bilateral     BYPASS GRAFT ARTERY CORONARY N/A 6/6/2022     "Procedure: CORONARY ARTERY BYPASS GRAFT X 5, ENDOSCOPIC VESSEL PROCUREMENT LEFT LEG, INTERNAL MAMMARY ARTERY HARVEST, LEFT RADIAL ARTERY HARVEST, ANESTHESIA TRANSESOPHAGEAL ECHOCARDIOGRAM, EPIAORTIC ULTRASOUND;  Surgeon: Mayank Rocha MD;  Location: [facility] OR    CV CORONARY ANGIOGRAM N/A 4/7/2022    Procedure: Coronary Angiogram;  Surgeon: Mery Lyons MD;  Location: [facility] CATH LAB CV    EXCHANGE POLY COMPONENT ARTHROPLASTY KNEE Right 11/2/2023    Procedure: RIGHT KNEE IRRIGATION AND DEBRIDEMENT;  Surgeon: Bradley Redd MD;  Location: [facility] OR    PICC SINGLE LUMEN PLACEMENT  11/05/2023    PROSTATECTOMY      REMOVE HARDWARE ARTHROPLASTY KNEE, IRRIG & TAVON, PLACE ANTIBIOTIC CEMENT SPACER, COMBINED Right 11/2/2023    Procedure: POLYETHELYNE EXCHANGE;  Surgeon: Bradley Redd MD;  Location: [facility] OR    TOTAL KNEE ARTHROPLASTY Left     WRIST SURGERY         Medications:  Current Outpatient Medications   Medication Sig Dispense Refill    acetaminophen (TYLENOL) 325 MG tablet Take 975 mg by mouth every 8 hours as needed for mild pain      amLODIPine (NORVASC) 5 MG tablet Take 1 tablet (5 mg) by mouth daily 90 tablet 3    aspirin 81 MG EC tablet Take 162 mg by mouth 2 times daily      atorvastatin (LIPITOR) 80 MG tablet TAKE 1 TABLET BY MOUTH EVERY EVENING 90 tablet 0    carvedilol (COREG) 3.125 MG tablet Take 1 tablet (3.125 mg) by mouth 2 times daily (with meals) To replace metoprolol 180 tablet 3    hydrochlorothiazide (HYDRODIURIL) 25 MG tablet TAKE 1 TABLET BY MOUTH EVERY DAY 90 tablet 3    lisinopril (ZESTRIL) 20 MG tablet [LISINOPRIL (PRINIVIL,ZESTRIL) 20 MG TABLET] TAKE 1 TABLET BY MOUTH EVERY DAY 90 tablet 3    nitroGLYcerin (NITROSTAT) 0.4 MG sublingual tablet One tablet under the tongue every 5 minutes if needed for chest pain. May repeat every 5 minutes for a maximum of 3 doses in 15 minutes\" 25 tablet 3    pramipexole (MIRAPEX) 0.25 MG tablet Take 1 tablet (0.25 mg) " "by mouth at bedtime 90 tablet 3    traZODone (DESYREL) 50 MG tablet Take 1-2 tablets ( mg) by mouth at bedtime 180 tablet 3    cefadroxil (DURICEF) 500 MG capsule Take 1 capsule (500 mg) by mouth 2 times daily 60 capsule 5       Allergies:  Allergies   Allergen Reactions    Metoprolol Tartrate Hives    Penicillins Hives     Tolerated CTX 06/2020, Ancef 6/2022. Tolerated augmentin        Social History:  Social History     Tobacco Use    Smoking status: Never    Smokeless tobacco: Never   Vaping Use    Vaping status: Never Used   Substance Use Topics    Alcohol use: Yes     Alcohol/week: 1.0 standard drink of alcohol     Types: 1 Standard drinks or equivalent per week    Drug use: Never       ROS: 10 point ROS neg other than the symptoms noted above in the HPI.    Physical Exam:    BP (!) 145/87   Pulse 60   Ht 1.753 m (5' 9\")   SpO2 92%   BMI 39.59 kg/m       Constitutional:  The patient was unaccompanied, well-groomed, and in no acute distress.     Skin: Normal:  warm and pink without rash    Neurologic: Alert and oriented x 3.  CN's III-XII within normal limits.  Voice normal.    Psychiatric: The patient's affect was calm, cooperative, and appropriate.     Communication:  Normal; communicates verbally, normal voice quality.    Respiratory: Breathing comfortably without stridor or exertion of accessory muscles.    Ears: Pinnae and tragus non-tender.         Otologic microscope exam:    Right ear was examined under the microscope.  Cerumen deeply impacted in canal.  This was removed using right angled hook and alligator forceps.  Normal appearing TM, nicely aerated middle ear space.    Left ear was also examined under the microscope.  Ear canal is clean and dry, free of cerumen.  Normal appearing TM, nicely aerated middle ear space.        Audiogram: 10/17/2024 - data independently reviewed  Right ear: Normal sloping to borderline mild rising to normal sensorineural hearing loss  Left ear: Moderate to " severe sensorineural hearing loss (10 to 40 dB improvement throughout all frequencies compared to 10/24/2023 audiogram)  WR right: 100% left: 16% at 90 dB (improvement from 0% compared to 10/24/2023 audiogram)  Acoustic Reflexes: Absent in left contra. Present in all other conditions   Tympanograms: type A bilaterally     Assessment and Plan:  1. Asymmetrical sensorineural hearing loss  Patient with sudden left asymmetric sensorineural hearing loss that occurred in October 2023.  Patient did receive 1 intratympanic steroid injection but was unable to complete series due to an acute knee infection requiring hospitalization.  Patient does report a slight improvement in the left hearing but audiogram today continues to show a moderate to severe hearing loss with poor word recognition score at 16%.  Patient did complete an MRI in July 2024 that was negative.    Discussed treatment of asymmetric hearing loss with patient.  Patient is a candidate for a BiCROS hearing aid.  Patient could also consider a cochlear implant evaluation.  Patient would like to proceed with a BiCROS hearing aid consult and will schedule this consultation at their convenience.  Patient is medically cleared for a BiCROS hearing aid.    Also discussed management of tinnitus using tinnitus masking and cognitive behavioral approaches.  Patient states that they have received cognitive behavioral therapy in the past and will try and utilize some of those coping mechanisms if tinnitus becomes more bothersome.    2. Right cerumen impaction  Right cerumen impaction cleaned under microscopy today.  Healthy ear exam bilaterally after cleaning.    Patient will follow up in 1 year with repeat audiogram.  Sooner for any new, sudden hearing loss.    Daisy Carpenter DNP, APRN, CNP  Otolaryngology  Head & Neck Surgery  [phone]    30 minutes spent by me on the date of the encounter doing chart review, history and exam, documentation and further activities per the  note excluding time spent examining and cleaning ears under microscopy.         [facility]

## 2024-10-17 NOTE — LETTER
10/17/2024       RE: Gonzalez Morton  [address] [facility] [address]     Dear Colleague,    Thank you for referring your patient, Gonzalez Morton, to the [facility] [place] at [facility]. Please see a copy of my visit note below.      Otolaryngology Clinic  October 17, 2024    Chief Complaint:   Left sudden hearing loss     History of Present Illness:   Gonzalez Morton is a 64 year old male who presents today for follow-up after receiving 1 intratympanic steroid injection by Dr. Nissen on 10/24/2023 after experiencing a sudden left hearing loss.  Patient was recommended to follow-up in 2 weeks with repeat audiogram and possible injection.  Unfortunately, patient was unable to complete this follow-up due to an acute knee infection.    Since patient's original injection, patient reports a slight increase in left hearing with some increase in word recognition score.  Overall, hearing continues to be decreased and words sound garbled in the left ear.  Patient has a difficult time localizing sound in conversation.  Also reports bothersome left tinnitus particularly when in a quiet environment.  Patient reports that they are not bothered by tinnitus during the day when distracted.  Tinnitus is not affecting patient's ability to sleep.  Patient denies any vertigo, otalgia or otorrhea.  Patient did not have any vertigo at the time of sudden loss.  No hearing concerns in the right ear.    Past Medical History:  Past Medical History:   Diagnosis Date     Abnormal cardiovascular stress test 04/03/2022     CAD (coronary artery disease)      Elevated coronary artery calcium score      Hypertension      Knee injury     right     Knee osteoarthritis 08/16/2023     Malignant tumor of prostate (H) 04/04/2019    C61 : Malignant tumor of prostate       Obese      Prostate cancer (H)      Rosacea      Sleep apnea     CPAP        Past Surgical History:  Past Surgical History:   Procedure Laterality Date     ABDOMEN SURGERY      Appendix     APPENDECTOMY       ARTHROPLASTY KNEE Right 8/16/2023    Procedure: RIGHT TOTAL KNEE ARTHROPLASTY;  Surgeon: Bradley Redd MD;  Location: [facility] OR     ARTHROSCOPY KNEE Bilateral      BYPASS GRAFT ARTERY CORONARY N/A 6/6/2022    Procedure: CORONARY ARTERY BYPASS GRAFT X 5, ENDOSCOPIC VESSEL PROCUREMENT LEFT LEG, INTERNAL MAMMARY ARTERY HARVEST, LEFT RADIAL ARTERY HARVEST, ANESTHESIA TRANSESOPHAGEAL ECHOCARDIOGRAM, EPIAORTIC ULTRASOUND;  Surgeon: Mayank Rocha MD;  Location[facility]     CV CORONARY ANGIOGRAM N/A 4/7/2022    Procedure: Coronary Angiogram;  Surgeon: Mery Lyons MD;  Location: [facility] CATH LAB CV     EXCHANGE POLY COMPONENT ARTHROPLASTY KNEE Right 11/2/2023    Procedure: RIGHT KNEE IRRIGATION AND DEBRIDEMENT;  Surgeon: Bradley Redd MD;  Location: [facility] OR     PICC SINGLE LUMEN PLACEMENT  11/05/2023     PROSTATECTOMY       REMOVE HARDWARE ARTHROPLASTY KNEE, IRRIG & TAVON, PLACE ANTIBIOTIC CEMENT SPACER, COMBINED Right 11/2/2023    Procedure: POLYETHELYNE EXCHANGE;  Surgeon: Bradley Redd MD;  Location: [facility] OR     TOTAL KNEE ARTHROPLASTY Left      WRIST SURGERY         Medications:  Current Outpatient Medications   Medication Sig Dispense Refill     acetaminophen (TYLENOL) 325 MG tablet Take 975 mg by mouth every 8 hours as needed for mild pain       amLODIPine (NORVASC) 5 MG tablet Take 1 tablet (5 mg) by mouth daily 90 tablet 3     aspirin 81 MG EC tablet Take 162 mg by mouth 2 times daily       atorvastatin (LIPITOR) 80 MG tablet TAKE 1 TABLET BY MOUTH EVERY EVENING 90 tablet 0     carvedilol (COREG) 3.125 MG tablet Take 1 tablet (3.125 mg) by mouth 2 times daily (with meals) To replace metoprolol 180 tablet 3     hydrochlorothiazide (HYDRODIURIL) 25 MG tablet TAKE 1 TABLET BY MOUTH EVERY DAY 90 tablet 3     lisinopril  "(ZESTRIL) 20 MG tablet [LISINOPRIL (PRINIVIL,ZESTRIL) 20 MG TABLET] TAKE 1 TABLET BY MOUTH EVERY DAY 90 tablet 3     nitroGLYcerin (NITROSTAT) 0.4 MG sublingual tablet One tablet under the tongue every 5 minutes if needed for chest pain. May repeat every 5 minutes for a maximum of 3 doses in 15 minutes\" 25 tablet 3     pramipexole (MIRAPEX) 0.25 MG tablet Take 1 tablet (0.25 mg) by mouth at bedtime 90 tablet 3     traZODone (DESYREL) 50 MG tablet Take 1-2 tablets ( mg) by mouth at bedtime 180 tablet 3     cefadroxil (DURICEF) 500 MG capsule Take 1 capsule (500 mg) by mouth 2 times daily 60 capsule 5       Allergies:  Allergies   Allergen Reactions     Metoprolol Tartrate Hives     Penicillins Hives     Tolerated CTX 06/2020, Ancef 6/2022. Tolerated augmentin        Social History:  Social History     Tobacco Use     Smoking status: Never     Smokeless tobacco: Never   Vaping Use     Vaping status: Never Used   Substance Use Topics     Alcohol use: Yes     Alcohol/week: 1.0 standard drink of alcohol     Types: 1 Standard drinks or equivalent per week     Drug use: Never       ROS: 10 point ROS neg other than the symptoms noted above in the HPI.    Physical Exam:    BP (!) 145/87   Pulse 60   Ht 1.753 m (5' 9\")   SpO2 92%   BMI 39.59 kg/m       Constitutional:  The patient was unaccompanied, well-groomed, and in no acute distress.     Skin: Normal:  warm and pink without rash    Neurologic: Alert and oriented x 3.  CN's III-XII within normal limits.  Voice normal.    Psychiatric: The patient's affect was calm, cooperative, and appropriate.     Communication:  Normal; communicates verbally, normal voice quality.    Respiratory: Breathing comfortably without stridor or exertion of accessory muscles.    Ears: Pinnae and tragus non-tender.         Otologic microscope exam:    Right ear was examined under the microscope.  Cerumen deeply impacted in canal.  This was removed using right angled hook and alligator " forceps.  Normal appearing TM, nicely aerated middle ear space.    Left ear was also examined under the microscope.  Ear canal is clean and dry, free of cerumen.  Normal appearing TM, nicely aerated middle ear space.        Audiogram: 10/17/2024 - data independently reviewed  Right ear: Normal sloping to borderline mild rising to normal sensorineural hearing loss  Left ear: Moderate to severe sensorineural hearing loss (10 to 40 dB improvement throughout all frequencies compared to 10/24/2023 audiogram)  WR right: 100% left: 16% at 90 dB (improvement from 0% compared to 10/24/2023 audiogram)  Acoustic Reflexes: Absent in left contra. Present in all other conditions   Tympanograms: type A bilaterally     Assessment and Plan:  1. Asymmetrical sensorineural hearing loss  Patient with sudden left asymmetric sensorineural hearing loss that occurred in October 2023.  Patient did receive 1 intratympanic steroid injection but was unable to complete series due to an acute knee infection requiring hospitalization.  Patient does report a slight improvement in the left hearing but audiogram today continues to show a moderate to severe hearing loss with poor word recognition score at 16%.  Patient did complete an MRI in July 2024 that was negative.    Discussed treatment of asymmetric hearing loss with patient.  Patient is a candidate for a BiCROS hearing aid.  Patient could also consider a cochlear implant evaluation.  Patient would like to proceed with a BiCROS hearing aid consult and will schedule this consultation at their convenience.  Patient is medically cleared for a BiCROS hearing aid.    Also discussed management of tinnitus using tinnitus masking and cognitive behavioral approaches.  Patient states that they have received cognitive behavioral therapy in the past and will try and utilize some of those coping mechanisms if tinnitus becomes more bothersome.    2. Right cerumen impaction  Right cerumen impaction cleaned  under microscopy today.  Healthy ear exam bilaterally after cleaning.    Patient will follow up in 1 year with repeat audiogram.  Sooner for any new, sudden hearing loss.    Daisy Carpenter DNP, APRN, CNP  Otolaryngology  Head & Neck Surgery  [phone]    30 minutes spent by me on the date of the encounter doing chart review, history and exam, documentation and further activities per the note excluding time spent examining and cleaning ears under microscopy.          Again, thank you for allowing me to participate in the care of your patient.      Sincerely,    Mee Carpenter, NP

## 2024-11-05 ENCOUNTER — TELEPHONE (OUTPATIENT)
Dept: CARDIOLOGY | Facility: CLINIC | Age: 64
End: 2024-11-05
Payer: COMMERCIAL

## 2024-11-05 NOTE — TELEPHONE ENCOUNTER
Reviewed patients recent elevated blood pressure reading.  Per [place] Community Measures guidelines, patients blood pressure is out of parameters and recheck blood pressure is recommended.    Last Blood Pressure: 145/87  Last Heart Rate: 60  Date: 10/17/24  Location: [facility]      Pt blood pressure is managed by Dr. Dylon Shafer, PCP.  See notes from last annual visit in May, 2024:    Benign essential hypertension  I10 lisinopril (ZESTRIL) 20 MG tablet        Comprehensive metabolic panel (BMP + Alb, Alk Phos, ALT, AST, Total. Bili, TP)       Comprehensive metabolic panel (BMP + Alb, Alk Phos, ALT, AST, Total. Bili, TP)       hydrochlorothiazide (HYDRODIURIL) 25 MG tablet         Keep annual follow-up with cardiology later this September/October.   Closing task.     LAYNE Whitney

## 2024-11-12 DIAGNOSIS — E78.2 MIXED HYPERLIPIDEMIA: ICD-10-CM

## 2024-11-12 DIAGNOSIS — R93.1 ELEVATED CORONARY ARTERY CALCIUM SCORE: ICD-10-CM

## 2024-11-12 RX ORDER — ATORVASTATIN CALCIUM 80 MG/1
80 TABLET, FILM COATED ORAL EVERY EVENING
Qty: 90 TABLET | Refills: 3 | Status: SHIPPED | OUTPATIENT
Start: 2024-11-12

## 2024-11-13 ENCOUNTER — TELEPHONE (OUTPATIENT)
Dept: FAMILY MEDICINE | Facility: CLINIC | Age: 64
End: 2024-11-13

## 2024-11-13 ENCOUNTER — HOSPITAL ENCOUNTER (EMERGENCY)
Facility: CLINIC | Age: 64
Discharge: HOME | End: 2024-11-13
Attending: EMERGENCY MEDICINE | Admitting: EMERGENCY MEDICINE
Payer: COMMERCIAL

## 2024-11-13 VITALS
OXYGEN SATURATION: 98 % | DIASTOLIC BLOOD PRESSURE: 69 MMHG | WEIGHT: 282 LBS | BODY MASS INDEX: 41.64 KG/M2 | RESPIRATION RATE: 17 BRPM | TEMPERATURE: 97.6 F | SYSTOLIC BLOOD PRESSURE: 117 MMHG | HEART RATE: 64 BPM

## 2024-11-13 DIAGNOSIS — T46.4X5A ANGIOTENSIN CONVERTING ENZYME INHIBITOR-AGGRAVATED ANGIOEDEMA, INITIAL ENCOUNTER: ICD-10-CM

## 2024-11-13 DIAGNOSIS — Z95.1 S/P CABG (CORONARY ARTERY BYPASS GRAFT): ICD-10-CM

## 2024-11-13 DIAGNOSIS — T78.3XXA ANGIOTENSIN CONVERTING ENZYME INHIBITOR-AGGRAVATED ANGIOEDEMA, INITIAL ENCOUNTER: ICD-10-CM

## 2024-11-13 LAB
ABO/RH(D): NORMAL
ANION GAP SERPL CALCULATED.3IONS-SCNC: 17 MMOL/L (ref 7–15)
ANTIBODY SCREEN: NEGATIVE
BASOPHILS # BLD AUTO: 0.1 10E3/UL (ref 0–0.2)
BASOPHILS NFR BLD AUTO: 1 %
BLD PROD TYP BPU: NORMAL
BLD PROD TYP BPU: NORMAL
BLOOD COMPONENT TYPE: NORMAL
BLOOD COMPONENT TYPE: NORMAL
BUN SERPL-MCNC: 16.9 MG/DL (ref 8–23)
CALCIUM SERPL-MCNC: 9 MG/DL (ref 8.8–10.4)
CHLORIDE SERPL-SCNC: 96 MMOL/L (ref 98–107)
CODING SYSTEM: NORMAL
CODING SYSTEM: NORMAL
CREAT SERPL-MCNC: 0.98 MG/DL (ref 0.67–1.17)
CRP SERPL-MCNC: 4.94 MG/L
EGFRCR SERPLBLD CKD-EPI 2021: 86 ML/MIN/1.73M2
EOSINOPHIL # BLD AUTO: 0.4 10E3/UL (ref 0–0.7)
EOSINOPHIL NFR BLD AUTO: 5 %
ERYTHROCYTE [DISTWIDTH] IN BLOOD BY AUTOMATED COUNT: 12.4 % (ref 10–15)
GLUCOSE SERPL-MCNC: 163 MG/DL (ref 70–99)
HCO3 SERPL-SCNC: 22 MMOL/L (ref 22–29)
HCT VFR BLD AUTO: 43.3 % (ref 40–53)
HGB BLD-MCNC: 15.8 G/DL (ref 13.3–17.7)
HOLD SPECIMEN: NORMAL
HOLD SPECIMEN: NORMAL
IMM GRANULOCYTES # BLD: 0 10E3/UL
IMM GRANULOCYTES NFR BLD: 0 %
ISSUE DATE AND TIME: NORMAL
ISSUE DATE AND TIME: NORMAL
LYMPHOCYTES # BLD AUTO: 1.8 10E3/UL (ref 0.8–5.3)
LYMPHOCYTES NFR BLD AUTO: 24 %
MCH RBC QN AUTO: 29.5 PG (ref 26.5–33)
MCHC RBC AUTO-ENTMCNC: 36.5 G/DL (ref 31.5–36.5)
MCV RBC AUTO: 81 FL (ref 78–100)
MONOCYTES # BLD AUTO: 0.5 10E3/UL (ref 0–1.3)
MONOCYTES NFR BLD AUTO: 6 %
NEUTROPHILS # BLD AUTO: 4.9 10E3/UL (ref 1.6–8.3)
NEUTROPHILS NFR BLD AUTO: 63 %
NRBC # BLD AUTO: 0 10E3/UL
NRBC BLD AUTO-RTO: 0 /100
PLATELET # BLD AUTO: 224 10E3/UL (ref 150–450)
POTASSIUM SERPL-SCNC: 3.4 MMOL/L (ref 3.4–5.3)
RBC # BLD AUTO: 5.36 10E6/UL (ref 4.4–5.9)
SODIUM SERPL-SCNC: 135 MMOL/L (ref 135–145)
SPECIMEN EXPIRATION DATE: NORMAL
UNIT ABO/RH: NORMAL
UNIT ABO/RH: NORMAL
UNIT NUMBER: NORMAL
UNIT NUMBER: NORMAL
UNIT STATUS: NORMAL
UNIT STATUS: NORMAL
UNIT TYPE ISBT: 5100
UNIT TYPE ISBT: 5100
WBC # BLD AUTO: 7.8 10E3/UL (ref 4–11)

## 2024-11-13 PROCEDURE — 250N000009 HC RX 250: Performed by: EMERGENCY MEDICINE

## 2024-11-13 PROCEDURE — P9059 PLASMA, FRZ BETWEEN 8-24HOUR: HCPCS | Performed by: EMERGENCY MEDICINE

## 2024-11-13 PROCEDURE — 80048 BASIC METABOLIC PNL TOTAL CA: CPT | Performed by: EMERGENCY MEDICINE

## 2024-11-13 PROCEDURE — 86900 BLOOD TYPING SEROLOGIC ABO: CPT | Performed by: EMERGENCY MEDICINE

## 2024-11-13 PROCEDURE — 36430 TRANSFUSION BLD/BLD COMPNT: CPT

## 2024-11-13 PROCEDURE — 85025 COMPLETE CBC W/AUTO DIFF WBC: CPT | Performed by: EMERGENCY MEDICINE

## 2024-11-13 PROCEDURE — 99291 CRITICAL CARE FIRST HOUR: CPT | Mod: 25

## 2024-11-13 PROCEDURE — 96372 THER/PROPH/DIAG INJ SC/IM: CPT | Performed by: EMERGENCY MEDICINE

## 2024-11-13 PROCEDURE — 82310 ASSAY OF CALCIUM: CPT | Performed by: EMERGENCY MEDICINE

## 2024-11-13 PROCEDURE — 96375 TX/PRO/DX INJ NEW DRUG ADDON: CPT

## 2024-11-13 PROCEDURE — 250N000011 HC RX IP 250 OP 636: Performed by: EMERGENCY MEDICINE

## 2024-11-13 PROCEDURE — 86850 RBC ANTIBODY SCREEN: CPT | Performed by: EMERGENCY MEDICINE

## 2024-11-13 PROCEDURE — 36415 COLL VENOUS BLD VENIPUNCTURE: CPT | Performed by: EMERGENCY MEDICINE

## 2024-11-13 PROCEDURE — 96374 THER/PROPH/DIAG INJ IV PUSH: CPT

## 2024-11-13 PROCEDURE — 86140 C-REACTIVE PROTEIN: CPT | Performed by: EMERGENCY MEDICINE

## 2024-11-13 RX ORDER — EPINEPHRINE 0.3 MG/.3ML
0.3 INJECTION SUBCUTANEOUS
Qty: 2 EACH | Refills: 0 | Status: SHIPPED | OUTPATIENT
Start: 2024-11-13

## 2024-11-13 RX ORDER — METHYLPREDNISOLONE SODIUM SUCCINATE 125 MG/2ML
125 INJECTION INTRAMUSCULAR; INTRAVENOUS ONCE
Status: COMPLETED | OUTPATIENT
Start: 2024-11-13 | End: 2024-11-13

## 2024-11-13 RX ORDER — HYDROXYZINE HYDROCHLORIDE 50 MG/ML
50 INJECTION, SOLUTION INTRAMUSCULAR ONCE
Status: COMPLETED | OUTPATIENT
Start: 2024-11-13 | End: 2024-11-13

## 2024-11-13 RX ADMIN — EPINEPHRINE 0.3 MG: 1 INJECTION INTRAMUSCULAR; INTRAVENOUS; SUBCUTANEOUS at 01:00

## 2024-11-13 RX ADMIN — FAMOTIDINE 20 MG: 10 INJECTION, SOLUTION INTRAVENOUS at 01:03

## 2024-11-13 RX ADMIN — HYDROXYZINE HYDROCHLORIDE 50 MG: 50 INJECTION, SOLUTION INTRAMUSCULAR at 01:38

## 2024-11-13 RX ADMIN — METHYLPREDNISOLONE SODIUM SUCCINATE 125 MG: 125 INJECTION, POWDER, FOR SOLUTION INTRAMUSCULAR; INTRAVENOUS at 01:01

## 2024-11-13 ASSESSMENT — COLUMBIA-SUICIDE SEVERITY RATING SCALE - C-SSRS
6. HAVE YOU EVER DONE ANYTHING, STARTED TO DO ANYTHING, OR PREPARED TO DO ANYTHING TO END YOUR LIFE?: NO
2. HAVE YOU ACTUALLY HAD ANY THOUGHTS OF KILLING YOURSELF IN THE PAST MONTH?: NO
1. IN THE PAST MONTH, HAVE YOU WISHED YOU WERE DEAD OR WISHED YOU COULD GO TO SLEEP AND NOT WAKE UP?: NO

## 2024-11-13 ASSESSMENT — ACTIVITIES OF DAILY LIVING (ADL)
ADLS_ACUITY_SCORE: 0

## 2024-11-13 NOTE — DISCHARGE INSTRUCTIONS
Discontinue lisinopril and do not take ACE inhibitors in the future  Call your doctor today and advise that you are not able to take your lisinopril due to a swollen tongue and ask if they would like to replace this medication  We did prescribe you an epi pen to have in case this was an allergic reaction. You should use this if you develop worse tongue or lip swelling and call 911 to come back to the ER if you use this.   Return to the emergency department for worsening problems or concerns

## 2024-11-13 NOTE — ED TRIAGE NOTES
Presents with c/o allergic reaction noted to start after work at 2300  Tongue is notably swelled  2 benadryl take pta        Triage Assessment (Adult)       Row Name 11/13/24 0052          Triage Assessment    Airway WDL WDL        Respiratory WDL    Respiratory WDL X  sob        Skin Circulation/Temperature WDL    Skin Circulation/Temperature WDL WDL        Cardiac WDL    Cardiac WDL WDL        Peripheral/Neurovascular WDL    Peripheral Neurovascular WDL WDL        Cognitive/Neuro/Behavioral WDL    Cognitive/Neuro/Behavioral WDL WDL

## 2024-11-13 NOTE — ED PROVIDER NOTES
EMERGENCY DEPARTMENT ENCOUnter      NAME: Gonzalez Morton  AGE: 64 year old male  YOB: 1960  MRN: [id number]  EVALUATION DATE & TIME: 11/13/2024 12:51 AM    PCP: Dylon Shafer    ED PROVIDER: Linda Aguirre MD      Chief Complaint   Patient presents with    Allergic Reaction         FINAL IMPRESSION:  1. Angiotensin converting enzyme inhibitor-aggravated angioedema, initial encounter          ED COURSE & MEDICAL DECISION MAKING:      In summary, the patient is a 64-year-old male that presents to the emergency department for evaluation of tongue swelling thought secondary to ACE inhibitor angioedema.  Symptoms improved in the emergency department after treatment with allergy medications and FFP.    0100-evaluated patient.  Epinephrine 0.3 mg IM, Solu-Medrol 125 mg IV, Pepcid 20 mg IV and Vistaril 50 mg IM was administered for treatment of his angioedema and possible allergic reaction.  0115-recommended administration of FFP which the patient declined due to small risk of infection.    0135-reevaluation reveals that his angioedema is improving  0200-reevaluation reveals that he feels like angioedema is improving.  It is still difficult to swallow spit  0300-tongue remains moderately edematous and patient is able to manage secretions.  We discussed FFP again and he agrees to this intervention.  FFP 2 units were ordered for transfusion.  0400-awaiting FFP.  Tongue remains moderately edematous.  He continues to manage airway and secretions.  0500-FFP transfusing. Tongue remains moderately edematous with minimal improvement.  Patient feels tongue swelling is improving  0600-FFP 1 unit is complete, and 2nd unit transfusing.  Angioedema continues to improve.  0630-2nd unit FFP infusing.  Discussed admission which the patient's preference is for discharge, which I think is reasonable if he continues to improve.  0700-Signed out at change of shift with a reevaluation at 8a.  If patient continues to  improve, the plan will be for discharge.      Critical Care     Performed by: Dr Linda Aguirre    Total critical care time: 30 minutes  Critical care was necessary to treat or prevent imminent or life-threatening deterioration of the following conditions: angioedema of tongue  Critical care was time spent personally by me on the following activities: development of treatment plan with patient or surrogate, discussions with consultants, examination of patient, evaluation of patient's response to treatment, obtaining history from patient or surrogate, ordering and performing treatments and interventions, ordering and review of laboratory studies, ordering and review of radiographic studies, re-evaluation of patient's condition and monitoring for potential decompensation.  Critical care time was exclusive of separately billable procedures and treating other patients.    Medical Decision Making  Obtained supplemental history:Supplemental history obtained?: No  Reviewed external records: External records reviewed?: No  Care impacted by chronic illness:Heart Disease  Did you consider but not order tests?: Work up considered but not performed and documented in chart, if applicable  Did you interpret images independently?: Independent interpretation of ECG and images noted in documentation, when applicable.  Consultation discussion with other provider:Did you involve another provider (consultant, , pharmacy, etc.)?: No  Discharge. No recommendations on prescription strength medication(s). I considered admission, but discharged patient after significant clinical improvement.    MIPS: Not Applicable        At the conclusion of the encounter I discussed the results of all of the tests and the disposition. The questions were answered. The patient or family acknowledged understanding and was agreeable with the care plan.         MEDICATIONS GIVEN IN THE EMERGENCY:  Medications   sodium chloride (PF) 0.9% PF flush 3 mL  (3 mLs Intracatheter Not Given 11/13/24 0520)   sodium chloride (PF) 0.9% PF flush 3 mL (has no administration in time range)   famotidine (PEPCID) injection 20 mg (20 mg Intravenous $Given 11/13/24 0103)   methylPREDNISolone Na Suc (solu-MEDROL) injection 125 mg (125 mg Intravenous $Given 11/13/24 0101)   EPINEPHrine (ADRENALIN) kit 0.3 mg (0.3 mg Intramuscular $Given 11/13/24 0100)   hydrOXYzine (VISTARIL) injection 50 mg (50 mg Intramuscular $Given 11/13/24 0138)       NEW PRESCRIPTIONS STARTED AT TODAY'S ER VISIT  New Prescriptions    No medications on file          =================================================================    HPI        Gonzalez Morton is a 64 year old male with a pertinent history of hypertension and coronary artery disease presents to the emergency department for evaluation of tongue swelling for the past several hours.  He denies any new foods, beverages, or medications.  He is on lisinopril and his last dose was yesterday morning.  He has not had tongue swelling in the past.  He states that he has mild shortness of breath and is having difficulty managing his secretions.  He took 2 Benadryl prior to his presentation to the emergency department      REVIEW OF SYSTEMS     Constitutional:  Denies fever or chills  HENT:  Denies sore throat   Respiratory:  Denies cough or shortness of breath   Cardiovascular:  Denies chest pain or palpitations  GI:  Denies abdominal pain, nausea, or vomiting  Musculoskeletal:  Denies any new extremity pain   Skin:  Denies rash   Neurologic:  Denies headache, focal weakness or sensory changes    All other systems reviewed and are negative      PAST MEDICAL HISTORY:  Past Medical History:   Diagnosis Date    Abnormal cardiovascular stress test 04/03/2022    CAD (coronary artery disease)     Elevated coronary artery calcium score     Hypertension     Knee injury     right    Knee osteoarthritis 08/16/2023    Malignant tumor of prostate (H) 04/04/2019     C61 : Malignant tumor of prostate      Obese     Prostate cancer (H)     Rosacea     Sleep apnea     CPAP       PAST SURGICAL HISTORY:  Past Surgical History:   Procedure Laterality Date    ABDOMEN SURGERY      Appendix    APPENDECTOMY      ARTHROPLASTY KNEE Right 8/16/2023    Procedure: RIGHT TOTAL KNEE ARTHROPLASTY;  Surgeon: Bradley Redd MD;  Location: [facility] Main OR    ARTHROSCOPY KNEE Bilateral     BYPASS GRAFT ARTERY CORONARY N/A 6/6/2022    Procedure: CORONARY ARTERY BYPASS GRAFT X 5, ENDOSCOPIC VESSEL PROCUREMENT LEFT LEG, INTERNAL MAMMARY ARTERY HARVEST, LEFT RADIAL ARTERY HARVEST, ANESTHESIA TRANSESOPHAGEAL ECHOCARDIOGRAM, EPIAORTIC ULTRASOUND;  Surgeon: Mayank Rocha MD;  Location: [facility] OR    CV CORONARY ANGIOGRAM N/A 4/7/2022    Procedure: Coronary Angiogram;  Surgeon: Mery Lyons MD;  Location: [facility] CATH LAB CV    EXCHANGE POLY COMPONENT ARTHROPLASTY KNEE Right 11/2/2023    Procedure: RIGHT KNEE IRRIGATION AND DEBRIDEMENT;  Surgeon: Bradley Redd MD;  Location: [facility] OR    PICC SINGLE LUMEN PLACEMENT  11/05/2023    PROSTATECTOMY      REMOVE HARDWARE ARTHROPLASTY KNEE, IRRIG & TAVON, PLACE ANTIBIOTIC CEMENT SPACER, COMBINED Right 11/2/2023    Procedure: POLYETHELYNE EXCHANGE;  Surgeon: Bradley Redd MD;  Location: [facility] Main OR    TOTAL KNEE ARTHROPLASTY Left     WRIST SURGERY             CURRENT MEDICATIONS:    acetaminophen (TYLENOL) 325 MG tablet  amLODIPine (NORVASC) 5 MG tablet  aspirin 81 MG EC tablet  atorvastatin (LIPITOR) 80 MG tablet  carvedilol (COREG) 3.125 MG tablet  hydrochlorothiazide (HYDRODIURIL) 25 MG tablet  lisinopril (ZESTRIL) 20 MG tablet  nitroGLYcerin (NITROSTAT) 0.4 MG sublingual tablet  pramipexole (MIRAPEX) 0.25 MG tablet  traZODone (DESYREL) 50 MG tablet        ALLERGIES:  Allergies   Allergen Reactions    Metoprolol Tartrate Hives    Penicillins Hives     Tolerated CTX 06/2020, Ancef 6/2022. Tolerated augmentin       FAMILY  HISTORY:  Family History   Problem Relation Age of Onset    Diabetes Mother     Heart Disease Mother     Heart Disease Father     Atrophic kidney Sister     Diabetes Sister     Diabetes Type 1 Brother        SOCIAL HISTORY:   Social History     Socioeconomic History    Marital status:    Tobacco Use    Smoking status: Never    Smokeless tobacco: Never   Vaping Use    Vaping status: Never Used   Substance and Sexual Activity    Alcohol use: Yes     Alcohol/week: 1.0 standard drink of alcohol     Types: 1 Standard drinks or equivalent per week    Drug use: Never    Sexual activity: Yes     Partners: Female     Birth control/protection: None   Other Topics Concern    Parent/sibling w/ CABG, MI or angioplasty before 65F 55M? No     Social Drivers of Health     Financial Resource Strain: High Risk (5/27/2024)    Financial Resource Strain     Within the past 12 months, have you or your family members you live with been unable to get utilities (heat, electricity) when it was really needed?: Yes   Food Insecurity: Low Risk  (5/27/2024)    Food Insecurity     Within the past 12 months, did you worry that your food would run out before you got money to buy more?: No     Within the past 12 months, did the food you bought just not last and you didn t have money to get more?: No   Transportation Needs: Low Risk  (5/27/2024)    Transportation Needs     Within the past 12 months, has lack of transportation kept you from medical appointments, getting your medicines, non-medical meetings or appointments, work, or from getting things that you need?: No   Physical Activity: Unknown (5/27/2024)    Exercise Vital Sign     Days of Exercise per Week: Patient declined     Minutes of Exercise per Session: 0 min   Stress: No Stress Concern Present (5/27/2024)    [employer] - Occupational Stress Questionnaire     Feeling of Stress : Only a little   Social Connections: Unknown (5/27/2024)    Social  Connection and Isolation Panel [NHANES]     Frequency of Social Gatherings with Friends and Family: Once a week   Interpersonal Safety: Low Risk  (5/28/2024)    Interpersonal Safety     Do you feel physically and emotionally safe where you currently live?: Yes     Within the past 12 months, have you been hit, slapped, kicked or otherwise physically hurt by someone?: No     Within the past 12 months, have you been humiliated or emotionally abused in other ways by your partner or ex-partner?: No   Housing Stability: Low Risk  (5/27/2024)    Housing Stability     Do you have housing? : Yes     Are you worried about losing your housing?: No       VITALS:  Patient Vitals for the past 24 hrs:   BP Temp Temp src Pulse Resp SpO2 Weight   11/13/24 0515 118/68 -- -- 57 20 90 % --   11/13/24 0500 111/67 -- -- 55 11 90 % --   11/13/24 0452 107/65 97.8  F (36.6  C) Oral 57 18 95 % --   11/13/24 0445 119/67 -- -- 55 13 93 % --   11/13/24 0436 120/70 97.8  F (36.6  C) Oral 59 18 93 % --   11/13/24 0415 107/56 -- -- 61 12 94 % --   11/13/24 0406 94/51 -- -- 62 13 94 % --   11/13/24 0400 -- -- -- 62 24 93 % --   11/13/24 0345 -- -- -- 61 17 94 % --   11/13/24 0245 125/68 -- -- 58 20 96 % --   11/13/24 0230 112/70 -- -- 55 12 95 % --   11/13/24 0215 122/71 -- -- 56 13 95 % --   11/13/24 0200 111/68 -- -- 58 -- -- --   11/13/24 0130 118/64 -- -- 64 -- 90 % --   11/13/24 0115 101/55 -- -- 61 -- 94 % --   11/13/24 0105 (!) 146/78 -- -- 68 -- 95 % --   11/13/24 0059 -- -- -- -- 20 -- --   11/13/24 0058 -- -- -- -- -- -- 127.9 kg (282 lb)   11/13/24 0052 (!) 165/96 -- -- 70 -- 94 % --       PHYSICAL EXAM    Constitutional:  Well developed, Well nourished,  HENT:  Normocephalic, Atraumatic, Bilateral external ears normal, Oropharynx moist, Nose normal.  Tongue is edematous especially on the right aspect with moderate sublingual angioedema  Neck:  Normal range of motion, No meningismus, No stridor.   Eyes:  EOMI, Conjunctiva normal, No  discharge.   Respiratory:  Normal breath sounds, No respiratory distress, No wheezing, No chest tenderness.   Cardiovascular:  Normal heart rate, Normal rhythm, No murmurs  GI:  Soft, No tenderness, No guarding,   Musculoskeletal:  Neurovascularly intact distally, No edema, No tenderness, No cyanosis, Good range of motion in all major joints.   Integument:  Warm, Dry, No erythema, No rash.   Lymphatic:  No lymphadenopathy noted.   Neurologic:  Alert & oriented , Normal motor function,  No focal deficits noted.   Psychiatric:  Affect normal, Judgment normal, Mood normal.      LAB:  All pertinent labs reviewed and interpreted.  Results for orders placed or performed during the hospital encounter of 11/13/24   Extra Blue Top Tube   Result Value Ref Range    Hold Specimen JIC    Extra Red Top Tube   Result Value Ref Range    Hold Specimen JIC    Basic metabolic panel   Result Value Ref Range    Sodium 135 135 - 145 mmol/L    Potassium 3.4 3.4 - 5.3 mmol/L    Chloride 96 (L) 98 - 107 mmol/L    Carbon Dioxide (CO2) 22 22 - 29 mmol/L    Anion Gap 17 (H) 7 - 15 mmol/L    Urea Nitrogen 16.9 8.0 - 23.0 mg/dL    Creatinine 0.98 0.67 - 1.17 mg/dL    GFR Estimate 86 >60 mL/min/1.73m2    Calcium 9.0 8.8 - 10.4 mg/dL    Glucose 163 (H) 70 - 99 mg/dL   Result Value Ref Range    CRP Inflammation 4.94 <5.00 mg/L   CBC with platelets and differential   Result Value Ref Range    WBC Count 7.8 4.0 - 11.0 10e3/uL    RBC Count 5.36 4.40 - 5.90 10e6/uL    Hemoglobin 15.8 13.3 - 17.7 g/dL    Hematocrit 43.3 40.0 - 53.0 %    MCV 81 78 - 100 fL    MCH 29.5 26.5 - 33.0 pg    MCHC 36.5 31.5 - 36.5 g/dL    RDW 12.4 10.0 - 15.0 %    Platelet Count 224 150 - 450 10e3/uL    % Neutrophils 63 %    % Lymphocytes 24 %    % Monocytes 6 %    % Eosinophils 5 %    % Basophils 1 %    % Immature Granulocytes 0 %    NRBCs per 100 WBC 0 <1 /100    Absolute Neutrophils 4.9 1.6 - 8.3 10e3/uL    Absolute Lymphocytes 1.8 0.8 - 5.3 10e3/uL    Absolute Monocytes 0.5  0.0 - 1.3 10e3/uL    Absolute Eosinophils 0.4 0.0 - 0.7 10e3/uL    Absolute Basophils 0.1 0.0 - 0.2 10e3/uL    Absolute Immature Granulocytes 0.0 <=0.4 10e3/uL    Absolute NRBCs 0.0 10e3/uL   Adult Type and Screen   Result Value Ref Range    ABO/RH(D) O POS     Antibody Screen Negative Negative    SPECIMEN EXPIRATION DATE [id number]    Prepare plasma (unit)   Result Value Ref Range    Blood Component Type Plasma     Product Code K4480Y45     Unit Status Ready for issue     Unit Number [id number]     CODING SYSTEM [id number]    Prepare plasma (unit)   Result Value Ref Range    Blood Component Type Plasma     Product Code C6411H46     Unit Status Transfused     Unit Number [id number]     CODING SYSTEM UEOS132     ISSUE DATE AND TIME [id number]     UNIT ABO/RH O+     UNIT TYPE ISBT 5100              Linda Aguirre MD  Emergency Medicine  [facility] EMERGENCY ROOM  [facility] [facility] [id number] [phone]  Dept: [phone]       Linda Aguirre MD  11/13/24 0634

## 2024-11-13 NOTE — TELEPHONE ENCOUNTER
Patient called to state that he was in the ER last night for a swollen tongue and determined that it was an allergic reaction to his lisinopril.  This was discontinued and he does now have an epi pen.    Patient is in need of a replacement medication.    Please advise.    CAITLYN BensonN RN  [facility]

## 2024-11-13 NOTE — ED NOTES
PT is unable to swallow his secretions and was given a yanker. He is able to talk and is maintaining his O2 on RA.

## 2024-11-13 NOTE — ED NOTES
"PT states that he feels like the swelling has gone down. Is speech sounds more clear and less like \"a mouth full of marbles\"  "

## 2024-11-13 NOTE — ED NOTES
Patient was signed out to me by Dr. Lorenzo pending reevaluation at 8 AM.  Please see her note for full history and physical exam and prior emergency department course.  In brief the patient had presented with angioedema.  This was suspected to be ACE inhibitor induced angioedema as he had no other rash or signs of anaphylaxis.  Had received epinephrine, Solu-Medrol, Pepcid, Vistaril, and 2 units of FFP.  Swelling is now significantly improved and he states that his tongue feels much closer to normal.  He does have history of sleep apnea and so did have a brief desaturation while sleeping which popped right back up to 98% when he is awake.  He does not have a CPAP on here but does typically wear this at home.  We discussed recommendation for admission for further monitoring however the patient declined this.  We had a shared decision-making conversation.  He would prefer to go home and monitor closely and return if at all worsening.  We discussed it is very important that if it all starts to worsen he needs to return immediately and even call 911 so that we assess this sooner so that it does not swell to the point of closing off his airway and being a difficult intubation which could cause respiratory failure and death.  He voiced understanding.  I did write him a prescription for an EpiPen to have in case at home and we discussed indications for use for this.  However again it sounded like this was not felt to be anaphylaxis as he had no other signs of this and that this is most likely ACE inhibitor induced.  Discussed he should not take his lisinopril and call his doctor to discuss that we are stopping this.  Again given very strict return precautions to the ER.  Patient voiced understanding.  He was discharged as requested in stable condition.  He reports he is significantly improved.  He is tolerating secretions and speaking normally here.    MD Sharon Abreu Ashley A, MD  11/13/24 2360

## 2024-11-13 NOTE — ED NOTES
Patient noted to desat when asleep even when head of bed up. Patient reports sleep apnea and uses a cpap at home. Patient denies any SOB. States he will sleep in a recliner at home with the cpap when discharged until tongue swelling all the way down. Pt reports tongue swelling much less swollen than when he arrived and has not increased in swelling since arrival to ER or post epi.

## 2024-11-14 RX ORDER — AMLODIPINE BESYLATE 10 MG/1
10 TABLET ORAL DAILY
Qty: 90 TABLET | Refills: 2 | Status: SHIPPED | OUTPATIENT
Start: 2024-11-14

## 2024-11-14 NOTE — TELEPHONE ENCOUNTER
Patient called back. Relayed provider message. Patient verbalized understanding and is in agreement with plan.     Patient states surgery has been rescheduled for March.     Patient scheduled for RN blood pressure check in one month as requested.     Altagracia Key RN  [facility]

## 2024-11-14 NOTE — TELEPHONE ENCOUNTER
Go ahead and increase amlodipine to 10 mg to offset what is lost with the lisinopril.    Nurse blood pressure check after a month.    I do however see he is scheduled for surgery in mid January.  If he needs a preop, could schedule that in the second half of December and then just check blood pressure at that time as well.    Dylon Shafer, CNP

## 2024-11-14 NOTE — TELEPHONE ENCOUNTER
Left message to call back for: Steven  Information to relay to patient: please relay th message below.

## 2024-12-12 ENCOUNTER — ALLIED HEALTH/NURSE VISIT (OUTPATIENT)
Dept: FAMILY MEDICINE | Facility: CLINIC | Age: 64
End: 2024-12-12
Payer: COMMERCIAL

## 2024-12-12 VITALS — DIASTOLIC BLOOD PRESSURE: 78 MMHG | HEART RATE: 64 BPM | SYSTOLIC BLOOD PRESSURE: 128 MMHG

## 2024-12-12 DIAGNOSIS — Z01.30 BP CHECK: Primary | ICD-10-CM

## 2024-12-12 NOTE — PROGRESS NOTES
Gonzalez Morton is a 64 year old year old patient who comes in today for a Blood Pressure check because of medication change.  Vital Signs by /78  Patient is taking medication as prescribed  Patient is tolerating medications well.  Patient is not monitoring Blood Pressure at home.     Current complaints: none  Disposition:  patient to continue with the same medication and call if any concerns.  Patient verbalized understanding.     MANDO Benson RN  [facility]

## 2025-02-07 NOTE — H&P (VIEW-ONLY)
HEART CARE FOLLOW UP NOTE      [facility]  [phone]      Assessment/Recommendations   Assessment: 64 year old male with CAD     Plan:  Pre-operative Risk Assessment  As I discussed with Gonzalez Morton, there is no guarantee for lack of [place]-operative cardiac complications, nor has there been any data to support the empiric use of any medications or treatments to mitigate that risk.  Thus, my aim is to assess his risk, and use that assessment to determine if any further testing is indicated that might alter the estimated risk or alter treatment recommendations.  Based upon the RCRI (Revised [place] Cardiac Risk Index) he has a score of 1 out of 6 (Positives: history of ischemic heart disease.  Negatives: history of heart failure, insulin requiring diabetes, high risk surgery, creatinine > 2.0, history of stroke).  As such, his estimated risk of a [place]akot[place]-operative cardiac complication is 3.9%.  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 Due to the added complexity in care, I will continue to support Steven in the subsequent management and with ongoing continuity of care.          History of Present Illness/Subjective    Indication for visit:  Gonzalez Morton returns for follow up for pre-op evaluation and was last seen on 9/12/2024 by myself.      HPI: Gonzalez Morton is a 64 year old male with a history of CAD s/p CABG 2022, HTN, hyperlipidemia  "who I saw 9/2024 for routine follow up, he was doing well exercising 30 minutes 3+ times per week.      He returns for follow up earlier than planned per ortho for pre-op evaluation.  Still doing 30+ minutes of cardio most days per week without chest pain, dyspnea, no orthopnea or PND.    I reviewed notes from PCP, ortho  prior to this visit.         Physical Examination  Past Cardiac History   Vitals: /78 (BP Location: Left arm, Patient Position: Sitting, Cuff Size: Adult Large)   Resp (!) 68   Ht 1.765 m (5' 9.5\")   Wt 123.4 kg (272 lb 1.6 oz)   BMI 39.61 kg/m    BMI= Body mass index is 39.61 kg/m .  Wt Readings from Last 3 Encounters:   02/14/25 123.4 kg (272 lb 1.6 oz)   02/12/25 122.9 kg (271 lb)   11/13/24 127.9 kg (282 lb)       General Appearance:   no distress, normal body habitus   ENT/Mouth: membranes moist, no oral lesions or bleeding gums.      EYES:  no scleral icterus, normal conjunctivae   Neck: no carotid bruits or thyromegaly   Chest/Lungs:   lungs are clear to auscultation, no rales or wheezing,  sternal scar, equal chest wall expansion    Cardiovascular:   Regular. Normal first and second heart sounds with no murmurs, rubs, or gallops; the carotid, radial and posterior tibial pulses are intact, Jugular venous pressure normal , no edema bilaterally    Abdomen:  no organomegaly, masses, bruits, or tenderness; bowel sounds are present   Extremities: no cyanosis or clubbing   Skin: no xanthelasma, warm.    Neurologic: normal  bilateral, no tremors         CAD: s/p 5 vessel CABG 6/2022 (LIMA-D2, SVG-D2, SVG-OM2, SVG-OM1, radial-rPDA        Most Recent Echocardiogram: 4/7/2022  Left ventricular size, wall motion and function are normal. The ejection  fraction is 60-65%.  Normal right ventricle size and systolic function.  No hemodynamically significant valvular abnormalities on 2D or color flow  imaging. The study was technically difficult.     Most Recent Stress Test: 3/30/2022    The " nuclear stress test is abnormal.  Nuclear images demonstrate a medium size area of nontransmural infarction involving the mid to basal inferior wall, although specificity reduced in the setting of diaphragmatic attenuation.    Stress to rest cavity ratio is 1.38.  TID is present visually and quantitatively.  This is a nonspecific finding but may be a marker of diffuse subendocardial ischemia.    The left ventricular ejection fraction at stress is greater than 70%.    The patient is at an intermediate risk of future cardiac ischemic events.    There is no prior study for comparison.     Most Recent Angiogram: 4/7/2022  Very high calcium score and abnormal stress test in an obese man with a family history of early coronary disease.  Severe, diffuse coronary calcification.  Moderate distal left main disease.  Severe mid and distal LAD disease with an occluded apical LAD.  Critical ostial circumflex disease limiting flow into both the large branching OM-1 and OM-3.   of the mid RCA with collateral filling piecmeal of the distal RCA branches.             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    Knee injury     right    Knee osteoarthritis 08/16/2023    Malignant tumor of prostate (H) 04/04/2019    C61 : Malignant tumor of prostate      Obese     Prostate cancer (H)     Rosacea     Sleep apnea     CPAP     Family History   Problem Relation Age of Onset    Diabetes Mother     Heart Disease Mother     Heart Disease Father     Atrophic kidney Sister     Diabetes Sister     Diabetes Type 1 Brother         Social History     Socioeconomic History    Marital status:      Spouse name: Not on file    Number of children: Not on file    Years of education: Not on file     Highest education level: Not on file   Occupational History    Not on file   Tobacco Use    Smoking status: Never    Smokeless tobacco: Never   Vaping Use    Vaping status: Never Used   Substance and Sexual Activity    Alcohol use: Yes     Alcohol/week: 1.0 standard drink of alcohol     Types: 1 Standard drinks or equivalent per week    Drug use: Never    Sexual activity: Yes     Partners: Female     Birth control/protection: None   Other Topics Concern    Parent/sibling w/ CABG, MI or angioplasty before 65F 55M? No   Social History Narrative    Not on file     Social Drivers of Health     Financial Resource Strain: High Risk (5/27/2024)    Financial Resource Strain     Within the past 12 months, have you or your family members you live with been unable to get utilities (heat, electricity) when it was really needed?: Yes   Food Insecurity: Low Risk  (5/27/2024)    Food Insecurity     Within the past 12 months, did you worry that your food would run out before you got money to buy more?: No     Within the past 12 months, did the food you bought just not last and you didn t have money to get more?: No   Transportation Needs: Low Risk  (5/27/2024)    Transportation Needs     Within the past 12 months, has lack of transportation kept you from medical appointments, getting your medicines, non-medical meetings or appointments, work, or from getting things that you need?: No   Physical Activity: Unknown (5/27/2024)    Exercise Vital Sign     Days of Exercise per Week: Patient declined     Minutes of Exercise per Session: 0 min   Stress: No Stress Concern Present (5/27/2024)    [employer] - Occupational Stress Questionnaire     Feeling of Stress : Only a little   Social Connections: Unknown (5/27/2024)    Social Connection and Isolation Panel [NHANES]     Frequency of Communication with Friends and Family: Not on file     Frequency of Social Gatherings with Friends and Family: Once a week     " Attends Church Services: Not on file     Active Member of Clubs or Organizations: Not on file     Attends Club or Organization Meetings: Not on file     Marital Status: Not on file   Interpersonal Safety: Low Risk  (2/12/2025)    Interpersonal Safety     Do you feel physically and emotionally safe where you currently live?: Yes     Within the past 12 months, have you been hit, slapped, kicked or otherwise physically hurt by someone?: No     Within the past 12 months, have you been humiliated or emotionally abused in other ways by your partner or ex-partner?: No   Housing Stability: Low Risk  (5/27/2024)    Housing Stability     Do you have housing? : Yes     Are you worried about losing your housing?: No           Medications  Allergies   Current Outpatient Medications   Medication Sig Dispense Refill    acetaminophen (TYLENOL) 325 MG tablet Take 975 mg by mouth every 8 hours as needed for mild pain      amLODIPine (NORVASC) 10 MG tablet Take 1 tablet (10 mg) by mouth daily. 90 tablet 2    aspirin 81 MG EC tablet Take 162 mg by mouth 2 times daily      atorvastatin (LIPITOR) 80 MG tablet TAKE 1 TABLET BY MOUTH EVERY DAY IN THE EVENING 90 tablet 3    carvedilol (COREG) 3.125 MG tablet Take 1 tablet (3.125 mg) by mouth 2 times daily (with meals) To replace metoprolol 180 tablet 3    EPINEPHrine (ANY BX GENERIC EQUIV) 0.3 MG/0.3ML injection 2-pack Inject 0.3 mLs (0.3 mg) into the muscle once as needed for anaphylaxis. May repeat one time in 5-15 minutes if response to initial dose is inadequate. 2 each 0    hydrochlorothiazide (HYDRODIURIL) 25 MG tablet TAKE 1 TABLET BY MOUTH EVERY DAY 90 tablet 3    nitroGLYcerin (NITROSTAT) 0.4 MG sublingual tablet One tablet under the tongue every 5 minutes if needed for chest pain. May repeat every 5 minutes for a maximum of 3 doses in 15 minutes\" 25 tablet 3    pramipexole (MIRAPEX) 0.25 MG tablet Take 1 tablet (0.25 mg) by mouth at bedtime 90 tablet 3    traZODone (DESYREL) " "50 MG tablet Take 1-2 tablets ( mg) by mouth at bedtime 180 tablet 3       Allergies   Allergen Reactions    Lisinopril Angioedema    Metoprolol Tartrate Hives    Penicillins Hives     Tolerated CTX 06/2020, Ancef 6/2022. Tolerated augmentin          Lab Results    Chemistry/lipid CBC Cardiac Enzymes/BNP/TSH/INR   Recent Labs   Lab Test 10/12/23  0803   CHOL 125   HDL 35*   LDL 65   TRIG 125     Recent Labs   Lab Test 10/12/23  0803 08/09/23  0905 05/17/23  0900   LDL 65 78 75     Recent Labs   Lab Test 11/13/24  0106      POTASSIUM 3.4   CHLORIDE 96*   CO2 22   *   BUN 16.9   CR 0.98   GFRESTIMATED 86   KEATON 9.0     Recent Labs   Lab Test 11/13/24  0106 05/28/24  0908 01/25/24  0734   CR 0.98 0.97 0.93     Recent Labs   Lab Test 05/28/24  0908 08/16/23  0940 05/31/22  0823   A1C 5.4 5.6 5.8*          Recent Labs   Lab Test 11/13/24  0106   WBC 7.8   HGB 15.8   HCT 43.3   MCV 81        Recent Labs   Lab Test 11/13/24  0106 01/25/24  0734 12/12/23  1025   HGB 15.8 15.1 13.9    No results for input(s): \"TROPONINI\" in the last 14264 hours.  No results for input(s): \"BNP\", \"NTBNPI\", \"NTBNP\" in the last 89193 hours.  No results for input(s): \"TSH\" in the last 27541 hours.  Recent Labs   Lab Test 06/06/22  1333 06/06/22  1025 05/31/22  0823   INR 1.28* 1.39* 1.05        Anneliese Barber MD  Noninvasive Cardiologist   [facility]                                    "

## 2025-02-12 ENCOUNTER — TRANSFERRED RECORDS (OUTPATIENT)
Dept: CARDIOLOGY | Facility: CLINIC | Age: 65
End: 2025-02-12

## 2025-02-12 ENCOUNTER — OFFICE VISIT (OUTPATIENT)
Dept: FAMILY MEDICINE | Facility: CLINIC | Age: 65
End: 2025-02-12
Payer: COMMERCIAL

## 2025-02-12 VITALS
HEIGHT: 69 IN | DIASTOLIC BLOOD PRESSURE: 78 MMHG | BODY MASS INDEX: 40.14 KG/M2 | TEMPERATURE: 98 F | HEART RATE: 59 BPM | WEIGHT: 271 LBS | SYSTOLIC BLOOD PRESSURE: 138 MMHG | RESPIRATION RATE: 18 BRPM | OXYGEN SATURATION: 96 %

## 2025-02-12 DIAGNOSIS — I10 ESSENTIAL HYPERTENSION, BENIGN: ICD-10-CM

## 2025-02-12 DIAGNOSIS — E78.2 MIXED HYPERLIPIDEMIA: ICD-10-CM

## 2025-02-12 DIAGNOSIS — I25.10 CORONARY ARTERY DISEASE INVOLVING NATIVE CORONARY ARTERY OF NATIVE HEART, UNSPECIFIED WHETHER ANGINA PRESENT: ICD-10-CM

## 2025-02-12 DIAGNOSIS — G47.33 OBSTRUCTIVE SLEEP APNEA SYNDROME: ICD-10-CM

## 2025-02-12 DIAGNOSIS — M48.062 SPINAL STENOSIS, LUMBAR REGION, WITH NEUROGENIC CLAUDICATION: ICD-10-CM

## 2025-02-12 DIAGNOSIS — E66.01 MORBID OBESITY (H): ICD-10-CM

## 2025-02-12 DIAGNOSIS — R73.03 PREDIABETES: ICD-10-CM

## 2025-02-12 DIAGNOSIS — Z01.818 PREOP GENERAL PHYSICAL EXAM: Primary | ICD-10-CM

## 2025-02-12 PROBLEM — T84.59XA: Status: RESOLVED | Noted: 2023-11-02 | Resolved: 2025-02-12

## 2025-02-12 PROBLEM — Z96.659: Status: RESOLVED | Noted: 2023-11-02 | Resolved: 2025-02-12

## 2025-02-12 PROBLEM — A49.01 MSSA (METHICILLIN SUSCEPTIBLE STAPHYLOCOCCUS AUREUS) INFECTION: Status: RESOLVED | Noted: 2023-11-04 | Resolved: 2025-02-12

## 2025-02-12 PROCEDURE — 99214 OFFICE O/P EST MOD 30 MIN: CPT | Performed by: NURSE PRACTITIONER

## 2025-02-12 PROCEDURE — 93005 ELECTROCARDIOGRAM TRACING: CPT | Performed by: NURSE PRACTITIONER

## 2025-02-12 ASSESSMENT — PAIN SCALES - GENERAL: PAINLEVEL_OUTOF10: NO PAIN (0)

## 2025-02-12 NOTE — PATIENT INSTRUCTIONS
I will get your paperwork faxed to your surgeon.    Follow your surgeon's directions regarding eating and drinking prior to surgery.     Medications you can take the morning of surgery include any inhalers and: Carvedilol/amlodipine    No advil, ibuprofen, aleve, naproxen, or aspirin a week before surgery. Tylenol is ok.    Stop all supplements a week before surgery.    Your surgeon will manage any complications after your procedure.

## 2025-02-12 NOTE — PROGRESS NOTES
Preoperative Evaluation  [facility]  [address]  [place] [address]  Phone: [phone]  Fax: [phone]  Primary Provider: Dylon Shafer NP  Pre-op Performing Provider: Dylon Shafer NP  Feb 12, 2025 2/7/2025   Surgical Information   What procedure is being done? Lower spine surgery   Facility or Hospital where procedure/surgery will be performed: [facility]   Who is doing the procedure / surgery? Kirt Gonzalez   Date of surgery / procedure: 3/5/25   Time of surgery / procedure: Unknown   Where do you plan to recover after surgery? at home with family     Fax number for surgical facility: [phone]    Assessment & Plan     The proposed surgical procedure is considered INTERMEDIATE risk.    Preop general physical exam  Medically optimized for procedure  - EKG 12-lead, tracing only    Spinal stenosis, lumbar region, with neurogenic claudication  Planned decompression    Coronary artery disease involving native coronary artery of native heart, unspecified whether angina present  History coronary bypass.  Stable.  No chest pain.  Does see cardiology at the end of this week    Mixed hyperlipidemia  On statin therapy    Prediabetes  Last A1c showed good control    Lab Results   Component Value Date    A1C 5.4 05/28/2024    A1C 5.6 08/16/2023    A1C 5.8 05/31/2022    A1C 5.9 08/20/2021    A1C 5.9 02/24/2020         Morbid obesity (H)  Increased risk for difficult airway    Obstructive sleep apnea syndrome      Benign Essential Hypertension  Controlled.  Recently had anaphylactic reaction to lisinopril.            - No identified additional risk factors other than previously addressed    Okay to take amlodipine and carvedilol morning of procedure with small sip of water.    Recommendation  Approval given to proceed with proposed procedure, without further diagnostic evaluation.    Subjective   Steven is a 64 year  old, presenting for the following:  Pre-Op Exam          2/12/2025     8:40 AM   Additional Questions   Roomed by Sayra Regalado CMA     HPI related to upcoming procedure: Patient has been dealing with persistent low back pain and leg numbness.  Tried conservative measures which failed.  Recommendations now for lumbar decompression        2/7/2025   Pre-Op Questionnaire   Have you ever had a heart attack or stroke? No   Have you ever had surgery on your heart or blood vessels, such as a stent placement, a coronary artery bypass, or surgery on an artery in your head, neck, heart, or legs? (!) YES CABG x5   Do you have chest pain with activity? No   Do you have a history of heart failure? No   Do you currently have a cold, bronchitis or symptoms of other infection? No   Do you have a cough, shortness of breath, or wheezing? No   Do you or anyone in your family have previous history of blood clots? No   Do you or does anyone in your family have a serious bleeding problem such as prolonged bleeding following surgeries or cuts? No   Have you ever had problems with anemia or been told to take iron pills? No   Have you had any abnormal blood loss such as black, tarry or bloody stools? No   Have you ever had a blood transfusion? No   Are you willing to have a blood transfusion if it is medically needed before, during, or after your surgery? Yes   Have you or any of your relatives ever had problems with anesthesia? No   Do you have sleep apnea, excessive snoring or daytime drowsiness? (!) YES   Do you have a CPAP machine? Yes   Do you have any artifical heart valves or other implanted medical devices like a pacemaker, defibrillator, or continuous glucose monitor? No   Do you have artificial joints? (!) YES   Are you allergic to latex? No     Health Care Directive  Patient does not have a Health Care Directive: Reviewed/up-to-date    Preoperative Review of    reviewed - no record of controlled substances  prescribed.      Status of Chronic Conditions:  See problem list for active medical problems.  Problems all longstanding and stable, except as noted/documented.  See ROS for pertinent symptoms related to these conditions.    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     Priority: Medium    Knee osteoarthritis 08/16/2023     Priority: Medium    Prediabetes 05/18/2023     Priority: Medium    Status post coronary artery bypass graft 06/23/2022     Priority: Medium    Coronary artery disease involving native coronary artery of native heart, unspecified whether angina present 06/06/2022     Priority: Medium    Obstructive sleep apnea syndrome 05/10/2022     Priority: Medium    Family history of ischemic heart disease 04/03/2022     Priority: Medium    Morbid obesity (H) 08/20/2021     Priority: Medium    Mixed hyperlipidemia      Priority: Medium     Created by Conversion        History of malignant neoplasm of prostate 09/05/2019     Priority: Medium     Z85.46 : History of malignant neoplasm of prostate      Erectile dysfunction following radical prostatectomy 08/29/2019     Priority: Medium     N52.31 : Erectile dysfunction following radical prostatectomy      Stress incontinence 08/29/2019     Priority: Medium     N39.3 : Genuine stress incontinence      Benign Essential Hypertension      Priority: Medium     Created by Conversion        Rosacea      Priority: Medium     Created by Conversion          Past Medical History:   Diagnosis Date    Abnormal cardiovascular stress test 04/03/2022    CAD (coronary artery disease)     Elevated coronary artery calcium score     Hypertension     Knee injury     right    Knee osteoarthritis 08/16/2023    Malignant tumor of prostate (H) 04/04/2019    C61 : Malignant tumor of prostate      Obese     Prostate  cancer (H)     Rosacea     Sleep apnea     CPAP     Past Surgical History:   Procedure Laterality Date    ABDOMEN SURGERY      Appendix    APPENDECTOMY      ARTHROPLASTY KNEE Right 8/16/2023    Procedure: RIGHT TOTAL KNEE ARTHROPLASTY;  Surgeon: Bradley Redd MD;  Location: [facility] Main OR    ARTHROSCOPY KNEE Bilateral     BYPASS GRAFT ARTERY CORONARY N/A 6/6/2022    Procedure: CORONARY ARTERY BYPASS GRAFT X 5, ENDOSCOPIC VESSEL PROCUREMENT LEFT LEG, INTERNAL MAMMARY ARTERY HARVEST, LEFT RADIAL ARTERY HARVEST, ANESTHESIA TRANSESOPHAGEAL ECHOCARDIOGRAM, EPIAORTIC ULTRASOUND;  Surgeon: Mayank Rocha MD;  Location: [facility] Main OR    CV CORONARY ANGIOGRAM N/A 4/7/2022    Procedure: Coronary Angiogram;  Surgeon: Mery Lyons MD;  Location: [facility] CATH LAB CV    EXCHANGE POLY COMPONENT ARTHROPLASTY KNEE Right 11/2/2023    Procedure: RIGHT KNEE IRRIGATION AND DEBRIDEMENT;  Surgeon: Bradley Redd MD;  Location: [facility] OR    PICC SINGLE LUMEN PLACEMENT  11/05/2023    PROSTATECTOMY      REMOVE HARDWARE ARTHROPLASTY KNEE, IRRIG & TAVON, PLACE ANTIBIOTIC CEMENT SPACER, COMBINED Right 11/2/2023    Procedure: POLYETHELYNE EXCHANGE;  Surgeon: Bradley Redd MD;  Location: [facility] Main OR    TOTAL KNEE ARTHROPLASTY Left     WRIST SURGERY       Current Outpatient Medications   Medication Sig Dispense Refill    acetaminophen (TYLENOL) 325 MG tablet Take 975 mg by mouth every 8 hours as needed for mild pain      amLODIPine (NORVASC) 10 MG tablet Take 1 tablet (10 mg) by mouth daily. 90 tablet 2    aspirin 81 MG EC tablet Take 162 mg by mouth 2 times daily      atorvastatin (LIPITOR) 80 MG tablet TAKE 1 TABLET BY MOUTH EVERY DAY IN THE EVENING 90 tablet 3    carvedilol (COREG) 3.125 MG tablet Take 1 tablet (3.125 mg) by mouth 2 times daily (with meals) To replace metoprolol 180 tablet 3    EPINEPHrine (ANY BX GENERIC EQUIV) 0.3 MG/0.3ML injection 2-pack Inject 0.3 mLs (0.3 mg) into the muscle once  "as needed for anaphylaxis. May repeat one time in 5-15 minutes if response to initial dose is inadequate. 2 each 0    hydrochlorothiazide (HYDRODIURIL) 25 MG tablet TAKE 1 TABLET BY MOUTH EVERY DAY 90 tablet 3    nitroGLYcerin (NITROSTAT) 0.4 MG sublingual tablet One tablet under the tongue every 5 minutes if needed for chest pain. May repeat every 5 minutes for a maximum of 3 doses in 15 minutes\" 25 tablet 3    pramipexole (MIRAPEX) 0.25 MG tablet Take 1 tablet (0.25 mg) by mouth at bedtime 90 tablet 3    traZODone (DESYREL) 50 MG tablet Take 1-2 tablets ( mg) by mouth at bedtime 180 tablet 3       Allergies   Allergen Reactions    Lisinopril Angioedema    Metoprolol Tartrate Hives    Penicillins Hives     Tolerated CTX 06/2020, Ancef 6/2022. Tolerated augmentin        Social History     Tobacco Use    Smoking status: Never    Smokeless tobacco: Never   Substance Use Topics    Alcohol use: Yes     Alcohol/week: 1.0 standard drink of alcohol     Types: 1 Standard drinks or equivalent per week     Family History   Problem Relation Age of Onset    Diabetes Mother     Heart Disease Mother     Heart Disease Father     Atrophic kidney Sister     Diabetes Sister     Diabetes Type 1 Brother      History   Drug Use Unknown             Review of Systems  Constitutional, HEENT, cardiovascular, pulmonary, gi and gu systems are negative, except as otherwise noted.    Objective    /78 (BP Location: Left arm, Patient Position: Sitting, Cuff Size: Adult Large)   Pulse 59   Temp 98  F (36.7  C) (Oral)   Resp 18   Ht 1.753 m (5' 9\")   Wt 122.9 kg (271 lb)   SpO2 96%   BMI 40.02 kg/m     Estimated body mass index is 40.02 kg/m  as calculated from the following:    Height as of this encounter: 1.753 m (5' 9\").    Weight as of this encounter: 122.9 kg (271 lb).  Physical Exam  GENERAL: alert and no distress  EYES: Eyes grossly normal to inspection, PERRL and conjunctivae and sclerae normal  HENT: ear canals and TM's " "normal, nose and mouth without ulcers or lesions  NECK: no adenopathy, no asymmetry, masses, or scars  RESP: lungs clear to auscultation - no rales, rhonchi or wheezes  CV: regular rate and rhythm, normal S1 S2, no S3 or S4, no murmur, click or rub, no peripheral edema  ABDOMEN: soft, nontender, no hepatosplenomegaly, no masses and bowel sounds normal  MS: no gross musculoskeletal defects noted, no edema  SKIN: no suspicious lesions or rashes  NEURO: Normal strength and tone, mentation intact and speech normal  PSYCH: mentation appears normal, affect normal/bright    Recent Labs   Lab Test 11/13/24  0106 05/28/24  0908   HGB 15.8  --      --     138   POTASSIUM 3.4 4.6   CR 0.98 0.97   A1C  --  5.4        Diagnostics  Last Comprehensive Metabolic Panel:  Lab Results   Component Value Date     11/13/2024    POTASSIUM 3.4 11/13/2024    CHLORIDE 96 (L) 11/13/2024    CO2 22 11/13/2024    ANIONGAP 17 (H) 11/13/2024     (H) 11/13/2024    BUN 16.9 11/13/2024    CR 0.98 11/13/2024    GFRESTIMATED 86 11/13/2024    KEATON 9.0 11/13/2024       Lab Results   Component Value Date    WBC 7.8 11/13/2024     Lab Results   Component Value Date    RBC 5.36 11/13/2024     Lab Results   Component Value Date    HGB 15.8 11/13/2024     Lab Results   Component Value Date    HCT 43.3 11/13/2024     No components found for: \"MCT\"  Lab Results   Component Value Date    MCV 81 11/13/2024     Lab Results   Component Value Date    MCH 29.5 11/13/2024     Lab Results   Component Value Date    MCHC 36.5 11/13/2024     Lab Results   Component Value Date    RDW 12.4 11/13/2024     Lab Results   Component Value Date     11/13/2024     Lab Results   Component Value Date    A1C 5.4 05/28/2024    A1C 5.6 08/16/2023    A1C 5.8 05/31/2022    A1C 5.9 08/20/2021    A1C 5.9 02/24/2020        ECG: Sinus bradycardia with nonspecific T wave abnormality.     Revised Cardiac Risk Index (RCRI)  The patient has the following serious " cardiovascular risks for perioperative complications:   - Coronary Artery Disease (MI, positive stress test, angina, Qs on EKG) = 1 point     RCRI Interpretation: 1 point: Class II (low risk - 0.9% complication rate)         Signed Electronically by: Dylon Shafer NP  A copy of this evaluation report is provided to the requesting physician.

## 2025-03-04 ENCOUNTER — TELEPHONE (OUTPATIENT)
Dept: FAMILY MEDICINE | Facility: CLINIC | Age: 65
End: 2025-03-04
Payer: COMMERCIAL

## 2025-03-04 ENCOUNTER — TELEPHONE (OUTPATIENT)
Dept: CARDIOLOGY | Facility: CLINIC | Age: 65
End: 2025-03-04
Payer: COMMERCIAL

## 2025-03-04 NOTE — TELEPHONE ENCOUNTER
FYI - Status Update    Who is Calling: patient    Update: pre op 2/12/25 faxed to [phone] per pt request. Surgery moved to [facility].    Does caller want a call/response back: No

## 2025-03-04 NOTE — TELEPHONE ENCOUNTER
[facility] Call Center    Phone Message    May a detailed message be left on voicemail: yes     Reason for Call: Other: Pt requesting the cardiac clearance information from 2/14/25 to be faxed to [place], Attn: Julianna at [phone]. (Procedure scheduled for 3/11/25) Thank you!     Action Taken: Message routed to:  Other: [facility] CARDIOLOGY [facility] [[id number]]    Travel Screening: Not Applicable     Date of Service:

## 2025-03-10 ENCOUNTER — ANESTHESIA EVENT (OUTPATIENT)
Dept: SURGERY | Facility: CLINIC | Age: 65
End: 2025-03-10
Payer: COMMERCIAL

## 2025-03-11 ENCOUNTER — APPOINTMENT (OUTPATIENT)
Dept: RADIOLOGY | Facility: CLINIC | Age: 65
End: 2025-03-11
Attending: ORTHOPAEDIC SURGERY
Payer: COMMERCIAL

## 2025-03-11 ENCOUNTER — HOSPITAL ENCOUNTER (INPATIENT)
Facility: CLINIC | Age: 65
LOS: 2 days | Discharge: HOME | End: 2025-03-13
Attending: ORTHOPAEDIC SURGERY | Admitting: ORTHOPAEDIC SURGERY
Payer: COMMERCIAL

## 2025-03-11 ENCOUNTER — ANESTHESIA (OUTPATIENT)
Dept: SURGERY | Facility: CLINIC | Age: 65
End: 2025-03-11
Payer: COMMERCIAL

## 2025-03-11 DIAGNOSIS — M48.062 LUMBAR STENOSIS WITH NEUROGENIC CLAUDICATION: Primary | ICD-10-CM

## 2025-03-11 PROBLEM — G47.33 OBSTRUCTIVE SLEEP APNEA SYNDROME: Status: ACTIVE | Noted: 2022-05-10

## 2025-03-11 PROBLEM — I25.10 CORONARY ARTERY DISEASE INVOLVING NATIVE CORONARY ARTERY OF NATIVE HEART, UNSPECIFIED WHETHER ANGINA PRESENT: Status: ACTIVE | Noted: 2022-06-06

## 2025-03-11 PROBLEM — Z95.1 STATUS POST CORONARY ARTERY BYPASS GRAFT: Status: ACTIVE | Noted: 2022-06-23

## 2025-03-11 PROBLEM — Z85.46 HISTORY OF MALIGNANT NEOPLASM OF PROSTATE: Status: ACTIVE | Noted: 2019-09-05

## 2025-03-11 LAB — GLUCOSE BLDC GLUCOMTR-MCNC: 123 MG/DL (ref 70–99)

## 2025-03-11 PROCEDURE — 250N000009 HC RX 250: Performed by: NURSE ANESTHETIST, CERTIFIED REGISTERED

## 2025-03-11 PROCEDURE — 360N000086 HC SURGERY LEVEL 6 W/ FLUORO, PER MIN: Performed by: ORTHOPAEDIC SURGERY

## 2025-03-11 PROCEDURE — 250N000011 HC RX IP 250 OP 636: Performed by: PHYSICIAN ASSISTANT

## 2025-03-11 PROCEDURE — 0SG00AJ FUSION OF LUMBAR VERTEBRAL JOINT WITH INTERBODY FUSION DEVICE, POSTERIOR APPROACH, ANTERIOR COLUMN, OPEN APPROACH: ICD-10-PCS | Performed by: ORTHOPAEDIC SURGERY

## 2025-03-11 PROCEDURE — 272N000001 HC OR GENERAL SUPPLY STERILE: Performed by: ORTHOPAEDIC SURGERY

## 2025-03-11 PROCEDURE — 99223 1ST HOSP IP/OBS HIGH 75: CPT | Performed by: HOSPITALIST

## 2025-03-11 PROCEDURE — 250N000013 HC RX MED GY IP 250 OP 250 PS 637: Performed by: NURSE PRACTITIONER

## 2025-03-11 PROCEDURE — 710N000010 HC RECOVERY PHASE 1, LEVEL 2, PER MIN: Performed by: ORTHOPAEDIC SURGERY

## 2025-03-11 PROCEDURE — 250N000011 HC RX IP 250 OP 636: Performed by: ANESTHESIOLOGY

## 2025-03-11 PROCEDURE — 250N000013 HC RX MED GY IP 250 OP 250 PS 637: Performed by: HOSPITALIST

## 2025-03-11 PROCEDURE — 258N000003 HC RX IP 258 OP 636: Performed by: ORTHOPAEDIC SURGERY

## 2025-03-11 PROCEDURE — 8E0WXBZ COMPUTER ASSISTED PROCEDURE OF TRUNK REGION: ICD-10-PCS | Performed by: ORTHOPAEDIC SURGERY

## 2025-03-11 PROCEDURE — 250N000009 HC RX 250: Performed by: ANESTHESIOLOGY

## 2025-03-11 PROCEDURE — 370N000017 HC ANESTHESIA TECHNICAL FEE, PER MIN: Performed by: ORTHOPAEDIC SURGERY

## 2025-03-11 PROCEDURE — 250N000011 HC RX IP 250 OP 636: Performed by: ORTHOPAEDIC SURGERY

## 2025-03-11 PROCEDURE — 258N000003 HC RX IP 258 OP 636: Performed by: NURSE ANESTHETIST, CERTIFIED REGISTERED

## 2025-03-11 PROCEDURE — 250N000013 HC RX MED GY IP 250 OP 250 PS 637: Performed by: ORTHOPAEDIC SURGERY

## 2025-03-11 PROCEDURE — 250N000013 HC RX MED GY IP 250 OP 250 PS 637: Performed by: ANESTHESIOLOGY

## 2025-03-11 PROCEDURE — 999N000157 HC STATISTIC RCP TIME EA 10 MIN

## 2025-03-11 PROCEDURE — 258N000003 HC RX IP 258 OP 636: Performed by: ANESTHESIOLOGY

## 2025-03-11 PROCEDURE — 0SG0071 FUSION OF LUMBAR VERTEBRAL JOINT WITH AUTOLOGOUS TISSUE SUBSTITUTE, POSTERIOR APPROACH, POSTERIOR COLUMN, OPEN APPROACH: ICD-10-PCS | Performed by: ORTHOPAEDIC SURGERY

## 2025-03-11 PROCEDURE — 250N000011 HC RX IP 250 OP 636: Performed by: NURSE ANESTHETIST, CERTIFIED REGISTERED

## 2025-03-11 PROCEDURE — P9045 ALBUMIN (HUMAN), 5%, 250 ML: HCPCS | Performed by: NURSE ANESTHETIST, CERTIFIED REGISTERED

## 2025-03-11 PROCEDURE — 250N000025 HC SEVOFLURANE, PER MIN: Performed by: ORTHOPAEDIC SURGERY

## 2025-03-11 PROCEDURE — 120N000001 HC R&B MED SURG/OB

## 2025-03-11 PROCEDURE — 94660 CPAP INITIATION&MGMT: CPT

## 2025-03-11 PROCEDURE — 250N000009 HC RX 250: Performed by: ORTHOPAEDIC SURGERY

## 2025-03-11 PROCEDURE — 01NB0ZZ RELEASE LUMBAR NERVE, OPEN APPROACH: ICD-10-PCS | Performed by: ORTHOPAEDIC SURGERY

## 2025-03-11 PROCEDURE — C1762 CONN TISS, HUMAN(INC FASCIA): HCPCS | Performed by: ORTHOPAEDIC SURGERY

## 2025-03-11 PROCEDURE — 999N000141 HC STATISTIC PRE-PROCEDURE NURSING ASSESSMENT: Performed by: ORTHOPAEDIC SURGERY

## 2025-03-11 PROCEDURE — C1713 ANCHOR/SCREW BN/BN,TIS/BN: HCPCS | Performed by: ORTHOPAEDIC SURGERY

## 2025-03-11 PROCEDURE — 99222 1ST HOSP IP/OBS MODERATE 55: CPT | Performed by: NURSE PRACTITIONER

## 2025-03-11 PROCEDURE — 999N000182 XR SURGERY OARM

## 2025-03-11 PROCEDURE — 0SB20ZZ EXCISION OF LUMBAR VERTEBRAL DISC, OPEN APPROACH: ICD-10-PCS | Performed by: ORTHOPAEDIC SURGERY

## 2025-03-11 DEVICE — GRAFT BONE FIBERS GRAFTON DBM DBF 6ML T50106: Type: IMPLANTABLE DEVICE | Status: FUNCTIONAL

## 2025-03-11 DEVICE — IMP SCR MEDT BREAK-OFF SET SOLERA 4.75MM TI 5440030: Type: IMPLANTABLE DEVICE | Site: SPINE LUMBAR | Status: FUNCTIONAL

## 2025-03-11 DEVICE — IMP SCR MEDT 4.75MM SOLERA 7.5X40MM MA 54840007540: Type: IMPLANTABLE DEVICE | Site: SPINE LUMBAR | Status: FUNCTIONAL

## 2025-03-11 DEVICE — IMP ROD MEDT 3.5CM 1475501035: Type: IMPLANTABLE DEVICE | Site: SPINE LUMBAR | Status: FUNCTIONAL

## 2025-03-11 DEVICE — IMP SCR MEDT 4.75MM SOLERA 7.5X50MM MA 54840007550: Type: IMPLANTABLE DEVICE | Site: SPINE LUMBAR | Status: FUNCTIONAL

## 2025-03-11 DEVICE — IMP SPI INTERBODY MEDT CATALYFT PL SHORT 9MM 6068093: Type: IMPLANTABLE DEVICE | Site: SPINE LUMBAR | Status: FUNCTIONAL

## 2025-03-11 RX ORDER — NALOXONE HYDROCHLORIDE 0.4 MG/ML
0.2 INJECTION, SOLUTION INTRAMUSCULAR; INTRAVENOUS; SUBCUTANEOUS
Status: DISCONTINUED | OUTPATIENT
Start: 2025-03-11 | End: 2025-03-13 | Stop reason: HOSPADM

## 2025-03-11 RX ORDER — NITROGLYCERIN 0.4 MG/1
0.4 TABLET SUBLINGUAL EVERY 5 MIN PRN
Status: DISCONTINUED | OUTPATIENT
Start: 2025-03-11 | End: 2025-03-13 | Stop reason: HOSPADM

## 2025-03-11 RX ORDER — SODIUM CHLORIDE, SODIUM LACTATE, POTASSIUM CHLORIDE, CALCIUM CHLORIDE 600; 310; 30; 20 MG/100ML; MG/100ML; MG/100ML; MG/100ML
INJECTION, SOLUTION INTRAVENOUS CONTINUOUS
Status: DISCONTINUED | OUTPATIENT
Start: 2025-03-11 | End: 2025-03-11 | Stop reason: HOSPADM

## 2025-03-11 RX ORDER — HYDROMORPHONE HCL IN WATER/PF 6 MG/30 ML
0.4 PATIENT CONTROLLED ANALGESIA SYRINGE INTRAVENOUS EVERY 5 MIN PRN
Status: DISCONTINUED | OUTPATIENT
Start: 2025-03-11 | End: 2025-03-11 | Stop reason: HOSPADM

## 2025-03-11 RX ORDER — HYDROXYZINE HYDROCHLORIDE 25 MG/1
25 TABLET, FILM COATED ORAL EVERY 6 HOURS PRN
Status: DISCONTINUED | OUTPATIENT
Start: 2025-03-11 | End: 2025-03-13 | Stop reason: HOSPADM

## 2025-03-11 RX ORDER — ONDANSETRON 2 MG/ML
4 INJECTION INTRAMUSCULAR; INTRAVENOUS EVERY 30 MIN PRN
Status: DISCONTINUED | OUTPATIENT
Start: 2025-03-11 | End: 2025-03-11 | Stop reason: HOSPADM

## 2025-03-11 RX ORDER — SODIUM CHLORIDE 9 MG/ML
INJECTION, SOLUTION INTRAVENOUS CONTINUOUS
Status: DISCONTINUED | OUTPATIENT
Start: 2025-03-11 | End: 2025-03-13 | Stop reason: HOSPADM

## 2025-03-11 RX ORDER — LIDOCAINE 40 MG/G
CREAM TOPICAL
Status: DISCONTINUED | OUTPATIENT
Start: 2025-03-11 | End: 2025-03-11 | Stop reason: HOSPADM

## 2025-03-11 RX ORDER — NALOXONE HYDROCHLORIDE 0.4 MG/ML
0.4 INJECTION, SOLUTION INTRAMUSCULAR; INTRAVENOUS; SUBCUTANEOUS
Status: DISCONTINUED | OUTPATIENT
Start: 2025-03-11 | End: 2025-03-13 | Stop reason: HOSPADM

## 2025-03-11 RX ORDER — AMOXICILLIN 250 MG
1 CAPSULE ORAL 2 TIMES DAILY
Status: DISCONTINUED | OUTPATIENT
Start: 2025-03-11 | End: 2025-03-13 | Stop reason: HOSPADM

## 2025-03-11 RX ORDER — DEXAMETHASONE SODIUM PHOSPHATE 10 MG/ML
INJECTION, SOLUTION INTRAMUSCULAR; INTRAVENOUS PRN
Status: DISCONTINUED | OUTPATIENT
Start: 2025-03-11 | End: 2025-03-11

## 2025-03-11 RX ORDER — BUPIVACAINE HYDROCHLORIDE AND EPINEPHRINE 2.5; 5 MG/ML; UG/ML
INJECTION, SOLUTION EPIDURAL; INFILTRATION; INTRACAUDAL; PERINEURAL PRN
Status: DISCONTINUED | OUTPATIENT
Start: 2025-03-11 | End: 2025-03-11 | Stop reason: HOSPADM

## 2025-03-11 RX ORDER — CEFAZOLIN SODIUM/WATER 3 G/30 ML
3 SYRINGE (ML) INTRAVENOUS SEE ADMIN INSTRUCTIONS
Status: DISCONTINUED | OUTPATIENT
Start: 2025-03-11 | End: 2025-03-11 | Stop reason: HOSPADM

## 2025-03-11 RX ORDER — CEFAZOLIN SODIUM 2 G/50ML
2 SOLUTION INTRAVENOUS EVERY 8 HOURS
Status: COMPLETED | OUTPATIENT
Start: 2025-03-11 | End: 2025-03-12

## 2025-03-11 RX ORDER — OXYCODONE HYDROCHLORIDE 5 MG/1
10 TABLET ORAL EVERY 4 HOURS PRN
Status: DISCONTINUED | OUTPATIENT
Start: 2025-03-11 | End: 2025-03-13 | Stop reason: HOSPADM

## 2025-03-11 RX ORDER — ONDANSETRON 2 MG/ML
4 INJECTION INTRAMUSCULAR; INTRAVENOUS EVERY 6 HOURS PRN
Status: DISCONTINUED | OUTPATIENT
Start: 2025-03-11 | End: 2025-03-13 | Stop reason: HOSPADM

## 2025-03-11 RX ORDER — FENTANYL CITRATE 50 UG/ML
INJECTION, SOLUTION INTRAMUSCULAR; INTRAVENOUS PRN
Status: DISCONTINUED | OUTPATIENT
Start: 2025-03-11 | End: 2025-03-11

## 2025-03-11 RX ORDER — HYDROCHLOROTHIAZIDE 25 MG/1
25 TABLET ORAL DAILY
Status: DISCONTINUED | OUTPATIENT
Start: 2025-03-11 | End: 2025-03-13 | Stop reason: HOSPADM

## 2025-03-11 RX ORDER — BUPIVACAINE HYDROCHLORIDE AND EPINEPHRINE 2.5; 5 MG/ML; UG/ML
INJECTION, SOLUTION EPIDURAL; INFILTRATION; INTRACAUDAL; PERINEURAL
Status: DISCONTINUED
Start: 2025-03-11 | End: 2025-03-11 | Stop reason: HOSPADM

## 2025-03-11 RX ORDER — AMLODIPINE BESYLATE 10 MG/1
10 TABLET ORAL DAILY
Status: DISCONTINUED | OUTPATIENT
Start: 2025-03-11 | End: 2025-03-13 | Stop reason: HOSPADM

## 2025-03-11 RX ORDER — MAGNESIUM HYDROXIDE 1200 MG/15ML
LIQUID ORAL PRN
Status: DISCONTINUED | OUTPATIENT
Start: 2025-03-11 | End: 2025-03-11 | Stop reason: HOSPADM

## 2025-03-11 RX ORDER — OXYCODONE HYDROCHLORIDE 5 MG/1
5 TABLET ORAL EVERY 4 HOURS PRN
Status: DISCONTINUED | OUTPATIENT
Start: 2025-03-11 | End: 2025-03-13 | Stop reason: HOSPADM

## 2025-03-11 RX ORDER — DEXAMETHASONE SODIUM PHOSPHATE 4 MG/ML
4 INJECTION, SOLUTION INTRA-ARTICULAR; INTRALESIONAL; INTRAMUSCULAR; INTRAVENOUS; SOFT TISSUE
Status: DISCONTINUED | OUTPATIENT
Start: 2025-03-11 | End: 2025-03-11 | Stop reason: HOSPADM

## 2025-03-11 RX ORDER — ACETAMINOPHEN 500 MG
1000 TABLET ORAL EVERY 6 HOURS
Status: DISCONTINUED | OUTPATIENT
Start: 2025-03-11 | End: 2025-03-13 | Stop reason: HOSPADM

## 2025-03-11 RX ORDER — PROCHLORPERAZINE MALEATE 10 MG
10 TABLET ORAL EVERY 6 HOURS PRN
Status: DISCONTINUED | OUTPATIENT
Start: 2025-03-11 | End: 2025-03-13 | Stop reason: HOSPADM

## 2025-03-11 RX ORDER — ONDANSETRON 4 MG/1
4 TABLET, ORALLY DISINTEGRATING ORAL EVERY 30 MIN PRN
Status: DISCONTINUED | OUTPATIENT
Start: 2025-03-11 | End: 2025-03-11 | Stop reason: HOSPADM

## 2025-03-11 RX ORDER — DEXMEDETOMIDINE HYDROCHLORIDE 4 UG/ML
INJECTION, SOLUTION INTRAVENOUS
Status: DISCONTINUED
Start: 2025-03-11 | End: 2025-03-11 | Stop reason: HOSPADM

## 2025-03-11 RX ORDER — HYDROMORPHONE HCL IN WATER/PF 6 MG/30 ML
0.2 PATIENT CONTROLLED ANALGESIA SYRINGE INTRAVENOUS
Status: DISCONTINUED | OUTPATIENT
Start: 2025-03-11 | End: 2025-03-13 | Stop reason: HOSPADM

## 2025-03-11 RX ORDER — MAGNESIUM OXIDE 400 MG/1
400 TABLET ORAL DAILY
Status: DISCONTINUED | OUTPATIENT
Start: 2025-03-11 | End: 2025-03-13 | Stop reason: HOSPADM

## 2025-03-11 RX ORDER — MAGNESIUM OXIDE 400 MG/1
400 TABLET ORAL DAILY
COMMUNITY

## 2025-03-11 RX ORDER — FENTANYL CITRATE 50 UG/ML
25 INJECTION, SOLUTION INTRAMUSCULAR; INTRAVENOUS EVERY 5 MIN PRN
Status: DISCONTINUED | OUTPATIENT
Start: 2025-03-11 | End: 2025-03-11 | Stop reason: HOSPADM

## 2025-03-11 RX ORDER — TRAZODONE HYDROCHLORIDE 50 MG/1
100 TABLET ORAL AT BEDTIME
COMMUNITY

## 2025-03-11 RX ORDER — THERA TABS 400 MCG
1 TAB ORAL DAILY
Status: DISCONTINUED | OUTPATIENT
Start: 2025-03-12 | End: 2025-03-13 | Stop reason: HOSPADM

## 2025-03-11 RX ORDER — DIPHENHYDRAMINE HYDROCHLORIDE 50 MG/ML
25 INJECTION, SOLUTION INTRAMUSCULAR; INTRAVENOUS EVERY 6 HOURS PRN
Status: DISCONTINUED | OUTPATIENT
Start: 2025-03-11 | End: 2025-03-11 | Stop reason: HOSPADM

## 2025-03-11 RX ORDER — CHLORAL HYDRATE 500 MG
2 CAPSULE ORAL DAILY
COMMUNITY

## 2025-03-11 RX ORDER — NALOXONE HYDROCHLORIDE 0.4 MG/ML
0.1 INJECTION, SOLUTION INTRAMUSCULAR; INTRAVENOUS; SUBCUTANEOUS
Status: DISCONTINUED | OUTPATIENT
Start: 2025-03-11 | End: 2025-03-11 | Stop reason: HOSPADM

## 2025-03-11 RX ORDER — POLYETHYLENE GLYCOL 3350 17 G/17G
17 POWDER, FOR SOLUTION ORAL DAILY
Status: DISCONTINUED | OUTPATIENT
Start: 2025-03-12 | End: 2025-03-13 | Stop reason: HOSPADM

## 2025-03-11 RX ORDER — LORATADINE 10 MG/1
10 TABLET ORAL DAILY PRN
Status: DISCONTINUED | OUTPATIENT
Start: 2025-03-11 | End: 2025-03-13 | Stop reason: HOSPADM

## 2025-03-11 RX ORDER — ACETAMINOPHEN 325 MG/1
975 TABLET ORAL ONCE
Status: COMPLETED | OUTPATIENT
Start: 2025-03-11 | End: 2025-03-11

## 2025-03-11 RX ORDER — ONDANSETRON 2 MG/ML
INJECTION INTRAMUSCULAR; INTRAVENOUS PRN
Status: DISCONTINUED | OUTPATIENT
Start: 2025-03-11 | End: 2025-03-11

## 2025-03-11 RX ORDER — FENTANYL CITRATE 50 UG/ML
50 INJECTION, SOLUTION INTRAMUSCULAR; INTRAVENOUS EVERY 5 MIN PRN
Status: DISCONTINUED | OUTPATIENT
Start: 2025-03-11 | End: 2025-03-11 | Stop reason: HOSPADM

## 2025-03-11 RX ORDER — PRAMIPEXOLE DIHYDROCHLORIDE 0.25 MG/1
0.25 TABLET ORAL AT BEDTIME
Status: DISCONTINUED | OUTPATIENT
Start: 2025-03-11 | End: 2025-03-13 | Stop reason: HOSPADM

## 2025-03-11 RX ORDER — DIPHENHYDRAMINE HCL 25 MG
25 CAPSULE ORAL EVERY 6 HOURS PRN
Status: DISCONTINUED | OUTPATIENT
Start: 2025-03-11 | End: 2025-03-11 | Stop reason: HOSPADM

## 2025-03-11 RX ORDER — ATORVASTATIN CALCIUM 40 MG/1
80 TABLET, FILM COATED ORAL EVERY EVENING
Status: DISCONTINUED | OUTPATIENT
Start: 2025-03-11 | End: 2025-03-13 | Stop reason: HOSPADM

## 2025-03-11 RX ORDER — CYCLOBENZAPRINE HCL 10 MG
10 TABLET ORAL EVERY 8 HOURS PRN
Status: DISCONTINUED | OUTPATIENT
Start: 2025-03-11 | End: 2025-03-13 | Stop reason: HOSPADM

## 2025-03-11 RX ORDER — ONDANSETRON 4 MG/1
4 TABLET, ORALLY DISINTEGRATING ORAL EVERY 6 HOURS PRN
Status: DISCONTINUED | OUTPATIENT
Start: 2025-03-11 | End: 2025-03-13 | Stop reason: HOSPADM

## 2025-03-11 RX ORDER — PROPOFOL 10 MG/ML
INJECTION, EMULSION INTRAVENOUS PRN
Status: DISCONTINUED | OUTPATIENT
Start: 2025-03-11 | End: 2025-03-11

## 2025-03-11 RX ORDER — TRAZODONE HYDROCHLORIDE 100 MG/1
100 TABLET ORAL AT BEDTIME
Status: DISCONTINUED | OUTPATIENT
Start: 2025-03-11 | End: 2025-03-13 | Stop reason: HOSPADM

## 2025-03-11 RX ORDER — HYDROMORPHONE HCL IN WATER/PF 6 MG/30 ML
0.2 PATIENT CONTROLLED ANALGESIA SYRINGE INTRAVENOUS EVERY 5 MIN PRN
Status: DISCONTINUED | OUTPATIENT
Start: 2025-03-11 | End: 2025-03-11 | Stop reason: HOSPADM

## 2025-03-11 RX ORDER — HYDROMORPHONE HCL IN WATER/PF 6 MG/30 ML
0.4 PATIENT CONTROLLED ANALGESIA SYRINGE INTRAVENOUS
Status: DISCONTINUED | OUTPATIENT
Start: 2025-03-11 | End: 2025-03-13 | Stop reason: HOSPADM

## 2025-03-11 RX ORDER — CEFAZOLIN SODIUM/WATER 3 G/30 ML
3 SYRINGE (ML) INTRAVENOUS
Status: COMPLETED | OUTPATIENT
Start: 2025-03-11 | End: 2025-03-11

## 2025-03-11 RX ORDER — CARVEDILOL 3.12 MG/1
3.12 TABLET ORAL 2 TIMES DAILY WITH MEALS
Status: DISCONTINUED | OUTPATIENT
Start: 2025-03-11 | End: 2025-03-13 | Stop reason: HOSPADM

## 2025-03-11 RX ORDER — ACETAMINOPHEN 325 MG/1
975 TABLET ORAL EVERY 8 HOURS
Status: DISCONTINUED | OUTPATIENT
Start: 2025-03-11 | End: 2025-03-11

## 2025-03-11 RX ORDER — BISACODYL 10 MG
10 SUPPOSITORY, RECTAL RECTAL DAILY PRN
Status: DISCONTINUED | OUTPATIENT
Start: 2025-03-14 | End: 2025-03-13 | Stop reason: HOSPADM

## 2025-03-11 RX ADMIN — HYDROMORPHONE HYDROCHLORIDE 0.5 MG: 1 INJECTION, SOLUTION INTRAMUSCULAR; INTRAVENOUS; SUBCUTANEOUS at 08:05

## 2025-03-11 RX ADMIN — SODIUM CHLORIDE: 9 INJECTION, SOLUTION INTRAVENOUS at 13:51

## 2025-03-11 RX ADMIN — ROCURONIUM BROMIDE 60 MG: 10 INJECTION, SOLUTION INTRAVENOUS at 07:34

## 2025-03-11 RX ADMIN — PROPOFOL 200 MG: 10 INJECTION, EMULSION INTRAVENOUS at 07:34

## 2025-03-11 RX ADMIN — DEXMEDETOMIDINE HYDROCHLORIDE 12 MCG: 100 INJECTION, SOLUTION INTRAVENOUS at 07:34

## 2025-03-11 RX ADMIN — ALBUMIN (HUMAN): 12.5 SOLUTION INTRAVENOUS at 08:46

## 2025-03-11 RX ADMIN — CARVEDILOL 3.12 MG: 3.12 TABLET, FILM COATED ORAL at 18:05

## 2025-03-11 RX ADMIN — DEXMEDETOMIDINE HYDROCHLORIDE 8 MCG: 100 INJECTION, SOLUTION INTRAVENOUS at 07:43

## 2025-03-11 RX ADMIN — PHENYLEPHRINE HYDROCHLORIDE 0.3 MCG/KG/MIN: 10 INJECTION INTRAVENOUS at 08:01

## 2025-03-11 RX ADMIN — ROCURONIUM BROMIDE 20 MG: 10 INJECTION, SOLUTION INTRAVENOUS at 08:34

## 2025-03-11 RX ADMIN — SENNOSIDES AND DOCUSATE SODIUM 1 TABLET: 50; 8.6 TABLET ORAL at 20:31

## 2025-03-11 RX ADMIN — ROCURONIUM BROMIDE 10 MG: 10 INJECTION, SOLUTION INTRAVENOUS at 08:46

## 2025-03-11 RX ADMIN — Medication 200 MG: at 11:01

## 2025-03-11 RX ADMIN — AMLODIPINE BESYLATE 10 MG: 10 TABLET ORAL at 14:00

## 2025-03-11 RX ADMIN — Medication 400 MG: at 20:31

## 2025-03-11 RX ADMIN — SODIUM CHLORIDE, POTASSIUM CHLORIDE, SODIUM LACTATE AND CALCIUM CHLORIDE: 600; 310; 30; 20 INJECTION, SOLUTION INTRAVENOUS at 06:40

## 2025-03-11 RX ADMIN — Medication 3 G: at 07:24

## 2025-03-11 RX ADMIN — HYDROCHLOROTHIAZIDE 25 MG: 25 TABLET ORAL at 14:00

## 2025-03-11 RX ADMIN — OXYCODONE 10 MG: 5 TABLET ORAL at 18:07

## 2025-03-11 RX ADMIN — ACETAMINOPHEN 1000 MG: 500 TABLET ORAL at 18:07

## 2025-03-11 RX ADMIN — ATORVASTATIN CALCIUM 80 MG: 40 TABLET, FILM COATED ORAL at 20:31

## 2025-03-11 RX ADMIN — FENTANYL CITRATE 50 MCG: 50 INJECTION INTRAMUSCULAR; INTRAVENOUS at 11:15

## 2025-03-11 RX ADMIN — ACETAMINOPHEN 975 MG: 325 TABLET ORAL at 06:40

## 2025-03-11 RX ADMIN — DEXMEDETOMIDINE HYDROCHLORIDE 4 MCG: 100 INJECTION, SOLUTION INTRAVENOUS at 10:34

## 2025-03-11 RX ADMIN — MIDAZOLAM 2 MG: 1 INJECTION INTRAMUSCULAR; INTRAVENOUS at 07:24

## 2025-03-11 RX ADMIN — HYDROMORPHONE HYDROCHLORIDE 0.5 MG: 1 INJECTION, SOLUTION INTRAMUSCULAR; INTRAVENOUS; SUBCUTANEOUS at 08:09

## 2025-03-11 RX ADMIN — Medication 20 G: at 11:18

## 2025-03-11 RX ADMIN — OXYCODONE 5 MG: 5 TABLET ORAL at 14:01

## 2025-03-11 RX ADMIN — FENTANYL CITRATE 100 MCG: 50 INJECTION INTRAMUSCULAR; INTRAVENOUS at 07:34

## 2025-03-11 RX ADMIN — PRAMIPEXOLE DIHYDROCHLORIDE 0.25 MG: 0.25 TABLET ORAL at 20:31

## 2025-03-11 RX ADMIN — FENTANYL CITRATE 25 MCG: 50 INJECTION, SOLUTION INTRAMUSCULAR; INTRAVENOUS at 12:43

## 2025-03-11 RX ADMIN — FENTANYL CITRATE 50 MCG: 50 INJECTION INTRAMUSCULAR; INTRAVENOUS at 10:45

## 2025-03-11 RX ADMIN — OXYCODONE 10 MG: 5 TABLET ORAL at 22:07

## 2025-03-11 RX ADMIN — CEFAZOLIN SODIUM 2 G: 2 SOLUTION INTRAVENOUS at 18:06

## 2025-03-11 RX ADMIN — LIDOCAINE HYDROCHLORIDE 50 MG: 10 INJECTION, SOLUTION EPIDURAL; INFILTRATION; INTRACAUDAL; PERINEURAL at 07:33

## 2025-03-11 RX ADMIN — ONDANSETRON 4 MG: 2 INJECTION INTRAMUSCULAR; INTRAVENOUS at 08:49

## 2025-03-11 RX ADMIN — ALBUMIN (HUMAN): 12.5 SOLUTION INTRAVENOUS at 08:05

## 2025-03-11 RX ADMIN — TRAZODONE HYDROCHLORIDE 100 MG: 100 TABLET ORAL at 20:31

## 2025-03-11 RX ADMIN — DEXMEDETOMIDINE HYDROCHLORIDE 8 MCG: 100 INJECTION, SOLUTION INTRAVENOUS at 08:10

## 2025-03-11 RX ADMIN — SODIUM CHLORIDE: 9 INJECTION, SOLUTION INTRAVENOUS at 22:09

## 2025-03-11 RX ADMIN — SODIUM CHLORIDE, POTASSIUM CHLORIDE, SODIUM LACTATE AND CALCIUM CHLORIDE: 600; 310; 30; 20 INJECTION, SOLUTION INTRAVENOUS at 09:50

## 2025-03-11 RX ADMIN — ROCURONIUM BROMIDE 20 MG: 10 INJECTION, SOLUTION INTRAVENOUS at 08:19

## 2025-03-11 RX ADMIN — DEXAMETHASONE SODIUM PHOSPHATE 10 MG: 10 INJECTION, SOLUTION INTRAMUSCULAR; INTRAVENOUS at 07:36

## 2025-03-11 ASSESSMENT — ACTIVITIES OF DAILY LIVING (ADL)
ADLS_ACUITY_SCORE: 46
ADLS_ACUITY_SCORE: 49
ADLS_ACUITY_SCORE: 49
ADLS_ACUITY_SCORE: 46
ADLS_ACUITY_SCORE: 49
ADLS_ACUITY_SCORE: 46
ADLS_ACUITY_SCORE: 49
ADLS_ACUITY_SCORE: 46
ADLS_ACUITY_SCORE: 49
ADLS_ACUITY_SCORE: 46
ADLS_ACUITY_SCORE: 49
ADLS_ACUITY_SCORE: 49
ADLS_ACUITY_SCORE: 40
ADLS_ACUITY_SCORE: 49
ADLS_ACUITY_SCORE: 49
ADLS_ACUITY_SCORE: 50
ADLS_ACUITY_SCORE: 38
ADLS_ACUITY_SCORE: 48

## 2025-03-11 NOTE — ANESTHESIA PREPROCEDURE EVALUATION
Anesthesia Pre-Procedure Evaluation    Patient: Gonzalez Morton   MRN: [id number] : 1960        Procedure : Procedure(s):  LUMBAR 4 - LUMBAR 5 TRANSFORAMINAL LUMBAR INTERBODY FUSION; LUMBAR 4 - LUMBAR 5 POSTERIOR INSTRUMENTED FUSION;  LUMBAR 3 - LUMBAR 4, LUMBAR 4 - LUMBAR 5 DECOMPRESSION WITH STEALTH NAVIGATION          Past Medical History:   Diagnosis Date     Abnormal cardiovascular stress test 2022     CAD (coronary artery disease)      Elevated coronary artery calcium score      Hypertension      Infection associated with internal knee prosthesis 2023     Knee injury     right     Knee osteoarthritis 2023     Malignant tumor of prostate (H) 2019    C61 : Malignant tumor of prostate       Obese      Prostate cancer (H)      Rosacea      Sleep apnea     CPAP      Past Surgical History:   Procedure Laterality Date     ABDOMEN SURGERY      Appendix     APPENDECTOMY       ARTHROPLASTY KNEE Right 2023    Procedure: RIGHT TOTAL KNEE ARTHROPLASTY;  Surgeon: Bradley Redd MD;  Location: [facility] OR     ARTHROSCOPY KNEE Bilateral      BYPASS GRAFT ARTERY CORONARY N/A 2022    Procedure: CORONARY ARTERY BYPASS GRAFT X 5, ENDOSCOPIC VESSEL PROCUREMENT LEFT LEG, INTERNAL MAMMARY ARTERY HARVEST, LEFT RADIAL ARTERY HARVEST, ANESTHESIA TRANSESOPHAGEAL ECHOCARDIOGRAM, EPIAORTIC ULTRASOUND;  Surgeon: Mayank Rocha MD;  Location: [facility] [place] [place] OR     CV CORONARY ANGIOGRAM N/A 2022    Procedure: Coronary Angiogram;  Surgeon: Mery Lyons MD;  Location: [facility] CATH LAB CV     EXCHANGE POLY COMPONENT ARTHROPLASTY KNEE Right 2023    Procedure: RIGHT KNEE IRRIGATION AND DEBRIDEMENT;  Surgeon: Bradley Redd MD;  Location: [facility] OR     PICC SINGLE LUMEN PLACEMENT  2023     PROSTATECTOMY       REMOVE HARDWARE ARTHROPLASTY KNEE, IRRIG & TAVON, PLACE ANTIBIOTIC CEMENT SPACER, COMBINED Right 2023    Procedure: POLYETHELYNE EXCHANGE;  Surgeon:  Bradley Redd MD;  Location: [facility] Main OR     TOTAL KNEE ARTHROPLASTY Left      WRIST SURGERY        Allergies   Allergen Reactions     Lisinopril Angioedema     Metoprolol Tartrate Hives     Penicillins Hives     Tolerated CTX 06/2020, Ancef 6/2022. Tolerated augmentin      Social History     Tobacco Use     Smoking status: Never     Smokeless tobacco: Never   Substance Use Topics     Alcohol use: Yes     Alcohol/week: 1.0 standard drink of alcohol     Types: 1 Standard drinks or equivalent per week      Wt Readings from Last 1 Encounters:   03/11/25 123.4 kg (272 lb 1.6 oz)        Anesthesia Evaluation   Pt has had prior anesthetic.         ROS/MED HX  ENT/Pulmonary:     (+) sleep apnea, severe, uses CPAP,                                      Neurologic:  - neg neurologic ROS     Cardiovascular:  - neg cardiovascular ROS   (+)  hypertension- -  CAD -  CABG-date: 2022. -                                      METS/Exercise Tolerance:     Hematologic:  - neg hematologic  ROS     Musculoskeletal:  - neg musculoskeletal ROS     GI/Hepatic:  - neg GI/hepatic ROS     Renal/Genitourinary:  - neg Renal ROS     Endo:     (+)               Obesity,       Psychiatric/Substance Use:  - neg psychiatric ROS     Infectious Disease:  - neg infectious disease ROS     Malignancy:  - neg malignancy ROS     Other:  - neg other ROS        Physical Exam    Airway  airway exam normal      Mallampati: II   TM distance: > 3 FB   Neck ROM: full   Mouth opening: > 3 cm    Respiratory Devices and Support         Dental  no notable dental history     (+) Modest Abnormalities - crowns, retainers, 1 or 2 missing teeth      Cardiovascular   cardiovascular exam normal       Rhythm and rate: regular and normal     Pulmonary   pulmonary exam normal        breath sounds clear to auscultation       OUTSIDE LABS:  CBC:   Lab Results   Component Value Date    WBC 7.8 11/13/2024    WBC 6.2 01/25/2024    HGB 15.8 11/13/2024    HGB 15.1  "01/25/2024    HCT 43.3 11/13/2024    HCT 44.5 01/25/2024     11/13/2024     01/25/2024     BMP:   Lab Results   Component Value Date     11/13/2024     05/28/2024    POTASSIUM 3.4 11/13/2024    POTASSIUM 4.6 05/28/2024    CHLORIDE 96 (L) 11/13/2024    CHLORIDE 100 05/28/2024    CO2 22 11/13/2024    CO2 27 05/28/2024    BUN 16.9 11/13/2024    BUN 15.6 05/28/2024    CR 0.98 11/13/2024    CR 0.97 05/28/2024     (H) 11/13/2024     (H) 05/28/2024     COAGS:   Lab Results   Component Value Date    PTT 30 06/06/2022    INR 1.28 (H) 06/06/2022    FIBR 354 06/06/2022     POC: No results found for: \"BGM\", \"HCG\", \"HCGS\"  HEPATIC:   Lab Results   Component Value Date    ALBUMIN 4.6 05/28/2024    PROTTOTAL 7.3 05/28/2024    ALT 36 05/28/2024    AST 21 05/28/2024    ALKPHOS 124 05/28/2024    BILITOTAL 0.4 05/28/2024     OTHER:   Lab Results   Component Value Date    PH 7.38 06/09/2022    LACT 1.7 09/14/2023    A1C 5.4 05/28/2024    KEATON 9.0 11/13/2024    PHOS 2.7 06/10/2022    MAG 2.0 11/06/2023    CRP 25.6 (H) 06/19/2020    SED 8 05/03/2024       Anesthesia Plan    ASA Status:  3    NPO Status:  NPO Appropriate    Anesthesia Type: General.     - Airway: ETT   Induction: Intravenous, Propofol.   Maintenance: Balanced.   Techniques and Equipment:     - Airway: Video-Laryngoscope     - Lines/Monitors: 2nd IV     Consents    Anesthesia Plan(s) and associated risks, benefits, and realistic alternatives discussed. Questions answered and patient/representative(s) expressed understanding.     - Discussed:     - Discussed with:  Patient      - Extended Intubation/Ventilatory Support Discussed: Yes.      - Patient is DNR/DNI Status: No     Use of blood products discussed: Yes.     - Discussed with: Patient.     Postoperative Care    Pain management: Multi-modal analgesia.   PONV prophylaxis: Ondansetron (or other 5HT-3), Dexamethasone or Solumedrol, Background Propofol Infusion     Comments:    Other " "Comments: Consider arterial line.         Gamal Harp MD    I have reviewed the pertinent notes and labs in the chart from the past 30 days and (re)examined the patient.  Any updates or changes from those notes are reflected in this note.    Clinically Significant Risk Factors Present on Admission                 # Drug Induced Platelet Defect: home medication list includes an antiplatelet medication   # Hypertension: Noted on problem list           # Obesity: Estimated body mass index is 39.62 kg/m  as calculated from the following:    Height as of this encounter: 1.765 m (5' 9.49\").    Weight as of this encounter: 123.4 kg (272 lb 1.6 oz).       # Financial/Environmental Concerns:     # History of CABG: noted on surgical history         "

## 2025-03-11 NOTE — CONSULTS
[facility] ACUTE PAIN SERVICE CONSULTATION   [facility], [facility], [facility], [facility], [place]     Date of Admission:  3/11/2025  Date of Consult (When I saw the patient): 03/11/25  Physician requesting consult:  Rajiv Gonzalez MD      Assessment/Plan:     Gonzalez Morton is a 64 year old male who was admitted on 3/11/2025.  Pain team was asked to see the patient for postop pain management. Admitted for planned surgery. History of spinal stenosis, HTN, ERIN, CAD,hyperlipidemia, prediabetes.  Describes pain as 6/10 and aching in the low back. The patient does not smoke and denies chemical dependency history.     Post op day: Day of Surgery. LUMBAR 4 - LUMBAR 5 TRANSFORAMINAL LUMBAR INTERBODY FUSION; LUMBAR 4 - LUMBAR 5 POSTERIOR INSTRUMENTED FUSION; LUMBAR 3 - LUMBAR 4, LUMBAR 4 - LUMBAR 5 DECOMPRESSION WITH STEALTH NAVIGATION    Opioid Induced Respiratory Depression Risk Assessment: high: : post op, naive, obesity, ERIN  (Low 0-1; Moderate 2-4; or High >4 or >/= 3 if two of the risk factors are age > 60 and opioid naive) due to the following risk factors: ERIN, COPD/Asthma/pulmonary disease, CHF, renal dysfunction, hepatic dysfunction, Obesity, Smoker, Age>60, >2 opioid therapies, concomitant CNS depressants, opioid naive status, or post surgical ?     The patient's home MME was none mg daily.     PLAN:   1) Pain is consistent with post op pain in the setting of chronic back pain.    Multimodal Medication Therapy  Topical: none  NSAID'S: none with fusion, estimated GFR 86 in November 2024  Steroids: none  Muscle Relaxants: Flexeril 10mg q8hprn  Adjuvants: Atarax 25mg q6hprn, APAP q6h   Antidepressants/anxiolytics: none  Opioids: oxycodone 5-10mg q4hprn  IV Pain medication: IV Dilaudid 0.2-0.4mg q2hprn  Non-medication interventions: Ice, Rest, PT, and OT  Constipation Prophylaxis: Miralax daily, SennaS BID    -Opioid prescriber has been none  -MN  pulled from system on 3/11. This indicates  no opioids   Discharge Recommendations - We recommend prescribing the following at the time of discharge: Per Orthopedics. Spine Surgery (Lumbar Fusion)- 240MME- 48 tabs of norco, or 32 tabs of oxycodone, or 30mg of dilaudid . Intranasal Naloxone not needed for post op rx. Follow up surgery. Follow up Primary Care Provider for post hospital follow up.      History of Present Illness (HPI):       Gonzalez Morton is a 64 year old male who presented for post op pain.  Past medical history as above. The pain is reported to be acute on chronic , located in the low back, and it does not radiate.  Current pain is rated at 4-6/10 and goal is 0-2/10.  The patient denies nausea, vomiting, constipation, diarrhea, chest pain, shortness of breath, dizziness, fever, and chills. The patient reports history ERIN and uses CPAP.     Per MN  review, the patient does not have an opioid tolerance. Opioid induced side effects noted and include: sedation, nausea, and constipation, sleep dysfunction, sleep apnea, and disease of addiction: denies.      Reviewed medical record, labs, imaging, ED note, and care everywhere.     Home pain medications/psych medications/anticoagulation medications include: none    Last UDS: n/a     Medical History   PAST MEDICAL HISTORY:   Past Medical History:   Diagnosis Date    Abnormal cardiovascular stress test 04/03/2022    CAD (coronary artery disease)     Elevated coronary artery calcium score     Hypertension     Infection associated with internal knee prosthesis 11/02/2023    Knee injury     right    Knee osteoarthritis 08/16/2023    Malignant tumor of prostate (H) 04/04/2019    C61 : Malignant tumor of prostate      Obese     Prostate cancer (H)     Rosacea     Sleep apnea     CPAP       PAST SURGICAL HISTORY:   Past Surgical History:   Procedure Laterality Date    ABDOMEN SURGERY      Appendix    APPENDECTOMY      ARTHROPLASTY KNEE Right 8/16/2023    Procedure: RIGHT TOTAL KNEE ARTHROPLASTY;   Surgeon: Bradley Redd MD;  Location: [facility] Main OR    ARTHROSCOPY KNEE Bilateral     BYPASS GRAFT ARTERY CORONARY N/A 6/6/2022    Procedure: CORONARY ARTERY BYPASS GRAFT X 5, ENDOSCOPIC VESSEL PROCUREMENT LEFT LEG, INTERNAL MAMMARY ARTERY HARVEST, LEFT RADIAL ARTERY HARVEST, ANESTHESIA TRANSESOPHAGEAL ECHOCARDIOGRAM, EPIAORTIC ULTRASOUND;  Surgeon: Mayank Rocha MD;  Location: [facility] Main OR    CV CORONARY ANGIOGRAM N/A 4/7/2022    Procedure: Coronary Angiogram;  Surgeon: Mery Lyons MD;  Location: [facility] CATH LAB CV    EXCHANGE POLY COMPONENT ARTHROPLASTY KNEE Right 11/2/2023    Procedure: RIGHT KNEE IRRIGATION AND DEBRIDEMENT;  Surgeon: Bradley Redd MD;  Location: [facility] OR    PICC SINGLE LUMEN PLACEMENT  11/05/2023    PROSTATECTOMY      REMOVE HARDWARE ARTHROPLASTY KNEE, IRRIG & TAVON, PLACE ANTIBIOTIC CEMENT SPACER, COMBINED Right 11/2/2023    Procedure: POLYETHELYNE EXCHANGE;  Surgeon: Bradley Redd MD;  Location: [facility] Main OR    TOTAL KNEE ARTHROPLASTY Left     WRIST SURGERY         FAMILY HISTORY:   Family History   Problem Relation Age of Onset    Diabetes Mother     Heart Disease Mother     Heart Disease Father     Atrophic kidney Sister     Diabetes Sister     Diabetes Type 1 Brother        SOCIAL HISTORY:   Social History     Tobacco Use    Smoking status: Never    Smokeless tobacco: Never   Substance Use Topics    Alcohol use: Yes     Alcohol/week: 1.0 standard drink of alcohol     Types: 1 Standard drinks or equivalent per week        [facility] [employer]IF[employer] PRACTICES  Tobacco:  reports that he has never smoked. He has never used smokeless tobacco.  Alcohol:  reports current alcohol use of about 1.0 standard drink of alcohol per week.  Illicit drugs:  reports no history of drug use.    Allergies  Allergies   Allergen Reactions    Lisinopril Angioedema    Metoprolol Tartrate Hives    Penicillins Hives     Tolerated CTX 06/2020, Ancef 6/2022. Tolerated augmentin        Problem List  Patient Active Problem List    Diagnosis Date Noted    Lumbar stenosis with neurogenic claudication 03/11/2025     Priority: Medium    Knee osteoarthritis 08/16/2023     Priority: Medium    Prediabetes 05/18/2023     Priority: Medium    Status post coronary artery bypass graft 06/23/2022     Priority: Medium    Coronary artery disease involving native coronary artery of native heart, unspecified whether angina present 06/06/2022     Priority: Medium    Obstructive sleep apnea syndrome 05/10/2022     Priority: Medium    Family history of ischemic heart disease 04/03/2022     Priority: Medium    Morbid obesity (H) 08/20/2021     Priority: Medium    Mixed hyperlipidemia      Priority: Medium     Created by Conversion        History of malignant neoplasm of prostate 09/05/2019     Priority: Medium     Z85.46 : History of malignant neoplasm of prostate      Erectile dysfunction following radical prostatectomy 08/29/2019     Priority: Medium     N52.31 : Erectile dysfunction following radical prostatectomy      Stress incontinence 08/29/2019     Priority: Medium     N39.3 : Genuine stress incontinence      Benign Essential Hypertension      Priority: Medium     Created by Conversion        Rosacea      Priority: Medium     Created by Conversion           Prior to Admission Medications   Medications Prior to Admission   Medication Sig Dispense Refill Last Dose/Taking    acetaminophen (TYLENOL) 500 MG tablet Take 1,000 mg by mouth every 6 hours as needed for mild pain.   3/8/2025    amLODIPine (NORVASC) 10 MG tablet Take 1 tablet (10 mg) by mouth daily. 90 tablet 2 3/10/2025 Morning    aspirin 81 MG EC tablet Take 162 mg by mouth 2 times daily   Past Week    atorvastatin (LIPITOR) 80 MG tablet TAKE 1 TABLET BY MOUTH EVERY DAY IN THE EVENING 90 tablet 3 3/10/2025 Bedtime    carvedilol (COREG) 3.125 MG tablet Take 1 tablet (3.125 mg) by mouth 2 times daily (with meals) To replace metoprolol 180 tablet 3  "3/11/2025 at  4:30 AM    EPINEPHrine (ANY BX GENERIC EQUIV) 0.3 MG/0.3ML injection 2-pack Inject 0.3 mLs (0.3 mg) into the muscle once as needed for anaphylaxis. May repeat one time in 5-15 minutes if response to initial dose is inadequate. 2 each 0 Taking As Needed    fish oil-omega-3 fatty acids 1000 MG capsule Take 2 g by mouth daily.   Past Week    hydrochlorothiazide (HYDRODIURIL) 25 MG tablet TAKE 1 TABLET BY MOUTH EVERY DAY 90 tablet 3 3/10/2025 Morning    magnesium oxide (MAG-OX) 400 MG tablet Take 400 mg by mouth daily.   Past Week    nitroGLYcerin (NITROSTAT) 0.4 MG sublingual tablet One tablet under the tongue every 5 minutes if needed for chest pain. May repeat every 5 minutes for a maximum of 3 doses in 15 minutes\" 25 tablet 3 Taking    pramipexole (MIRAPEX) 0.25 MG tablet Take 1 tablet (0.25 mg) by mouth at bedtime 90 tablet 3 3/10/2025 Bedtime    traZODone (DESYREL) 50 MG tablet Take 100 mg by mouth at bedtime.   3/10/2025 Bedtime       Review of Systems  Complete ROS reviewed, unless noted in HPI, all other systems reviewed (with patient) and all others found to be negative.      Objective:     Physical Exam:  /87 (BP Location: Left arm, Patient Position: Right side)   Pulse 69   Temp 98.4  F (36.9  C) (Axillary)   Resp 18   Ht 1.765 m (5' 9.49\")   Wt 123.4 kg (272 lb 1.6 oz)   SpO2 96%   BMI 39.62 kg/m    Weight:   Vitals:    03/11/25 0622   Weight: 123.4 kg (272 lb 1.6 oz)      Body mass index is 39.62 kg/m .    General Appearance:  Alert, cooperative, no distress, resting in bed    Head:  Normocephalic, without obvious abnormality, atraumatic   Eyes:  PERRL, conjunctiva/corneas clear, EOM's intact   ENT/Throat: Lips, mucosa, and tongue normal; teeth and gums normal   Lymph/Neck: Supple, symmetrical, trachea midline   Lungs:   Clear to auscultation bilaterally, respirations unlabored, NC O2   Chest Wall:  No tenderness or deformity   Cardiovascular/Heart:  Regular rate and rhythm, S1, " S2 normal,no murmur, rub or gallop.    Abdomen:   Soft, non-tender   Musculoskeletal: Extremities normal, atraumatic   Skin: Skin warm, dry, lumbar incision is covered and drain is intact    Neurologic: Alert and oriented X 3, Moves all 4 extremities     Psych: Affect is appropriate      Imaging: Reviewed I have personally reviewed pertinent notes, labs, tests, and radiologic imaging in patient's chart.  Labs: Reviewed I have personally reviewed pertinent notes, labs, tests, and radiologic imaging in patient's chart.  Notes: Reviewed I have personally reviewed pertinent notes, labs, tests, and radiologic imaging in patient's chart.    Total time spent 55 minutes with greater than 50% in consultation, education and coordination of care.   Also discussed with RN.   Treatment plan includes: multimodal pain approach, Hospital Medicine Service for medical management, Orthopedics, PT, OT.   Patient educated regarding: multimodal pain approach and medications as listed above.   Elements of Medical Decision Making as described above. Acute or chronic illness or injury or surgery. High risk therapy including opioids, high risk drug therapy including oral and/or parenteral controlled substances.    Patient is understanding of the plan. All questions and concerns addressed to patient's satisfaction.     Thank you for this consultation.    KEV Slaughter  Acute Care Inpatient Pain Management Program  [facility] ([facility])  Hours of coverage Monday-Friday [phone]. After hours please contact Primary team   Page via Epic chat or Vocera Messaging

## 2025-03-11 NOTE — ANESTHESIA PROCEDURE NOTES
Airway       Patient location during procedure: OR       Procedure Start/Stop Times: 3/11/2025 7:36 AM  Staff -        CRNA: Gisela Stephenson APRN CRNA       Performed By: CRNA  Consent for Airway        Urgency: elective  Indications and Patient Condition       Indications for airway management: dakotah-procedural       Induction type:intravenous       Mask difficulty assessment: 2 - vent by mask + OA or adjuvant +/- NMBA    Final Airway Details       Final airway type: endotracheal airway       Successful airway: ETT - single  Endotracheal Airway Details        ETT size (mm): 8.0       Cuffed: yes       Cuff volume (mL): 10       Successful intubation technique: video laryngoscopy       VL Blade Size: Glidescope 3       Grade View of Cords: 1       Adjucts: stylet       Position: Center       Measured from: lips       Secured at (cm): 24       Bite block used: Soft (gauze [id number] molars)    Post intubation assessment        Placement verified by: capnometry, equal breath sounds and chest rise        Number of attempts at approach: 1       Number of other approaches attempted: 0       Ease of procedure: easy       Dentition: Unchanged    Medication(s) Administered   Medication Administration Time: 3/11/2025 7:36 AM

## 2025-03-11 NOTE — PROGRESS NOTES
[id number]  Patient vital signs are at baseline: Yes  Patient able to ambulate as they were prior to admission or with assist devices provided by therapies during their stay:  Yes  Patient MUST void prior to discharge:  No,  Reason:  nicholson in place  Patient able to tolerate oral intake:  Yes  Pain has adequate pain control using Oral analgesics:  Yes  Does patient have an identified :  Yes  Has goal D/C date and time been discussed with patient:  Yes      Pt A&Ox4. Pain 6/10 in back, managed with PRN oxycodone, icepacks, scheduled tylenol. Nicholson in place. Drain in place. Both dressings CDI. Not passing gas yet. NS @125/hr. Pt arrived to floor on 5L oxymask. Weaned onto RA. Ambulated in hallway SBA walker gait belt. Placed on 2L NC r/t O2 decreasing when sleeping (normally uses CPAP at home, awaiting CPAP orders for RT to set up home CPAP in room). Clear liquid diet for now. Pt still need teaching on incentive spirometer. Discharge pending.

## 2025-03-11 NOTE — CONSULTS
[facility]  Consult Note - Hospitalist Service  Date of Admission:  3/11/2025  Consult Requested by: Orthopedics  Reason for Consult: Postop medical cares    Assessment & Plan   Gonzalez Morton is a 64 year old old male with a history of HTN, HLD, ERIN, CAD s/p CABG, lumbar stenosis with neurogenic claudication underwent L3-5 spine surgery. [facility] [place] asked to evaluate patient for postoperative medical management. Please resume the home medications as reconciled and further noted below with ordered hold parameters.  Vital signs have been stable post-operatively including hemodynamically stable blood pressure and heart rate. Thank you for this consult; we will continue to follow this patient until discharge.    HTN  -Order home medications with the written tiered hold parameters given risk for post-operative hypotension. Given ACEi/ARB/diuretic medication, ordered creatine, potassium, and magnesium to evaluate renal function and electrolytes, respectively.     HLD  -Order home statin.    ERIN  -Order and utilize home CPAP device if available; if not available, then utilize in-house respiratory CPAP equipment.  -RT evaluation as needed for device/mask fitting and assessment.     S/p L3-5 Spine Surgery  Spine Disease  Acute Post-Op Pain  -Agree with current pain control regimen; consider acute pain team consultation if pain becomes increasingly difficult to control or proves refractory.   -PT evaluation.   -OT evaluation.  -IS frequently, initially every hour while awake as tolerated. Directly encouraged and discussed.      Post-Op DVT Prophylaxis  -As per primary surgery team.    Procedure(s):  LUMBAR 4 - LUMBAR 5 TRANSFORAMINAL LUMBAR INTERBODY FUSION; LUMBAR 4 - LUMBAR 5 POSTERIOR INSTRUMENTED FUSION;  LUMBAR 3 - LUMBAR 4, LUMBAR 4 - LUMBAR 5 DECOMPRESSION WITH STEALTH NAVIGATION  Post-operative Day: Day of Surgery  Code status:Full Code     Estimated Blood Loss:  150 mL    Hospital  "Problem List   No problem-specific Assessment & Plan notes found for this encounter.    Active Problems:    Lumbar stenosis with neurogenic claudication      -Reviewed the patient's preoperative H and P and updated missing elements.  -Home medication reconciliation has been reviewed.  Medications have been ordered as noted from the home list and changes are documented above      Clinically Significant Risk Factors Present on Admission                 # Drug Induced Platelet Defect: home medication list includes an antiplatelet medication   # Hypertension: Noted on problem list           # Obesity: Estimated body mass index is 39.62 kg/m  as calculated from the following:    Height as of this encounter: 1.765 m (5' 9.49\").    Weight as of this encounter: 123.4 kg (272 lb 1.6 oz).       # Financial/Environmental Concerns:     # History of CABG: noted on surgical history       Jim Garcia MD  Hospitalist Service  Securely message with [employer] (more info)  Text page via [facility] Paging/Directory   ______________________________________________________________________    Chief Complaint   Postop medical cares    History is obtained from the patient and EMR    History of Present Illness   Gonzalez Morton is a 64 year old old male with a history of HTN, HLD, ERIN, CAD s/p CABG, lumbar stenosis with neurogenic claudication underwent L3-5 spine surgery.     He has been in his usual state of health prior to presenting for this elective surgery.  He denies any associated symptoms prior to coming in today, and also denies any recent travel domestically or internationally, and also denies any recent sick contacts around him including any family or friends who have been sick.  When asked, he denies any fevers, rigors, chest pain or shortness of breath, nausea or vomiting or abdominal pain, changes in bowel movements/pattern, urinary symptoms, focal weakness, or any other new and associated symptoms at this time.  He has been " compliant with his medications.  14 point review of systems performed with the patient without any other pertinent positives at this time.    Presently, he states his pain is currently well-tolerated.  He denies any new complaints or symptoms at this time. Postop doing well. Labs wnl.     All questions he had at this time were answered.       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    Knee injury     right    Knee osteoarthritis 08/16/2023    Malignant tumor of prostate (H) 04/04/2019    C61 : Malignant tumor of prostate      Obese     Prostate cancer (H)     Rosacea     Sleep apnea     CPAP       Past Surgical History   Past Surgical History:   Procedure Laterality Date    ABDOMEN SURGERY      Appendix    APPENDECTOMY      ARTHROPLASTY KNEE Right 8/16/2023    Procedure: RIGHT TOTAL KNEE ARTHROPLASTY;  Surgeon: Bradley Redd MD;  Location: [facility] OR    ARTHROSCOPY KNEE Bilateral     BYPASS GRAFT ARTERY CORONARY N/A 6/6/2022    Procedure: CORONARY ARTERY BYPASS GRAFT X 5, ENDOSCOPIC VESSEL PROCUREMENT LEFT LEG, INTERNAL MAMMARY ARTERY HARVEST, LEFT RADIAL ARTERY HARVEST, ANESTHESIA TRANSESOPHAGEAL ECHOCARDIOGRAM, EPIAORTIC ULTRASOUND;  Surgeon: Mayank Rocha MD;  Location: [facility] OR    CV CORONARY ANGIOGRAM N/A 4/7/2022    Procedure: Coronary Angiogram;  Surgeon: Mery Lyons MD;  Location: [facility] CATH LAB CV    EXCHANGE POLY COMPONENT ARTHROPLASTY KNEE Right 11/2/2023    Procedure: RIGHT KNEE IRRIGATION AND DEBRIDEMENT;  Surgeon: Bradley Redd MD;  Location: [facility] OR    PICC SINGLE LUMEN PLACEMENT  11/05/2023    PROSTATECTOMY      REMOVE HARDWARE ARTHROPLASTY KNEE, IRRIG & TAVON, PLACE ANTIBIOTIC CEMENT SPACER, COMBINED Right 11/2/2023     "Procedure: POLYETHELYNE EXCHANGE;  Surgeon: Bradley Redd MD;  Location: [facility] Main OR    TOTAL KNEE ARTHROPLASTY Left     WRIST SURGERY         Medications   I have reviewed this patient's current medications  Medications Prior to Admission   Medication Sig Dispense Refill Last Dose/Taking    acetaminophen (TYLENOL) 500 MG tablet Take 1,000 mg by mouth every 6 hours as needed for mild pain.   3/8/2025    amLODIPine (NORVASC) 10 MG tablet Take 1 tablet (10 mg) by mouth daily. 90 tablet 2 3/10/2025 Morning    aspirin 81 MG EC tablet Take 162 mg by mouth 2 times daily   Past Week    atorvastatin (LIPITOR) 80 MG tablet TAKE 1 TABLET BY MOUTH EVERY DAY IN THE EVENING 90 tablet 3 3/10/2025 Bedtime    carvedilol (COREG) 3.125 MG tablet Take 1 tablet (3.125 mg) by mouth 2 times daily (with meals) To replace metoprolol 180 tablet 3 3/11/2025 at  4:30 AM    EPINEPHrine (ANY BX GENERIC EQUIV) 0.3 MG/0.3ML injection 2-pack Inject 0.3 mLs (0.3 mg) into the muscle once as needed for anaphylaxis. May repeat one time in 5-15 minutes if response to initial dose is inadequate. 2 each 0 Taking As Needed    fish oil-omega-3 fatty acids 1000 MG capsule Take 2 g by mouth daily.   Past Week    hydrochlorothiazide (HYDRODIURIL) 25 MG tablet TAKE 1 TABLET BY MOUTH EVERY DAY 90 tablet 3 3/10/2025 Morning    magnesium oxide (MAG-OX) 400 MG tablet Take 400 mg by mouth daily.   Past Week    nitroGLYcerin (NITROSTAT) 0.4 MG sublingual tablet One tablet under the tongue every 5 minutes if needed for chest pain. May repeat every 5 minutes for a maximum of 3 doses in 15 minutes\" 25 tablet 3 Taking    pramipexole (MIRAPEX) 0.25 MG tablet Take 1 tablet (0.25 mg) by mouth at bedtime 90 tablet 3 3/10/2025 Bedtime    traZODone (DESYREL) 50 MG tablet Take 100 mg by mouth at bedtime.   3/10/2025 Bedtime             Physical Exam   Vital Signs: Temp: 96.8  F (36  C) Temp src: Temporal BP: 126/59 Pulse: 69   Resp: 16 SpO2: 95 % O2 Device: Oxymask " Oxygen Delivery: 5 LPM  Weight: 272 lbs 1.61 oz    GENERAL:  Alert, appears comfortable, in no acute distress, appears stated age, on ambient room air   HEAD:  Normocephalic, without obvious abnormality, atraumatic   EYES:  Conjunctiva/corneas clear, no scleral icterus, EOM's appears intact grossly   NOSE: Nares normal, septum midline, mucosa normal, no drainage   THROAT: Lips, mucosa, and tongue appear normal   NECK: Symmetrical, trachea appears midline   BACK:   Symmetric, no curvature noted   LUNGS:   Symmetric chest rise on inhalation, respirations unlabored   CHEST WALL:  No apparent deformity   HEART:  No thrills or heaves visualized   ABDOMEN:   No masses or organomegaly apparent; non-scaphoid   EXTREMITIES: Extremities appear normal, atraumatic, no cyanosis   SKIN: No exanthems in the visualized areas   NEURO: Alert and oriented x4, moves all four extremities freely and spontaneously   PSYCH: Cooperative, behavior is appropriate     Medical Decision Making       82 MINUTES SPENT BY ME on the date of service doing chart review, history, exam, documentation & further activities per the note.      Data         Imaging results reviewed over the past 24 hrs:   Recent Results (from the past 24 hours)   XR Surgery OARM    Narrative    This exam was marked as non-reportable because it will not be read by a   radiologist or a [place] non-radiologist provider.

## 2025-03-11 NOTE — PHARMACY-ADMISSION MEDICATION HISTORY
"Pharmacist Admission Medication History    Admission medication history is complete. The information provided in this note is only as accurate as the sources available at the time of the update.    Information Source(s): Patient and CareEverywhere/SureScripts via in-person    Pertinent Information: n/a    Allergies reviewed with patient and updates made in EHR: yes    Medication History Completed By: Alysia Jimenez, PharmD 3/11/2025 7:13 AM    PTA Med List   Medication Sig Last Dose/Taking    acetaminophen (TYLENOL) 500 MG tablet Take 1,000 mg by mouth every 6 hours as needed for mild pain. 3/8/2025    amLODIPine (NORVASC) 10 MG tablet Take 1 tablet (10 mg) by mouth daily. 3/10/2025 Morning    aspirin 81 MG EC tablet Take 162 mg by mouth 2 times daily Past Week    atorvastatin (LIPITOR) 80 MG tablet TAKE 1 TABLET BY MOUTH EVERY DAY IN THE EVENING 3/10/2025 Bedtime    carvedilol (COREG) 3.125 MG tablet Take 1 tablet (3.125 mg) by mouth 2 times daily (with meals) To replace metoprolol 3/11/2025 at  4:30 AM    EPINEPHrine (ANY BX GENERIC EQUIV) 0.3 MG/0.3ML injection 2-pack Inject 0.3 mLs (0.3 mg) into the muscle once as needed for anaphylaxis. May repeat one time in 5-15 minutes if response to initial dose is inadequate. Taking As Needed    fish oil-omega-3 fatty acids 1000 MG capsule Take 2 g by mouth daily. Past Week    hydrochlorothiazide (HYDRODIURIL) 25 MG tablet TAKE 1 TABLET BY MOUTH EVERY DAY 3/10/2025 Morning    magnesium oxide (MAG-OX) 400 MG tablet Take 400 mg by mouth daily. Past Week    nitroGLYcerin (NITROSTAT) 0.4 MG sublingual tablet One tablet under the tongue every 5 minutes if needed for chest pain. May repeat every 5 minutes for a maximum of 3 doses in 15 minutes\" Taking    pramipexole (MIRAPEX) 0.25 MG tablet Take 1 tablet (0.25 mg) by mouth at bedtime 3/10/2025 Bedtime    traZODone (DESYREL) 50 MG tablet Take 100 mg by mouth at bedtime. 3/10/2025 Bedtime     "

## 2025-03-11 NOTE — ANESTHESIA POSTPROCEDURE EVALUATION
Patient: Gonzalez Morton    Procedure: Procedure(s):  LUMBAR 4 - LUMBAR 5 TRANSFORAMINAL LUMBAR INTERBODY FUSION; LUMBAR 4 - LUMBAR 5 POSTERIOR INSTRUMENTED FUSION;  LUMBAR 3 - LUMBAR 4, LUMBAR 4 - LUMBAR 5 DECOMPRESSION WITH STEALTH NAVIGATION       Anesthesia Type:  General    Note:  Disposition: Admission   Postop Pain Control: Uneventful            Sign Out: Well controlled pain   PONV: No   Neuro/Psych: Uneventful            Sign Out: Acceptable/Baseline neuro status   Airway/Respiratory: Uneventful            Sign Out: Acceptable/Baseline resp. status   CV/Hemodynamics: Uneventful            Sign Out: Acceptable CV status; No obvious hypovolemia; No obvious fluid overload   Other NRE: NONE   DID A NON-ROUTINE EVENT OCCUR? No       Last vitals:  Vitals Value Taken Time   /91 03/11/25 1230   Temp 36.4  C (97.5  F) 03/11/25 1158   Pulse 75 03/11/25 1232   Resp 7 03/11/25 1232   SpO2 92 % 03/11/25 1232   Vitals shown include unfiled device data.    Electronically Signed By: Gamal Harp MD  March 11, 2025  12:33 PM

## 2025-03-11 NOTE — PROCEDURES
Procedure     DATE OF PROCEDURE: March 11,2025        ATTENDING SURGEON:  Rajiv Gonzalez M.D.      FIRST ASSISTANT:  Trupti Stinson PA-C was critical and important in the safe performance of the procedure including positioning, soft tissue retraction, and safe performance of the procedure including the diskectomy, interbody fusion, and posterior instrumentation. Her expertise and his experience in spinal assisting was critical and important in the safe performance of the procedure particularly with neural retraction and help with placement of the screws. This would not have been possible with the assistance of a scrub tech.      PREOPERATIVE DIAGNOSES:  1. L4-5 degenerative spondylolisthesis  2. L3-4, L4-5 spinal stenosis with neurogenic claudication.  3. Failure of conservative management.      POSTOPERATIVE DIAGNOSES:  1. L4-5 degenerative spondylolisthesis  2. L3-4, L4-5 spinal stenosis with neurogenic claudication.  3. Failure of conservative management.         PROCEDURES PERFORMED:  1. L4-5 discectomy and transforaminal lumbar interbody fusion at L4-5 utilizing a combination of demineralized bone matrix and local autograft bone.  2. Interbody device placement at L4-5 utilizing Medtronic titanium spacer, size 23 mm by 9 mm.  3. Posterior instrumentation L4-5 utilizing Medtronic Solera screws and rods, size 7.5 x 50 mm for both L4 pedicles, 7.5 x 40 mm for both L5 pedicles.  4. Right L4-5 facet fusion utilizing local autograft bone   5.  L3, L4 and L5 laminectomy .  6.  L3-4, L4-5 bilateral medial facetectomy and lateral recess decompression with bilateral L3, L4 and L5 foraminotomies.  7. Stealth Navigation.   8.  Modifier 22.     ESTIMATED BLOOD LOSS:  150 cc.     COMPLICATIONS:  None.     INTRAOPERATIVE FINDINGS:  L4-5 degenerative spondylolisthesis and L3-4 and L4-5 spinal stenosis.     INTRODUCTION:  The patient is a very pleasant 64-year-old male who came to the clinic with a longstanding history of  bilateral lower extremity numbness and discomfort consistent with imaging studies showing L4-5 degenerative spondylolisthesis and L3-4 and L4-5 stenosis.The patient had exhausted nonoperative measures and continued to have debilitating symptoms. Therefore, I had a discussion with him regarding surgery, and specifically, we discussed the risks including but not limited to death, infection, dural tear, nerve injury, and nonresolution of symptoms among others, and the patient accepted and wished to proceed.           DETAILS OF PROCEDURE:     The patient was brought to the operating room and placed under general endotracheal anesthetic without complications. A Strong catheter was placed. Intravenous antibiotics were given within one hour of incision. The patient was then placed prone on the Cirilo table ensuring that all nerve areas and bony areas were well padded and free of pressure. The low back area was then prepped and draped in the routine sterile manner. After the appropriate timeout, I made an incision overlying what I felt to be the L3, L4 and L5 spinous processes and carried it down onto the fascia and subperiosteally to expose the posterior elements. I then placed a reference frame over the L5 spinous process and brought in the O-arm and performed intraoperative scans which were then registered onto the [employer] Navigation Station. Utilizing Stealth Navigation, I then was able to create tracts into the pedicles of L4 and L5 bilaterally. I then performed navigated tapping and navigated placement of appropriately sized screws. Next, I excised the facet joint of L4-5 on the left side utilizing osteotomes and Kerrison rongeurs.  This bone was saved for later use.  I then identified the posterior disc. I then used a 15 blade to perform an annulotomy and utilized manjeet and curettes to perform a near total diskectomy. I then packed the disk space with a combination of demineralized bone matrix and local autograft  bone. I then obtained the appropriately sized interbody device and impacted this into the disk space with a good fit.   I then used a rongeur to remove the spinous processes of L3, L4 and L5, and used a Kerrison rongeur to perform a laminectomy of L3, L4 and L5. I then carried out a bilateral medial facetectomy and lateral recess decompression at the L3-4 and L4-5 levels bilaterally, carrying out my decompression all the way to the medial aspect of the L3, L4 and L5 pedicles bilaterally.  I then performed foraminotomies for the L3, L4 and L5 nerves bilaterally.  This medial facetectomy and decompression was distinct and separate from the facetectomy required to do the fusion. At the end of the procedure, there was a capacious canal with no neural impingement.  I then decorticated the L4-5 facet joint on the right side and placed local autograft bone over the decorticated bone to perform a fusion. I then placed rods through the screw heads bilaterally and placed set screws to secure the construct. I then took intraoperative O-arm scans which revealed good placement of the implants.  I am applying a modifier 22 because of the patient's body habitus which led to deeper planes of dissection, and also led to a technically more difficult surgery than normal, requiring more operative time to perform the instrumentation and fusion.  I then performed copious irrigation and hemostasis. I performed layer-by-layer closure utilizing Vicryl 0 suture over the fascia over a subfacial drain, Vicryl 2-0 for the subcutaneous tissues, and Vicryl 3-0 for the skin. 10 cc 0.25% Marcaine was injected into the skin and subcutaneous tissues. Steri-Strips were applied by a sterile dressing. He was then placed supine on his bed and extubated without complications and brought to the recovery room in satisfactory condition.           POSTOPERATIVE PLAN:     The patient will be mobilized as tolerated. He will be sent home on oxycodone for pain.  No bending, twisting, or lifting greater than 10 pounds for the next six weeks. After discharge, the patient will be seen after 6 weeks.

## 2025-03-11 NOTE — ANESTHESIA CARE TRANSFER NOTE
Patient: Gonzalez Morton    Procedure: Procedure(s):  LUMBAR 4 - LUMBAR 5 TRANSFORAMINAL LUMBAR INTERBODY FUSION; LUMBAR 4 - LUMBAR 5 POSTERIOR INSTRUMENTED FUSION;  LUMBAR 3 - LUMBAR 4, LUMBAR 4 - LUMBAR 5 DECOMPRESSION WITH STEALTH NAVIGATION       Diagnosis: Lumbar stenosis [M48.061]  Spondylolisthesis of lumbar region [M43.16]  Diagnosis Additional Information: No value filed.    Anesthesia Type:   General     Note:    Oropharynx: oropharynx clear of all foreign objects and spontaneously breathing  Level of Consciousness: awake  Oxygen Supplementation: face mask  Level of Supplemental Oxygen (L/min / FiO2): 10  Independent Airway: airway patency satisfactory and stable  Dentition: dentition unchanged  Vital Signs Stable: post-procedure vital signs reviewed and stable  Report to RN Given: handoff report given  Patient transferred to: PACU    Handoff Report: Identifed the Patient, Identified the Reponsible Provider, Reviewed the pertinent medical history, Discussed the surgical course, Reviewed Intra-OP anesthesia mangement and issues during anesthesia, Set expectations for post-procedure period and Allowed opportunity for questions and acknowledgement of understanding  Vitals:  Vitals Value Taken Time   /79 03/11/25 1200   Temp 36.4  C (97.5  F) 03/11/25 1158   Pulse 76 03/11/25 1201   Resp 9 03/11/25 1201   SpO2 99 % 03/11/25 1201   Vitals shown include unfiled device data.    Electronically Signed By: ANGELA Stout CRNA  March 11, 2025  12:03 PM

## 2025-03-12 LAB
ANION GAP SERPL CALCULATED.3IONS-SCNC: 9 MMOL/L (ref 7–15)
BUN SERPL-MCNC: 9.7 MG/DL (ref 8–23)
CALCIUM SERPL-MCNC: 9 MG/DL (ref 8.8–10.4)
CHLORIDE SERPL-SCNC: 101 MMOL/L (ref 98–107)
CREAT SERPL-MCNC: 0.82 MG/DL (ref 0.67–1.17)
EGFRCR SERPLBLD CKD-EPI 2021: >90 ML/MIN/1.73M2
GLUCOSE SERPL-MCNC: 135 MG/DL (ref 70–99)
HCO3 SERPL-SCNC: 30 MMOL/L (ref 22–29)
HGB BLD-MCNC: 13.7 G/DL (ref 13.3–17.7)
MAGNESIUM SERPL-MCNC: 2 MG/DL (ref 1.7–2.3)
POTASSIUM SERPL-SCNC: 3.6 MMOL/L (ref 3.4–5.3)
SODIUM SERPL-SCNC: 140 MMOL/L (ref 135–145)

## 2025-03-12 PROCEDURE — 97161 PT EVAL LOW COMPLEX 20 MIN: CPT | Mod: GP

## 2025-03-12 PROCEDURE — 999N000157 HC STATISTIC RCP TIME EA 10 MIN

## 2025-03-12 PROCEDURE — 97116 GAIT TRAINING THERAPY: CPT | Mod: GP

## 2025-03-12 PROCEDURE — 99207 PR NO BILLABLE SERVICE THIS VISIT: CPT | Performed by: HOSPITALIST

## 2025-03-12 PROCEDURE — 36415 COLL VENOUS BLD VENIPUNCTURE: CPT | Performed by: HOSPITALIST

## 2025-03-12 PROCEDURE — 97166 OT EVAL MOD COMPLEX 45 MIN: CPT | Mod: GO

## 2025-03-12 PROCEDURE — 94660 CPAP INITIATION&MGMT: CPT

## 2025-03-12 PROCEDURE — 250N000013 HC RX MED GY IP 250 OP 250 PS 637: Performed by: HOSPITALIST

## 2025-03-12 PROCEDURE — 80048 BASIC METABOLIC PNL TOTAL CA: CPT | Performed by: HOSPITALIST

## 2025-03-12 PROCEDURE — 250N000011 HC RX IP 250 OP 636: Performed by: ORTHOPAEDIC SURGERY

## 2025-03-12 PROCEDURE — 250N000013 HC RX MED GY IP 250 OP 250 PS 637: Performed by: NURSE PRACTITIONER

## 2025-03-12 PROCEDURE — 97530 THERAPEUTIC ACTIVITIES: CPT | Mod: GP

## 2025-03-12 PROCEDURE — 97535 SELF CARE MNGMENT TRAINING: CPT | Mod: GO

## 2025-03-12 PROCEDURE — 85018 HEMOGLOBIN: CPT | Performed by: ORTHOPAEDIC SURGERY

## 2025-03-12 PROCEDURE — 250N000013 HC RX MED GY IP 250 OP 250 PS 637: Performed by: ORTHOPAEDIC SURGERY

## 2025-03-12 PROCEDURE — 120N000001 HC R&B MED SURG/OB

## 2025-03-12 PROCEDURE — 83735 ASSAY OF MAGNESIUM: CPT | Performed by: HOSPITALIST

## 2025-03-12 RX ADMIN — SENNOSIDES AND DOCUSATE SODIUM 1 TABLET: 50; 8.6 TABLET ORAL at 20:30

## 2025-03-12 RX ADMIN — ACETAMINOPHEN 1000 MG: 500 TABLET ORAL at 06:00

## 2025-03-12 RX ADMIN — OXYCODONE 10 MG: 5 TABLET ORAL at 18:52

## 2025-03-12 RX ADMIN — CARVEDILOL 3.12 MG: 3.12 TABLET, FILM COATED ORAL at 08:44

## 2025-03-12 RX ADMIN — AMLODIPINE BESYLATE 10 MG: 10 TABLET ORAL at 08:44

## 2025-03-12 RX ADMIN — HYDROXYZINE HYDROCHLORIDE 25 MG: 25 TABLET ORAL at 14:45

## 2025-03-12 RX ADMIN — PRAMIPEXOLE DIHYDROCHLORIDE 0.25 MG: 0.25 TABLET ORAL at 20:30

## 2025-03-12 RX ADMIN — OXYCODONE 5 MG: 5 TABLET ORAL at 02:16

## 2025-03-12 RX ADMIN — OXYCODONE 10 MG: 5 TABLET ORAL at 07:10

## 2025-03-12 RX ADMIN — HYDROCHLOROTHIAZIDE 25 MG: 25 TABLET ORAL at 08:44

## 2025-03-12 RX ADMIN — ACETAMINOPHEN 1000 MG: 500 TABLET ORAL at 18:52

## 2025-03-12 RX ADMIN — ACETAMINOPHEN 1000 MG: 500 TABLET ORAL at 12:25

## 2025-03-12 RX ADMIN — OXYCODONE 10 MG: 5 TABLET ORAL at 14:45

## 2025-03-12 RX ADMIN — ACETAMINOPHEN 1000 MG: 500 TABLET ORAL at 01:11

## 2025-03-12 RX ADMIN — CARVEDILOL 3.12 MG: 3.12 TABLET, FILM COATED ORAL at 17:31

## 2025-03-12 RX ADMIN — POLYETHYLENE GLYCOL 3350 17 G: 17 POWDER, FOR SOLUTION ORAL at 08:45

## 2025-03-12 RX ADMIN — SENNOSIDES AND DOCUSATE SODIUM 1 TABLET: 50; 8.6 TABLET ORAL at 08:44

## 2025-03-12 RX ADMIN — CEFAZOLIN SODIUM 2 G: 2 SOLUTION INTRAVENOUS at 02:17

## 2025-03-12 RX ADMIN — TRAZODONE HYDROCHLORIDE 100 MG: 100 TABLET ORAL at 20:30

## 2025-03-12 RX ADMIN — THERA TABS 1 TABLET: TAB at 08:44

## 2025-03-12 RX ADMIN — Medication 400 MG: at 20:30

## 2025-03-12 RX ADMIN — ATORVASTATIN CALCIUM 80 MG: 40 TABLET, FILM COATED ORAL at 20:30

## 2025-03-12 ASSESSMENT — ACTIVITIES OF DAILY LIVING (ADL)
ADLS_ACUITY_SCORE: 45
ADLS_ACUITY_SCORE: 45
ADLS_ACUITY_SCORE: 47
ADLS_ACUITY_SCORE: 45
ADLS_ACUITY_SCORE: 45
ADLS_ACUITY_SCORE: 47
ADLS_ACUITY_SCORE: 45
ADLS_ACUITY_SCORE: 46
ADLS_ACUITY_SCORE: 47
ADLS_ACUITY_SCORE: 45
ADLS_ACUITY_SCORE: 45
ADLS_ACUITY_SCORE: 46
ADLS_ACUITY_SCORE: 45
ADLS_ACUITY_SCORE: 47
ADLS_ACUITY_SCORE: 47
ADLS_ACUITY_SCORE: 45
ADLS_ACUITY_SCORE: 49
ADLS_ACUITY_SCORE: 46
ADLS_ACUITY_SCORE: 47
ADLS_ACUITY_SCORE: 49
ADLS_ACUITY_SCORE: 45
ADLS_ACUITY_SCORE: 47
ADLS_ACUITY_SCORE: 47

## 2025-03-12 NOTE — PROGRESS NOTES
Will not see today:  PAIN MANAGEMENT SERVICE CHART CHECK    This patient's chart has been reviewed by the Pain Service. It has been determined that no change is necessary to the pain management regimen at this time. The chart will be reviewed regularly and the patient will be seen if necessary. If you would like the patient to be seen, please contact the service and ask to have the patient seen. Pain team will sign off. Discussed with Orthopedics.     Thank you!      DANA SlaughterP-C  Acute Care Pain Management Program [facility]   Monday-Friday 8a-4p   Page via Epic or [employer]

## 2025-03-12 NOTE — PROGRESS NOTES
Occupational Therapy Discharge Summary    Reason for therapy discharge:    Discharged to home.    Progress towards therapy goal(s). See goals on Care Plan in [facility] electronic health record for goal details.  Goals met    Therapy recommendation(s):    No further therapy is recommended.

## 2025-03-12 NOTE — PLAN OF CARE
Patient vital signs are at baseline: Yes  Patient able to ambulate as they were prior to admission or with assist devices provided by therapies during their stay:  Yes  Patient MUST void prior to discharge:  No,  Reason:  Strong removed, passed two PVR.  Patient able to tolerate oral intake:  Yes  Pain has adequate pain control using Oral analgesics:  Yes  Does patient have an identified :  Yes  Has goal D/C date and time been discussed with patient:  Yes  Goal Outcome Evaluation:       Pt is alert and oriented X 4. CMS intact. Dressing is CDI. Complains of moderate pain, managed with scheduled Tylenol, PRN Oxycodone, and Cold therapy. Hemovac in place. Up with SBA, GB, and walker. Alarms in place. Calls appropriately.

## 2025-03-12 NOTE — PLAN OF CARE
Problem: Adult Inpatient Plan of Care  Goal: Absence of Hospital-Acquired Illness or Injury  Intervention: Prevent Infection  Recent Flowsheet Documentation  Taken 3/12/2025 0830 by Lesly Espitia, RN  Infection Prevention:   hand hygiene promoted   rest/sleep promoted   single patient room provided     Problem: Adult Inpatient Plan of Care  Goal: Optimal Comfort and Wellbeing  Outcome: Progressing     Patient vital signs are at baseline: Yes  Patient able to ambulate as they were prior to admission or with assist devices provided by therapies during their stay:  Yes  Patient MUST void prior to discharge:  Yes  Patient able to tolerate oral intake:  Yes  Pain has adequate pain control using Oral analgesics:  Yes  Does patient have an identified :  Yes  Has goal D/C date and time been discussed with patient:  Yes    Patient A&O, VSS, and CMS intact. Dressing CDI. Hemovac still in place. Pain is managed well. Up independently in the room. Passed all 3 PVRs. Voiding adequately. Tolerating oral intake. Blisters on right, lateral abdomen noticed this AM underneath ice binder. Call light appropriate. Hopeful to discharge tomorrow, pending drain output.  Lesly Espitia, RN

## 2025-03-12 NOTE — PROGRESS NOTES
"         Orthopedic Progress Note      Assessment: 1 Day Post-Op  S/P Procedure(s):  LUMBAR 4 - LUMBAR 5 TRANSFORAMINAL LUMBAR INTERBODY FUSION; LUMBAR 4 - LUMBAR 5 POSTERIOR INSTRUMENTED FUSION;  LUMBAR 3 - LUMBAR 4, LUMBAR 4 - LUMBAR 5 DECOMPRESSION WITH STEALTH NAVIGATION     Plan:   -Continue PT/OT.   -Weightbearing status: WBAT.  -No bending, twisting, or lifting greater than 10 pounds for the next six weeks. .  -Drain with 50 ml out overnight. Continue to monitor. - discontinue drain if  < 10 ml/ shift   -DVT prophylaxis with SCDs, marie stockings and early ambulation.  -Anticoagulation: None due to Spine surgery.   -NO aspirin, anticoagulation or NSAIDS (advil/ibuprofen, aleve/naproxen, celebrex) for 5 days after surgery.  -Encourage IS.  -Appreciate hospitalist recs.  -Discharge planning: anticipate discharge to home  - follow up with Dr. Gonzalez in 6 weeks    Subjective:  Nausea, Vomiting:  no  Lightheadedness, Dizziness:  no  Neuro:  Patient denies new onset numbness or paresthesias in bilateral lower extremities    Patient doing very well on POD #1. States radiculopathy he experienced prior to surgery is gone. Pain meds controlling back pain. Currently rates pain as 4/10 while sitting; pain decreases when he ambulates . Voiding and passing gas. Tolerating oral intake. Ambulating in hallways.    Objective:  /60 (BP Location: Right arm)   Pulse 59   Temp 97.4  F (36.3  C) (Oral)   Resp 17   Ht 1.765 m (5' 9.49\")   Wt 123.4 kg (272 lb 1.6 oz)   SpO2 94%   BMI 39.62 kg/m    The patient is A&Ox3. Appears comfortable. Conversational. Non-labored breathing.  Sensation is intact in bilateral lower extremities and feet.  Dorsalis pedis pulses intact bilaterally.  Calves are soft and non-tender. No warmth. No edema. Negative Connie's.  Back incision is covered. Dressing with scant dry blood, no surrounding erythema.    LOWER EXTREMITIES  RLE 5/5 hip flexor, 5/5 Quad, 5/5 Tib Ant, 5/5 EHL, 5/5 Gastroc " Soleus  RLE L4-S1 intact to light touch    LLE 5/5 hip flexor, 5/5 Quad, 5/5 Tib Ant, 5/5 EHL, 5/5 Gastroc Soleus  LLE L4-S1 intact to light touch    Drain in place, 50 ml out overnight    Pertinent Labs   Lab Results: personally reviewed.   Lab Results   Component Value Date    INR 1.28 (H) 06/06/2022    INR 1.39 (H) 06/06/2022    INR 1.05 05/31/2022     Lab Results   Component Value Date    WBC 7.8 11/13/2024    HGB 13.7 03/12/2025    HCT 43.3 11/13/2024    MCV 81 11/13/2024     11/13/2024     Lab Results   Component Value Date     03/12/2025    CO2 30 (H) 03/12/2025         Report completed by:  Lindsay Schaefer PA-C/[facility]    Date: 3/12/2025  Time: 8:17 AM

## 2025-03-12 NOTE — PLAN OF CARE
Problem: Spinal Surgery  Goal: Optimal Pain Control and Function  Outcome: Progressing    Patient alert and oriented. VSS. Has CPAP at bedtime with 2L of O2.  NS infusing at 125ml/hr. Tolerating Clear Liquid diet. Has nihcolson in place. Hemovac with output of 150ml. Ambulating a1 with walker and gaitbelt. PRN oxycodone given for pain along with repositioning and ice pack. Call light within reach. Alarms on.

## 2025-03-13 VITALS
BODY MASS INDEX: 40.3 KG/M2 | RESPIRATION RATE: 18 BRPM | OXYGEN SATURATION: 97 % | WEIGHT: 272.1 LBS | HEART RATE: 58 BPM | SYSTOLIC BLOOD PRESSURE: 147 MMHG | DIASTOLIC BLOOD PRESSURE: 79 MMHG | HEIGHT: 69 IN | TEMPERATURE: 98.5 F

## 2025-03-13 LAB — HGB BLD-MCNC: 13.2 G/DL (ref 13.3–17.7)

## 2025-03-13 PROCEDURE — 250N000013 HC RX MED GY IP 250 OP 250 PS 637: Performed by: ORTHOPAEDIC SURGERY

## 2025-03-13 PROCEDURE — 85018 HEMOGLOBIN: CPT | Performed by: ORTHOPAEDIC SURGERY

## 2025-03-13 PROCEDURE — 99233 SBSQ HOSP IP/OBS HIGH 50: CPT | Performed by: HOSPITALIST

## 2025-03-13 PROCEDURE — 250N000013 HC RX MED GY IP 250 OP 250 PS 637: Performed by: HOSPITALIST

## 2025-03-13 PROCEDURE — 250N000013 HC RX MED GY IP 250 OP 250 PS 637: Performed by: NURSE PRACTITIONER

## 2025-03-13 PROCEDURE — 36415 COLL VENOUS BLD VENIPUNCTURE: CPT | Performed by: ORTHOPAEDIC SURGERY

## 2025-03-13 RX ORDER — OXYCODONE HYDROCHLORIDE 5 MG/1
5 TABLET ORAL EVERY 4 HOURS PRN
Qty: 32 TABLET | Refills: 0 | Status: SHIPPED | OUTPATIENT
Start: 2025-03-13

## 2025-03-13 RX ADMIN — CARVEDILOL 3.12 MG: 3.12 TABLET, FILM COATED ORAL at 08:02

## 2025-03-13 RX ADMIN — THERA TABS 1 TABLET: TAB at 08:02

## 2025-03-13 RX ADMIN — HYDROXYZINE HYDROCHLORIDE 25 MG: 25 TABLET ORAL at 00:22

## 2025-03-13 RX ADMIN — ACETAMINOPHEN 1000 MG: 500 TABLET ORAL at 13:36

## 2025-03-13 RX ADMIN — HYDROCHLOROTHIAZIDE 25 MG: 25 TABLET ORAL at 08:02

## 2025-03-13 RX ADMIN — ACETAMINOPHEN 1000 MG: 500 TABLET ORAL at 06:48

## 2025-03-13 RX ADMIN — AMLODIPINE BESYLATE 10 MG: 10 TABLET ORAL at 08:02

## 2025-03-13 RX ADMIN — OXYCODONE 5 MG: 5 TABLET ORAL at 06:48

## 2025-03-13 RX ADMIN — SENNOSIDES AND DOCUSATE SODIUM 1 TABLET: 50; 8.6 TABLET ORAL at 08:02

## 2025-03-13 RX ADMIN — OXYCODONE 10 MG: 5 TABLET ORAL at 13:37

## 2025-03-13 RX ADMIN — POLYETHYLENE GLYCOL 3350 17 G: 17 POWDER, FOR SOLUTION ORAL at 08:03

## 2025-03-13 RX ADMIN — OXYCODONE 10 MG: 5 TABLET ORAL at 00:21

## 2025-03-13 ASSESSMENT — ACTIVITIES OF DAILY LIVING (ADL)
ADLS_ACUITY_SCORE: 47

## 2025-03-13 NOTE — PLAN OF CARE
Problem: Adult Inpatient Plan of Care  Goal: Optimal Comfort and Wellbeing  Outcome: Progressing  Intervention: Monitor Pain and Promote Comfort  Recent Flowsheet Documentation  Taken 3/13/2025 0021 by Vanda Hill RN  Pain Management Interventions:   cold applied   medication (see MAR)     Problem: Spinal Surgery  Goal: Optimal Coping with Surgery  Outcome: Progressing   Goal Outcome Evaluation:    Patient vital signs are at baseline: Yes  Patient able to ambulate as they were prior to admission or with assist devices provided by therapies during their stay:  Independent in ambulation status   Patient MUST void prior to discharge:  Yes, pt is independent status in ambulation, reported that he had been voiding.   Patient able to tolerate oral intake:  Yes  Pain has adequate pain control using Oral analgesics:  YesPt rated pain 7/10. Received PRN Atarax and Oxycodone. Pt stated  interventions are effective and stated that PRNs brings back down to 5/10. Which is a tolerable pain level.   Does patient have an identified :  Yes  Has goal D/C date and time been discussed with patient:  Yes, pending on drainage output.     Dressing CDI.     Drain output from 2300-700 hours was 3ml. Per orders are for >10 ml for 4 shifts.

## 2025-03-13 NOTE — PLAN OF CARE
Problem: Adult Inpatient Plan of Care  Goal: Absence of Hospital-Acquired Illness or Injury  Outcome: Progressing  Intervention: Identify and Manage Fall Risk  Recent Flowsheet Documentation  Taken 3/13/2025 0800 by Jayjay Leung RN  Safety Promotion/Fall Prevention:   safety round/check completed   nonskid shoes/slippers when out of bed  Intervention: Prevent Skin Injury  Recent Flowsheet Documentation  Taken 3/13/2025 0800 by Jayjay Leung RN  Body Position: position changed independently  Intervention: Prevent Infection  Recent Flowsheet Documentation  Taken 3/13/2025 0800 by Jayjay Leung RN  Infection Prevention:   hand hygiene promoted   rest/sleep promoted   single patient room provided     Problem: Adult Inpatient Plan of Care  Goal: Absence of Hospital-Acquired Illness or Injury  Intervention: Identify and Manage Fall Risk  Recent Flowsheet Documentation  Taken 3/13/2025 0800 by Jayjay Leung RN  Safety Promotion/Fall Prevention:   safety round/check completed   nonskid shoes/slippers when out of bed   Goal Outcome Evaluation:      Plan of Care Reviewed With: patient    Overall Patient Progress: improvingOverall Patient Progress: improving  Patient vital signs are at baseline: Yes  Patient able to ambulate as they were prior to admission or with assist devices provided by therapies during their stay:  Yes  Patient MUST void prior to discharge:  Yes  Patient able to tolerate oral intake:  Yes  Pain has adequate pain control using Oral analgesics:  Yes  Does patient have an identified :  Yes  Has goal D/C date and time been discussed with patient:  Yes   Pt alert and oriented, vss pn room air, can voice needs, pain mild,drain taken out, pt is independent, fully continent, pressure dressing applied at the back after drain removal, potential discharge today.

## 2025-03-13 NOTE — PLAN OF CARE
"  Problem: Adult Inpatient Plan of Care  Goal: Patient-Specific Goal (Individualized)  Description: You can add care plan individualizations to a care plan. Examples of Individualization might be:  \"Parent requests to be called daily at 9am for status\", \"I have a hard time hearing out of my right ear\", or \"Do not touch me to wake me up as it startles  me\".  3/13/2025 1226 by Jayjay Leung RN  Outcome: Progressing  3/13/2025 0936 by Jayjay Leung RN  Outcome: Progressing     Problem: Adult Inpatient Plan of Care  Goal: Absence of Hospital-Acquired Illness or Injury  3/13/2025 1226 by Jayjay Leung RN  Outcome: Progressing  3/13/2025 0936 by Jayjay Leung RN  Outcome: Progressing  Intervention: Identify and Manage Fall Risk  Recent Flowsheet Documentation  Taken 3/13/2025 0800 by Jayjay Leung RN  Safety Promotion/Fall Prevention:   safety round/check completed   nonskid shoes/slippers when out of bed  Intervention: Prevent Skin Injury  Recent Flowsheet Documentation  Taken 3/13/2025 0800 by Jayjay Leung RN  Body Position: position changed independently  Intervention: Prevent Infection  Recent Flowsheet Documentation  Taken 3/13/2025 0800 by Jayjay Leung RN  Infection Prevention:   hand hygiene promoted   rest/sleep promoted   single patient room provided     Problem: Adult Inpatient Plan of Care  Goal: Absence of Hospital-Acquired Illness or Injury  Intervention: Identify and Manage Fall Risk  Recent Flowsheet Documentation  Taken 3/13/2025 0800 by Jayjay Leung RN  Safety Promotion/Fall Prevention:   safety round/check completed   nonskid shoes/slippers when out of bed   Goal Outcome Evaluation:      Plan of Care Reviewed With: patient    Overall Patient Progress: improvingOverall Patient Progress: improving  Patient vital signs are at baseline: Yes  Patient able to ambulate as they were prior to admission or with assist devices provided by therapies during their " stay:  Yes  Patient MUST void prior to discharge:  Yes  Patient able to tolerate oral intake:  Yes  Pain has adequate pain control using Oral analgesics:  Yes  Does patient have an identified :  Yes  Has goal D/C date and time been discussed with patient:  Yes   Pt alert and oriented, vss on room air, can voice needs, pain meds given. Hemovac drain pulled out and pressure dressing applied.pt is independent, voiding spontenously, for discharge today, wife at work till late,  a friend will pick him up later

## 2025-03-13 NOTE — PROGRESS NOTES
"Orthopedic Progress Note      Assessment: 2 Days Post-Op  S/P Procedure(s):  LUMBAR 4 - LUMBAR 5 TRANSFORAMINAL LUMBAR INTERBODY FUSION; LUMBAR 4 - LUMBAR 5 POSTERIOR INSTRUMENTED FUSION;  LUMBAR 3 - LUMBAR 4, LUMBAR 4 - LUMBAR 5 DECOMPRESSION WITH STEALTH NAVIGATION     Plan:   -Continue PT/OT.   -Weightbearing status: WBAT.  -No bending, twisting, or lifting greater than 10 pounds for the next six weeks. .  -DVT prophylaxis with SCDs, marie stockings and early ambulation.  -Anticoagulation: None due to Spine surgery.   -NO aspirin, anticoagulation or NSAIDS (advil/ibuprofen, aleve/naproxen, celebrex) for 5 days after surgery.  -Encourage IS.  -Appreciate hospitalist recs.  -Discharge planning: anticipate discharge  home  - follow up with Dr. Gonzalez in 6 weeks  - apply soft bandages over blisters on abdomen when wearing corset brace. May keep open to air when not wearing brace. Brace for comfort, so if too painful to wear d/t blisters, may discontinue    Subjective:  Pain: Well controlled on oral medications  Nausea, Vomiting:  no  Lightheadedness, Dizziness:  no  Neuro:  Patient denies new onset numbness or paresthesias in bilateral lower extremities    Patient states that his drain came out today when he was in the bathroom having a bowel movement. Last measured output of drain was 3 ml, so drain was discontinued. Patient notes that when he was in bathroom, he noticed that he had developed blisters on right lower abdomen from coset brace. States that his pain is currently controlled in back. No radiculopathy. No SOB, CP or calf pain.     Objective:  BP (!) 141/71 (BP Location: Left arm)   Pulse 64   Temp 98.9  F (37.2  C) (Oral)   Resp 18   Ht 1.765 m (5' 9.49\")   Wt 123.4 kg (272 lb 1.6 oz)   SpO2 93%   BMI 39.62 kg/m    The patient is A&Ox3. Appears comfortable. Conversational. Non-labored breathing.Resting in bed.  Sensation is intact in bilateral lower extremities and feet.  Dorsalis pedis pulses intact " bilaterally.  Calves are soft and non-tender. No warmth. No edema. Negative Connie's.  Back incision is covered. Dressing with scant dry blood, no surrounding erythema.  Abdomen right side had multiple intact blisters. ( See attached photo)  LOWER EXTREMITIES  RLE 5/5 hip flexor, 5/5 Quad, 5/5 Tib Ant, 5/5 EHL, 5/5 Gastroc Soleus  RLE L4-S1 intact to light touch    LLE 5/5 hip flexor, 5/5 Quad, 5/5 Tib Ant, 5/5 EHL, 5/5 Gastroc Soleus  LLE L4-S1 intact to light touch       1        Pertinent Labs   Lab Results: personally reviewed.   Lab Results   Component Value Date    INR 1.28 (H) 06/06/2022    INR 1.39 (H) 06/06/2022    INR 1.05 05/31/2022     Lab Results   Component Value Date    WBC 7.8 11/13/2024    HGB 13.2 (L) 03/13/2025    HCT 43.3 11/13/2024    MCV 81 11/13/2024     11/13/2024     Lab Results   Component Value Date     03/12/2025    CO2 30 (H) 03/12/2025         Report completed by:  Lindsay Schaefer PA-C/[facility]    Date: 3/13/2025  Time: 9:49 AM

## 2025-03-13 NOTE — PROGRESS NOTES
[facility]    Medicine Progress Note - Hospitalist Service    Date of Admission:  3/11/2025    Assessment & Plan   Gonzalez Morton is a 64 year old old male with a history of HTN, HLD, ERIN, CAD s/p CABG, lumbar stenosis with neurogenic claudication underwent L3-5 spine surgery. [facility] service was asked to evaluate patient for postoperative medical management. Please resume the home medications as reconciled and further noted below with ordered hold parameters.  Vital signs have been stable post-operatively including hemodynamically stable blood pressure and heart rate. Thank you for this consult; we will continue to follow this patient until discharge.    Labs wnl. Medication reconciliation completed for anticipated discharge. Per report and review of EMR, patient's pain remains well-tolerated, patient is up and ambulating, patient has normal oral intake with usual eating and drinking without issue, clinical status is unchanged, and there are no interval changes. Chart check. No medical objections to discharge at the discretion of the primary surgery team. Please page and update [facility] with any clinical status changes or questions.     HTN  -Order home medications with the written tiered hold parameters given risk for post-operative hypotension.    HLD  -Order home statin.    ERIN  -Order and utilize home CPAP device if available; if not available, then utilize in-house respiratory CPAP equipment.  -RT evaluation as needed for device/mask fitting and assessment.     S/p L3-5 Spine Surgery  Spine Disease  Acute Post-Op Pain  -Agree with current pain control regimen; consider acute pain team consultation if pain becomes increasingly difficult to control or proves refractory.   -PT evaluation.   -OT evaluation.  -IS frequently, initially every hour while awake as tolerated. Directly encouraged and discussed.      Post-Op DVT Prophylaxis  -As per primary surgery team.    Procedure(s):  LUMBAR 4 -  "LUMBAR 5 TRANSFORAMINAL LUMBAR INTERBODY FUSION; LUMBAR 4 - LUMBAR 5 POSTERIOR INSTRUMENTED FUSION;  LUMBAR 3 - LUMBAR 4, LUMBAR 4 - LUMBAR 5 DECOMPRESSION WITH STEALTH NAVIGATION  Post-operative Day: Day of Surgery  Code status:Full Code     Estimated Blood Loss:  150 mL    Hospital Problem List   No problem-specific Assessment & Plan notes found for this encounter.    Active Problems:    Lumbar stenosis with neurogenic claudication      -Reviewed the patient's preoperative H and P and updated missing elements.  -Home medication reconciliation has been reviewed.  Medications have been ordered as noted from the home list and changes are documented above           Diet: Combination Diet Regular Diet; (advanced to regular as tolerated)  Discharge Instruction - Regular Diet Adult    DVT Prophylaxis: Defer to primary service  Strong Catheter: Not present  Lines: None     Cardiac Monitoring: None  Code Status: Full Code      Clinically Significant Risk Factors                   # Hypertension: Noted on problem list            # Obesity: Estimated body mass index is 39.62 kg/m  as calculated from the following:    Height as of this encounter: 1.765 m (5' 9.49\").    Weight as of this encounter: 123.4 kg (272 lb 1.6 oz)., PRESENT ON ADMISSION     # Financial/Environmental Concerns:     # History of CABG: noted on surgical history       Social Drivers of Health    Housing Stability: High Risk (3/11/2025)    Housing Stability     Do you have housing? : No     Are you worried about losing your housing?: No   Physical Activity: Unknown (5/27/2024)    Exercise Vital Sign     Days of Exercise per Week: Patient declined     Minutes of Exercise per Session: 0 min   Social Connections: Unknown (5/27/2024)    Social Connection and Isolation Panel [NHANES]     Frequency of Social Gatherings with Friends and Family: Once a week          Disposition Plan     Medically Ready for Discharge: Ready Now             Jim Garcia, " MD  Hospitalist Service  [facility]  Securely message with [employer] (more info)  Text page via [employer] Paging/Directory   ______________________________________________________________________    Interval History   No acute events overnight.    No report of chest pain, shortness of breath, or other new complaints reported.     Physical Exam   Vital Signs: Temp: 98.9  F (37.2  C) Temp src: Oral BP: (!) 141/71 Pulse: 64   Resp: 18 SpO2: 93 % O2 Device: None (Room air)    Weight: 272 lbs 1.61 oz        Medical Decision Making       51 MINUTES SPENT BY ME on the date of service doing chart review, history, exam, documentation & further activities per the note.      Data     I have personally reviewed the following data over the past 24 hrs:    N/A  \   13.2 (L)   / N/A     N/A N/A N/A /  N/A   N/A N/A N/A \       Imaging results reviewed over the past 24 hrs:   No results found for this or any previous visit (from the past 24 hours).

## 2025-03-13 NOTE — DISCHARGE SUMMARY
ORTHOPEDIC DISCHARGE SUMMARY       Gonzalez Morton,  1960, MRN [id number]    Admission Date: 3/11/2025      Admission Diagnoses: Lumbar stenosis [M48.061]  Spondylolisthesis of lumbar region [M43.16]  Lumbar stenosis with neurogenic claudication [M48.062]     Discharge Date:  3/13/2025      Post-operative Day:  2 Days Post-Op    Reason for Admission: The patient was admitted for the following: Procedure(s):  LUMBAR 4 - LUMBAR 5 TRANSFORAMINAL LUMBAR INTERBODY FUSION; LUMBAR 4 - LUMBAR 5 POSTERIOR INSTRUMENTED FUSION;  LUMBAR 3 - LUMBAR 4, LUMBAR 4 - LUMBAR 5 DECOMPRESSION WITH STEALTH NAVIGATION    BRIEF HOSPITAL COURSE   Gonzalez Morton is a pleasant 64 year old male who underwent the aforementioned procedure with Dr. Gonzalez on 3/11/25. There were no intraoperative complications and the patient was transferred to the recovery room and later the orthopedic unit in stable condition. Once the patient reached the orthopedic floor our orthopedic pain protocol was implemented along with the following:    Anticoagulation Medications: None  Therapy: PT and OT  Activity: WBAT  Bracing: None    Consultations during Admission: Hospitalist service for medical management     COMPLICATIONS/SIGNIFICANT FINDINGS        DISCHARGE INFORMATION   Condition at discharge: Good  Discharge destination: Home  Patient was seen by myself on the date of discharge.    FOLLOW UP CARE   Follow up with orthopedics in 6 weeks or sooner should the need arise. Ortho will continue to manage pain control, post op anticoagulation and incision care.     Follow up with your PCP for management of chronic medical problems and to evaluate for post op medical complications including constipation, nausea/vomiting, DVT/PE, anemia, changes in blood pressure, fevers/chills, urinary retention and atelectasis/pneumonia.     Subjective   Patient is doing well on POD #2. Pain is well controlled with oral medications. Ambulating. Tolerating oral  "intake.     Physical Exam   BP (!) 141/71 (BP Location: Left arm)   Pulse 64   Temp 98.9  F (37.2  C) (Oral)   Resp 18   Ht 1.765 m (5' 9.49\")   Wt 123.4 kg (272 lb 1.6 oz)   SpO2 93%   BMI 39.62 kg/m    The patient is A&Ox3. Appears comfortable. Conversational. Non-labored breathing.Resting in bed.  Sensation is intact in bilateral lower extremities and feet.  Dorsalis pedis pulses intact bilaterally.  Calves are soft and non-tender. No warmth. No edema. Negative Connie's.  Back incision is covered. Dressing with scant dry blood, no surrounding erythema.  Abdomen right side had multiple intact blisters. ( See attached photo)  LOWER EXTREMITIES  RLE 5/5 hip flexor, 5/5 Quad, 5/5 Tib Ant, 5/5 EHL, 5/5 Gastroc Soleus  RLE L4-S1 intact to light touch     LLE 5/5 hip flexor, 5/5 Quad, 5/5 Tib Ant, 5/5 EHL, 5/5 Gastroc Soleus  LLE L4-S1 intact to light touch    Pertinent Results at Discharge     Hemoglobin   Date/Time Value Ref Range Status   03/13/2025 07:25 AM 13.2 (L) 13.3 - 17.7 g/dL Final   03/12/2025 06:29 AM 13.7 13.3 - 17.7 g/dL Final   11/13/2024 01:06 AM 15.8 13.3 - 17.7 g/dL Final     INR   Date/Time Value Ref Range Status   06/06/2022 01:33 PM 1.28 (H) 0.85 - 1.15 Final   06/06/2022 10:25 AM 1.39 (H) 0.85 - 1.15 Final   05/31/2022 08:23 AM 1.05 0.85 - 1.15 Final     Platelet Count   Date/Time Value Ref Range Status   11/13/2024 01:06  150 - 450 10e3/uL Final   01/25/2024 07:34  150 - 450 10e3/uL Final   12/12/2023 10:25  150 - 450 10e3/uL Final       Problem List   Active Problems:    Benign Essential Hypertension    Mixed hyperlipidemia    History of malignant neoplasm of prostate    Obstructive sleep apnea syndrome    Coronary artery disease involving native coronary artery of native heart, unspecified whether angina present    Status post coronary artery bypass graft    Lumbar stenosis with neurogenic claudication      Lindsay Schaefer PA-C/Dr. [facility]  [phone]  Date: 3/13/2025  Time: 11:40 AM

## 2025-03-13 NOTE — PLAN OF CARE
Problem: Spinal Surgery  Goal: Optimal Functional Ability  Intervention: Optimize Functional Status  Recent Flowsheet Documentation  Taken 3/12/2025 1530 by Sony Vang, RN  Activity Management: activity adjusted per tolerance     Problem: Spinal Surgery  Goal: Optimal Pain Control and Function  Outcome: Progressing     Problem: Spinal Surgery  Goal: Effective Urinary Elimination  Outcome: Progressing     Patient alert and oriented. VSS. RA. Hs Cpap at bedtime. Saline locked. Tolerating  Regular diet. Ambulating independently, Voiding spontaneously. PRN oxycodone given for pain along with ice packs and repositioning. Hemovac output of 15ml. Call light within reach.

## 2025-03-21 ENCOUNTER — TRANSFERRED RECORDS (OUTPATIENT)
Dept: HEALTH INFORMATION MANAGEMENT | Facility: CLINIC | Age: 65
End: 2025-03-21
Payer: COMMERCIAL

## 2025-04-14 ENCOUNTER — TRANSFERRED RECORDS (OUTPATIENT)
Dept: HEALTH INFORMATION MANAGEMENT | Facility: CLINIC | Age: 65
End: 2025-04-14
Payer: COMMERCIAL

## 2025-05-15 NOTE — PROGRESS NOTES
03/12/25 1045   Appointment Info   Signing Clinician's Name / Credentials (OT) JAMIL Yao/L, CLT   Rehab Comments (OT) OT eval   Living Environment   People in Home spouse   Current Living Arrangements house   Self-Care   Usual Activity Tolerance good   Current Activity Tolerance moderate   Equipment Currently Used at Home raised toilet seat;shower chair   Activity/Exercise/Self-Care Comment Pt IND w/ ADLs and IADLs at baseline and works currently as a supervisor for [employer].   General Information   Onset of Illness/Injury or Date of Surgery 03/11/25   Referring Physician Dr. Gonzalez   Patient/Family Therapy Goal Statement (OT) go home   Additional Occupational Profile Info/Pertinent History of Current Problem spine surgery   Existing Precautions/Restrictions spinal;other (see comments)  (no brace)   Cognitive Status Examination   Orientation Status orientation to person, place and time   Affect/Mental Status (Cognitive) WNL   Visual Perception   Visual Impairment/Limitations WFL   Sensory   Sensory Quick Adds sensation intact   Posture   Posture not impaired   Range of Motion Comprehensive   General Range of Motion no range of motion deficits identified   Strength Comprehensive (MMT)   General Manual Muscle Testing (MMT) Assessment no strength deficits identified   Muscle Tone Assessment   Muscle Tone Quick Adds No deficits were identified   Coordination   Upper Extremity Coordination No deficits were identified   Bed Mobility   Bed Mobility scooting/bridging;supine-sit;sit-supine   Comment (Bed Mobility) SBA-CGA   Transfers   Transfers bed-chair transfer;sit-stand transfer;toilet transfer;shower transfer   Transfer Comments SBA-CGA   Transfer Skill: Bed to Chair/Chair to Bed   Transfer Comments SBA-CGA   Sit-Stand Transfer   Sit/Stand Transfer Comments SBA-CGA   Shower Transfer   Shower Transfer Comments SBA-CGA   Toilet Transfer   Toilet Transfer Comments SBA-CGA   Balance   Balance Comments  decreased   Activities of Daily Living   BADL Assessment/Intervention upper body dressing;bathing;lower body dressing;toileting   Bathing Assessment/Intervention   Enders Level (Bathing) minimum assist (75% patient effort)   Upper Body Dressing Assessment/Training   Enders Level (Upper Body Dressing) supervision   Lower Body Dressing Assessment/Training   Enders Level (Lower Body Dressing) minimum assist (75% patient effort)   Toileting   Enders Level (Toileting) adjust/manage clothing;toileting skills;supervision   Clinical Impression   Criteria for Skilled Therapeutic Interventions Met (OT) Yes, treatment indicated   OT Diagnosis decreased ADL independence/safety   Influenced by the following impairments spine surgery   OT Problem List-Impairments impacting ADL activity tolerance impaired;mobility;pain;post-surgical precautions   Assessment of Occupational Performance 3-5 Performance Deficits   Identified Performance Deficits dressing, bed mobility, functional transfers, bathing, toileting   Planned Therapy Interventions (OT) ADL retraining;bed mobility training;transfer training   Clinical Decision Making Complexity (OT) detailed assessment/moderate complexity   Risk & Benefits of therapy have been explained evaluation/treatment results reviewed;patient   OT Total Evaluation Time   OT Eval, Moderate Complexity Minutes (10392) 8   OT Goals   Therapy Frequency (OT) One time eval and treatment   OT Predicted Duration/Target Date for Goal Attainment 03/12/25   OT Goals Lower Body Dressing;Bed Mobility;Toilet Transfer/Toileting   OT: Lower Body Dressing Modified independent   OT: Bed Mobility Modified independent;Goal Met;Completed   OT: Toilet Transfer/Toileting Modified independent;Goal Met;Completed   Interventions   Interventions Quick Adds Self-Care/Home Management   Self-Care/Home Management   Self-Care/Home Mgmt/ADL, Compensatory, Meal Prep Minutes (71958) 23   Symptoms Noted  During/After Treatment (Meal Preparation/Planning Training) increased pain   Treatment Detail/Skilled Intervention Pt edu on spinal px - pt verbalized understanding. Pt edu on log roll technique for bed mobility - completed Mod I. Pt instructed on compensatory strategies for LE dressing - completed Mod I while seated EOB using AE and figure 4 position. Pt STS with mod I and FWW with cues for technique.  Pt walked to tub room and pt edu on safety technique for tub shower transfer - pt completed SBA - recommended pt use shower chair at home. Pt edu on safe RTS transfer - completed  Mod I. Edu on compensatory strategies for g/h and other tasks while standing at sink. Edu handout given on ADL w/in spinal px. Edu on car transfers and sleeping position - pt verbalized understanding. Pt ended session in recliner. Alarms on/call light in reach.   OT Discharge Planning   OT Plan OT d/c   OT Discharge Recommendation (DC Rec) (S)  home with assist   OT Rationale for DC Rec Pt moving well and has good support at home.   OT Brief overview of current status Mod I for BADL   OT Total Distance Amb During Session (feet) 150   OT Equipment Needed at Discharge other (see comments)  (wants sock aide)   Total Session Time   Timed Code Treatment Minutes 23   Total Session Time (sum of timed and untimed services) 31   [facility]                                                                                   OUTPATIENT OCCUPATIONAL THERAPY    PLAN OF TREATMENT FOR OUTPATIENT REHABILITATION   Patient's Last Name, First Name, Gonzalez Black YOB: 1960   Provider's Name   [facility]   Medical Record No.  [id number]     Onset Date: 03/11/25 Start of Care Date:       Medical Diagnosis:                  OT Diagnosis:  decreased ADL independence/safety Certification Dates:  From:    To:       See note for plan of treatment, functional goals, and  certification details.    I CERTIFY THE NEED FOR THESE SERVICES FURNISHED UNDER        THIS PLAN OF TREATMENT AND WHILE UNDER MY CARE (Physician co-signature of this document indicates review and certification of the therapy plan).                               Patient stable for transfer. Transfer and plan of care discussed with patient  and verbalized understanding. No acute distress noted. Safety precautions maintained. Belongings secured with patient.

## 2025-05-23 SDOH — HEALTH STABILITY: PHYSICAL HEALTH: ON AVERAGE, HOW MANY MINUTES DO YOU ENGAGE IN EXERCISE AT THIS LEVEL?: 40 MIN

## 2025-05-23 SDOH — HEALTH STABILITY: PHYSICAL HEALTH: ON AVERAGE, HOW MANY DAYS PER WEEK DO YOU ENGAGE IN MODERATE TO STRENUOUS EXERCISE (LIKE A BRISK WALK)?: 5 DAYS

## 2025-05-23 ASSESSMENT — SOCIAL DETERMINANTS OF HEALTH (SDOH): HOW OFTEN DO YOU GET TOGETHER WITH FRIENDS OR RELATIVES?: ONCE A WEEK

## 2025-05-25 DIAGNOSIS — I25.10 CORONARY ARTERY DISEASE INVOLVING NATIVE CORONARY ARTERY OF NATIVE HEART, UNSPECIFIED WHETHER ANGINA PRESENT: ICD-10-CM

## 2025-05-27 RX ORDER — CARVEDILOL 3.12 MG/1
3.12 TABLET ORAL 2 TIMES DAILY WITH MEALS
Qty: 180 TABLET | Refills: 2 | Status: SHIPPED | OUTPATIENT
Start: 2025-05-27

## 2025-06-08 SDOH — HEALTH STABILITY: PHYSICAL HEALTH: ON AVERAGE, HOW MANY DAYS PER WEEK DO YOU ENGAGE IN MODERATE TO STRENUOUS EXERCISE (LIKE A BRISK WALK)?: 5 DAYS

## 2025-06-08 SDOH — HEALTH STABILITY: PHYSICAL HEALTH: ON AVERAGE, HOW MANY MINUTES DO YOU ENGAGE IN EXERCISE AT THIS LEVEL?: 40 MIN

## 2025-06-08 ASSESSMENT — SOCIAL DETERMINANTS OF HEALTH (SDOH): HOW OFTEN DO YOU GET TOGETHER WITH FRIENDS OR RELATIVES?: ONCE A WEEK

## 2025-06-10 RX ORDER — HYDROCHLOROTHIAZIDE 25 MG/1
TABLET ORAL
Qty: 90 TABLET | Refills: 3 | Status: CANCELLED | OUTPATIENT
Start: 2025-06-10

## 2025-06-11 ENCOUNTER — OFFICE VISIT (OUTPATIENT)
Dept: FAMILY MEDICINE | Facility: CLINIC | Age: 65
End: 2025-06-11
Attending: NURSE PRACTITIONER
Payer: COMMERCIAL

## 2025-06-11 VITALS
SYSTOLIC BLOOD PRESSURE: 138 MMHG | RESPIRATION RATE: 18 BRPM | TEMPERATURE: 97.7 F | BODY MASS INDEX: 39.4 KG/M2 | OXYGEN SATURATION: 95 % | HEART RATE: 52 BPM | HEIGHT: 70 IN | WEIGHT: 275.2 LBS | DIASTOLIC BLOOD PRESSURE: 78 MMHG

## 2025-06-11 DIAGNOSIS — G25.81 RESTLESS LEGS SYNDROME: ICD-10-CM

## 2025-06-11 DIAGNOSIS — I25.10 CORONARY ARTERY DISEASE INVOLVING NATIVE CORONARY ARTERY OF NATIVE HEART, UNSPECIFIED WHETHER ANGINA PRESENT: ICD-10-CM

## 2025-06-11 DIAGNOSIS — Z13.6 SCREENING FOR CARDIOVASCULAR CONDITION: ICD-10-CM

## 2025-06-11 DIAGNOSIS — Z00.00 ROUTINE GENERAL MEDICAL EXAMINATION AT A HEALTH CARE FACILITY: Primary | ICD-10-CM

## 2025-06-11 DIAGNOSIS — I10 BENIGN ESSENTIAL HYPERTENSION: ICD-10-CM

## 2025-06-11 DIAGNOSIS — Z13.1 SCREENING FOR DIABETES MELLITUS: ICD-10-CM

## 2025-06-11 LAB
ALBUMIN SERPL BCG-MCNC: 4.6 G/DL (ref 3.5–5.2)
ALP SERPL-CCNC: 132 U/L (ref 40–150)
ALT SERPL W P-5'-P-CCNC: 34 U/L (ref 0–70)
ANION GAP SERPL CALCULATED.3IONS-SCNC: 12 MMOL/L (ref 7–15)
AST SERPL W P-5'-P-CCNC: 29 U/L (ref 0–45)
BILIRUB SERPL-MCNC: 0.6 MG/DL
BUN SERPL-MCNC: 18.5 MG/DL (ref 8–23)
CALCIUM SERPL-MCNC: 10 MG/DL (ref 8.8–10.4)
CHLORIDE SERPL-SCNC: 99 MMOL/L (ref 98–107)
CHOLEST SERPL-MCNC: 128 MG/DL
CREAT SERPL-MCNC: 1.03 MG/DL (ref 0.67–1.17)
EGFRCR SERPLBLD CKD-EPI 2021: 81 ML/MIN/1.73M2
EST. AVERAGE GLUCOSE BLD GHB EST-MCNC: 128 MG/DL
FASTING STATUS PATIENT QL REPORTED: YES
FASTING STATUS PATIENT QL REPORTED: YES
GLUCOSE SERPL-MCNC: 123 MG/DL (ref 70–99)
HBA1C MFR BLD: 6.1 % (ref 0–5.6)
HCO3 SERPL-SCNC: 29 MMOL/L (ref 22–29)
HDLC SERPL-MCNC: 35 MG/DL
LDLC SERPL CALC-MCNC: 67 MG/DL
NONHDLC SERPL-MCNC: 93 MG/DL
POTASSIUM SERPL-SCNC: 4.7 MMOL/L (ref 3.4–5.3)
PROT SERPL-MCNC: 7.7 G/DL (ref 6.4–8.3)
SODIUM SERPL-SCNC: 140 MMOL/L (ref 135–145)
TRIGL SERPL-MCNC: 132 MG/DL

## 2025-06-11 PROCEDURE — 3078F DIAST BP <80 MM HG: CPT | Performed by: NURSE PRACTITIONER

## 2025-06-11 PROCEDURE — 83036 HEMOGLOBIN GLYCOSYLATED A1C: CPT | Performed by: NURSE PRACTITIONER

## 2025-06-11 PROCEDURE — G2211 COMPLEX E/M VISIT ADD ON: HCPCS | Performed by: NURSE PRACTITIONER

## 2025-06-11 PROCEDURE — 1126F AMNT PAIN NOTED NONE PRSNT: CPT | Performed by: NURSE PRACTITIONER

## 2025-06-11 PROCEDURE — 80053 COMPREHEN METABOLIC PANEL: CPT | Performed by: NURSE PRACTITIONER

## 2025-06-11 PROCEDURE — 3044F HG A1C LEVEL LT 7.0%: CPT | Performed by: NURSE PRACTITIONER

## 2025-06-11 PROCEDURE — 3075F SYST BP GE 130 - 139MM HG: CPT | Performed by: NURSE PRACTITIONER

## 2025-06-11 PROCEDURE — 99396 PREV VISIT EST AGE 40-64: CPT | Mod: 25 | Performed by: NURSE PRACTITIONER

## 2025-06-11 PROCEDURE — 80061 LIPID PANEL: CPT | Performed by: NURSE PRACTITIONER

## 2025-06-11 PROCEDURE — 36415 COLL VENOUS BLD VENIPUNCTURE: CPT | Performed by: NURSE PRACTITIONER

## 2025-06-11 PROCEDURE — 90471 IMMUNIZATION ADMIN: CPT | Performed by: NURSE PRACTITIONER

## 2025-06-11 PROCEDURE — 99214 OFFICE O/P EST MOD 30 MIN: CPT | Mod: 25 | Performed by: NURSE PRACTITIONER

## 2025-06-11 PROCEDURE — 90715 TDAP VACCINE 7 YRS/> IM: CPT | Performed by: NURSE PRACTITIONER

## 2025-06-11 RX ORDER — FUROSEMIDE 20 MG/1
20 TABLET ORAL DAILY
Qty: 90 TABLET | Refills: 3 | Status: SHIPPED | OUTPATIENT
Start: 2025-06-11

## 2025-06-11 RX ORDER — PRAMIPEXOLE DIHYDROCHLORIDE 0.25 MG/1
0.25 TABLET ORAL AT BEDTIME
Qty: 90 TABLET | Refills: 3 | Status: SHIPPED | OUTPATIENT
Start: 2025-06-11

## 2025-06-11 ASSESSMENT — PAIN SCALES - GENERAL: PAINLEVEL_OUTOF10: NO PAIN (0)

## 2025-06-11 NOTE — PATIENT INSTRUCTIONS
"Tetanus given today.    That shingles vaccine we talked about is called \"Shingrix\".     For patients on Medicare, this is covered at the pharmacy under Medicare part D starting in 2023.    For those with commercial insurance, check with your insurance to see if they cover it. If they do and you're interested in getting it, let me know and we can have you come in for just the shot without having to see me.    Let me know if you want the pneumonia shot.     Lasix/furosemide sent to replace the hydrochlorothiazide to see if we can get better help the swelling. Recheck labs in 4-6 weeks to make sure kidneys and electrolytes look good.       Patient Education   Preventive Care Advice   This is general advice given by our system to help you stay healthy. However, your care team may have specific advice just for you. Please talk to your care team about your preventive care needs.  Nutrition  Eat 5 or more servings of fruits and vegetables each day.  Try wheat bread, brown rice and whole grain pasta (instead of white bread, rice, and pasta).  Get enough calcium and vitamin D. Check the label on foods and aim for 100% of the RDA (recommended daily allowance).  Lifestyle  Exercise at least 150 minutes each week  (30 minutes a day, 5 days a week).  Do muscle strengthening activities 2 days a week. These help control your weight and prevent disease.  No smoking.  Wear sunscreen to prevent skin cancer.  Have a dental exam and cleaning every 6 months.  Yearly exams  See your health care team every year to talk about:  Any changes in your health.  Any medicines your care team has prescribed.  Preventive care, family planning, and ways to prevent chronic diseases.  Shots (vaccines)   HPV shots (up to age 26), if you've never had them before.  Hepatitis B shots (up to age 59), if you've never had them before.  COVID-19 shot: Get this shot when it's due.  Flu shot: Get a flu shot every year.  Tetanus shot: Get a tetanus shot every 10 " years.  Pneumococcal, hepatitis A, and RSV shots: Ask your care team if you need these based on your risk.  Shingles shot (for age 50 and up)  General health tests  Diabetes screening:  Starting at age 35, Get screened for diabetes at least every 3 years.  If you are younger than age 35, ask your care team if you should be screened for diabetes.  Cholesterol test: At age 39, start having a cholesterol test every 5 years, or more often if advised.  Bone density scan (DEXA): At age 50, ask your care team if you should have this scan for osteoporosis (brittle bones).  Hepatitis C: Get tested at least once in your life.  STIs (sexually transmitted infections)  Before age 24: Ask your care team if you should be screened for STIs.  After age 24: Get screened for STIs if you're at risk. You are at risk for STIs (including HIV) if:  You are sexually active with more than one person.  You don't use condoms every time.  You or a partner was diagnosed with a sexually transmitted infection.  If you are at risk for HIV, ask about PrEP medicine to prevent HIV.  Get tested for HIV at least once in your life, whether you are at risk for HIV or not.  Cancer screening tests  Cervical cancer screening: If you have a cervix, begin getting regular cervical cancer screening tests starting at age 21.  Breast cancer scan (mammogram): If you've ever had breasts, begin having regular mammograms starting at age 40. This is a scan to check for breast cancer.  Colon cancer screening: It is important to start screening for colon cancer at age 45.  Have a colonoscopy test every 10 years (or more often if you're at risk) Or, ask your provider about stool tests like a FIT test every year or Cologuard test every 3 years.  To learn more about your testing options, visit:   .  For help making a decision, visit:   https://bit.ly/vb70729.  Prostate cancer screening test: If you have a prostate, ask your care team if a prostate cancer screening test  (PSA) at age 55 is right for you.  Lung cancer screening: If you are a current or former smoker ages 50 to 80, ask your care team if ongoing lung cancer screenings are right for you.  For informational purposes only. Not to replace the advice of your health care provider. Copyright   2023 [employer]. All rights reserved. Clinically reviewed by the [facility] Transitions Program. [employer] 590404 - REV 01/24.

## 2025-06-11 NOTE — PROGRESS NOTES
"Preventive Care Visit  [facility]  Dylon Shafer NP, Family Medicine  Jun 11, 2025      Assessment & Plan       ICD-10-CM    1. Routine general medical examination at a health care facility  Z00.00       2. Benign essential hypertension  I10 furosemide (LASIX) 20 MG tablet     Comprehensive metabolic panel (BMP + Alb, Alk Phos, ALT, AST, Total. Bili, TP)     Basic metabolic panel  (Ca, Cl, CO2, Creat, Gluc, K, Na, BUN)     Comprehensive metabolic panel (BMP + Alb, Alk Phos, ALT, AST, Total. Bili, TP)      3. Restless legs syndrome  G25.81 pramipexole (MIRAPEX) 0.25 MG tablet      4. Screening for cardiovascular condition  Z13.6       5. Coronary artery disease involving native coronary artery of native heart, unspecified whether angina present  I25.10 Lipid panel reflex to direct LDL Fasting     Lipid panel reflex to direct LDL Fasting      6. Screening for diabetes mellitus  Z13.1 Hemoglobin A1c     Hemoglobin A1c        Reviewed healthy lifestyle behaviors.  Update screening/med monitoring labs.  Switch out HCTZ for Lasix for better LE swelling.  Keep working on weight loss which will help as well.  Continue using compression socks.  Reviewed cancer screening guidelines.  Tetanus given today.  Reviewed other vaccine options.  Recheck metabolic panel in 4-6 weeks after switching to furosemide.            BMI  Estimated body mass index is 39.49 kg/m  as calculated from the following:    Height as of this encounter: 1.778 m (5' 10\").    Weight as of this encounter: 124.8 kg (275 lb 3.2 oz).       Counseling  Appropriate preventive services were addressed with this patient via screening, questionnaire, or discussion as appropriate for fall prevention, nutrition, physical activity, Tobacco-use cessation, social engagement, weight loss and cognition.  Checklist reviewing preventive services available has been given to the patient.  Reviewed patient's diet, addressing concerns and/or " questions.   The patient was instructed to see the dentist every 6 months.           Subjective   Steven is a 64 year old, presenting for the following:  Physical (Fasting )        6/11/2025     9:04 AM   Additional Questions   Roomed by LAYNE Hagan          HPI    Doing well.  Had back surgery.  Well recovered.  Done with physical therapy.    Advance Care Planning  Previously reviewed        6/8/2025   General Health   How would you rate your overall physical health? Good   Feel stress (tense, anxious, or unable to sleep) Only a little   (!) STRESS CONCERN      6/8/2025   Nutrition   Three or more servings of calcium each day? Yes   Diet: Low salt   How many servings of fruit and vegetables per day? (!) 2-3   How many sweetened beverages each day? 0-1         6/8/2025   Exercise   Days per week of moderate/strenous exercise 5 days   Average minutes spent exercising at this level 40 min         6/8/2025   Social Factors   Frequency of gathering with friends or relatives Once a week   Worry food won't last until get money to buy more No   Food not last or not have enough money for food? No   Do you have housing? (Housing is defined as stable permanent housing and does not include staying outside in a car, in a tent, in an abandoned building, in an overnight shelter, or couch-surfing.) Yes   Are you worried about losing your housing? No   Lack of transportation? No   Unable to get utilities (heat,electricity)? Yes   Want help with housing or utility concern? No   (!) FINANCIAL RESOURCE STRAIN CONCERN      6/8/2025   Fall Risk   Fallen 2 or more times in the past year? No   Trouble with walking or balance? No          6/8/2025   Dental   Dentist two times every year? (!) NO         Today's PHQ-2 Score:       2/12/2025     8:35 AM   PHQ-2 ( 1999 [employer])   Q1: Little interest or pleasure in doing things 0   Q2: Feeling down, depressed or hopeless 0   PHQ-2 Score 0    Q1: Little interest or pleasure in doing things Not at  all   Q2: Feeling down, depressed or hopeless Not at all   PHQ-2 Score 0       Patient-reported         6/8/2025   Substance Use   Alcohol more than 3/day or more than 7/wk No   Do you use any other substances recreationally? No     Social History     Tobacco Use    Smoking status: Never    Smokeless tobacco: Never   Vaping Use    Vaping status: Never Used   Substance Use Topics    Alcohol use: Yes     Alcohol/week: 1.0 standard drink of alcohol     Types: 1 Standard drinks or equivalent per week    Drug use: Never           6/8/2025   STI Screening   New sexual partner(s) since last STI/HIV test? 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    Knee injury     right    Knee osteoarthritis 08/16/2023    Malignant tumor of prostate (H) 04/04/2019    C61 : Malignant tumor of prostate      Obese     Prostate cancer (H)     Rosacea     Skin disease     Sleep apnea     CPAP     Past Surgical History:   Procedure Laterality Date    ABSCESS DRAINAGE      pelvic abscess drained through IR    APPENDECTOMY      ARTHROPLASTY KNEE Right 8/16/2023    Procedure: RIGHT TOTAL KNEE ARTHROPLASTY;  Surgeon: Bradley Redd MD;  Location: [facility] Main OR    ARTHROSCOPY KNEE Bilateral     BYPASS GRAFT ARTERY CORONARY N/A 6/6/2022    Procedure: CORONARY ARTERY BYPASS GRAFT X 5, ENDOSCOPIC VESSEL PROCUREMENT LEFT LEG, INTERNAL MAMMARY ARTERY HARVEST, LEFT RADIAL  "ARTERY HARVEST, ANESTHESIA TRANSESOPHAGEAL ECHOCARDIOGRAM, EPIAORTIC ULTRASOUND;  Surgeon: Mayank Rocha MD;  Location: [facility] Main OR    CV CORONARY ANGIOGRAM N/A 4/7/2022    Procedure: Coronary Angiogram;  Surgeon: Mery Lyons MD;  Location: [facility] CATH LAB CV    EXCHANGE POLY COMPONENT ARTHROPLASTY KNEE Right 11/2/2023    Procedure: RIGHT KNEE IRRIGATION AND DEBRIDEMENT;  Surgeon: Bradley Redd MD;  [facility]    FUSION TRANSFORAMINAL LUMBAR INTERBODY, 1 LEVEL, POSTERIOR APPROACH, USING OTS SURG IMAGING GUIDANCE N/A 03/11/2025    Procedure: LUMBAR 4 - LUMBAR 5 TRANSFORAMINAL LUMBAR INTERBODY FUSION; LUMBAR 4 - LUMBAR 5 POSTERIOR INSTRUMENTED FUSION;;  Surgeon: Rajiv Gonzalez MD;  [place] [facility] [place]    LAMINECTOMY, SPINE, LUMBAR, 2 LEVELS, USING OPTICAL TRACKING SYSTEM N/A 03/11/2025    Procedure: LUMBAR 3 - LUMBAR 4, LUMBAR 4 - LUMBAR 5 DECOMPRESSION WITH STEALTH NAVIGATION;  Surgeon: Rajiv Gonzalez MD;  Location: [facility] OR    PICC SINGLE LUMEN PLACEMENT  11/05/2023    PROSTATECTOMY      REMOVE HARDWARE ARTHROPLASTY KNEE, IRRIG & TAVON, PLACE ANTIBIOTIC CEMENT SPACER, COMBINED Right 11/2/2023    Procedure: POLYETHELYNE EXCHANGE;  Surgeon: Brdaley Redd MD;  Location: [facility] OR    TOTAL KNEE ARTHROPLASTY Left     WRIST SURGERY           Review of Systems  Constitutional, HEENT, cardiovascular, pulmonary, gi and gu systems are negative, except as otherwise noted.     Objective    Exam  /78 (BP Location: Right arm, Patient Position: Sitting, Cuff Size: Adult Large)   Pulse 52   Temp 97.7  F (36.5  C) (Oral)   Resp 18   Ht 1.778 m (5' 10\")   Wt 124.8 kg (275 lb 3.2 oz)   SpO2 95%   BMI 39.49 kg/m     Estimated body mass index is 39.49 kg/m  as calculated from the following:    Height as of this encounter: 1.778 m (5' 10\").    Weight as of this encounter: 124.8 kg (275 lb 3.2 oz).    Physical Exam  GENERAL: " alert and no distress  EYES: Eyes grossly normal to inspection, PERRL and conjunctivae and sclerae normal  HENT: ear canals and TM's normal, nose and mouth without ulcers or lesions  NECK: no adenopathy, no asymmetry, masses, or scars  RESP: lungs clear to auscultation - no rales, rhonchi or wheezes  CV: regular rate and rhythm, normal S1 S2, no S3 or S4, no murmur, click or rub, no peripheral edema  ABDOMEN: soft, nontender, no hepatosplenomegaly, no masses and bowel sounds normal  MS: no gross musculoskeletal defects noted, no edema  SKIN: no suspicious lesions or rashes  NEURO: Normal strength and tone, mentation intact and speech normal  PSYCH: mentation appears normal, affect normal/bright        Signed Electronically by: Dylon Shafer NP

## 2025-06-12 ENCOUNTER — RESULTS FOLLOW-UP (OUTPATIENT)
Dept: FAMILY MEDICINE | Facility: CLINIC | Age: 65
End: 2025-06-12

## 2025-07-07 ENCOUNTER — TRANSFERRED RECORDS (OUTPATIENT)
Dept: HEALTH INFORMATION MANAGEMENT | Facility: CLINIC | Age: 65
End: 2025-07-07
Payer: COMMERCIAL

## 2025-08-06 ENCOUNTER — TRANSFERRED RECORDS (OUTPATIENT)
Dept: HEALTH INFORMATION MANAGEMENT | Facility: CLINIC | Age: 65
End: 2025-08-06
Payer: COMMERCIAL

## 2025-08-08 DIAGNOSIS — G47.00 INSOMNIA, UNSPECIFIED: ICD-10-CM

## 2025-08-08 DIAGNOSIS — I10 BENIGN ESSENTIAL HYPERTENSION: ICD-10-CM

## 2025-08-08 DIAGNOSIS — Z95.1 S/P CABG (CORONARY ARTERY BYPASS GRAFT): ICD-10-CM

## 2025-08-08 RX ORDER — AMLODIPINE BESYLATE 10 MG/1
10 TABLET ORAL DAILY
Qty: 90 TABLET | Refills: 0 | Status: SHIPPED | OUTPATIENT
Start: 2025-08-08

## 2025-08-08 RX ORDER — HYDROCHLOROTHIAZIDE 25 MG/1
25 TABLET ORAL DAILY
Qty: 90 TABLET | Refills: 3 | OUTPATIENT
Start: 2025-08-08

## 2025-08-08 RX ORDER — TRAZODONE HYDROCHLORIDE 50 MG/1
50-100 TABLET ORAL AT BEDTIME
Qty: 180 TABLET | Refills: 0 | Status: SHIPPED | OUTPATIENT
Start: 2025-08-08

## 2025-08-13 ENCOUNTER — TRANSFERRED RECORDS (OUTPATIENT)
Dept: HEALTH INFORMATION MANAGEMENT | Facility: CLINIC | Age: 65
End: 2025-08-13
Payer: COMMERCIAL

## (undated) DEVICE — DRAPE IOBAN INCISE 23X17" 6650EZ

## (undated) DEVICE — SOL NACL 0.9% IRRIG 1000ML BOTTLE 2F7124

## (undated) DEVICE — A3 SUPPLIES- SEE NURSING INFO PAGE

## (undated) DEVICE — GLOVE BIOGEL PI INDICATOR 8.0 LF 41680

## (undated) DEVICE — DRSG MEPILEX BORDER ADHS 4INX12IN STRL 496605

## (undated) DEVICE — KIT DRAIN CLOSED WOUND SUCTION MED 400ML RESVR

## (undated) DEVICE — Device

## (undated) DEVICE — PACK MINOR SINGLE BASIN SSK3001

## (undated) DEVICE — DRAPE C-ARM 60X42" 1013

## (undated) DEVICE — DRAPE SHEET REV FOLD 3/4 9349

## (undated) DEVICE — POSITIONER ARM CRADLE LAMINECTOMY DISP

## (undated) DEVICE — DRSG DRAIN 4X4" 7086

## (undated) DEVICE — ESU ELEC EDGE INSULATED BLADE 4" E1455-4

## (undated) DEVICE — CUSHION INSERT LG PRONE VIEW JACKSON TABLE

## (undated) DEVICE — GLOVE BIOGEL PI ULTRATOUCH G SZ 8.5 42185

## (undated) DEVICE — DRSG GAUZE 4X4" TRAY 6939

## (undated) DEVICE — DECANTER VIAL 2006S

## (undated) DEVICE — LIGACLIP LARGE AESCULAP O4120-1

## (undated) DEVICE — DRESSING MEPILEX AG SILVER 4X12 395990

## (undated) DEVICE — CATH IV 14GA 2IN REM FLASHPLUG DEHP-FR PVC FR 4251717-02

## (undated) DEVICE — MARKER SURG SKIN 2 TIP STRL SPP99DT2AA

## (undated) DEVICE — SUTURE VICRYL+ 2-0 CT-2 27" UND VCP269H

## (undated) DEVICE — SUTURE MONOCRYL+ 4-0 PS-2 27IN MCP426H

## (undated) DEVICE — DRAPE BACK TABLE PADDED 60X90

## (undated) DEVICE — ELECTRODE PATIENT RETURN ADULT L10 FT 2 PLATE CORD 0855C

## (undated) DEVICE — CLIP HORIZON MULTI SM YELLOW 001204

## (undated) DEVICE — DRSG ADAPTIC 3X8" 6113

## (undated) DEVICE — GOWN IMPERVIOUS BREATHABLE SMART LG 89015

## (undated) DEVICE — BONE CLEANING TIP INTERPULSE  0210-010-000

## (undated) DEVICE — RX VISTASEAL FIBRIN SEALANT W/THROMBIN 10ML VST10

## (undated) DEVICE — SU DERMABOND ADVANCED .7ML DNX12

## (undated) DEVICE — CUSTOM PACK LUMBAR FUSION SNE5BLFHEA

## (undated) DEVICE — MARKER SPHERES PASSIVE MEDT PACK 5 8801075

## (undated) DEVICE — SPONGE LAP 8X4IN 12 PLY RADOPQ DERMACEA STRL BLU WHT

## (undated) DEVICE — CUSTOM PACK TOTAL KNEE SOP5BTKHEC

## (undated) DEVICE — GLOVE BIOGEL PI ULTRATOUCH G SZ 8.0 42180

## (undated) DEVICE — ESU ELEC BLADE 2.75" COATED/INSULATED E1455

## (undated) DEVICE — KIT HAND CONTROL ACIST 014644 AR-P54

## (undated) DEVICE — CLEANSER JET LAVAGE IRRISEPT 0.05% CHG IRRISEPT45USA

## (undated) DEVICE — SOLUTION IRRIG 2B7127 .9NS 3000ML BAG

## (undated) DEVICE — SUTURE PDS 1 CTB-1 ZB347

## (undated) DEVICE — SUTURE VICRYL+ 2 27IN CP UNDYED VCP195H

## (undated) DEVICE — SU VICRYL+ 3-0 27IN SH UND VCP416H

## (undated) DEVICE — STPL SKIN 35W 6.9MM  PXW35

## (undated) DEVICE — VIAL DECANTER STERILE WHITE DYNJDEC06

## (undated) DEVICE — HOLDER LIMB VELCRO OR 0814-1533

## (undated) DEVICE — SU ETHIBOND 3-0 BBDA 36" X588H

## (undated) DEVICE — NEEDLE HYPO 18X1-1/2 SAFETY 305918

## (undated) DEVICE — SUTURE VICRYL+ 0 27IN CT-1 UND VCP260H

## (undated) DEVICE — SOL WATER IRRIG 1000ML BOTTLE 2F7114

## (undated) DEVICE — CUSTOM PACK CAB SCV5BCBHEA

## (undated) DEVICE — PITCHER STERILE 1000ML  SSK9004A

## (undated) DEVICE — SU PROLENE 8-0 BV130-5DA 24" 8732H

## (undated) DEVICE — TAPE ADH MEDIPORE 6IN SOFT CLOTH 2866

## (undated) DEVICE — EXCHANGE WIRE .035 260 STAR/JFC/035/260/ M001491681

## (undated) DEVICE — CONTAINER URINE SPEC 4OZ STRL 1053

## (undated) DEVICE — CATH DIAG 4FR JR 5.0 538423

## (undated) DEVICE — SUCTION CANISTER MEDIVAC LINER 3000ML W/LID 65651-530

## (undated) DEVICE — GUIDEWIRE VASCULAR 0.035IN DIA 145CML 15CML/6CML STAINLESS

## (undated) DEVICE — SU STRATAFIX PDS PLUS 1 CT-1 18" SXPP1A404

## (undated) DEVICE — SU STRATAFIX PDS PLUS 0 CT 45CM SXPP1A406

## (undated) DEVICE — CABLE MYO/LEAD PACING BLUE DISP 019-535

## (undated) DEVICE — CATH THORACIC STRAIGHT CLOTSTOP 28FR

## (undated) DEVICE — DEVICE TISSUE STABILIZATION OCTOBASE 28707

## (undated) DEVICE — ADH SKIN CLOSURE PREMIERPRO EXOFIN 1.0ML 3470

## (undated) DEVICE — SUCTION MANIFOLD NEPTUNE 2 SYS 4 PORT 0702-020-000

## (undated) DEVICE — SUTURE BOOTS 051003PBX

## (undated) DEVICE — PLATE GROUNDING ADULT W/CORD 9165L

## (undated) DEVICE — DRSG STERI STRIP 1/2X4" R1547

## (undated) DEVICE — HEMOSTASIS BONE OSTENE 2.5G SYNTHETIC 1503832

## (undated) DEVICE — GLOVE UNDER INDICATOR PI SZ 8.5 LF 41685

## (undated) DEVICE — SLEEVE TR BAND RADIAL COMPRESSION DEVICE 29CM XX-RF06L

## (undated) DEVICE — MANIFOLD KIT ANGIO AUTOMATED 014613

## (undated) DEVICE — SU PROLENE 6-0 C-1DA 30" M8706

## (undated) DEVICE — DRESSING MEPILEX ADH 4INX10IN STRL LF 496455

## (undated) DEVICE — SUTURE PROLENE 6-0 C-1 8307H

## (undated) DEVICE — GLOVE BIOGEL PI ULTRATOUCH G SZ 7.5 42175

## (undated) DEVICE — SUCTION IRR SYSTEM W/O TIP INTERPULSE HANDPIECE 0210-100-000

## (undated) DEVICE — CAST PADDING 6" STERILE 9046S

## (undated) DEVICE — CUSTOM PACK TOTAL KNEE ACCESSORY SOP5BTAHEA

## (undated) DEVICE — DRSG ABD TNDRSRB WET PRUF 8IN X 10IN STRL  9194A

## (undated) DEVICE — TOOL DISSECT MIDAS MR8 14CM MATCH HEAD 3MM MR8-14MH30

## (undated) DEVICE — SHEATH GLIDE RADIAL 4FR 25CM 0.021

## (undated) DEVICE — DRSG TEGADERM 4X4 3/4" 1626W

## (undated) DEVICE — CATH THORACIC RT ANGLE CLOTSTOP 28FR

## (undated) DEVICE — INTRO MICRO MINI STICK 4FR STD NITINOL

## (undated) DEVICE — CLIP HORIZON MULTI MED BLUE 002204

## (undated) DEVICE — SU PDS II 0 CT-2 27" Z334H

## (undated) DEVICE — GOWN IMPERVIOUS ZONED XLG 9041

## (undated) DEVICE — SYR ANGIOGRAPHY MULTIUSE KIT ACIST 014612

## (undated) DEVICE — BLADE SAW SAGITTAL STRK DUAL CUT 4118-135-090

## (undated) DEVICE — SPONGE LAP 18X18" X8435

## (undated) DEVICE — ELECTRODE ADULT PACING MULTI P-211-M1

## (undated) DEVICE — SU PROLENE 7-0 BV-1DA 4X30" M8703

## (undated) DEVICE — GOWN XLG DISP 9545

## (undated) DEVICE — CATH TRAY FOLEY SURESTEP 16FR DRAIN BAG STATOCK A899916

## (undated) DEVICE — SUCTION DRY CHEST DRAIN OASIS 3600-100

## (undated) DEVICE — CLIP APPLIER 11" MED LIGACLIP MCM20

## (undated) DEVICE — ESU PENCIL SMOKE EVAC W/ROCKER SWITCH 0703-047-000

## (undated) DEVICE — RX SURGIFLO HEMOSTATIC MATRIX W/THROMBIN 8ML 2994

## (undated) DEVICE — SUTURE VICRYL+ 1 27IN CT-1 UND VCP261H

## (undated) DEVICE — BANDAGE ELASTIC 4X550 LF DBL 593-94LF

## (undated) DEVICE — SUTURE VICRYL+ 3-0 18IN PS-2 UND VCP497H

## (undated) DEVICE — BLANKET BAIR ADLT PLYMR 60X36IN 63000

## (undated) DEVICE — CUSTOM PACK CORONARY SAN5BCRHEA

## (undated) DEVICE — SU MONOCRYL 3-0 PS-2 18" UND MCP497G

## (undated) DEVICE — WRAP B-COOL TERI LUMBAR 08143380

## (undated) DEVICE — DRAPE O ARM TUBE 9732722

## (undated) DEVICE — SUTURE VICRYL+ 2-0 27IN CT-1 UND VCP259H

## (undated) DEVICE — DRAPE STERI TOWEL LG 1010

## (undated) DEVICE — GLOVE SURG PI ULTRA TOUCH M SZ 8 LF

## (undated) DEVICE — SU PROLENE 4-0 SHDA 36" 8521H

## (undated) DEVICE — CATH SUCTION 14FR W/O CTRL DYND41962

## (undated) DEVICE — SOL NACL 0.9% INJ 1000ML BAG 2B1324X

## (undated) DEVICE — LEAD PACER MYOCARDIAL BIPOLAR TEMPORARY 53CM 6495F

## (undated) DEVICE — KIT ENDO VASOVIEW HARVESTING SYSTEM VH-3200

## (undated) DEVICE — SU ETHIBOND 0 CT-1 CR 8X18" CX21D

## (undated) DEVICE — PREP CHLORAPREP 26ML TINTED HI-LITE ORANGE 930815

## (undated) DEVICE — CUSTOM PACK CV ST JOES SCV5BCVHEA

## (undated) DEVICE — PACK 9X6IN THRP HOT COLD CMPR  MED GEL 80104

## (undated) DEVICE — SYR 20ML LL W/O NDL 302830

## (undated) DEVICE — CABLE MYO/LEAD PACING WHITE DISP 019-530

## (undated) DEVICE — BLADE SAW STRK STERNAL 6207-97-101

## (undated) DEVICE — SUCTION MANIFOLD NEPTUNE 2 SYS 1 PORT 702-025-000

## (undated) DEVICE — CATH ANGIO INFINITI JL3.5 4FRX100CM 538418

## (undated) DEVICE — BANDAGE ELASTIC VELCRO 4IN REB3014

## (undated) DEVICE — COUNTER NEEDLE ADH & FOAM 20CT 9106

## (undated) DEVICE — GLOVE BIOGEL PI ORTHOPRO SZ 8.0 47680

## (undated) DEVICE — CONNECTOR Y 1/2 X 3/8 X 3/8 STRL C440

## (undated) DEVICE — SU MONOCRYL+ 4-0 18IN PS2 UND MCP496G

## (undated) DEVICE — SU PLEDGET SOFT TFE 3/8"X3/26"X1/16" PCP40

## (undated) RX ORDER — ONDANSETRON 2 MG/ML
INJECTION INTRAMUSCULAR; INTRAVENOUS
Status: DISPENSED
Start: 2025-03-11

## (undated) RX ORDER — FENTANYL CITRATE-0.9 % NACL/PF 10 MCG/ML
PLASTIC BAG, INJECTION (ML) INTRAVENOUS
Status: DISPENSED
Start: 2022-06-06

## (undated) RX ORDER — ONDANSETRON 2 MG/ML
INJECTION INTRAMUSCULAR; INTRAVENOUS
Status: DISPENSED
Start: 2023-08-16

## (undated) RX ORDER — PROPOFOL 10 MG/ML
INJECTION, EMULSION INTRAVENOUS
Status: DISPENSED
Start: 2022-06-06

## (undated) RX ORDER — PROPOFOL 10 MG/ML
INJECTION, EMULSION INTRAVENOUS
Status: DISPENSED
Start: 2023-08-16

## (undated) RX ORDER — PROPOFOL 10 MG/ML
INJECTION, EMULSION INTRAVENOUS
Status: DISPENSED
Start: 2023-11-02

## (undated) RX ORDER — CEFAZOLIN SODIUM 1 G/3ML
INJECTION, POWDER, FOR SOLUTION INTRAMUSCULAR; INTRAVENOUS
Status: DISPENSED
Start: 2025-03-11

## (undated) RX ORDER — FENTANYL CITRATE 50 UG/ML
INJECTION, SOLUTION INTRAMUSCULAR; INTRAVENOUS
Status: DISPENSED
Start: 2025-03-11

## (undated) RX ORDER — CEFAZOLIN SODIUM 1 G/3ML
INJECTION, POWDER, FOR SOLUTION INTRAMUSCULAR; INTRAVENOUS
Status: DISPENSED
Start: 2022-06-06

## (undated) RX ORDER — EPHEDRINE SULFATE 50 MG/ML
INJECTION, SOLUTION INTRAMUSCULAR; INTRAVENOUS; SUBCUTANEOUS
Status: DISPENSED
Start: 2022-06-06

## (undated) RX ORDER — FENTANYL CITRATE 50 UG/ML
INJECTION, SOLUTION INTRAMUSCULAR; INTRAVENOUS
Status: DISPENSED
Start: 2022-06-06

## (undated) RX ORDER — VANCOMYCIN HYDROCHLORIDE 1 G/20ML
INJECTION, POWDER, LYOPHILIZED, FOR SOLUTION INTRAVENOUS
Status: DISPENSED
Start: 2022-06-06

## (undated) RX ORDER — FENTANYL CITRATE-0.9 % NACL/PF 10 MCG/ML
PLASTIC BAG, INJECTION (ML) INTRAVENOUS
Status: DISPENSED
Start: 2025-03-11

## (undated) RX ORDER — FENTANYL CITRATE 50 UG/ML
INJECTION, SOLUTION INTRAMUSCULAR; INTRAVENOUS
Status: DISPENSED
Start: 2023-11-02

## (undated) RX ORDER — DIAZEPAM 5 MG
TABLET ORAL
Status: DISPENSED
Start: 2022-04-07

## (undated) RX ORDER — DEXAMETHASONE SODIUM PHOSPHATE 10 MG/ML
INJECTION, SOLUTION INTRAMUSCULAR; INTRAVENOUS
Status: DISPENSED
Start: 2025-03-11

## (undated) RX ORDER — PHENYLEPHRINE HYDROCHLORIDE 10 MG/ML
INJECTION INTRAVENOUS
Status: DISPENSED
Start: 2025-03-11

## (undated) RX ORDER — LIDOCAINE HYDROCHLORIDE 10 MG/ML
INJECTION, SOLUTION EPIDURAL; INFILTRATION; INTRACAUDAL; PERINEURAL
Status: DISPENSED
Start: 2025-03-11

## (undated) RX ORDER — DEXAMETHASONE SODIUM PHOSPHATE 4 MG/ML
INJECTION, SOLUTION INTRA-ARTICULAR; INTRALESIONAL; INTRAMUSCULAR; INTRAVENOUS; SOFT TISSUE
Status: DISPENSED
Start: 2023-08-16

## (undated) RX ORDER — FENTANYL CITRATE-0.9 % NACL/PF 10 MCG/ML
PLASTIC BAG, INJECTION (ML) INTRAVENOUS
Status: DISPENSED
Start: 2023-08-16

## (undated) RX ORDER — FENTANYL CITRATE 50 UG/ML
INJECTION, SOLUTION INTRAMUSCULAR; INTRAVENOUS
Status: DISPENSED
Start: 2022-04-07

## (undated) RX ORDER — LIDOCAINE HYDROCHLORIDE 10 MG/ML
INJECTION, SOLUTION EPIDURAL; INFILTRATION; INTRACAUDAL; PERINEURAL
Status: DISPENSED
Start: 2023-08-16

## (undated) RX ORDER — KETAMINE HYDROCHLORIDE 10 MG/ML
INJECTION INTRAMUSCULAR; INTRAVENOUS
Status: DISPENSED
Start: 2022-06-06

## (undated) RX ORDER — PROPOFOL 10 MG/ML
INJECTION, EMULSION INTRAVENOUS
Status: DISPENSED
Start: 2025-03-11